# Patient Record
Sex: FEMALE | Race: BLACK OR AFRICAN AMERICAN | NOT HISPANIC OR LATINO | Employment: UNEMPLOYED | URBAN - METROPOLITAN AREA
[De-identification: names, ages, dates, MRNs, and addresses within clinical notes are randomized per-mention and may not be internally consistent; named-entity substitution may affect disease eponyms.]

---

## 2017-04-19 ENCOUNTER — TELEPHONE (OUTPATIENT)
Dept: NEUROLOGY | Facility: CLINIC | Age: 57
End: 2017-04-19

## 2017-04-19 NOTE — TELEPHONE ENCOUNTER
I called in refill script to on representative pharmacist because the patient called them desperate, about to run out

## 2017-06-01 ENCOUNTER — TELEPHONE (OUTPATIENT)
Dept: NEUROLOGY | Facility: CLINIC | Age: 57
End: 2017-06-01

## 2017-06-01 NOTE — TELEPHONE ENCOUNTER
----- Message from Naina Monzon sent at 5/31/2017  4:54 PM CDT -----  Contact: Patient [phone]  Patient calling to let office know that [facility] will not be paying for her clobazam (ONFI) 20 mg Tab, anymore and she wanted to know what she should do to continue to receive her medication. Please call.

## 2017-06-01 NOTE — TELEPHONE ENCOUNTER
I called Miss Francisco to get details. She is emailing me the letter she received from [employer]. I am completing the onfi PA assistance request form in hopes of receiving help from the company.

## 2017-06-13 ENCOUNTER — TELEPHONE (OUTPATIENT)
Dept: NEUROSURGERY | Facility: CLINIC | Age: 57
End: 2017-06-13

## 2017-06-14 NOTE — TELEPHONE ENCOUNTER
----- Message from Dominic Menezes sent at 6/13/2017 12:28 PM CDT -----  Contact: PT  Would like to schedule her post op appt, the next available was 7-18-17. She would like to come in sooner.    She would like to schedule between 8am-10am, due to distance.     Call: [phone]

## 2017-06-14 NOTE — TELEPHONE ENCOUNTER
Spoke with patient. Patient has been advised that she will not need to see Dr. Gonzalez until her Vagal Nerve Stimulator needs the battery changed. Patient has been informed that she needs to contact her PCP if she is having nose bleeding. Lesvia the VNS rep has been contacted and she will call patient about her VNS concerns.

## 2017-06-21 ENCOUNTER — TELEPHONE (OUTPATIENT)
Dept: NEUROLOGY | Facility: CLINIC | Age: 57
End: 2017-06-21

## 2017-06-21 NOTE — TELEPHONE ENCOUNTER
----- Message from Alejandra Gooden sent at 6/21/2017  8:37 AM CDT -----  Contact: self @ [phone]  Pt says onfi is not paying for her medication.  Pt would like to speak with someone.  pls call.

## 2017-06-26 ENCOUNTER — TELEPHONE (OUTPATIENT)
Dept: NEUROLOGY | Facility: CLINIC | Age: 57
End: 2017-06-26

## 2017-06-26 NOTE — TELEPHONE ENCOUNTER
Patient has bilateral temporal pain and weakness. took mucinex, didn't seem to help. she should follow up w/ her PCP or ENT per Dr. Hobbs. she understands and agrees the plan

## 2017-06-27 ENCOUNTER — TELEPHONE (OUTPATIENT)
Dept: NEUROLOGY | Facility: CLINIC | Age: 57
End: 2017-06-27

## 2017-06-27 NOTE — TELEPHONE ENCOUNTER
Shilo Olvera spoke to Pt       - Message from Tomy Santana sent at 6/26/2017 12:00 PM CDT -----  Contact: self  Pt stated that she is feeling weak after taking a mucinex she is requesting a call back at [phone].. She stated she had pain on both sides of her head which is why she took a mucinex ..

## 2017-07-20 ENCOUNTER — TELEPHONE (OUTPATIENT)
Dept: NEUROLOGY | Facility: CLINIC | Age: 57
End: 2017-07-20

## 2017-07-20 NOTE — TELEPHONE ENCOUNTER
Spoke to pt and advised much needed f/u to discuss medications and seizure activity. Pt requested I call Reji, son, to schedule pt. Cannot leave message on lenahs phone.

## 2017-07-20 NOTE — TELEPHONE ENCOUNTER
----- Message from Filiberto Mendes sent at 7/20/2017  2:38 PM CDT -----  Contact: patient @ [phone]  Pt said she had 2 seizures this morning and would like to speak with someone. Please call.

## 2017-07-21 ENCOUNTER — TELEPHONE (OUTPATIENT)
Dept: NEUROLOGY | Facility: CLINIC | Age: 57
End: 2017-07-21

## 2017-07-21 NOTE — TELEPHONE ENCOUNTER
Spoke to Pt she states understanding of her appt date and time      Message from Alejandra Gooden sent at 7/21/2017 11:09 AM CDT -----  Contact: self @ [phone]  Pt says she is trying to come in on Tuesday 7-25-17 not 8-1-17.  Pls call.

## 2017-07-21 NOTE — TELEPHONE ENCOUNTER
Spoke to Pt she states she will give us a call back for her appt die to her  work scheduled         - Message from Donya Gardiner sent at 7/21/2017 10:04 AM CDT -----  Contact: pt [phone]  Pt is calling to schedule an appt.pls call

## 2017-07-25 ENCOUNTER — OFFICE VISIT (OUTPATIENT)
Dept: NEUROLOGY | Facility: CLINIC | Age: 57
End: 2017-07-25
Payer: MEDICARE

## 2017-07-25 ENCOUNTER — TELEPHONE (OUTPATIENT)
Dept: NEUROLOGY | Facility: CLINIC | Age: 57
End: 2017-07-25

## 2017-07-25 VITALS
SYSTOLIC BLOOD PRESSURE: 118 MMHG | BODY MASS INDEX: 36.84 KG/M2 | HEART RATE: 90 BPM | DIASTOLIC BLOOD PRESSURE: 78 MMHG | HEIGHT: 65 IN | WEIGHT: 221.13 LBS

## 2017-07-25 DIAGNOSIS — G40.109 TEMPORAL LOBE EPILEPSY: ICD-10-CM

## 2017-07-25 DIAGNOSIS — Z79.899 ENCOUNTER FOR MONITORING ANTICONVULSANT THERAPY: Primary | ICD-10-CM

## 2017-07-25 DIAGNOSIS — Z51.81 ENCOUNTER FOR MONITORING ANTICONVULSANT THERAPY: Primary | ICD-10-CM

## 2017-07-25 PROCEDURE — 99999 PR PBB SHADOW E&M-EST. PATIENT-LVL III: CPT | Mod: PBBFAC,,, | Performed by: PSYCHIATRY & NEUROLOGY

## 2017-07-25 PROCEDURE — 99499 UNLISTED E&M SERVICE: CPT | Mod: S$PBB,,, | Performed by: PSYCHIATRY & NEUROLOGY

## 2017-07-25 PROCEDURE — 99213 OFFICE O/P EST LOW 20 MIN: CPT | Mod: PBBFAC | Performed by: PSYCHIATRY & NEUROLOGY

## 2017-07-25 PROCEDURE — 99213 OFFICE O/P EST LOW 20 MIN: CPT | Mod: S$PBB,,, | Performed by: PSYCHIATRY & NEUROLOGY

## 2017-07-25 NOTE — PROGRESS NOTES
History of Present Illness:  Ran out of Onfi in May   Last sz - past Thursday, prior to that she had 3 in 2016     Prior note:   Patient reports that she has had 4 seizures since last visit. She had 3 seizures on Wednesday and was evaluated by MANJULA. She reports that she has been taking benadryl for a rash. She is currently on Lamictal 125 mg daily and Zonsiamide 100 mg AM and 300 mg PM. She reports compliance with her medications. She has an appointment with Dr. Gonzalez on Tuesday for VNS.      Last Clinic Visit (5/19/16):  Patient reports that she has had 2 seizures since her last visit. She is currently taking Zonisamide 100 mg AM and 300 mg PM and Lamictal 75 mg daily. She reports that she has been complaint with her medications by using a pill box. She denies missing any medications when she had her seizures. She reports that she is still feeling fatigued but it is improving. She has an appointment with Dr. Gonzalez for VNS consult on 6/7/16. She also reports that someone from [facility] has been in contact with her about the possibility of her being able to get her medications cheaper.      Last Clinic Visit (4/28/16):  The patient is a 56 y.o. female who is here today to follow-up from EMU admission. Patient was discharged form the EMU on 4/16/16 on Zonisamide 200 mg BID and Onfi 20 mg BID. Patient was not able to afford Onfi and she ran out of medication on Saturday. She reports 2 seizures since discharge one on 4/23/16, she is not sure of when the other occurred. Zonisamide was increased on Monday to 300 mg BID since patient ran out of Onfi until she could be seen in clinic today to discuss other medication options. She reports since the increase that she is fatigued, has decreased appetite, and has trouble thinking. She reports that she is interested in VNS.      Epilepsy History  The patient is a 55 y.o. yo RHWF   The patient was initially referred for consultation by Dr. Palencia.      The patient was  accompanied by her mother who provided some of the history.   Patient reports that she began having spells about 6 years ago. The first spell she describes as staring for a few seconds. She reports that sometimes she is aware and sometimes she has loss of awareness, she reports that she has not had a staring episode in a few years. She describes her second type of spell as her left side feeling like it will give way, followed by running around and spitting. Her mother reports that this can last up to 10 minutes. She reported that these spells began 3 years ago and are currently happening about once a week. She reports that she had 2 of these spells on Monday. Denies tongue biting and incontinence during these spells. Triggers include sleep deprivation, cleaning odors, and certain foods. She also reports that when she uses her CPAP machine that she tends to have seizures. She is currently taking Zonisiamide 100 mg AM and 300 mg PM and Depakote 500 mg BID.      Seizure Seminology  Seizure Type 1  Classification:   Aura -   Ictus  - Nonconv - staring +/- loss of awarness  - Conv -  - Duration - few seconds   Post-ictal  - Symptoms  - Duration  Age of onset - 46 y/o  Current Seizure Frequency - Has not had one in a few years  Last Seizure     Seizure Type 2  Classification:   Aura - left side feels like it is going to give way  Ictus  - Nonconv - runs around, spitting; not aware pf what is going on  - Conv -  - Duration - 10 minutes  Post-ictal  - Symptoms - takes a nap  - Duration  Age of onset - 51 y/o  Current Seizure Frequency - 1 a week  Last Seizure - 6/2/16     Seizure Triggers  Sleep Deprivation - Yes  Other medications - None  Psych/stress - None  Photic stimulation - None  Hyperventilation - None  Medical Problems - None  Menses - No  Sensory Stimulation (light, sound, etc) - None  Missed dose of meds - None  Odors +     AED Treatments  Present regimen  Zonisamide 300 mg BID     7/15/16  PREVIOUS SETTINGS    Output Current  (MA)  0   Signal Freq  (Hz)  30    Pulse width  (u sec)  500    Signal on time  (sec)  30    Signal off time  (min)  5    Mag Current  (MA)  0    Mag on time  (sec)  30    Pulse width  (u sec)  500               NEW SETTINGS   Output Current  (MA)  0.25     Signal Freq  (Hz)  30    Pulse width  (u sec)  500    Signal on time  (sec)  30    Signal off time  (min)  5.0    Mag Current  (MA)  0.5    Mag on time  (sec)  60    Pulse width  (u sec)  500               Auto-stim Output Current  (MA)  0.375     Signal Freq  (Hz)  30    Pulse width  (u sec)  500    Signal on time  (sec)  30            Tachycardia detection    ON   Sensitivity    3   Threshold    40%              Model# [id number]   Serial# [id number]  Implanted 6/30/16  Impedence - Ok    Ohms - 3690  IFI - NO      Cough at 0.5 mA    Prior treatments  lamotrigine (Lamictal, LTG) - mild rash  levetiracetam (Keppra, LEV)  topiramate (Topamax, TPM)  Depakote 500 gm BID  Vimpat, Onfi, and Fycompa were too expensive      Not tried  acetazolamide (Diamox, AZM)  amantadine  carbamazepine (Tegretol, CBZ)  ethosuximide (Zarontin, ESM)  eslicarbazine (Aptiom, ESL)  felbamate (felbatol, FBM)  gabapentin (Neurontin, GPN)  methsuximide (Celontin, MSM)  methyphenytoin (Mesantion, MHT)  oxcarbazepine (Trileptal OXC)  phenobarbital (Pb)  phenytoin (Dilantin, PHT)  pregabalin (Lyrica, PGB)  primidone (Mysoline, PRM)  retigabine (Potiga, RTG)  rufinamide (Banzel, RUF)  tiagabine (Gabatril, TGB)  viagabatrin, (Sabril, VGB)  vagal nerve stimulator (VNS)  Benzodiazepines  diazepam - rectal (Diastatl)  diazepam - oral (Valium, DZ)  clonazepam (Klonopin, CZP)  clorazepate (Tranxene, CLZ)  Ativan  Brain Stimulation  Vagal Nerve Stimulation-n/a  DBS- n/a  Compliance method  Memory - yes  Mom or Spouse - Yes  Pill Box - no  Chris calendar - no  Turn over medication bottle - no  Phone alarm - no     Seizure Evaluation  EEG Routine - reports that she has had in the past and were  normal (reports not available to me)  EEG Ambulatory -   EEG\Video Monitoring -   4/7/16 - CLINICAL DESCRIPTION: At the beginning of seizure 1, the patient was lying in bed with her eyes closed and was in light sleep. With the onset of the electrographic seizure, she opened her eyes and looked slightly to the left. She then quickly got out of bed and walked out of the site of the camera towards the open door into the corridor. At the onset, she did fumble and tried to push the event button. The nursing and physician staff did guide her back into the room and she remained standing, but not responding appropriately to them till the end of the electrographic seizure. Clinical manifestations at the beginning of the second seizure were the same. She quickly got out of bed and tried to exit the room. The camera was changed and followed her and she was guided back into the room by the nursing staff. She was noted to have some orofacial automatisms. Seizure 3 also occurred while in bed asleep. She was noted to move a little in the bed, but did not get out of bed and ambulate as she did on the first 2 seizures. There were no automatisms or other motor behavior noted. The fourth seizure also occurred out of light sleep. Fifteen seconds into the seizure, she appeared to arouse and then turned over in bed, but no other behavioral manifestations were evident.     FINAL IMPRESSION:  Classification: Complex partial seizures.  Localization: Lateral frontal, anterior temporal.  Lateralization: Two seizures evolved from the right and they were longer with some overt clinical manifestations and 2 evolved from the left.     CLINICAL CORRELATION: The patient is a 55-year-old female who has been having what was thought to possibly be seizures for the last 6 years. They started with blank staring spells, but then the last 3 years, she has had episodes of falling and specifically her left side would appear to give way. She would thenrun  around and spit. The episodes were reported to last up to 10 minutes.  They were occurring about once a week. By history, they were triggered by use of her CPAP machine, swimming, cleaning fluids and certain foods. During the monitoring, she was exposed to the cleaning fluids that she had thought had caused the seizures in the past, but there were no seizures triggered.     MRI/MRA - 4/22/15 - slight asymetry of right temporal horn, per outside record in media tab (image not available to me)  CT/CTA Scan -   PET Scan -   Neuropsychological evaluation -   DEXA Scan     Potential Epilepsy Risk Factors:   Pregnancy/Labor/Delivery - full term uncomplicated pregnancy labor and vaginal delivery  Febrile seizures - none  Head injury - none  CNS infection - none   Stroke - none  Family Hx of Sz - + father     Review of Systems:  Constitutional: no fever or chills  Eyes: no visual changes  ENT: no nasal congestion or sore throat  Respiratory: no cough or shortness of breath  Cardiovascular: no chest pain or palpitations  Gastrointestinal: no nausea or vomiting, no abdominal pain or change in bowel habits  Genitourinary: no hematuria or dysuria  Integument/Breast: positive for rash, negative for dryness and pruritus  Musculoskeletal: no arthralgias or myalgias  Neurological: positive for headaches  Behavioral/Psych: no auditory or visual hallucinations     ALLERGIES: Ace inhibitors and Latex, natural rubber         OBJECTIVE:      Physical Exam:  General: well developed, well nourished  Eyes: conjunctivae/corneas clear. PERRL..  HENT: Head:normocephalic, atraumatic. Ears:right ear normal, left ear normal. Nose: no discharge. Throat: no throat erythema.  Skin: Skin color, texture, turgor normal. No rashes or lesions   Higher Cortical Function:   Patient is a well developed, pleasant, well groomed individual appearing their stated age  Oriented - intact to person, place and time and followed two step instruction correctly.    Memory - Intact  Fund of knowledge was appropriate.   R-L Orientation - Intact    Cranial Nerves II - XII: grossly normal   Motor: Power, bulk and tone were normal in all extremities.  Sensory: Light touch senses were normal in all extremities.   Coordination: Rapid alternating movements and rapid finger tapping - normal.   Gait: normal     ASSESSMENT/PLAN:      IMPRESSION:  1. Complex Partial Seizures - Right and left onset, localized to Lateral frontal, anterior temporal  2.  ABELARDO - uses CPAP on occasion at home  3.  HTN  - managed by PCP  4.  Hyperlipidemia - managed by PCP  5.  CAD - managed by PCP  Headaches       DISPOSITION:    1. Continue Zonisamide 300 mg BID   2. AED levels today  3, Will increase VNS settings during next visit     F/up in 2 months

## 2017-07-25 NOTE — TELEPHONE ENCOUNTER
----- Message from Filiberto Mendes sent at 7/25/2017 11:10 AM CDT -----  Contact: Self [phone]  Patient is requesting a return call regarding the appt today she coming to get a brace check but she do not have one piece of the brace, pls call ASAP before she make the long distance drive

## 2017-07-31 ENCOUNTER — TELEPHONE (OUTPATIENT)
Dept: NEUROLOGY | Facility: CLINIC | Age: 57
End: 2017-07-31

## 2017-07-31 NOTE — TELEPHONE ENCOUNTER
----- Message from Alejandra Gooden sent at 7/31/2017  9:02 AM CDT -----  Contact: self @ [phone]  Pt was advised to call and inform dr palomino if she had a seizure.  Pt says she had a seizure yesterday morning.  Pls call to discuss.

## 2017-07-31 NOTE — TELEPHONE ENCOUNTER
Called and spoke to Patient.She states she had a seizure yesterday morning.She was staring first and woke up-she was trying to get ready to go to [place].She was across the bed-daughter witnessed it but she is not available to provide details.No incontinence noted.She did not bite tongue.Last seizure stated was 2 weeks ago.She is taking Zionism 300 mg in am and 300 mg in PM.She states she feels tired today.

## 2017-08-01 DIAGNOSIS — R56.9 SEIZURES: Primary | ICD-10-CM

## 2017-08-01 NOTE — TELEPHONE ENCOUNTER
attempted to call pt back, no voice mail set up. i called 2nd #and left message to call us back. 3rd# also has no voice mail. Dr. Hobbs has ordered a zonisamide level to be drawn

## 2017-08-03 ENCOUNTER — TELEPHONE (OUTPATIENT)
Dept: NEUROLOGY | Facility: CLINIC | Age: 57
End: 2017-08-03

## 2017-08-03 NOTE — TELEPHONE ENCOUNTER
Returned Patient's call.She relates she feels agitated and can't sleep at all which she feels is related to the Zonisiamide.She relates she can't sleep at all.She is short of breath and will see Cardiology next week for a monitor placement.

## 2017-08-03 NOTE — TELEPHONE ENCOUNTER
Called and left message for Patient.Informed her to reduce her Zonisamide to 200 mg twice daily-and to notify clinic if she has any questions or concerns.

## 2017-08-28 RX ORDER — ZONISAMIDE 100 MG/1
300 CAPSULE ORAL 2 TIMES DAILY
Qty: 180 CAPSULE | Refills: 12 | Status: CANCELLED | OUTPATIENT
Start: 2017-08-28

## 2017-08-28 NOTE — TELEPHONE ENCOUNTER
----- Message from Naina Monzon sent at 8/28/2017 12:10 PM CDT -----  Contact: Patient [phone]  Patient is calling to request a refill on her zonisamide (ZONEGRAN) 100 MG Cap.    [facility] - [address] [address]  Phone: [phone] Fax: [phone]

## 2018-10-01 ENCOUNTER — CLINICAL SUPPORT (OUTPATIENT)
Dept: AUDIOLOGY | Facility: CLINIC | Age: 58
End: 2018-10-01
Payer: MEDICARE

## 2018-10-01 ENCOUNTER — OFFICE VISIT (OUTPATIENT)
Dept: OTOLARYNGOLOGY | Facility: CLINIC | Age: 58
End: 2018-10-01
Payer: MEDICARE

## 2018-10-01 VITALS
TEMPERATURE: 97 F | SYSTOLIC BLOOD PRESSURE: 145 MMHG | WEIGHT: 232.81 LBS | BODY MASS INDEX: 38.74 KG/M2 | HEART RATE: 64 BPM | DIASTOLIC BLOOD PRESSURE: 86 MMHG

## 2018-10-01 DIAGNOSIS — H92.03 ACUTE OTALGIA, BILATERAL: Primary | ICD-10-CM

## 2018-10-01 DIAGNOSIS — H92.03 OTALGIA OF BOTH EARS: Primary | ICD-10-CM

## 2018-10-01 DIAGNOSIS — M26.623 BILATERAL TEMPOROMANDIBULAR JOINT PAIN: ICD-10-CM

## 2018-10-01 PROCEDURE — 99999 PR PBB SHADOW E&M-EST. PATIENT-LVL III: CPT | Mod: PBBFAC,,, | Performed by: ORTHOPAEDIC SURGERY

## 2018-10-01 PROCEDURE — 99203 OFFICE O/P NEW LOW 30 MIN: CPT | Mod: S$PBB,,, | Performed by: ORTHOPAEDIC SURGERY

## 2018-10-01 PROCEDURE — 99999 PR PBB SHADOW E&M-EST. PATIENT-LVL I: CPT | Mod: PBBFAC,,, | Performed by: AUDIOLOGIST-HEARING AID FITTER

## 2018-10-01 PROCEDURE — 92557 COMPREHENSIVE HEARING TEST: CPT | Mod: PBBFAC | Performed by: AUDIOLOGIST-HEARING AID FITTER

## 2018-10-01 PROCEDURE — 99213 OFFICE O/P EST LOW 20 MIN: CPT | Mod: PBBFAC,27 | Performed by: ORTHOPAEDIC SURGERY

## 2018-10-01 PROCEDURE — 99211 OFF/OP EST MAY X REQ PHY/QHP: CPT | Mod: PBBFAC,25 | Performed by: AUDIOLOGIST-HEARING AID FITTER

## 2018-10-01 PROCEDURE — 92567 TYMPANOMETRY: CPT | Mod: PBBFAC | Performed by: AUDIOLOGIST-HEARING AID FITTER

## 2018-10-01 NOTE — PROGRESS NOTES
Subjective:       Patient ID: Evelyne Francisco is a 58 y.o. female.    Chief Complaint: Otalgia    Patient is a very pleasant 58 year old female her to see me today for the first time for evaluation of pain in her both ears, left worse than right.  She says that it has been an issue since May.  She has not seen any ear drainage, or had any issue with hearing loss or tinnitus.  She has been using ear drops with no improvement in her symptoms.  She says that her pain is worse at night, but is also present during the day as well.  She has pain over her right jaw at times with yawning or laughing.  She does not think that she grinds her teeth at night.  She has not seen her dentist in many years, more than 10 years.  She has a history of seizures, sees Neurology.      Review of Systems   Constitutional: Negative for chills, fatigue, fever and unexpected weight change.   HENT: Negative for congestion, dental problem, ear discharge, ear pain, facial swelling, hearing loss, nosebleeds, postnasal drip, rhinorrhea, sinus pressure, sneezing, sore throat, tinnitus, trouble swallowing and voice change.    Eyes: Negative for redness, itching and visual disturbance.   Respiratory: Positive for shortness of breath. Negative for cough, choking and wheezing.    Cardiovascular: Negative for chest pain and palpitations.   Gastrointestinal: Negative for abdominal pain.        No reflux.   Musculoskeletal: Negative for gait problem.   Skin: Negative for rash.   Neurological: Negative for dizziness, light-headedness and headaches.       Objective:      Physical Exam   Constitutional: She is oriented to person, place, and time. She appears well-developed and well-nourished. No distress.   HENT:   Head: Normocephalic and atraumatic.   Right Ear: Tympanic membrane, external ear and ear canal normal.   Left Ear: Tympanic membrane, external ear and ear canal normal.   Nose: Nose normal. No mucosal edema, rhinorrhea, nasal deformity or septal  deviation. No epistaxis. Right sinus exhibits no maxillary sinus tenderness and no frontal sinus tenderness. Left sinus exhibits no maxillary sinus tenderness and no frontal sinus tenderness.   Mouth/Throat: Uvula is midline, oropharynx is clear and moist and mucous membranes are normal. Mucous membranes are not pale and not dry. No dental caries. No oropharyngeal exudate or posterior oropharyngeal erythema.   Tender to palpation anterior to tragus, worse with jaw movement, popping of right TM joint with movement   Eyes: Conjunctivae, EOM and lids are normal. Pupils are equal, round, and reactive to light. Right eye exhibits no chemosis. Left eye exhibits no chemosis. Right conjunctiva is not injected. Left conjunctiva is not injected. No scleral icterus. Right eye exhibits normal extraocular motion and no nystagmus. Left eye exhibits normal extraocular motion and no nystagmus.   Neck: Trachea normal and phonation normal. No tracheal tenderness present. No tracheal deviation present. No thyroid mass and no thyromegaly present.   Cardiovascular: Intact distal pulses.   Pulmonary/Chest: Effort normal. No stridor. No respiratory distress.   Abdominal: She exhibits no distension.   Lymphadenopathy:        Head (right side): No submental, no submandibular, no preauricular, no posterior auricular and no occipital adenopathy present.        Head (left side): No submental, no submandibular, no preauricular, no posterior auricular and no occipital adenopathy present.     She has no cervical adenopathy.   Neurological: She is alert and oriented to person, place, and time. No cranial nerve deficit.   Skin: Skin is warm and dry. No rash noted. No erythema.   Psychiatric: She has a normal mood and affect. Her behavior is normal.           AUDIOLOGY:  Results reveal normal hearing from 250-8000 Hz in each ear.   Speech Reception Thresholds were  10 dBHL for the right ear and 10 dBHL for the left ear.   Word recognition scores  were excellent for the right ear and excellent for the left ear.   Tympanograms were Type As, shallow for the right ear and Type As, shallow for the left ear.    Assessment:       1. Acute otalgia, bilateral    2. Bilateral temporomandibular joint pain        Plan:       1.  Acute otalgia:  Reassured patient that her ear exam today is normal, and does not show any cause for her ear pain.  We had a long discussion regarding the underlying pathology of temporomandibular joint dysfunction (TMD) as the cause of ear pain.  We further discussed conservative measures to treat TMD including avoiding gum and other foods that require lots of chewing, warm compresses, and scheduled antinflammatories.  If the pain persists, the patient will then schedule an appointment with a dentist for further evaluation.  She has not seen a dentist in over 10 years, and I think that is the main trigger for her ear pain.  2.  Bilateral TMJ:  As above.

## 2018-10-01 NOTE — PROGRESS NOTES
Referring Provider: Dr. Tyree Francisco was seen 10/01/2018 for an audiological evaluation.  Patient complains of ear pain since May 2018. She reports taking medication from her primary care physician but symptoms were not alleviated. She denies hearing loss, tinnitus, vertigo, and aural fullness. She does report sinus trouble regularly.     Results reveal normal hearing from 250-8000 Hz in each ear.   Speech Reception Thresholds were  10 dBHL for the right ear and 10 dBHL for the left ear.   Word recognition scores were excellent for the right ear and excellent for the left ear.   Tympanograms were Type As, shallow for the right ear and Type As, shallow for the left ear.    Patient was counseled on the above findings.    Recommendations:  1. ENT  2. Audiograms as needed.  3. Hearing protection in noise.

## 2018-10-01 NOTE — LETTER
October 1, 2018        Edouard Ovalle III, MD  [address]  [facility] [address]             [facility]  [address] [place] [id number]  Phone: [phone]  Fax: [phone]   Patient: Evelyne Francisco   MR Number: [id number]   YOB: 1960   Date of Visit: 10/1/2018       Dear Dr. Ovalle:    Thank you for referring Evelyne Francisco to me for evaluation. Attached you will find relevant portions of my assessment and plan of care.    If you have questions, please do not hesitate to call me. I look forward to following Evelyne Francisco along with you.    Sincerely,      Emily Clements MD          CC  No Recipients    Enclosure

## 2018-10-09 ENCOUNTER — TELEPHONE (OUTPATIENT)
Dept: OTOLARYNGOLOGY | Facility: CLINIC | Age: 58
End: 2018-10-09

## 2018-10-09 NOTE — TELEPHONE ENCOUNTER
I called the patient back and conveyed to her that neither us nor the  staff found her id or insurance card.  Patient thanked me for calling her back.

## 2018-10-09 NOTE — TELEPHONE ENCOUNTER
----- Message from Esteban Cyr sent at 10/9/2018 11:30 AM CDT -----  Contact: [phone]   Pt would like to know if ID card and insurance card was left.  Please call back at [phone].  x-

## 2018-11-29 ENCOUNTER — OFFICE VISIT (OUTPATIENT)
Dept: DERMATOLOGY | Facility: CLINIC | Age: 58
End: 2018-11-29
Payer: MEDICARE

## 2018-11-29 DIAGNOSIS — L30.9 DERMATITIS: Primary | ICD-10-CM

## 2018-11-29 PROCEDURE — 99213 OFFICE O/P EST LOW 20 MIN: CPT | Mod: S$PBB,,, | Performed by: PHYSICIAN ASSISTANT

## 2018-11-29 PROCEDURE — 99999 PR PBB SHADOW E&M-EST. PATIENT-LVL II: CPT | Mod: PBBFAC,,, | Performed by: PHYSICIAN ASSISTANT

## 2018-11-29 PROCEDURE — 99212 OFFICE O/P EST SF 10 MIN: CPT | Mod: PBBFAC,PO | Performed by: PHYSICIAN ASSISTANT

## 2018-11-29 RX ORDER — TRIAMCINOLONE ACETONIDE 1 MG/G
CREAM TOPICAL 2 TIMES DAILY
Qty: 45 G | Refills: 1 | Status: SHIPPED | OUTPATIENT
Start: 2018-11-29 | End: 2021-04-20

## 2018-11-29 NOTE — PROGRESS NOTES
"  Subjective:       Patient ID:  Evelyne Francisco is a 58 y.o. female who presents for   Chief Complaint   Patient presents with    Eczema     c/o eczema all over body x years thats really itchy tx aveeno eczema relief      History of Present Illness: The patient presents with chief complaint of "eczema". Describes clusters of red bumps that are itchy, and spread with scratching, then leave dark discoloration. Flares monthly. Biopsy in 2016 by Dr. Anderson for similar rash.   Location: generalized body (worse on bilateral knees, elbows, and lower shins)  Duration: years   Signs/Symptoms: itching, bumps, redness, discoloration; Aggravating factors: hot showers, soap with fragrance  Prior treatments: aveeno eczema relief, cortisone injection- helped temporarily, OTC HC,     Denies Hx of eczema as child.    Biopsy Result 6/7/16-  FINAL PATHOLOGIC DIAGNOSIS  1. 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  MICROSCOPIC DESCRIPTION: Sections show focal epidermal spongiosis and a superficial and deep perivascular  and interstitial infiltrate with numerous eosinophils.  COMMENT: These changes are consistent with an arthropod assault.          Review of Systems   Constitutional: Negative for fever and chills.   Gastrointestinal: Negative for nausea and vomiting.   Skin: Positive for itching, rash and dry skin. Negative for daily sunscreen use, activity-related sunscreen use and recent sunburn.   Hematologic/Lymphatic: Does not bruise/bleed easily.        Objective:    Physical Exam   Constitutional: She appears well-developed and well-nourished. No distress.   Neurological: She is alert and oriented to person, place, and time. She is not disoriented.   Psychiatric: She has a normal mood and affect.   Skin:   Areas Examined (abnormalities noted in diagram):   Head / Face Inspection Performed  Neck Inspection Performed  Chest / " Axilla Inspection Performed  Abdomen Inspection Performed  Genitals / Buttocks / Groin Inspection Performed  Back Inspection Performed  RUE Inspected  LUE Inspection Performed  RLE Inspected  LLE Inspection Performed  Nails and Digits Inspection Performed              Diagram Legend     Erythematous scaling macule/papule c/w actinic keratosis       Vascular papule c/w angioma      Pigmented verrucoid papule/plaque c/w seborrheic keratosis      Yellow umbilicated papule c/w sebaceous hyperplasia      Irregularly shaped tan macule c/w lentigo     1-2 mm smooth white papules consistent with Milia      Movable subcutaneous cyst with punctum c/w epidermal inclusion cyst      Subcutaneous movable cyst c/w pilar cyst      Firm pink to brown papule c/w dermatofibroma      Pedunculated fleshy papule(s) c/w skin tag(s)      Evenly pigmented macule c/w junctional nevus     Mildly variegated pigmented, slightly irregular-bordered macule c/w mildly atypical nevus      Flesh colored to evenly pigmented papule c/w intradermal nevus       Pink pearly papule/plaque c/w basal cell carcinoma      Erythematous hyperkeratotic cursted plaque c/w SCC      Surgical scar with no sign of skin cancer recurrence      Open and closed comedones      Inflammatory papules and pustules      Verrucoid papule consistent consistent with wart     Erythematous eczematous patches and plaques     Dystrophic onycholytic nail with subungual debris c/w onychomycosis     Umbilicated papule    Erythematous-base heme-crusted tan verrucoid plaque consistent with inflamed seborrheic keratosis     Erythematous Silvery Scaling Plaque c/w Psoriasis     See annotation      Assessment / Plan:        Dermatitis  Ddx: insect bite reaction vs. other  -     triamcinolone acetonide 0.1% (KENALOG) 0.1 % cream; Apply topically 2 (two) times daily.  Dispense: 45 g; Refill: 1  Will start above med bid prn itching. Discussed gentle skin care. Discussed previous biopsy findings  and the potential for sensitivity to insect bites. Encouraged patient to try to identify any potential triggers for rash or exposures.  Consider repeat biopsy in the future.          Follow-up in about 4 weeks (around 12/27/2018) for dermatitis.

## 2018-11-29 NOTE — PROGRESS NOTES
Subjective:       Patient ID:  Evelyne Francisco is a 58 y.o. female who presents for   Chief Complaint   Patient presents with    Eczema     c/o eczema all over body x years thats really itchy tx aveeno eczema relief      History of Present Illness: The patient presents with chief complaint of eczema .  Location: all over body   Duration: years   Signs/Symptoms: itch   Prior treatments: aveeno eczema relief           Review of Systems     Objective:    Physical Exam       Diagram Legend     Erythematous scaling macule/papule c/w actinic keratosis       Vascular papule c/w angioma      Pigmented verrucoid papule/plaque c/w seborrheic keratosis      Yellow umbilicated papule c/w sebaceous hyperplasia      Irregularly shaped tan macule c/w lentigo     1-2 mm smooth white papules consistent with Milia      Movable subcutaneous cyst with punctum c/w epidermal inclusion cyst      Subcutaneous movable cyst c/w pilar cyst      Firm pink to brown papule c/w dermatofibroma      Pedunculated fleshy papule(s) c/w skin tag(s)      Evenly pigmented macule c/w junctional nevus     Mildly variegated pigmented, slightly irregular-bordered macule c/w mildly atypical nevus      Flesh colored to evenly pigmented papule c/w intradermal nevus       Pink pearly papule/plaque c/w basal cell carcinoma      Erythematous hyperkeratotic cursted plaque c/w SCC      Surgical scar with no sign of skin cancer recurrence      Open and closed comedones      Inflammatory papules and pustules      Verrucoid papule consistent consistent with wart     Erythematous eczematous patches and plaques     Dystrophic onycholytic nail with subungual debris c/w onychomycosis     Umbilicated papule    Erythematous-base heme-crusted tan verrucoid plaque consistent with inflamed seborrheic keratosis     Erythematous Silvery Scaling Plaque c/w Psoriasis     See annotation      Assessment / Plan:        There are no diagnoses linked to this encounter.         No  Follow-up on file.

## 2018-11-29 NOTE — PATIENT INSTRUCTIONS
Use a mild gentle soap such as Dove for Sensitive Skin or Cetaphil Gentle Cleanser.  Recommend fragrance free and hypoallergenic skin care products.  Shower or bathe once daily. Limit duration to 5 minutes with lukewarm water.  Apply moisturizer immediately after showering.  Some good moisturizers to try are Cetaphil, CeraVe, or Aveeno.

## 2020-09-23 ENCOUNTER — PATIENT MESSAGE (OUTPATIENT)
Dept: NEUROLOGY | Facility: CLINIC | Age: 60
End: 2020-09-23

## 2020-09-23 ENCOUNTER — OFFICE VISIT (OUTPATIENT)
Dept: NEUROLOGY | Facility: CLINIC | Age: 60
End: 2020-09-23
Payer: MEDICARE

## 2020-09-23 VITALS
SYSTOLIC BLOOD PRESSURE: 138 MMHG | RESPIRATION RATE: 16 BRPM | HEART RATE: 90 BPM | WEIGHT: 238.56 LBS | HEIGHT: 65 IN | DIASTOLIC BLOOD PRESSURE: 40 MMHG | BODY MASS INDEX: 39.75 KG/M2

## 2020-09-23 DIAGNOSIS — I25.10 CORONARY ARTERY DISEASE INVOLVING NATIVE CORONARY ARTERY WITHOUT ANGINA PECTORIS, UNSPECIFIED WHETHER NATIVE OR TRANSPLANTED HEART: Chronic | ICD-10-CM

## 2020-09-23 DIAGNOSIS — G40.919 REFRACTORY EPILEPSY: ICD-10-CM

## 2020-09-23 DIAGNOSIS — G40.919 REFRACTORY EPILEPSY: Primary | ICD-10-CM

## 2020-09-23 DIAGNOSIS — R56.9 CONVULSIONS, UNSPECIFIED CONVULSION TYPE: ICD-10-CM

## 2020-09-23 DIAGNOSIS — G40.219 PARTIAL SYMPTOMATIC EPILEPSY WITH COMPLEX PARTIAL SEIZURES, INTRACTABLE, WITHOUT STATUS EPILEPTICUS: ICD-10-CM

## 2020-09-23 DIAGNOSIS — Z79.899 ENCOUNTER FOR MONITORING ANTICONVULSANT THERAPY: ICD-10-CM

## 2020-09-23 DIAGNOSIS — E78.5 HYPERLIPIDEMIA, UNSPECIFIED HYPERLIPIDEMIA TYPE: Chronic | ICD-10-CM

## 2020-09-23 DIAGNOSIS — Z51.81 ENCOUNTER FOR MONITORING ANTICONVULSANT THERAPY: ICD-10-CM

## 2020-09-23 DIAGNOSIS — R79.89 ABNORMAL TSH: ICD-10-CM

## 2020-09-23 DIAGNOSIS — G40.019 PARTIAL IDIOPATHIC EPILEPSY WITH SEIZURES OF LOCALIZED ONSET, INTRACTABLE, WITHOUT STATUS EPILEPTICUS: ICD-10-CM

## 2020-09-23 DIAGNOSIS — R21 RASH: ICD-10-CM

## 2020-09-23 DIAGNOSIS — I10 ESSENTIAL HYPERTENSION: Chronic | ICD-10-CM

## 2020-09-23 PROCEDURE — 99214 OFFICE O/P EST MOD 30 MIN: CPT | Mod: PBBFAC | Performed by: PSYCHIATRY & NEUROLOGY

## 2020-09-23 PROCEDURE — 99205 OFFICE O/P NEW HI 60 MIN: CPT | Mod: S$PBB,,, | Performed by: PSYCHIATRY & NEUROLOGY

## 2020-09-23 PROCEDURE — 99205 PR OFFICE/OUTPT VISIT, NEW, LEVL V, 60-74 MIN: ICD-10-PCS | Mod: S$PBB,,, | Performed by: PSYCHIATRY & NEUROLOGY

## 2020-09-23 PROCEDURE — 99999 PR PBB SHADOW E&M-EST. PATIENT-LVL IV: ICD-10-PCS | Mod: PBBFAC,,, | Performed by: PSYCHIATRY & NEUROLOGY

## 2020-09-23 PROCEDURE — 99999 PR PBB SHADOW E&M-EST. PATIENT-LVL IV: CPT | Mod: PBBFAC,,, | Performed by: PSYCHIATRY & NEUROLOGY

## 2020-09-23 RX ORDER — LEVOTHYROXINE SODIUM 100 UG/1
100 TABLET ORAL
COMMUNITY
End: 2021-01-15 | Stop reason: SDUPTHER

## 2020-09-23 RX ORDER — NITROFURANTOIN (MACROCRYSTALS) 100 MG/1
CAPSULE ORAL
COMMUNITY
Start: 2020-09-03 | End: 2020-10-11 | Stop reason: ALTCHOICE

## 2020-09-23 RX ORDER — ZONISAMIDE 100 MG/1
300 CAPSULE ORAL 2 TIMES DAILY
Qty: 180 CAPSULE | Refills: 11 | Status: SHIPPED | OUTPATIENT
Start: 2020-09-23 | End: 2021-10-06

## 2020-09-23 RX ORDER — CLOBAZAM 20 MG/1
20 TABLET ORAL 2 TIMES DAILY
Qty: 180 TABLET | Refills: 5 | Status: SHIPPED | OUTPATIENT
Start: 2020-09-23 | End: 2020-10-12

## 2020-09-23 RX ORDER — CLOPIDOGREL BISULFATE 75 MG/1
75 TABLET ORAL DAILY
COMMUNITY
End: 2022-05-02 | Stop reason: SDUPTHER

## 2020-09-23 RX ORDER — POTASSIUM CHLORIDE 750 MG/1
10 CAPSULE, EXTENDED RELEASE ORAL ONCE
COMMUNITY

## 2020-09-23 RX ORDER — NAPROXEN SODIUM 220 MG/1
81 TABLET, FILM COATED ORAL DAILY
COMMUNITY
End: 2021-02-23

## 2020-09-23 RX ORDER — LEVOCETIRIZINE DIHYDROCHLORIDE 5 MG/1
5 TABLET, FILM COATED ORAL NIGHTLY
COMMUNITY
End: 2020-12-07 | Stop reason: SDUPTHER

## 2020-09-23 RX ORDER — MIRABEGRON 25 MG/1
25 TABLET, FILM COATED, EXTENDED RELEASE ORAL DAILY
COMMUNITY
End: 2020-12-07

## 2020-09-23 RX ORDER — METOPROLOL SUCCINATE 50 MG/1
50 TABLET, EXTENDED RELEASE ORAL DAILY
COMMUNITY
End: 2020-12-14 | Stop reason: SDUPTHER

## 2020-09-23 RX ORDER — CLOBAZAM 20 MG/1
20 TABLET ORAL 2 TIMES DAILY
Qty: 180 TABLET | Refills: 5 | Status: SHIPPED | OUTPATIENT
Start: 2020-09-23 | End: 2020-09-23 | Stop reason: SDUPTHER

## 2020-09-23 RX ORDER — PHENYTOIN SODIUM 100 MG/1
CAPSULE, EXTENDED RELEASE ORAL 2 TIMES DAILY
COMMUNITY
End: 2020-09-23 | Stop reason: SDUPTHER

## 2020-09-23 RX ORDER — PHENYTOIN SODIUM 100 MG/1
100 CAPSULE, EXTENDED RELEASE ORAL 2 TIMES DAILY
Qty: 180 CAPSULE | Refills: 11 | Status: SHIPPED | OUTPATIENT
Start: 2020-09-23 | End: 2020-10-12 | Stop reason: SDUPTHER

## 2020-09-23 RX ORDER — ESTRADIOL 2 MG/1
2 TABLET ORAL DAILY
COMMUNITY
End: 2021-08-17 | Stop reason: SDUPTHER

## 2020-09-23 NOTE — PATIENT INSTRUCTIONS
Living Well with Epilepsy  People with epilepsy can lead healthy, productive lives. Life with epilepsy can be challenging, but there are things you can do to make it easier. For example, you can pay attention to your emotions. If you feel down, upset, or scared, talk to your healthcare provider. And be open with the people in your life. Talking about epilepsy can help them understand. It can also help you feel better.  Coping with emotions  You may be scared to go out in public for fear of having a seizure. Or you may just get frustrated with having epilepsy. Such feelings are normal. But they can lead to anxiety and depression. Treatment is available for these conditions, so talk to your healthcare provider. Discuss what can help you, such as the following:  · Support groups give you the chance to talk with other people who have epilepsy.  · Counseling helps you learn to cope with your emotions and health problems.  · Medicine can help if you have a mood disorder.     Recognizing signs of depression  Depression is an illness that affects your thoughts and feelings. It can be caused by trouble coping with epilepsy, and sometimes it may be caused by the medicines used to treat it. Depression can be serious. If you have any of the following, call your healthcare provider:  · Feeling down most of the time  · Feeling hopeless or helpless  · Losing pleasure in things you used to enjoy  · Sleeping less or more than usual  · Having a big change in appetite or weight  · Having trouble focusing, remembering, or making decisions  · Isolating yourself from friends or family    Coping at home  Epilepsy affects those around you, too. Talk with your loved ones and learn their concerns. For instance, your children may be afraid for your safety. Reassure them that you can live a long, healthy life with epilepsy. Your partner may wonder if a normal sex life is possible. Let him or her know that epilepsy doesnt have to affect  intimacy. If loved ones have questions, you can always arrange a talk with your healthcare provider.  Epilepsy and your job  Epilepsy doesnt have to keep you from working. In fact, people with epilepsy hold many kinds of jobs. But there are some issues you should consider, such as:  · What kind of work can I do? This depends on several things, like how well controlled your seizures are. Also consider whether the job involves tasks that may not be safe for you. These include driving or operating heavy machinery.  · Should I tell my boss or coworkers about my epilepsy? This is your personal choice. But you may be safer if people at your workplace are prepared to respond to a seizure. If you are concerned about losing your job, know your rights. The Americans with Disabilities Act provides work-related protections for people with epilepsy.  Date Last Reviewed: 9/10/2015  © 6808-3769 [employer]. [address] [place] [address], [place] [address]. All rights reserved. This information is not intended as a substitute for professional medical care. Always follow your healthcare professional's instructions.

## 2020-09-23 NOTE — PROGRESS NOTES
Subjective:       Patient ID: Evelyne Francisco is a 60 y.o. female.    Chief Complaint: Seizures          HPI       The patient is here for seizure evaluation and a 4th opinion.    Saw Dr. Monge, Dr. Hobbs and Dr. Smith in the past.     The patient started having seizures 11 years ago (2009) at the age of 49 .The patient describes aura of strange feeling in her head that she cannot describe. The patient is amnestic to the seizure after that. The seizures are mostly described as Complex Partial (staring, unresponsiveness, sometimes accompanied by spitting) and some of the them Grand Mal (GTC). The patient is currently not driving and has 2-5 seizure a month. No history of meningitis or encephalitis No toxic exposure or lead poisoning. Her father had posttraumatic family history of epilepsy and her nephew might be having seizures.  No history of strokes or aneurysmal bleeding. No history of TBI. Routine EEGs have been normal. EMU in 2016 captured 2 seizures with B/L TL localization. Brain MRI was reported as unremarkable. Failed VPA, LTG, OXC, LEV and VNS. She is currently on  mg BID and  mg BID. LCM,  ESL and BRV were too expensive. She says that CLB helped control the seizures but the insurance would not pay for it.         Review of Systems   Constitutional: Negative for appetite change and fatigue.   HENT: Negative for hearing loss and tinnitus.    Eyes: Negative for photophobia and visual disturbance.   Respiratory: Negative for apnea and shortness of breath.    Cardiovascular: Negative for chest pain and palpitations.   Gastrointestinal: Negative for nausea and vomiting.   Endocrine: Negative for cold intolerance and heat intolerance.   Genitourinary: Negative for difficulty urinating and urgency.   Musculoskeletal: Negative for arthralgias, back pain, gait problem, joint swelling, myalgias, neck pain and neck stiffness.   Skin: Negative for color change and rash.   Allergic/Immunologic:  Negative for environmental allergies and immunocompromised state.   Neurological: Positive for seizures. Negative for dizziness, tremors, syncope, facial asymmetry, speech difficulty, weakness, light-headedness, numbness and headaches.   Hematological: Negative for adenopathy. Does not bruise/bleed easily.   Psychiatric/Behavioral: Negative for agitation, behavioral problems, confusion, decreased concentration, dysphoric mood, hallucinations, self-injury, sleep disturbance and suicidal ideas. The patient is not hyperactive.              Current Outpatient Medications:     aspirin 81 MG Chew, Take 81 mg by mouth once daily., Disp: , Rfl:     atorvastatin (LIPITOR) 80 MG tablet, Take 80 mg by mouth nightly. , Disp: , Rfl:     clopidogreL (PLAVIX) 75 mg tablet, Take 75 mg by mouth once daily., Disp: , Rfl:     estradioL (ESTRACE) 2 MG tablet, Take 2 mg by mouth once daily., Disp: , Rfl:     isosorbide mononitrate (IMDUR) 60 MG 24 hr tablet, Take 60 mg by mouth every morning. , Disp: , Rfl:     Lactobacillus rhamnosus GG (CULTURELLE) 10 billion cell capsule, Take 1 capsule by mouth once daily., Disp: , Rfl:     levocetirizine (XYZAL) 5 MG tablet, Take 5 mg by mouth every evening., Disp: , Rfl:     levothyroxine (SYNTHROID) 100 MCG tablet, Take 100 mcg by mouth before breakfast., Disp: , Rfl:     metoprolol succinate (TOPROL-XL) 50 MG 24 hr tablet, Take 50 mg by mouth once daily., Disp: , Rfl:     mirabegron (MYRBETRIQ) 25 mg Tb24 ER tablet, Take 25 mg by mouth once daily., Disp: , Rfl:     nitrofurantoin (MACRODANTIN) 100 MG capsule, TAKE ONE CAPSULE TWICE DAILY FOR 10 DAYS, THEN ONE (1) CAPSULE AT BEDTIME, Disp: , Rfl:     pantoprazole (PROTONIX) 40 MG tablet, Take 40 mg by mouth every morning. , Disp: , Rfl:     phenytoin (DILANTIN) 100 MG ER capsule, Take 1 capsule (100 mg total) by mouth 2 (two) times daily., Disp: 180 capsule, Rfl: 11    potassium chloride (MICRO-K) 10 MEQ CpSR, Take 10 mEq by mouth  once., Disp: , Rfl:     ranolazine (RANEXA) 500 MG Tb12, Take 1,000 mg by mouth 2 (two) times daily. 1000 mg every morning; 1000 mg every evening, Disp: , Rfl:     zonisamide (ZONEGRAN) 100 MG Cap, Take 3 capsules (300 mg total) by mouth 2 (two) times daily., Disp: 180 capsule, Rfl: 11    cloBAZam (ONFI) 20 mg Tab, Take 1 tablet (20 mg total) by mouth 2 (two) times daily., Disp: 180 tablet, Rfl: 5    triamcinolone acetonide 0.1% (KENALOG) 0.1 % cream, Apply topically 2 (two) times daily., Disp: 45 g, Rfl: 1  Past Medical History:   Diagnosis Date    Blood clotting tendency     Coronary artery disease     Fever blister     HLD (hyperlipidemia)     Hypertension     Seizures     Temporal lobe epilepsy 2016     Past Surgical History:   Procedure Laterality Date    ABDOMINAL SURGERY      BACK SURGERY      CARDIAC SURGERY      FRACTURE SURGERY      HYSTERECTOMY      TONSILLECTOMY      VASCULAR SURGERY       Social History     Socioeconomic History    Marital status:      Spouse name: Not on file    Number of children: Not on file    Years of education: Not on file    Highest education level: Not on file   Occupational History    Not on file   Social Needs    Financial resource strain: Not on file    Food insecurity     Worry: Not on file     Inability: Not on file    Transportation needs     Medical: Not on file     Non-medical: Not on file   Tobacco Use    Smoking status: Former Smoker     Quit date: 2014     Years since quittin.2    Smokeless tobacco: Never Used   Substance and Sexual Activity    Alcohol use: No    Drug use: No    Sexual activity: Yes     Partners: Male   Lifestyle    Physical activity     Days per week: Not on file     Minutes per session: Not on file    Stress: Not on file   Relationships    Social connections     Talks on phone: Not on file     Gets together: Not on file     Attends [employer] service: Not on file     Active member of club or  organization: Not on file     Attends meetings of clubs or organizations: Not on file     Relationship status: Not on file   Other Topics Concern    Are you pregnant or think you may be? Not Asked    Breast-feeding Not Asked   Social History Narrative    Not on file             Past/Current Medical/Surgical History, Past/Current Social History, Past/Current Family History and Past/Current Medications were reviewed in detail.        Objective:           VITAL SIGNS WERE REVIEWED      GENERAL APPEARANCE:     The patient looks comfortable.    Body habitus is obese.     No signs of respiratory distress.    Normal breathing pattern.    No dysmorphic features    Normal eye contact.     GENERAL MEDICAL EXAM:    HEENT:  Head is atraumatic normocephalic.     No tender temporal arteries. Fundoscopic (Ophthalmoscopic) exam showed no disc edema.      Neck and Axillae: No JVD. No visible lesions.    No carotid bruits. No thyromegaly. No lymphadenopathy.    Cardiopulmonary: No cyanosis. No tachypnea. Normal respiratory effort.VNS in place.    Clear breath sounds. S1, S2 with regular rhythm . No murmurs.     Gastrointestinal/Urogenital:  No jaundice. No stomas or lesions. No visible hernias. No catheters.     Abdomen is soft non-tender. No masses or organomegaly.    Skin, Hair and Nails: No pathognonomic skin rash. No neurofibromatosis. No visible lesions.No stigmata of autoimmune disease. No clubbing.    Skin is warm and moist. No palpable masses.    Limbs: No varicose veins. No visible swelling.    No palpable edema. Pulses are symmetric. Pedal pulses are palpable.      Muskoskeletal: No visible deformities.No visible lesions.    No spine tenderness. No signs of longstanding neuropathy. No dislocations or fractures.            Neurologic Exam     Mental Status   Oriented to person, place, and time.   Registration: recalls 3 of 3 objects. Recall at 5 minutes: recalls 3 of 3 objects. Follows 3 step commands.   Attention:  normal. Concentration: normal.   Speech: speech is normal   Level of consciousness: alert  Knowledge: good and consistent with education. Able to perform simple calculations.   Able to name object. Able to read. Able to repeat. Able to write. Normal comprehension.     Cranial Nerves   Cranial nerves II through XII intact.     CN II   Visual fields full to confrontation.   Visual acuity: normal with correction  Right visual field deficit: none  Left visual field deficit: none     CN III, IV, VI   Pupils are equal, round, and reactive to light.  Extraocular motions are normal.   Right pupil: Size: 2 mm. Shape: regular. Reactivity: brisk. Consensual response: intact. Accommodation: intact.   Left pupil: Size: 2 mm. Shape: regular. Reactivity: brisk. Consensual response: intact. Accommodation: intact.   CN III: no CN III palsy  CN VI: no CN VI palsy  Nystagmus: none   Diplopia: none  Ophthalmoparesis: none  Upgaze: normal  Downgaze: normal  Conjugate gaze: present  Vestibulo-ocular reflex: present    CN V   Facial sensation intact.   Right facial sensation deficit: none  Left facial sensation deficit: none    CN VII   Facial expression full, symmetric.   Right facial weakness: none  Left facial weakness: none    CN VIII   CN VIII normal.   Hearing: intact  Right Rinne: AC > BC  Left Rinne: AC > BC  Mendes: does not lateralize     CN IX, X   CN IX normal.   CN X normal.   Palate: symmetric    CN XI   CN XI normal.   Right sternocleidomastoid strength: normal  Left sternocleidomastoid strength: normal  Right trapezius strength: normal  Left trapezius strength: normal    CN XII   CN XII normal.   Tongue: not atrophic  Fasciculations: absent  Tongue deviation: none    Motor Exam   Muscle bulk: normal  Overall muscle tone: normal  Right arm tone: normal  Left arm tone: normal  Right arm pronator drift: absent  Left arm pronator drift: absent  Right leg tone: normal  Left leg tone: normal    Strength   Strength 5/5 throughout.    Right neck flexion: 5/5  Left neck flexion: 5/5  Right neck extension: 5/5  Left neck extension: 5/5  Right deltoid: 5/5  Left deltoid: 5/5  Right biceps: 5/5  Left biceps: 5/5  Right triceps: 5/5  Left triceps: 5/5  Right wrist flexion: 5/5  Left wrist flexion: 5/5  Right wrist extension: 5/5  Left wrist extension: 5/5  Right interossei: 5/5  Left interossei: 5/5  Right iliopsoas: 5/5  Left iliopsoas: 5/5  Right quadriceps: 5/5  Left quadriceps: 5/5  Right hamstrin/5  Left hamstrin/5  Right glutei: 5/5  Left glutei: 5/5  Right anterior tibial: 5/5  Left anterior tibial: 5/5  Right posterior tibial: 5/5  Left posterior tibial: 5/5  Right peroneal: 5/5  Left peroneal: 5/5  Right gastroc: 5/5  Left gastroc: 55    Sensory Exam   Light touch normal.   Right arm light touch: normal  Left arm light touch: normal  Right leg light touch: normal  Left leg light touch: normal  Vibration normal.   Right arm vibration: normal  Left arm vibration: normal  Right leg vibration: normal  Left leg vibration: normal  Proprioception normal.   Right arm proprioception: normal  Left arm proprioception: normal  Right leg proprioception: normal  Left leg proprioception: normal  Pinprick normal.   Right arm pinprick: normal  Left arm pinprick: normal  Right leg pinprick: normal  Left leg pinprick: normal  Graphesthesia: normal  Stereognosis: normal    Gait, Coordination, and Reflexes     Gait  Gait: normal    Coordination   Romberg: negative  Finger to nose coordination: normal  Heel to shin coordination: normal  Tandem walking coordination: normal    Tremor   Resting tremor: absent  Intention tremor: absent  Action tremor: absent    Reflexes   Right brachioradialis: 2+  Left brachioradialis: 2+  Right biceps: 2+  Left biceps: 2+  Right triceps: 2+  Left triceps: 2+  Right patellar: 2+  Left patellar: 2+  Right achilles: 2+  Left achilles: 2+  Right plantar: normal  Left plantar: normal  Right Marie: absent  Left Marie:  absent  Right ankle clonus: absent  Left ankle clonus: absent  Right pendular knee jerk: absent  Left pendular knee jerk: absent      Lab Results   Component Value Date    WBC 4.58 04/07/2016    HGB 11.3 (L) 04/07/2016    HCT 34.7 (L) 04/07/2016    MCV 97 04/07/2016     (L) 04/07/2016     Sodium   Date Value Ref Range Status   04/07/2016 140 136 - 145 mmol/L Final     Potassium   Date Value Ref Range Status   04/07/2016 4.5 3.5 - 5.1 mmol/L Final     Chloride   Date Value Ref Range Status   04/07/2016 113 (H) 95 - 110 mmol/L Final     CO2   Date Value Ref Range Status   04/07/2016 21 (L) 23 - 29 mmol/L Final     Glucose   Date Value Ref Range Status   04/07/2016 82 70 - 110 mg/dL Final     BUN, Bld   Date Value Ref Range Status   04/07/2016 15 6 - 20 mg/dL Final     Creatinine   Date Value Ref Range Status   04/07/2016 0.9 0.5 - 1.4 mg/dL Final     Calcium   Date Value Ref Range Status   04/07/2016 8.7 8.7 - 10.5 mg/dL Final     Total Protein   Date Value Ref Range Status   04/07/2016 6.0 6.0 - 8.4 g/dL Final     Albumin   Date Value Ref Range Status   04/07/2016 3.1 (L) 3.5 - 5.2 g/dL Final     Total Bilirubin   Date Value Ref Range Status   04/07/2016 0.3 0.1 - 1.0 mg/dL Final     Comment:     For infants and newborns, interpretation of results should be based  on gestational age, weight and in agreement with clinical  observations.  Premature Infant recommended reference ranges:  Up to 24 hours.............<8.0 mg/dL  Up to 48 hours............<12.0 mg/dL  3-5 days..................<15.0 mg/dL  6-29 days.................<15.0 mg/dL       Alkaline Phosphatase   Date Value Ref Range Status   04/07/2016 72 55 - 135 U/L Final     AST   Date Value Ref Range Status   04/07/2016 12 10 - 40 U/L Final     ALT   Date Value Ref Range Status   04/07/2016 9 (L) 10 - 44 U/L Final     Anion Gap   Date Value Ref Range Status   04/07/2016 6 (L) 8 - 16 mmol/L Final     eGFR if    Date Value Ref Range Status    04/07/2016 >60.0 >60 mL/min/1.73 m^2 Final     eGFR if non    Date Value Ref Range Status   04/07/2016 >60.0 >60 mL/min/1.73 m^2 Final     Comment:     Calculation used to obtain the estimated glomerular filtration  rate (eGFR) is the CKD-EPI equation. Since race is unknown   in our information system, the eGFR values for   -American and Non--American patients are given   for each creatinine result.       No results found for: [id number]  Lab Results   Component Value Date    TSH 0.287 (L) 04/07/2016    FREET4 0.99 04/07/2016 2016-2020    Brain MRI Unremarkable    EMG TL Seizures  B/L         Reviewed the neuroimaging independently       Assessment:       1. Refractory epilepsy    2. Coronary artery disease involving native coronary artery without angina pectoris, unspecified whether native or transplanted heart    3. Essential hypertension    4. Hyperlipidemia, unspecified hyperlipidemia type    5. Abnormal TSH    6. Partial symptomatic epilepsy with complex partial seizures, intractable, without status epilepticus    7. Rash    8. Encounter for monitoring anticonvulsant therapy    9. Convulsions, unspecified convulsion type    10. Partial idiopathic epilepsy with seizures of localized onset, intractable, without status epilepticus          EPILEPSY CLASSIFICATION      SEMIOLOGY: DIALEPTIC (FOCAL WITH HILARY) WITH ICTAL SPITTING -->GTC     EPILEPTOGENIC ZONE (S):  TL     ETIOLOGY: UNKNOWN     PRIOR AEDS: VPA, LTG, OXC, LEV, CLB (EXPENSIVE)    CURRENT AEDS: ZNS, PHT    LAST SEIZURE DATE:       COMPREHENSIVE LIST OF AEDs:     Acetazolamide (AZM-Diamox)   Benzos: clonazepam (CZP Klonopin), lorazepam (LZP-Ativan), diazepam (DZP-Valium), clorazepate (CLZ- Tranxene)  Brivaracetam (BRV-Briviact)  Cannabidiol (CBD- Epidiolex)  Carbamazepine (CBZ-Tegretol)  Cenobamate (CNB-Xcopri)  Clobazam (CLB-Onfi)  Eslicarbazepine (ESL-Aptiom)  Ethosuximide (ESX-Zarontin)  Felbamate  (FBM-Felbatol)  Gabapentin (GBP-Neurontin)  Lacosamide (LCM-Vimpat)  Lamotrigine (LTG-Lamitcal)  Levetiracetam (LEV- Keppra)  Oxcarbazepine (OXC-Trileptal)  Perampanel (PML-Fycompa)  Phenobarbital (PB)  Phenytoin (PHT-Dilantin)  Pregabalin (PGB-Lyrica)  Primidone (PRM)   Retigabine (RTG- Potiga) Discontinued in 2017  Rufinamide (RFN-Benzil)  Stiripentol (STP-Diacomit)  Tiagabine (TGB-Gabitril)  Topiramate (TPM-Topamax)  Valproate (VPA-Depakote)  Vigabatrin (VGB-Sabril)  Zonisamide (ZNS-Zonegran)    Plan:       INTRACTABLE-MEDICALLY REFRACTORY NON-LESIONAL TLE           EEG to evaluate for epileptiform discharges.     The patient was encouraged to maintain full traditional seizure precautions which include but not limited to avoid driving, avoid high altitudes, avoid being close to fire or fire source, avoid being close to a body of water or swimming alone, avoid operating heavy machinery and avoid using sharp objects if possible. The patient was encouraged to shower (without accumulation of water) instead of taking a bath if unsupervised. The patient was made aware that these precautions are especially important during concurrent illness. Adequate sleep and avoidance of alcohol as important measures to assure good seizure control were discussed with the patient. The patient was also advised not to care for children without company. The patient was advised to pad the side rails with pillows and blankets if applicable.I strongly recommended lowering the bed to the floor level to decrease the risk of falls.     Continue  mg BID. Check Trough level.    Continue  mg BID. Check Trough level.    Add CLB 20 mg BID.    Explained to the patient that failing 2 AEDs typically means that chance of achieving seizure-freedom using AEDs alone is <10% and we should explore other treatments like epilepsy surgery. Verbalized understanding.        Continue AEDs INDEFINITELY, FOR GOOD AND NEVER SKIP A DOSE. The patient  verbalized full understanding. Stressed the extreme medical, personal safety, public safety and legal importance of compliance and seizure control. Will give as many refills as possible with or without face-to-face evaluation to make sure the patient and any patient with epilepsy will never run out of medications. Running out of seizure medications should never happen under our care. I explained to the patient that he should not, under any circumstances, adjust or stop taking his seizure medication without discussing with me. The patient verbalized full understanding.     AVOID any substance that could lower seizure threshold including but not limited to:      ALCOHOL AND WITHDRAWAL      TRAMADOL.     DEMEROL      ALL STIMULANTS-ALL ADHD MEDICATIONS.      CLOZAPINE.      BUPROPION.     CIPROFLOXACIN          MEDICAL/SURGICAL COMORBIDITIES     All relevant medical comorbidities noted and managed by primary care physician and medical care team.          MISCELLANEOUS MEDICAL PROBLEMS       HEALTHY LIFESTYLE AND PREVENTATIVE CARE    Encouraged the patient to adhere to the age-appropriate health maintenance guidelines including screening tests and vaccinations.     Discussed the overall importance of healthy lifestyle, optimal weight, exercise, healthy diet, good sleep hygiene and avoiding drugs including smoking, alcohol and recreational drugs. The patient verbalized full understanding.       Advised the patient to follow COVID-19 prevention measures.       I spent  65 minutes face to face with the patient    More than 35  minutes of the time spent in counseling and coordination of care including discussions etiology of diagnosis (EPILEPSY), pathogenesis of diagnosis, prognosis of diagnosis,, diagnostic results, impression and recommendations, diagnostic studies, management, risks and benefits of treatment, instructions of disease self-management, treatment instructions, follow up requirements, patient and family  counseling/involvement in care compliance with treatment regimen. All of the patient's questions were answered during this discussion.          RTC in 8 weeks       Amandreia Estevez MD, FAAN    Attending Neurologist/Epileptologist         Diplomate, American Board of Psychiatry and Neurology    Diplomate, American Board of Clinical Neurophysiology     Fellow, American Academy of Neurology

## 2020-09-24 ENCOUNTER — LAB VISIT (OUTPATIENT)
Dept: LAB | Facility: HOSPITAL | Age: 60
End: 2020-09-24
Attending: PSYCHIATRY & NEUROLOGY
Payer: MEDICARE

## 2020-09-24 DIAGNOSIS — G40.919 REFRACTORY EPILEPSY: ICD-10-CM

## 2020-09-24 PROCEDURE — 36415 COLL VENOUS BLD VENIPUNCTURE: CPT | Mod: PO

## 2020-09-27 LAB
PHENYTOIN FREE SERPL-MCNC: 1.1 MCG/ML (ref 1–2)
PHENYTOIN, TOTAL: 12.9 MCG/ML (ref 10–20)

## 2020-09-28 ENCOUNTER — PATIENT MESSAGE (OUTPATIENT)
Dept: NEUROLOGY | Facility: CLINIC | Age: 60
End: 2020-09-28

## 2020-09-29 ENCOUNTER — PATIENT MESSAGE (OUTPATIENT)
Dept: NEUROLOGY | Facility: CLINIC | Age: 60
End: 2020-09-29

## 2020-09-29 DIAGNOSIS — G40.109 TEMPORAL LOBE EPILEPSY: Primary | ICD-10-CM

## 2020-09-29 LAB — ZONISAMIDE SERPL-MCNC: 13 MCG/ML (ref 10–40)

## 2020-09-30 ENCOUNTER — PATIENT MESSAGE (OUTPATIENT)
Dept: NEUROLOGY | Facility: CLINIC | Age: 60
End: 2020-09-30

## 2020-10-02 ENCOUNTER — PATIENT MESSAGE (OUTPATIENT)
Dept: NEUROLOGY | Facility: CLINIC | Age: 60
End: 2020-10-02

## 2020-10-11 ENCOUNTER — HOSPITAL ENCOUNTER (EMERGENCY)
Facility: HOSPITAL | Age: 60
Discharge: HOME | End: 2020-10-11
Attending: EMERGENCY MEDICINE
Payer: MEDICARE

## 2020-10-11 VITALS
HEART RATE: 97 BPM | RESPIRATION RATE: 26 BRPM | DIASTOLIC BLOOD PRESSURE: 64 MMHG | TEMPERATURE: 99 F | OXYGEN SATURATION: 100 % | SYSTOLIC BLOOD PRESSURE: 121 MMHG

## 2020-10-11 DIAGNOSIS — R41.82 ALTERED MENTAL STATUS: ICD-10-CM

## 2020-10-11 DIAGNOSIS — T50.905A MEDICATION ADVERSE EFFECT, INITIAL ENCOUNTER: Primary | ICD-10-CM

## 2020-10-11 DIAGNOSIS — R53.1 GENERALIZED WEAKNESS: ICD-10-CM

## 2020-10-11 LAB
ALBUMIN SERPL BCP-MCNC: 3.9 G/DL (ref 3.5–5.2)
ALP SERPL-CCNC: 174 U/L (ref 55–135)
ALT SERPL W/O P-5'-P-CCNC: 22 U/L (ref 10–44)
AMPHET+METHAMPHET UR QL: NEGATIVE
ANION GAP SERPL CALC-SCNC: 10 MMOL/L (ref 8–16)
AST SERPL-CCNC: 20 U/L (ref 10–40)
BARBITURATES UR QL SCN>200 NG/ML: NEGATIVE
BASOPHILS # BLD AUTO: 0.02 K/UL (ref 0–0.2)
BASOPHILS NFR BLD: 0.2 % (ref 0–1.9)
BENZODIAZ UR QL SCN>200 NG/ML: NORMAL
BILIRUB SERPL-MCNC: 0.4 MG/DL (ref 0.1–1)
BILIRUB UR QL STRIP: NEGATIVE
BUN SERPL-MCNC: 11 MG/DL (ref 6–20)
BZE UR QL SCN: NEGATIVE
CALCIUM SERPL-MCNC: 8.7 MG/DL (ref 8.7–10.5)
CANNABINOIDS UR QL SCN: NEGATIVE
CHLORIDE SERPL-SCNC: 109 MMOL/L (ref 95–110)
CLARITY UR: CLEAR
CO2 SERPL-SCNC: 21 MMOL/L (ref 23–29)
COLOR UR: YELLOW
CREAT SERPL-MCNC: 1 MG/DL (ref 0.5–1.4)
CREAT UR-MCNC: 106.8 MG/DL (ref 15–325)
DIFFERENTIAL METHOD: ABNORMAL
EOSINOPHIL # BLD AUTO: 0 K/UL (ref 0–0.5)
EOSINOPHIL NFR BLD: 0.2 % (ref 0–8)
ERYTHROCYTE [DISTWIDTH] IN BLOOD BY AUTOMATED COUNT: 15.9 % (ref 11.5–14.5)
EST. GFR  (AFRICAN AMERICAN): >60 ML/MIN/1.73 M^2
EST. GFR  (NON AFRICAN AMERICAN): >60 ML/MIN/1.73 M^2
ETHANOL SERPL-MCNC: <10 MG/DL
GLUCOSE SERPL-MCNC: 121 MG/DL (ref 70–110)
GLUCOSE UR QL STRIP: NEGATIVE
HCT VFR BLD AUTO: 37.6 % (ref 37–48.5)
HCV AB SERPL QL IA: NEGATIVE
HGB BLD-MCNC: 11.6 G/DL (ref 12–16)
HGB UR QL STRIP: NEGATIVE
HIV 1+2 AB+HIV1 P24 AG SERPL QL IA: NEGATIVE
IMM GRANULOCYTES # BLD AUTO: 0.04 K/UL (ref 0–0.04)
IMM GRANULOCYTES NFR BLD AUTO: 0.4 % (ref 0–0.5)
KETONES UR QL STRIP: NEGATIVE
LACTATE SERPL-SCNC: 2 MMOL/L (ref 0.5–2.2)
LEUKOCYTE ESTERASE UR QL STRIP: NEGATIVE
LYMPHOCYTES # BLD AUTO: 0.9 K/UL (ref 1–4.8)
LYMPHOCYTES NFR BLD: 7.6 % (ref 18–48)
MCH RBC QN AUTO: 28 PG (ref 27–31)
MCHC RBC AUTO-ENTMCNC: 30.9 G/DL (ref 32–36)
MCV RBC AUTO: 91 FL (ref 82–98)
METHADONE UR QL SCN>300 NG/ML: NEGATIVE
MONOCYTES # BLD AUTO: 0.7 K/UL (ref 0.3–1)
MONOCYTES NFR BLD: 6.6 % (ref 4–15)
NEUTROPHILS # BLD AUTO: 9.5 K/UL (ref 1.8–7.7)
NEUTROPHILS NFR BLD: 85 % (ref 38–73)
NITRITE UR QL STRIP: NEGATIVE
NRBC BLD-RTO: 0 /100 WBC
OPIATES UR QL SCN: NEGATIVE
PCP UR QL SCN>25 NG/ML: NEGATIVE
PH UR STRIP: 7 [PH] (ref 5–8)
PHENYTOIN SERPL-MCNC: 15 UG/ML (ref 10–20)
PLATELET # BLD AUTO: 235 K/UL (ref 150–350)
PMV BLD AUTO: 9.7 FL (ref 9.2–12.9)
POCT GLUCOSE: 109 MG/DL (ref 70–110)
POTASSIUM SERPL-SCNC: 4 MMOL/L (ref 3.5–5.1)
PROT SERPL-MCNC: 7.5 G/DL (ref 6–8.4)
PROT UR QL STRIP: NEGATIVE
RBC # BLD AUTO: 4.14 M/UL (ref 4–5.4)
SARS-COV-2 RDRP RESP QL NAA+PROBE: NEGATIVE
SODIUM SERPL-SCNC: 140 MMOL/L (ref 136–145)
SP GR UR STRIP: 1.01 (ref 1–1.03)
T4 FREE SERPL-MCNC: 0.99 NG/DL (ref 0.71–1.51)
TOXICOLOGY INFORMATION: NORMAL
TROPONIN I SERPL DL<=0.01 NG/ML-MCNC: <0.006 NG/ML (ref 0–0.03)
TSH SERPL DL<=0.005 MIU/L-ACNC: 0.15 UIU/ML (ref 0.4–4)
URN SPEC COLLECT METH UR: NORMAL
UROBILINOGEN UR STRIP-ACNC: NEGATIVE EU/DL
WBC # BLD AUTO: 11.18 K/UL (ref 3.9–12.7)

## 2020-10-11 PROCEDURE — 80203 DRUG SCREEN QUANT ZONISAMIDE: CPT

## 2020-10-11 PROCEDURE — 84443 ASSAY THYROID STIM HORMONE: CPT

## 2020-10-11 PROCEDURE — 83605 ASSAY OF LACTIC ACID: CPT

## 2020-10-11 PROCEDURE — 80307 DRUG TEST PRSMV CHEM ANLYZR: CPT

## 2020-10-11 PROCEDURE — 80185 ASSAY OF PHENYTOIN TOTAL: CPT

## 2020-10-11 PROCEDURE — 84484 ASSAY OF TROPONIN QUANT: CPT

## 2020-10-11 PROCEDURE — 99285 EMERGENCY DEPT VISIT HI MDM: CPT | Mod: 25

## 2020-10-11 PROCEDURE — 87040 BLOOD CULTURE FOR BACTERIA: CPT

## 2020-10-11 PROCEDURE — 93010 ELECTROCARDIOGRAM REPORT: CPT | Mod: ,,, | Performed by: INTERNAL MEDICINE

## 2020-10-11 PROCEDURE — 80053 COMPREHEN METABOLIC PANEL: CPT

## 2020-10-11 PROCEDURE — 86703 HIV-1/HIV-2 1 RESULT ANTBDY: CPT

## 2020-10-11 PROCEDURE — U0002 COVID-19 LAB TEST NON-CDC: HCPCS

## 2020-10-11 PROCEDURE — 93010 EKG 12-LEAD: ICD-10-PCS | Mod: ,,, | Performed by: INTERNAL MEDICINE

## 2020-10-11 PROCEDURE — 82962 GLUCOSE BLOOD TEST: CPT

## 2020-10-11 PROCEDURE — 81003 URINALYSIS AUTO W/O SCOPE: CPT

## 2020-10-11 PROCEDURE — 96360 HYDRATION IV INFUSION INIT: CPT

## 2020-10-11 PROCEDURE — 86803 HEPATITIS C AB TEST: CPT

## 2020-10-11 PROCEDURE — 80320 DRUG SCREEN QUANTALCOHOLS: CPT

## 2020-10-11 PROCEDURE — 36415 COLL VENOUS BLD VENIPUNCTURE: CPT

## 2020-10-11 PROCEDURE — 25000003 PHARM REV CODE 250: Performed by: EMERGENCY MEDICINE

## 2020-10-11 PROCEDURE — 85025 COMPLETE CBC W/AUTO DIFF WBC: CPT

## 2020-10-11 PROCEDURE — 84439 ASSAY OF FREE THYROXINE: CPT

## 2020-10-11 PROCEDURE — 93005 ELECTROCARDIOGRAM TRACING: CPT

## 2020-10-11 RX ORDER — TOLTERODINE 4 MG/1
CAPSULE, EXTENDED RELEASE ORAL
COMMUNITY
Start: 2020-09-30 | End: 2020-12-07

## 2020-10-11 RX ORDER — RANOLAZINE 1000 MG/1
1000 TABLET, EXTENDED RELEASE ORAL 2 TIMES DAILY
COMMUNITY
Start: 2020-09-29

## 2020-10-11 RX ADMIN — SODIUM CHLORIDE 1000 ML: 0.9 INJECTION, SOLUTION INTRAVENOUS at 02:10

## 2020-10-11 NOTE — DISCHARGE INSTRUCTIONS
Take next Onfi dose tomorrow night, 10/12/2020.  Start taking 20 mg once daily (instead of twice).

## 2020-10-11 NOTE — ED PROVIDER NOTES
"SCRIBE #1 NOTE: I, Dulce Nicholson, am scribing for, and in the presence of, Chema Pérez MD. I have scribed the entire note.       History     Chief Complaint   Patient presents with    Medication Reaction     reports increased seizure medication 2 weeks ago; reports extreme drowsiness, labile emotions, slurred speech, loss of balance worsening over past few days     Review of patient's allergies indicates:   Allergen Reactions    Latex, natural rubber Hives, Shortness Of Breath and Swelling     Throat swelling  Throat swelling  Other reaction(s): Hives  Throat swelling    Ace inhibitors Other (See Comments)     Coughing  Other reaction(s): Unknown         History of Present Illness     HPI    10/11/2020, 1:45 PM  History obtained from the patient      History of Present Illness: Evelyne Francisco is a 60 y.o. female patient with a PMHx of seizures, HTN, HLD, and CAD who presents to the Emergency Department for evaluation of medication reaction. Pt reports she was started on Onfi 2mg x2 weeks ago. Since med change, she has been experiencing fatigue, confusion and generalized weakness. She states that today sxs worsened and she suddenly starting "crying and shaking." Symptoms are constant and moderate in severity. No mitigating or exacerbating factors reported. Associated sxs include bilat hand numbness. Patient denies any fever, chills, n/v/d, abd pain, CP, SOB, and all other sxs at this time. Pt also reports intermittent dysuria since September of 2019. Lastly, pt c/o R knee pain for "a while." Sxs are constant and moderate in severity. Exacerbated by ambulation. No mitigating factors reported. No further complaints or concerns at this time.       Arrival mode: Personal vehicle     PCP: Edouard Ovalle Iii, MD        Past Medical History:  Past Medical History:   Diagnosis Date    Blood clotting tendency     Coronary artery disease     Fever blister     HLD (hyperlipidemia)     Hypertension     Seizures  "    Temporal lobe epilepsy 2016       Past Surgical History:  Past Surgical History:   Procedure Laterality Date    ABDOMINAL SURGERY      BACK SURGERY      CARDIAC SURGERY      FRACTURE SURGERY      HYSTERECTOMY      TONSILLECTOMY      VASCULAR SURGERY           Family History:  Family History   Problem Relation Age of Onset    Hypertension Mother     Hyperlipidemia Mother     Stroke Father     Seizures Father     Hypertension Sister     Cancer Brother     Marfan syndrome Daughter        Social History:  Social History     Tobacco Use    Smoking status: Former Smoker     Quit date: 2014     Years since quittin.2    Smokeless tobacco: Never Used   Substance and Sexual Activity    Alcohol use: No    Drug use: No    Sexual activity: Yes     Partners: Male        Review of Systems     Review of Systems   Constitutional: Positive for fatigue. Negative for chills and fever.   HENT: Negative for sore throat.    Respiratory: Negative for shortness of breath.    Cardiovascular: Negative for chest pain.   Gastrointestinal: Negative for abdominal pain, diarrhea, nausea and vomiting.   Genitourinary: Positive for dysuria.   Musculoskeletal: Positive for arthralgias (R knee). Negative for back pain.   Skin: Negative for rash.   Allergic/Immunologic:        (+) medication reaction   Neurological: Positive for weakness (generalized) and numbness (bilat hand).   Hematological: Does not bruise/bleed easily.   Psychiatric/Behavioral: Positive for confusion.   All other systems reviewed and are negative.     Physical Exam     Initial Vitals [10/11/20 1324]   BP Pulse Resp Temp SpO2   (!) 146/79 (!) 114 20 99.4 °F (37.4 °C) 95 %      MAP       --          Physical Exam  Nursing Notes and Vital Signs Reviewed.  Constitutional: Patient is in moderate distress. Well-developed and well-nourished.  Head: Atraumatic. Normocephalic.  Eyes: PERRL. EOM intact. Conjunctivae are not pale. No scleral  icterus.  ENT: Mucous membranes are moist. Oropharynx is clear and symmetric.    Neck: Supple. Full ROM. No lymphadenopathy.  Cardiovascular: Tachycardic. Regular rhythm. No murmurs, rubs, or gallops. Distal pulses are 2+ and symmetric.  Pulmonary/Chest: No respiratory distress. Clear to auscultation bilaterally. No wheezing or rales.  Abdominal: Soft and non-distended.  There is no tenderness.  No rebound, guarding, or rigidity. Good bowel sounds. Bilat knees non tender.   Genitourinary: No CVA tenderness  Musculoskeletal: Moves all extremities. No obvious deformities. No edema. No calf tenderness.  Skin: Warm and dry.  Neurological:  Alert, awake, and appropriate.  Normal speech. Long latency of speech and reaction.   Psychiatric: Good eye contact. Tearful.     ED Course   Procedures  ED Vital Signs:  Vitals:    10/11/20 1324 10/11/20 1341 10/11/20 1436 10/11/20 1445   BP: (!) 146/79 (!) 171/91 (!) 154/74    Pulse: (!) 114 (!) 118 109 107   Resp: 20      Temp: 99.4 °F (37.4 °C)      TempSrc: Oral      SpO2: 95% 100% 100%     10/11/20 1500 10/11/20 1530 10/11/20 1600 10/11/20 1630   BP: 131/71 135/71 136/67 130/65   Pulse: 104 102 100 99   Resp: 20 19 (!) 21 (!) 26   Temp:       TempSrc:       SpO2: 99% 99% 99% 99%    10/11/20 1730 10/11/20 1815   BP: 124/66 121/64   Pulse: 97 97   Resp: (!) 25 (!) 26   Temp:  99 °F (37.2 °C)   TempSrc:  Oral   SpO2: 100% 100%       Abnormal Lab Results:  Labs Reviewed   CBC W/ AUTO DIFFERENTIAL - Abnormal; Notable for the following components:       Result Value    Hemoglobin 11.6 (*)     Mean Corpuscular Hemoglobin Conc 30.9 (*)     RDW 15.9 (*)     Gran # (ANC) 9.5 (*)     Lymph # 0.9 (*)     Gran% 85.0 (*)     Lymph% 7.6 (*)     All other components within normal limits   COMPREHENSIVE METABOLIC PANEL - Abnormal; Notable for the following components:    CO2 21 (*)     Glucose 121 (*)     Alkaline Phosphatase 174 (*)     All other components within normal limits   TSH -  Abnormal; Notable for the following components:    TSH 0.152 (*)     All other components within normal limits   CULTURE, BLOOD   CULTURE, BLOOD   HIV 1 / 2 ANTIBODY   HEPATITIS C ANTIBODY   DRUG SCREEN PANEL, URINE EMERGENCY    Narrative:     Specimen Source->Urine   ALCOHOL,MEDICAL (ETHANOL)   URINALYSIS, REFLEX TO URINE CULTURE    Narrative:     Specimen Source->Urine   LACTIC ACID, PLASMA   SARS-COV-2 RNA AMPLIFICATION, QUAL   TROPONIN I   TROPONIN I   PHENYTOIN LEVEL, TOTAL AND FREE   ZONISAMIDE LEVEL   T4, FREE   PHENYTOIN LEVEL, TOTAL   ZONISAMIDE LEVEL   POCT GLUCOSE        All Lab Results:  Results for orders placed or performed during the hospital encounter of 10/11/20   Blood culture #1 **CANNOT BE ORDERED STAT**    Specimen: Peripheral, Antecubital, Right; Blood   Result Value Ref Range    Blood Culture, Routine No Growth to date     Blood Culture, Routine No Growth to date    Blood culture #2 **CANNOT BE ORDERED STAT**    Specimen: Peripheral, Antecubital, Left; Blood   Result Value Ref Range    Blood Culture, Routine No Growth to date     Blood Culture, Routine No Growth to date    HIV 1/2 Ag/Ab (4th Gen)   Result Value Ref Range    HIV 1/2 Ag/Ab Negative Negative   Hepatitis C antibody   Result Value Ref Range    Hepatitis C Ab Negative Negative   CBC auto differential   Result Value Ref Range    WBC 11.18 3.90 - 12.70 K/uL    RBC 4.14 4.00 - 5.40 M/uL    Hemoglobin 11.6 (L) 12.0 - 16.0 g/dL    Hematocrit 37.6 37.0 - 48.5 %    Mean Corpuscular Volume 91 82 - 98 fL    Mean Corpuscular Hemoglobin 28.0 27.0 - 31.0 pg    Mean Corpuscular Hemoglobin Conc 30.9 (L) 32.0 - 36.0 g/dL    RDW 15.9 (H) 11.5 - 14.5 %    Platelets 235 150 - 350 K/uL    MPV 9.7 9.2 - 12.9 fL    Immature Granulocytes 0.4 0.0 - 0.5 %    Gran # (ANC) 9.5 (H) 1.8 - 7.7 K/uL    Immature Grans (Abs) 0.04 0.00 - 0.04 K/uL    Lymph # 0.9 (L) 1.0 - 4.8 K/uL    Mono # 0.7 0.3 - 1.0 K/uL    Eos # 0.0 0.0 - 0.5 K/uL    Baso # 0.02 0.00 - 0.20  K/uL    nRBC 0 0 /100 WBC    Gran% 85.0 (H) 38.0 - 73.0 %    Lymph% 7.6 (L) 18.0 - 48.0 %    Mono% 6.6 4.0 - 15.0 %    Eosinophil% 0.2 0.0 - 8.0 %    Basophil% 0.2 0.0 - 1.9 %    Differential Method Automated    Comprehensive metabolic panel   Result Value Ref Range    Sodium 140 136 - 145 mmol/L    Potassium 4.0 3.5 - 5.1 mmol/L    Chloride 109 95 - 110 mmol/L    CO2 21 (L) 23 - 29 mmol/L    Glucose 121 (H) 70 - 110 mg/dL    BUN, Bld 11 6 - 20 mg/dL    Creatinine 1.0 0.5 - 1.4 mg/dL    Calcium 8.7 8.7 - 10.5 mg/dL    Total Protein 7.5 6.0 - 8.4 g/dL    Albumin 3.9 3.5 - 5.2 g/dL    Total Bilirubin 0.4 0.1 - 1.0 mg/dL    Alkaline Phosphatase 174 (H) 55 - 135 U/L    AST 20 10 - 40 U/L    ALT 22 10 - 44 U/L    Anion Gap 10 8 - 16 mmol/L    eGFR if African American >60 >60 mL/min/1.73 m^2    eGFR if non African American >60 >60 mL/min/1.73 m^2   Drug screen panel, emergency   Result Value Ref Range    Benzodiazepines Presumptive Positive     Methadone metabolites Negative     Cocaine (Metab.) Negative     Opiate Scrn, Ur Negative     Barbiturate Screen, Ur Negative     Amphetamine Screen, Ur Negative     THC Negative     Phencyclidine Negative     Creatinine, Random Ur 106.8 15.0 - 325.0 mg/dL    Toxicology Information SEE COMMENT    Ethanol   Result Value Ref Range    Alcohol, Medical, Serum <10 <10 mg/dL   Urinalysis, Reflex to Urine Culture Urine, Clean Catch    Specimen: Urine   Result Value Ref Range    Specimen UA Urine, Clean Catch     Color, UA Yellow Yellow, Straw, Angelica    Appearance, UA Clear Clear    pH, UA 7.0 5.0 - 8.0    Specific Gravity, UA 1.015 1.005 - 1.030    Protein, UA Negative Negative    Glucose, UA Negative Negative    Ketones, UA Negative Negative    Bilirubin (UA) Negative Negative    Occult Blood UA Negative Negative    Nitrite, UA Negative Negative    Urobilinogen, UA Negative <2.0 EU/dL    Leukocytes, UA Negative Negative   Lactic acid, plasma   Result Value Ref Range    Lactate (Lactic  Acid) 2.0 0.5 - 2.2 mmol/L   TSH   Result Value Ref Range    TSH 0.152 (L) 0.400 - 4.000 uIU/mL   COVID-19 Rapid Screening   Result Value Ref Range    SARS-CoV-2 RNA, Amplification, Qual Negative Negative   Troponin I   Result Value Ref Range    Troponin I <0.006 0.000 - 0.026 ng/mL   T4, Free   Result Value Ref Range    Free T4 0.99 0.71 - 1.51 ng/dL   Phenytoin Level, Total   Result Value Ref Range    Phenytoin Lvl 15.0 10.0 - 20.0 ug/mL   POCT glucose   Result Value Ref Range    POCT Glucose 109 70 - 110 mg/dL     Imaging Results:  Imaging Results          CT Head Without Contrast (Final result)  Result time 10/11/20 14:18:26    Final result by Yoseph English MD (10/11/20 14:18:26)                 Impression:      Negative head CT.    All CT scans at this facility are performed  using dose modulation techniques as appropriate to performed exam including the following:  automated exposure control; adjustment of mA and/or kV according to the patients size (this includes techniques or standardized protocols for targeted exams where dose is matched to indication/reason for exam: i.e. extremities or head);  iterative reconstruction technique.      Electronically signed by: Yoseph English MD  Date:    10/11/2020  Time:    14:18             Narrative:    EXAMINATION:  CT HEAD WITHOUT CONTRAST    CLINICAL HISTORY:  Altered mental status;    TECHNIQUE:  Routine noncontrast head CT.    COMPARISON:  None    FINDINGS:  There is no acute intracranial hemorrhage or abnormal extra-axial fluid collection.  There is no abnormal increased or decreased density within the brain parenchyma.  Gray-white differentiation preserved.  Normal ventricles.  There is no intracranial mass or mass effect.  The calvarium is intact.  Visualized paranasal sinuses and mastoids are well aerated.                                 The EKG was ordered, reviewed, and independently interpreted by the ED provider.  Interpretation time: 1353  Rate: 110  BPM  Rhythm: sinus tachycardia  Interpretation: Possible L atrial enlargement. Low voltage QRS. Nonspecific ST and T wave abnormality. No STEMI.           The Emergency Provider reviewed the vital signs and test results, which are outlined above.     ED Discussion     2:01 PM: Discussed pt's case with Derek Yap MD (Neurology) who states that patient is on a heavy does of Onfi and he recommends decreasing dose from 20 BID to 20 once a day. He also recommends checking patient's seizure med levels and having her f/u with Dr. Estevez in clinic tomorrow. He states that patient's symptoms sound consistent with Onfi side effects.     5:40 PM: Reassessed pt at this time.  Pt states her condition has improved at this time. Discussed with pt all pertinent ED information and results. Discussed pt dx and plan of tx. Gave pt all f/u and return to the ED instructions. All questions and concerns were addressed at this time. Pt expresses understanding of information and instructions, and is comfortable with plan to discharge. Pt is stable for discharge.    I discussed with patient and/or family/caretaker that evaluation in the ED does not suggest any emergent or life threatening medical conditions requiring immediate intervention beyond what was provided in the ED, and I believe patient is safe for discharge.  Regardless, an unremarkable evaluation in the ED does not preclude the development or presence of a serious of life threatening condition. As such, patient was instructed to return immediately for any worsening or change in current symptoms.         Medical Decision Making:   Clinical Tests:   Lab Tests: Reviewed and Ordered  Radiological Study: Reviewed and Ordered  Medical Tests: Reviewed and Ordered     Additional MDM:     NIH Stroke Scale:   Interval = baseline (upon arrival/admit)  Level of consciousness = 0 - alert  LOC questions = 0 - answers both correctly  LOC commands = 0 - performs both correctly  Best gaze =  0 - normal  Visual = 0 - no visual loss  Facial palsy = 0 - normal  Motor left arm =  0 - no drift  Motor right arm =  0 - no drift  Motor left leg = 0 - no drift  Motor right leg =  0 - no drift  Limb ataxia = 0 - absent  Sensory = 0 - normal  Best language = 0 - no aphasia  Dysarthria = 0 - normal articulation  Extinction and inattention = 0 - no neglect  NIH Stroke Scale Total = 0       ED Medication(s):  Medications   sodium chloride 0.9% bolus 1,000 mL (0 mLs Intravenous Stopped 10/11/20 1533)       Discharge Medication List as of 10/11/2020  5:55 PM          Follow-up Information     Payton Estevez MD In 1 day.    Specialty: Neurology  Contact information:  [id number] [place] [address] [phone]             [facility]    Specialty: Emergency Medicine  Why: As needed, If symptoms worsen  Contact information:  [address]  [facility] [address] [phone]                     Scribe Attestation:   Scribe #1: I performed the above scribed service and the documentation accurately describes the services I performed. I attest to the accuracy of the note.     Attending:   Physician Attestation Statement for Scribe #1: I, Chema Pérez MD, personally performed the services described in this documentation, as scribed by Dulce Nicholson, in my presence, and it is both accurate and complete.           Clinical Impression       ICD-10-CM ICD-9-CM   1. Medication adverse effect, initial encounter  T50.905A E947.9   2. Altered mental status  R41.82 780.97   3. Generalized weakness  R53.1 780.79       Disposition:   Disposition: Discharged  Condition: Stable         Chema Pérez MD  10/13/20 1202

## 2020-10-12 ENCOUNTER — OFFICE VISIT (OUTPATIENT)
Dept: NEUROLOGY | Facility: CLINIC | Age: 60
End: 2020-10-12
Payer: MEDICARE

## 2020-10-12 ENCOUNTER — LAB VISIT (OUTPATIENT)
Dept: LAB | Facility: HOSPITAL | Age: 60
End: 2020-10-12
Attending: NURSE PRACTITIONER
Payer: MEDICARE

## 2020-10-12 VITALS
HEART RATE: 72 BPM | DIASTOLIC BLOOD PRESSURE: 72 MMHG | BODY MASS INDEX: 39.34 KG/M2 | SYSTOLIC BLOOD PRESSURE: 118 MMHG | WEIGHT: 236.13 LBS | RESPIRATION RATE: 16 BRPM | HEIGHT: 65 IN

## 2020-10-12 DIAGNOSIS — G40.019 PARTIAL IDIOPATHIC EPILEPSY WITH SEIZURES OF LOCALIZED ONSET, INTRACTABLE, WITHOUT STATUS EPILEPTICUS: ICD-10-CM

## 2020-10-12 DIAGNOSIS — T88.7XXA MEDICATION SIDE EFFECTS: ICD-10-CM

## 2020-10-12 DIAGNOSIS — G40.919 REFRACTORY EPILEPSY: ICD-10-CM

## 2020-10-12 DIAGNOSIS — I25.10 CORONARY ARTERY DISEASE INVOLVING NATIVE CORONARY ARTERY WITHOUT ANGINA PECTORIS, UNSPECIFIED WHETHER NATIVE OR TRANSPLANTED HEART: Chronic | ICD-10-CM

## 2020-10-12 DIAGNOSIS — I10 ESSENTIAL HYPERTENSION: Chronic | ICD-10-CM

## 2020-10-12 DIAGNOSIS — G40.919 REFRACTORY EPILEPSY: Primary | ICD-10-CM

## 2020-10-12 DIAGNOSIS — E78.5 HYPERLIPIDEMIA, UNSPECIFIED HYPERLIPIDEMIA TYPE: Chronic | ICD-10-CM

## 2020-10-12 PROCEDURE — 99999 PR PBB SHADOW E&M-EST. PATIENT-LVL V: CPT | Mod: PBBFAC,,, | Performed by: NURSE PRACTITIONER

## 2020-10-12 PROCEDURE — 99214 OFFICE O/P EST MOD 30 MIN: CPT | Mod: S$PBB,,, | Performed by: NURSE PRACTITIONER

## 2020-10-12 PROCEDURE — 99214 PR OFFICE/OUTPT VISIT, EST, LEVL IV, 30-39 MIN: ICD-10-PCS | Mod: S$PBB,,, | Performed by: NURSE PRACTITIONER

## 2020-10-12 PROCEDURE — 99215 OFFICE O/P EST HI 40 MIN: CPT | Mod: PBBFAC | Performed by: NURSE PRACTITIONER

## 2020-10-12 PROCEDURE — 36415 COLL VENOUS BLD VENIPUNCTURE: CPT

## 2020-10-12 PROCEDURE — 80339 ANTIEPILEPTICS NOS 1-3: CPT

## 2020-10-12 PROCEDURE — 99999 PR PBB SHADOW E&M-EST. PATIENT-LVL V: ICD-10-PCS | Mod: PBBFAC,,, | Performed by: NURSE PRACTITIONER

## 2020-10-12 RX ORDER — PHENYTOIN SODIUM 100 MG/1
100 CAPSULE, EXTENDED RELEASE ORAL 2 TIMES DAILY
Qty: 180 CAPSULE | Refills: 11 | Status: SHIPPED | OUTPATIENT
Start: 2020-10-12 | End: 2021-05-06 | Stop reason: SDUPTHER

## 2020-10-12 RX ORDER — CLOBAZAM 10 MG/1
10 TABLET ORAL DAILY
Qty: 30 EACH | Refills: 5 | Status: SHIPPED | OUTPATIENT
Start: 2020-10-12 | End: 2020-10-19 | Stop reason: DRUGHIGH

## 2020-10-12 RX ORDER — CLOBAZAM 20 MG/1
20 TABLET ORAL NIGHTLY
Qty: 30 TABLET | Refills: 5 | Status: SHIPPED | OUTPATIENT
Start: 2020-10-12 | End: 2020-10-19 | Stop reason: SDUPTHER

## 2020-10-12 NOTE — PATIENT INSTRUCTIONS
Continue  mg BID.    Continue  mg BID.   Plan to taper medication dose Patient instructed to take CLB 10 mg in the morning and 20 mg at bedtime     Clobazam oral tablets  What is this medicine?  CLOBAZAM (KLOH [place]) is a benzodiazepine. It is used to treat seizures due to Lennox-Gastaut syndrome in combination with other anti-seizure medicines.  How should I use this medicine?  Take this medicine by mouth with a glass of water. Follow the directions on the prescription label. You can take it with or without food. If it upsets your stomach, take it with food. The tablets may be swallowed whole, split in half along the score line, or crushed and mixed in applesauce. Take your medicine at regular intervals. Do not take it more often than directed. Do not stop taking except on your doctor's advice.  A special MedGuide will be given to you by the pharmacist with each prescription and refill. Be sure to read this information carefully each time.  Talk to your pediatrician regarding the use of this medicine in children. While this drug may be prescribed for children as young as 2 years of age for selected conditions, precautions do apply.  Patients over 65 years old may have a stronger reaction and need a smaller dose.  What side effects may I notice from receiving this medicine?  Side effects that you should report to your doctor or health care professional as soon as possible:  · allergic reactions like skin rash, itching or hives, swelling of the face, lips, or tongue  · breathing problems  · confusion  · loss of balance or coordination  · signs and symptoms of low blood pressure like dizziness; feeling faint or lightheaded, falls; unusually weak or tired  · suicidal thoughts or other mood changes  Side effects that usually do not require medical attention (report to your doctor or health care professional if they continue or are bothersome):  · constipation  · irritable  · tiredness  · vomiting  What  may interact with this medicine?  Do not take this medication with any of the following medicines:  · narcotic medicines for cough  · sodium oxybate  This medicine may interact with the following medications:  · alcohol  · antihistamines for allergy, cough, and cold  · certain medicines for anxiety or sleep  · certain medicines for depression, like amitriptyline, fluoxetine, sertraline  · certain medicines for fungal infections like ketoconazole and itraconazole  · certain medicines for seizures like phenobarbital, primidone  · dextromethorphan  · female hormones, like estrogens or progestins and birth control pills, patches, rings, or injections  · general anesthetics like halothane, isoflurane, methoxyflurane, propofol  · local anesthetics like lidocaine, pramoxine, tetracaine  · medicines that relax muscles for surgery  · narcotic medicines for pain  · omeprazole  · phenothiazines like chlorpromazine, mesoridazine, prochlorperazine, thioridazine  · ticlopidine  What if I miss a dose?  If you miss a dose, take it as soon as you can. If it is almost time for your next dose, take only that dose. Do not take double or extra doses.  Where should I keep my medicine?  Keep out of the reach of children. This medicine can be abused. Keep your medicine in a safe place to protect it from theft. Do not share this medicine with anyone. Selling or giving away this medicine is dangerous and against the law.  This medicine may cause accidental overdose and death if it taken by other adults, children, or pets. Mix any unused medicine with a substance like cat litter or coffee grounds. Then throw the medicine away in a sealed container like a sealed bag or a coffee can with a lid. Do not use the medicine after the expiration date.  Store at room temperature between 20 and 25 degrees C (68 and 77 degrees F).  What should I tell my health care provider before I take this medicine?  They need to know if you have any of these  conditions:  · drug abuse or addiction  · kidney disease  · liver disease  · lung or breathing disease  · mental illness  · suicidal thoughts, plans, or attempt; a previous suicide attempt by you or a family member  · an unusual or allergic reaction to clobazam, other benzodiazepines, foods, dyes, or preservatives  · pregnant or trying to get pregnant  · breast-feeding  What should I watch for while using this medicine?  Tell your doctor or healthcare professional if your symptoms do not start to get better or if they get worse.  Do not stop taking except on your doctor's advice. You may develop a severe reaction. Your doctor will tell you how much medicine to take.  Wear a medical ID bracelet or chain, and carry a card that describes your disease and details of your medicine and dosage times.  You may get drowsy or dizzy. Do not drive, use machinery, or do anything that needs mental alertness until you know how this medicine affects you. Do not stand or sit up quickly, especially if you are an older patient. This reduces the risk of dizzy or fainting spells. Alcohol may interfere with the effect of this medicine. Avoid alcoholic drinks.  If you are taking another medicine that also causes drowsiness, you may have more side effects. Give your health care provider a list of all medicines you use. Your doctor will tell you how much medicine to take. Do not take more medicine than directed. Call emergency for help if you have problems breathing or unusual sleepiness.  Birth control pills may not work properly while you are taking this medicine. Talk to your doctor about using an extra method of birth control.  NOTE:This sheet is a summary. It may not cover all possible information. If you have questions about this medicine, talk to your doctor, pharmacist, or health care provider. Copyright© 2017 [employer]      6

## 2020-10-12 NOTE — PROGRESS NOTES
Subjective:       Patient ID: Evelyne Francisco is a 60 y.o. female.    Chief Complaint: Medication Reaction (onfi), Altered Mental Status, and Hospital Follow Up          HPI       The patient is here for seizure evaluation and a 4th opinion.    Saw Dr. Monge, Dr. Hobbs and Dr. Smith in the past.     The patient started having seizures 11 years ago (2009) at the age of 49 .The patient describes aura of strange feeling in her head that she cannot describe. The patient is amnestic to the seizure after that. The seizures are mostly described as Complex Partial (staring, unresponsiveness, sometimes accompanied by spitting) and some of the them Grand Mal (GTC). The patient is currently not driving and has 2-5 seizure a month. No history of meningitis or encephalitis No toxic exposure or lead poisoning. Her father had posttraumatic family history of epilepsy and her nephew might be having seizures.  No history of strokes or aneurysmal bleeding. No history of TBI. Routine EEGs have been normal. EMU in 2016 captured 2 seizures with B/L TL localization. Brain MRI was reported as unremarkable. Failed VPA, LTG, OXC, LEV and VNS. She is currently on  mg BID and  mg BID. LCM,  ESL and BRV were too expensive. She says that CLB helped control the seizures but the insurance would not pay for it.       Interval Note: 10-12-20: Patient is current patient of Dr. Herb chauhan to me. Patient accompanied by daughter for hospital follow up adverse effects to Onfi. The patient daughter reports that the patient  began experiencing extereme drowsiness shortly after starting ONFI 2 weeks ago. She reported the drowsiness started after the onfi was started. The patient reportedly had increased drowsiness and slurred speech. The daughter reported  Yesterday the patient  began having crying outburts, lack of coordination and feeling weak so she brought her to the ED to be evaluated. The patient was started on CLB 20 mg BID two  weeks ago. The patient reports that she took CLB in the past and had success of managing seizures but she remembered that the dose had to be adjusted because the  higher dose made her have simliar side effects. She reports her Neurologist decreased the dose and she was much better and it controlled her seizures. The patient reports that since starting the medication she has not had any seizure. The patient denies fever, recent illness, no lightheadedness, no nausea and no vomiting, no increased stress and occasional mild headache. No complaint of headache at this time.     Last Zonimide level 9/24 13 WNL, PHT 15 WNL     Review of Systems   Constitutional: Negative for activity change, appetite change, chills and fatigue.   HENT: Negative for hearing loss and tinnitus.    Eyes: Negative for photophobia and visual disturbance.   Respiratory: Negative for apnea and shortness of breath.    Cardiovascular: Negative for chest pain and palpitations.   Gastrointestinal: Negative for nausea and vomiting.   Endocrine: Negative for cold intolerance and heat intolerance.   Genitourinary: Negative for difficulty urinating and urgency.   Musculoskeletal: Negative for arthralgias, back pain, gait problem, joint swelling, myalgias, neck pain and neck stiffness.   Skin: Negative for color change and rash.   Allergic/Immunologic: Negative for environmental allergies and immunocompromised state.   Neurological: Positive for seizures, weakness and headaches. Negative for dizziness, tremors, syncope, facial asymmetry, speech difficulty, light-headedness and numbness.        No recent seizure within the last 2 weeks    Hematological: Negative for adenopathy. Does not bruise/bleed easily.   Psychiatric/Behavioral: Negative for agitation, behavioral problems, confusion, decreased concentration, dysphoric mood, hallucinations, self-injury, sleep disturbance and suicidal ideas. The patient is not hyperactive.         Crying and emotional               Current Outpatient Medications:     aspirin 81 MG Chew, Take 81 mg by mouth once daily., Disp: , Rfl:     atorvastatin (LIPITOR) 80 MG tablet, Take 80 mg by mouth nightly. , Disp: , Rfl:     clopidogreL (PLAVIX) 75 mg tablet, Take 75 mg by mouth once daily., Disp: , Rfl:     estradioL (ESTRACE) 2 MG tablet, Take 2 mg by mouth once daily., Disp: , Rfl:     isosorbide mononitrate (IMDUR) 60 MG 24 hr tablet, Take 60 mg by mouth every morning. , Disp: , Rfl:     Lactobacillus rhamnosus GG (CULTURELLE) 10 billion cell capsule, Take 1 capsule by mouth once daily., Disp: , Rfl:     levocetirizine (XYZAL) 5 MG tablet, Take 5 mg by mouth every evening., Disp: , Rfl:     levothyroxine (SYNTHROID) 100 MCG tablet, Take 100 mcg by mouth before breakfast., Disp: , Rfl:     metoprolol succinate (TOPROL-XL) 50 MG 24 hr tablet, Take 50 mg by mouth once daily., Disp: , Rfl:     mirabegron (MYRBETRIQ) 25 mg Tb24 ER tablet, Take 25 mg by mouth once daily., Disp: , Rfl:     pantoprazole (PROTONIX) 40 MG tablet, Take 40 mg by mouth every morning. , Disp: , Rfl:     potassium chloride (MICRO-K) 10 MEQ CpSR, Take 10 mEq by mouth once., Disp: , Rfl:     ranolazine (RANEXA) 1,000 mg Tb12, Take 1,000 mg by mouth 2 (two) times daily., Disp: , Rfl:     tolterodine (DETROL LA) 4 MG 24 hr capsule, , Disp: , Rfl:     triamcinolone acetonide 0.1% (KENALOG) 0.1 % cream, Apply topically 2 (two) times daily., Disp: 45 g, Rfl: 1    zonisamide (ZONEGRAN) 100 MG Cap, Take 3 capsules (300 mg total) by mouth 2 (two) times daily., Disp: 180 capsule, Rfl: 11    cloBAZam (ONFI) 10 mg Tab, Take 1 tablet (10 mg total) by mouth once daily., Disp: 30 each, Rfl: 5    cloBAZam (ONFI) 20 mg Tab, Take 1 tablet (20 mg total) by mouth every evening., Disp: 30 tablet, Rfl: 5    phenytoin (DILANTIN) 100 MG ER capsule, Take 1 capsule (100 mg total) by mouth 2 (two) times daily., Disp: 180 capsule, Rfl: 11  No current facility-administered  medications for this visit.   Past Medical History:   Diagnosis Date    Blood clotting tendency     Coronary artery disease     Fever blister     HLD (hyperlipidemia)     Hypertension     Seizures     Temporal lobe epilepsy 2016     Past Surgical History:   Procedure Laterality Date    ABDOMINAL SURGERY      BACK SURGERY      CARDIAC SURGERY      FRACTURE SURGERY      HYSTERECTOMY      TONSILLECTOMY      VASCULAR SURGERY       Social History     Socioeconomic History    Marital status:      Spouse name: Not on file    Number of children: Not on file    Years of education: Not on file    Highest education level: Not on file   Occupational History    Not on file   Social Needs    Financial resource strain: Not on file    Food insecurity     Worry: Not on file     Inability: Not on file    Transportation needs     Medical: Not on file     Non-medical: Not on file   Tobacco Use    Smoking status: Former Smoker     Quit date: 2014     Years since quittin.2    Smokeless tobacco: Never Used   Substance and Sexual Activity    Alcohol use: No    Drug use: No    Sexual activity: Yes     Partners: Male   Lifestyle    Physical activity     Days per week: Not on file     Minutes per session: Not on file    Stress: Not on file   Relationships    Social connections     Talks on phone: Not on file     Gets together: Not on file     Attends Rastafari service: Not on file     Active member of club or organization: Not on file     Attends meetings of clubs or organizations: Not on file     Relationship status: Not on file   Other Topics Concern    Are you pregnant or think you may be? Not Asked    Breast-feeding Not Asked   Social History Narrative    Not on file             Past/Current Medical/Surgical History, Past/Current Social History, Past/Current Family History and Past/Current Medications were reviewed in detail.        Objective:           VITAL SIGNS WERE  REVIEWED      GENERAL APPEARANCE:     The patient looks calm, tearful, comfortable.    Body habitus is obese.     No signs of respiratory distress.    Normal breathing pattern.    No dysmorphic features    Normal eye contact.     GENERAL MEDICAL EXAM:    HEENT:  Head is atraumatic normocephalic.     No tender temporal arteries. Fundoscopic (Ophthalmoscopic) exam showed no disc edema.      Neck and Axillae: No JVD. No visible lesions.    No carotid bruits. No thyromegaly. No lymphadenopathy.    Cardiopulmonary: No cyanosis. No tachypnea. Normal respiratory effort.VNS in place.    Clear breath sounds. S1, S2 with regular rhythm . No murmurs.     Gastrointestinal/Urogenital:  No jaundice. No stomas or lesions. No visible hernias. No catheters.     Abdomen is soft non-tender. No masses or organomegaly.    Skin, Hair and Nails: No pathognonomic skin rash. No neurofibromatosis. No visible lesions.No stigmata of autoimmune disease. No clubbing.    Skin is warm and moist. No palpable masses.    Limbs: No varicose veins. No visible swelling.    No palpable edema. Pulses are symmetric. Pedal pulses are palpable.      Muskoskeletal: No visible deformities.No visible lesions.    No spine tenderness. No signs of longstanding neuropathy. No dislocations or fractures.            Neurologic Exam     Mental Status   Oriented to person, place, and time.   Registration: recalls 3 of 3 objects. Recall at 5 minutes: recalls 3 of 3 objects. Follows 3 step commands.   Attention: normal. Concentration: normal.   Speech: speech is normal   Level of consciousness: alert  Knowledge: good and consistent with education. Able to perform simple calculations.   Able to name object. Able to read. Able to repeat. Able to write. Normal comprehension.     Cranial Nerves   Cranial nerves II through XII intact.     CN II   Visual fields full to confrontation.   Visual acuity: normal with correction  Right visual field deficit: none  Left visual field  deficit: none     CN III, IV, VI   Pupils are equal, round, and reactive to light.  Extraocular motions are normal.   Right pupil: Size: 2 mm. Shape: regular. Reactivity: brisk. Consensual response: intact. Accommodation: intact.   Left pupil: Size: 2 mm. Shape: regular. Reactivity: brisk. Consensual response: intact. Accommodation: intact.   CN III: no CN III palsy  CN VI: no CN VI palsy  Nystagmus: none   Diplopia: none  Ophthalmoparesis: none  Upgaze: normal  Downgaze: normal  Conjugate gaze: present  Vestibulo-ocular reflex: present    CN V   Facial sensation intact.   Right facial sensation deficit: none  Left facial sensation deficit: none    CN VII   Facial expression full, symmetric.   Right facial weakness: none  Left facial weakness: none    CN VIII   CN VIII normal.   Hearing: intact  Right Rinne: AC > BC  Left Rinne: AC > BC  Mendes: does not lateralize     CN IX, X   CN IX normal.   CN X normal.   Palate: symmetric    CN XI   CN XI normal.   Right sternocleidomastoid strength: normal  Left sternocleidomastoid strength: normal  Right trapezius strength: normal  Left trapezius strength: normal    CN XII   CN XII normal.   Tongue: not atrophic  Fasciculations: absent  Tongue deviation: none    Motor Exam   Muscle bulk: normal  Overall muscle tone: normal  Right arm tone: normal  Left arm tone: normal  Right arm pronator drift: absent  Left arm pronator drift: absent  Right leg tone: normal  Left leg tone: normal    Strength   Strength 5/5 throughout.   Right neck flexion: 5/5  Left neck flexion: 5/5  Right neck extension: 5/5  Left neck extension: 5/5  Right deltoid: 5/5  Left deltoid: 5/5  Right biceps: 5/5  Left biceps: 5/5  Right triceps: 5/5  Left triceps: 5/5  Right wrist flexion: 5/5  Left wrist flexion: 5/5  Right wrist extension: 5/5  Left wrist extension: 5/5  Right interossei: 5/5  Left interossei: 5/5  Right iliopsoas: 5/5  Left iliopsoas: 5/5  Right quadriceps: 5/5  Left quadriceps: 5/5  Right  hamstrin/5  Left hamstrin/5  Right glutei: 5/5  Left glutei: 5/5  Right anterior tibial: 5/5  Left anterior tibial: 5/5  Right posterior tibial: 5/5  Left posterior tibial: 5/5  Right peroneal: 5/5  Left peroneal: 5/5  Right gastroc: 5/5  Left gastroc: 5/5    Sensory Exam   Light touch normal.   Right arm light touch: normal  Left arm light touch: normal  Right leg light touch: normal  Left leg light touch: normal  Vibration normal.   Right arm vibration: normal  Left arm vibration: normal  Right leg vibration: normal  Left leg vibration: normal  Proprioception normal.   Right arm proprioception: normal  Left arm proprioception: normal  Right leg proprioception: normal  Left leg proprioception: normal  Pinprick normal.   Right arm pinprick: normal  Left arm pinprick: normal  Right leg pinprick: normal  Left leg pinprick: normal  Graphesthesia: normal  Stereognosis: normal    Gait, Coordination, and Reflexes     Gait  Gait: normal    Coordination   Romberg: negative  Finger to nose coordination: normal  Heel to shin coordination: normal  Tandem walking coordination: normal    Tremor   Resting tremor: absent  Intention tremor: absent  Action tremor: absent    Reflexes   Right brachioradialis: 2+  Left brachioradialis: 2+  Right biceps: 2+  Left biceps: 2+  Right triceps: 2+  Left triceps: 2+  Right patellar: 2+  Left patellar: 2+  Right achilles: 2+  Left achilles: 2+  Right plantar: normal  Left plantar: normal  Right Marie: absent  Left Marie: absent  Right ankle clonus: absent  Left ankle clonus: absent  Right pendular knee jerk: absent  Left pendular knee jerk: absent      Lab Results   Component Value Date    WBC 11.18 10/11/2020    HGB 11.6 (L) 10/11/2020    HCT 37.6 10/11/2020    MCV 91 10/11/2020     10/11/2020     Sodium   Date Value Ref Range Status   10/11/2020 140 136 - 145 mmol/L Final     Potassium   Date Value Ref Range Status   10/11/2020 4.0 3.5 - 5.1 mmol/L Final     Chloride    Date Value Ref Range Status   10/11/2020 109 95 - 110 mmol/L Final     CO2   Date Value Ref Range Status   10/11/2020 21 (L) 23 - 29 mmol/L Final     Glucose   Date Value Ref Range Status   10/11/2020 121 (H) 70 - 110 mg/dL Final     BUN, Bld   Date Value Ref Range Status   10/11/2020 11 6 - 20 mg/dL Final     Creatinine   Date Value Ref Range Status   10/11/2020 1.0 0.5 - 1.4 mg/dL Final     Calcium   Date Value Ref Range Status   10/11/2020 8.7 8.7 - 10.5 mg/dL Final     Total Protein   Date Value Ref Range Status   10/11/2020 7.5 6.0 - 8.4 g/dL Final     Albumin   Date Value Ref Range Status   10/11/2020 3.9 3.5 - 5.2 g/dL Final     Total Bilirubin   Date Value Ref Range Status   10/11/2020 0.4 0.1 - 1.0 mg/dL Final     Comment:     For infants and newborns, interpretation of results should be based  on gestational age, weight and in agreement with clinical  observations.  Premature Infant recommended reference ranges:  Up to 24 hours.............<8.0 mg/dL  Up to 48 hours............<12.0 mg/dL  3-5 days..................<15.0 mg/dL  6-29 days.................<15.0 mg/dL       Alkaline Phosphatase   Date Value Ref Range Status   10/11/2020 174 (H) 55 - 135 U/L Final     AST   Date Value Ref Range Status   10/11/2020 20 10 - 40 U/L Final     ALT   Date Value Ref Range Status   10/11/2020 22 10 - 44 U/L Final     Anion Gap   Date Value Ref Range Status   10/11/2020 10 8 - 16 mmol/L Final     eGFR if    Date Value Ref Range Status   10/11/2020 >60 >60 mL/min/1.73 m^2 Final     eGFR if non    Date Value Ref Range Status   10/11/2020 >60 >60 mL/min/1.73 m^2 Final     Comment:     Calculation used to obtain the estimated glomerular filtration  rate (eGFR) is the CKD-EPI equation.        No results found for: [id number]  Lab Results   Component Value Date    TSH 0.152 (L) 10/11/2020    FREET4 0.99 10/11/2020      Ref Range & Units 2wk ago  (9/24/20)   Zonisamide 10 - 40 mcg/mL 13      Lab Results   Component Value Date    PHENYTOIN 15.0 10/11/2020       [id number]    Brain MRI Unremarkable    EMG TL Seizures  B/L         Reviewed the neuroimaging independently       Assessment:       1. Refractory epilepsy    2. Medication side effects    3. Partial idiopathic epilepsy with seizures of localized onset, intractable, without status epilepticus    4. Coronary artery disease involving native coronary artery without angina pectoris, unspecified whether native or transplanted heart    5. Essential hypertension    6. Hyperlipidemia, unspecified hyperlipidemia type          EPILEPSY CLASSIFICATION      SEMIOLOGY: DIALEPTIC (FOCAL WITH HILARY) WITH ICTAL SPITTING -->GTC     EPILEPTOGENIC ZONE (S):  TL     ETIOLOGY: UNKNOWN     PRIOR AEDS: VPA, LTG, OXC, LEV, CLB (EXPENSIVE)    CURRENT AEDS: ZNS, PHT    LAST SEIZURE DATE:       COMPREHENSIVE LIST OF AEDs:     Acetazolamide (AZM-Diamox)   Benzos: clonazepam (CZP Klonopin), lorazepam (LZP-Ativan), diazepam (DZP-Valium), clorazepate (CLZ- Tranxene)  Brivaracetam (BRV-Briviact)  Cannabidiol (CBD- Epidiolex)  Carbamazepine (CBZ-Tegretol)  Cenobamate (CNB-Xcopri)  Clobazam (CLB-Onfi)  Eslicarbazepine (ESL-Aptiom)  Ethosuximide (ESX-Zarontin)  Felbamate (FBM-Felbatol)  Gabapentin (GBP-Neurontin)  Lacosamide (LCM-Vimpat)  Lamotrigine (LTG-Lamitcal)  Levetiracetam (LEV- Keppra)  Oxcarbazepine (OXC-Trileptal)  Perampanel (PML-Fycompa)  Phenobarbital (PB)  Phenytoin (PHT-Dilantin)  Pregabalin (PGB-Lyrica)  Primidone (PRM)   Retigabine (RTG- Potiga) Discontinued in 2017  Rufinamide (RFN-Benzil)  Stiripentol (STP-Diacomit)  Tiagabine (TGB-Gabitril)  Topiramate (TPM-Topamax)  Valproate (VPA-Depakote)  Vigabatrin (VGB-Sabril)  Zonisamide (ZNS-Zonegran)    Plan:       INTRACTABLE-MEDICALLY REFRACTORY NON-LESIONAL TLE           EEG to evaluate for epileptiform discharges.     The patient was encouraged to maintain full traditional seizure precautions which include  but not limited to avoid driving, avoid high altitudes, avoid being close to fire or fire source, avoid being close to a body of water or swimming alone, avoid operating heavy machinery and avoid using sharp objects if possible. The patient was encouraged to shower (without accumulation of water) instead of taking a bath if unsupervised. The patient was made aware that these precautions are especially important during concurrent illness. Adequate sleep and avoidance of alcohol as important measures to assure good seizure control were discussed with the patient. The patient was also advised not to care for children without company. The patient was advised to pad the side rails with pillows and blankets if applicable.I strongly recommended lowering the bed to the floor level to decrease the risk of falls.     Continue  mg BID. Trough level pending    Continue  mg BID. Trough level pending     CLB Medication side Effects     Plan to taper medication dose Patient instructed to take CLB 10 mg in the morning and 20 mg at bedtime  Check CLB Trough Level   If patient s/s persist will consider decreasing dose.    Explained to the patient that failing 2 AEDs typically means that chance of achieving seizure-freedom using AEDs alone is <10% and we should explore other treatments like epilepsy surgery. Verbalized understanding.  Patient has agreed to follow up with [facility] Department of Neurology referral sent for Epilepsy surgery.       Continue AEDs INDEFINITELY, FOR GOOD AND NEVER SKIP A DOSE. The patient verbalized full understanding. Stressed the extreme medical, personal safety, public safety and legal importance of compliance and seizure control. Will give as many refills as possible with or without face-to-face evaluation to make sure the patient and any patient with epilepsy will never run out of medications. Running out of seizure medications should never happen under our care. I explained to the patient  that he should not, under any circumstances, adjust or stop taking his seizure medication without discussing with me. The patient verbalized full understanding.     AVOID any substance that could lower seizure threshold including but not limited to:      ALCOHOL AND WITHDRAWAL      TRAMADOL.     DEMEROL      ALL STIMULANTS-ALL ADHD MEDICATIONS.      CLOZAPINE.      BUPROPION.     CIPROFLOXACIN          MEDICAL/SURGICAL COMORBIDITIES     All relevant medical comorbidities noted and managed by primary care physician and medical care team.          MISCELLANEOUS MEDICAL PROBLEMS       HEALTHY LIFESTYLE AND PREVENTATIVE CARE    Encouraged the patient to adhere to the age-appropriate health maintenance guidelines including screening tests and vaccinations.     Discussed the overall importance of healthy lifestyle, optimal weight, exercise, healthy diet, good sleep hygiene and avoiding drugs including smoking, alcohol and recreational drugs. The patient verbalized full understanding.       Advised the patient to follow COVID-19 prevention measures.       I spent 30 minutes face to face with the patient    More than 35  minutes of the time spent in counseling and coordination of care including discussions etiology of diagnosis (EPILEPSY, CLB Medication side effects ), pathogenesis of diagnosis, prognosis of diagnosis,, diagnostic results, impression and recommendations, diagnostic studies, management, risks and benefits of treatment, instructions of disease self-management, treatment instructions, follow up requirements, patient and family counseling/involvement in care compliance with treatment regimen. All of the patient's questions were answered during this discussion.          RTC in 6 weeks     Serenity Adan MSN,  NP  Collaborating Provider: Payton Estevez MD, FAAN, Neurologist/Epileptologist

## 2020-10-13 ENCOUNTER — TELEPHONE (OUTPATIENT)
Dept: NEUROLOGY | Facility: CLINIC | Age: 60
End: 2020-10-13

## 2020-10-13 NOTE — TELEPHONE ENCOUNTER
----- Message from Muriel Morgan sent at 10/13/2020  9:22 AM CDT -----  Regarding: Nurse Naina call back about referral  Type:  Needs Medical Advice    Who Called: Lata   Symptoms (please be specific): n/a  How long has patient had these symptoms:  n/a  Pharmacy name and phone #:  n/a  Would the patient rather a call back or a response via [facility]? Call back if necessary   Best Call Back Number: [phone]  Additional Information: in regards to fax that has been sent over 4 times it has been received 4 time,  has accepted case. The pt will be called. Thanks

## 2020-10-14 LAB — ZONISAMIDE SERPL-MCNC: 37 MCG/ML (ref 10–40)

## 2020-10-15 LAB
CLOBAZAM SERPL-MCNC: 176 NG/ML (ref 30–300)
NORCLOBAZAM SERPL-MCNC: 3450 NG/ML (ref 300–3000)

## 2020-10-16 LAB
BACTERIA BLD CULT: NORMAL
BACTERIA BLD CULT: NORMAL

## 2020-10-17 ENCOUNTER — PATIENT MESSAGE (OUTPATIENT)
Dept: NEUROLOGY | Facility: CLINIC | Age: 60
End: 2020-10-17

## 2020-10-19 ENCOUNTER — PATIENT MESSAGE (OUTPATIENT)
Dept: NEUROLOGY | Facility: CLINIC | Age: 60
End: 2020-10-19

## 2020-10-19 DIAGNOSIS — G40.919 REFRACTORY EPILEPSY: ICD-10-CM

## 2020-10-19 DIAGNOSIS — G40.109 TEMPORAL LOBE EPILEPSY: Primary | ICD-10-CM

## 2020-10-19 RX ORDER — CLOBAZAM 2.5 MG/ML
5 SUSPENSION ORAL 2 TIMES DAILY
Qty: 3 BOTTLE | Refills: 5 | Status: SHIPPED | OUTPATIENT
Start: 2020-10-19 | End: 2020-12-07 | Stop reason: SDUPTHER

## 2020-10-19 RX ORDER — CLOBAZAM 10 MG/1
10 TABLET ORAL 2 TIMES DAILY
Qty: 60 EACH | Refills: 5 | OUTPATIENT
Start: 2020-10-19 | End: 2021-04-17

## 2020-11-09 ENCOUNTER — OFFICE VISIT (OUTPATIENT)
Dept: NEUROLOGY | Facility: CLINIC | Age: 60
End: 2020-11-09
Payer: MEDICARE

## 2020-11-09 VITALS
WEIGHT: 234.38 LBS | HEART RATE: 72 BPM | DIASTOLIC BLOOD PRESSURE: 72 MMHG | HEIGHT: 65 IN | BODY MASS INDEX: 39.05 KG/M2 | SYSTOLIC BLOOD PRESSURE: 130 MMHG | RESPIRATION RATE: 16 BRPM

## 2020-11-09 DIAGNOSIS — R79.89 ABNORMAL TSH: ICD-10-CM

## 2020-11-09 DIAGNOSIS — I25.10 CORONARY ARTERY DISEASE INVOLVING NATIVE CORONARY ARTERY WITHOUT ANGINA PECTORIS, UNSPECIFIED WHETHER NATIVE OR TRANSPLANTED HEART: Chronic | ICD-10-CM

## 2020-11-09 DIAGNOSIS — E78.5 HYPERLIPIDEMIA, UNSPECIFIED HYPERLIPIDEMIA TYPE: Chronic | ICD-10-CM

## 2020-11-09 DIAGNOSIS — R21 RASH: ICD-10-CM

## 2020-11-09 DIAGNOSIS — R56.9 CONVULSIONS, UNSPECIFIED CONVULSION TYPE: ICD-10-CM

## 2020-11-09 DIAGNOSIS — Z51.81 ENCOUNTER FOR MONITORING ANTICONVULSANT THERAPY: ICD-10-CM

## 2020-11-09 DIAGNOSIS — I10 ESSENTIAL HYPERTENSION: Chronic | ICD-10-CM

## 2020-11-09 DIAGNOSIS — G40.019 PARTIAL IDIOPATHIC EPILEPSY WITH SEIZURES OF LOCALIZED ONSET, INTRACTABLE, WITHOUT STATUS EPILEPTICUS: ICD-10-CM

## 2020-11-09 DIAGNOSIS — Z79.899 ENCOUNTER FOR MONITORING ANTICONVULSANT THERAPY: ICD-10-CM

## 2020-11-09 DIAGNOSIS — G40.109 TEMPORAL LOBE EPILEPSY: Primary | ICD-10-CM

## 2020-11-09 DIAGNOSIS — I25.10 CORONARY ARTERY DISEASE INVOLVING NATIVE CORONARY ARTERY OF NATIVE HEART WITHOUT ANGINA PECTORIS: ICD-10-CM

## 2020-11-09 DIAGNOSIS — G40.219 PARTIAL SYMPTOMATIC EPILEPSY WITH COMPLEX PARTIAL SEIZURES, INTRACTABLE, WITHOUT STATUS EPILEPTICUS: ICD-10-CM

## 2020-11-09 PROCEDURE — 99214 PR OFFICE/OUTPT VISIT, EST, LEVL IV, 30-39 MIN: ICD-10-PCS | Mod: S$PBB,,, | Performed by: PSYCHIATRY & NEUROLOGY

## 2020-11-09 PROCEDURE — 99214 OFFICE O/P EST MOD 30 MIN: CPT | Mod: S$PBB,,, | Performed by: PSYCHIATRY & NEUROLOGY

## 2020-11-09 PROCEDURE — 99999 PR PBB SHADOW E&M-EST. PATIENT-LVL V: ICD-10-PCS | Mod: PBBFAC,,, | Performed by: PSYCHIATRY & NEUROLOGY

## 2020-11-09 PROCEDURE — 99215 OFFICE O/P EST HI 40 MIN: CPT | Mod: PBBFAC | Performed by: PSYCHIATRY & NEUROLOGY

## 2020-11-09 PROCEDURE — 99999 PR PBB SHADOW E&M-EST. PATIENT-LVL V: CPT | Mod: PBBFAC,,, | Performed by: PSYCHIATRY & NEUROLOGY

## 2020-11-09 NOTE — PROGRESS NOTES
Subjective:       Patient ID: Evelyne Francisco is a 60 y.o. female.    Chief Complaint: refractory epilepsy          HPI       BACKGROUND HISTORY     The patient is here for seizure evaluation and a 4th opinion.    Saw Dr. Monge, Dr. Hobbs and Dr. Smith in the past.     The patient started having seizures 11 years ago (2009) at the age of 49 .The patient describes aura of strange feeling in her head that she cannot describe. The patient is amnestic to the seizure after that. The seizures are mostly described as Complex Partial (staring, unresponsiveness, sometimes accompanied by spitting) and some of the them Grand Mal (GTC). The patient is currently not driving and has 2-5 seizure a month. No history of meningitis or encephalitis No toxic exposure or lead poisoning. Her father had posttraumatic family history of epilepsy and her nephew might be having seizures.  No history of strokes or aneurysmal bleeding. No history of TBI. Routine EEGs have been normal. EMU in 2016 captured 2 seizures with B/L TL localization. Brain MRI was reported as unremarkable. Failed VPA, LTG, OXC, LEV and VNS. She is currently on  mg BID and  mg BID. LCM,  ESL and BRV were too expensive. She says that CLB helped control the seizures but the insurance would not pay for it. Continued  mg BID. Checked Trough level.Continued  mg BID. Checked Trough level.Added CLB 20 mg BID.Explained to the patient that failing 2 AEDs typically means that chance of achieving seizure-freedom using AEDs alone is <10% and we should explore other treatments like epilepsy surgery. Verbalized understanding.        INTERVAL HISTORY         On 09->10- PHT 12.9>15 (10-20), fPHT 1.1 (1-2) and  ZNS 13>37 (10-40).  On  mg BID and  mg BID. Could not tolerate CLB 20 mg BID. Takes 5 mg BID. Last seizure was 11-. Was approved to be seen at [facility].  EEG is pending.          Review of Systems    Constitutional: Positive for fatigue. Negative for appetite change.   HENT: Negative for hearing loss and tinnitus.    Eyes: Negative for photophobia and visual disturbance.   Respiratory: Negative for apnea and shortness of breath.    Cardiovascular: Negative for chest pain and palpitations.   Gastrointestinal: Negative for nausea and vomiting.   Endocrine: Negative for cold intolerance and heat intolerance.   Genitourinary: Negative for difficulty urinating and urgency.   Musculoskeletal: Negative for arthralgias, back pain, gait problem, joint swelling, myalgias, neck pain and neck stiffness.   Skin: Negative for color change and rash.   Allergic/Immunologic: Negative for environmental allergies and immunocompromised state.   Neurological: Positive for seizures. Negative for dizziness, tremors, syncope, facial asymmetry, speech difficulty, weakness, light-headedness, numbness and headaches.   Hematological: Negative for adenopathy. Does not bruise/bleed easily.   Psychiatric/Behavioral: Negative for agitation, behavioral problems, confusion, decreased concentration, dysphoric mood, hallucinations, self-injury, sleep disturbance and suicidal ideas. The patient is not hyperactive.              Current Outpatient Medications:     aspirin 81 MG Chew, Take 81 mg by mouth once daily., Disp: , Rfl:     atorvastatin (LIPITOR) 80 MG tablet, Take 80 mg by mouth nightly. , Disp: , Rfl:     cloBAZam (ONFI) 2.5 mg/mL Susp, Take 2 mLs (5 mg total) by mouth 2 (two) times daily., Disp: 3 Bottle, Rfl: 5    clopidogreL (PLAVIX) 75 mg tablet, Take 75 mg by mouth once daily., Disp: , Rfl:     estradioL (ESTRACE) 2 MG tablet, Take 2 mg by mouth once daily., Disp: , Rfl:     isosorbide mononitrate (IMDUR) 60 MG 24 hr tablet, Take 60 mg by mouth every morning. , Disp: , Rfl:     Lactobacillus rhamnosus GG (CULTURELLE) 10 billion cell capsule, Take 1 capsule by mouth once daily., Disp: , Rfl:     levocetirizine (XYZAL) 5 MG  tablet, Take 5 mg by mouth every evening., Disp: , Rfl:     levothyroxine (SYNTHROID) 100 MCG tablet, Take 100 mcg by mouth before breakfast., Disp: , Rfl:     metoprolol succinate (TOPROL-XL) 50 MG 24 hr tablet, Take 50 mg by mouth once daily., Disp: , Rfl:     mirabegron (MYRBETRIQ) 25 mg Tb24 ER tablet, Take 25 mg by mouth once daily., Disp: , Rfl:     pantoprazole (PROTONIX) 40 MG tablet, Take 40 mg by mouth every morning. , Disp: , Rfl:     phenytoin (DILANTIN) 100 MG ER capsule, Take 1 capsule (100 mg total) by mouth 2 (two) times daily., Disp: 180 capsule, Rfl: 11    potassium chloride (MICRO-K) 10 MEQ CpSR, Take 10 mEq by mouth once., Disp: , Rfl:     ranolazine (RANEXA) 1,000 mg Tb12, Take 1,000 mg by mouth 2 (two) times daily., Disp: , Rfl:     tolterodine (DETROL LA) 4 MG 24 hr capsule, , Disp: , Rfl:     triamcinolone acetonide 0.1% (KENALOG) 0.1 % cream, Apply topically 2 (two) times daily., Disp: 45 g, Rfl: 1    zonisamide (ZONEGRAN) 100 MG Cap, Take 3 capsules (300 mg total) by mouth 2 (two) times daily., Disp: 180 capsule, Rfl: 11  Past Medical History:   Diagnosis Date    Blood clotting tendency     Coronary artery disease     Fever blister     HLD (hyperlipidemia)     Hypertension     Seizures     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     Past Surgical History:   Procedure Laterality Date    ABDOMINAL SURGERY      BACK SURGERY      CARDIAC SURGERY      FRACTURE SURGERY      HYSTERECTOMY      TONSILLECTOMY      VASCULAR SURGERY       Social History     Socioeconomic History    Marital status:      Spouse name: Not on file    Number of children: Not on file    Years of education: Not on file    Highest education level: Not on file   Occupational History    Not on file   Social Needs    Financial resource strain: Not on file    Food insecurity     Worry: Not on file     Inability: Not on file    Transportation needs     Medical: Not on file     Non-medical: Not on  file   Tobacco Use    Smoking status: Former Smoker     Quit date: 2014     Years since quittin.3    Smokeless tobacco: Never Used   Substance and Sexual Activity    Alcohol use: No    Drug use: No    Sexual activity: Yes     Partners: Male   Lifestyle    Physical activity     Days per week: Not on file     Minutes per session: Not on file    Stress: Not on file   Relationships    Social connections     Talks on phone: Not on file     Gets together: Not on file     Attends [employer] service: Not on file     Active member of club or organization: Not on file     Attends meetings of clubs or organizations: Not on file     Relationship status: Not on file   Other Topics Concern    Are you pregnant or think you may be? Not Asked    Breast-feeding Not Asked   Social History Narrative    Not on file             Past/Current Medical/Surgical History, Past/Current Social History, Past/Current Family History and Past/Current Medications were reviewed in detail.        Objective:           VITAL SIGNS WERE REVIEWED      GENERAL APPEARANCE:     The patient looks comfortable.    Body habitus is obese.     No signs of respiratory distress.    Normal breathing pattern.    No dysmorphic features    Normal eye contact.     GENERAL MEDICAL EXAM:    HEENT:  Head is atraumatic normocephalic.     Neck and Axillae: No JVD. No visible lesions    Cardiopulmonary: No cyanosis. No tachypnea. Normal respiratory effort.VNS in place.    Gastrointestinal/Urogenital:  No jaundice. No stomas or lesions. No visible hernias. No catheters.     Skin, Hair and Nails: No pathognonomic skin rash. No neurofibromatosis. No visible lesions.No stigmata of autoimmune disease. No clubbing.    Limbs: No varicose veins. No visible swelling.    Muskoskeletal: No visible deformities.No visible lesions.               Neurologic Exam     Mental Status   Oriented to person, place, and time.   Registration: recalls 3 of 3 objects. Recall at 5  minutes: recalls 3 of 3 objects. Follows 3 step commands.   Attention: normal. Concentration: normal.   Speech: speech is normal   Level of consciousness: alert  Knowledge: good and consistent with education. Able to perform simple calculations.   Able to name object. Able to read. Able to repeat. Able to write. Normal comprehension.     Cranial Nerves   Cranial nerves II through XII intact.     CN II   Visual fields full to confrontation.   Visual acuity: normal with correction  Right visual field deficit: none  Left visual field deficit: none     CN III, IV, VI   Pupils are equal, round, and reactive to light.  Extraocular motions are normal.   Right pupil: Size: 2 mm. Shape: regular. Reactivity: brisk. Consensual response: intact. Accommodation: intact.   Left pupil: Size: 2 mm. Shape: regular. Reactivity: brisk. Consensual response: intact. Accommodation: intact.   CN III: no CN III palsy  CN VI: no CN VI palsy  Nystagmus: none   Diplopia: none  Ophthalmoparesis: none  Upgaze: normal  Downgaze: normal  Conjugate gaze: present  Vestibulo-ocular reflex: present    CN V   Facial sensation intact.   Right facial sensation deficit: none  Left facial sensation deficit: none    CN VII   Facial expression full, symmetric.   Right facial weakness: none  Left facial weakness: none    CN VIII   CN VIII normal.   Hearing: intact  Right Rinne: AC > BC  Left Rinne: AC > BC  Mendes: does not lateralize     CN IX, X   CN IX normal.   CN X normal.   Palate: symmetric    CN XI   CN XI normal.   Right sternocleidomastoid strength: normal  Left sternocleidomastoid strength: normal  Right trapezius strength: normal  Left trapezius strength: normal    CN XII   CN XII normal.   Tongue: not atrophic  Fasciculations: absent  Tongue deviation: none    Motor Exam   Muscle bulk: normal  Overall muscle tone: normal  Right arm tone: normal  Left arm tone: normal  Right arm pronator drift: absent  Left arm pronator drift: absent  Right leg  tone: normal  Left leg tone: normal    Strength   Strength 5/5 throughout.   Right neck flexion: 5/5  Left neck flexion: 5/5  Right neck extension: 5/5  Left neck extension: 5/5  Right deltoid: 5/5  Left deltoid: 5/5  Right biceps: 5/5  Left biceps: 5/5  Right triceps: 5/5  Left triceps: 5/5  Right wrist flexion: 5/5  Left wrist flexion: 5/5  Right wrist extension: 5/5  Left wrist extension: 5/5  Right interossei: 5/5  Left interossei: 5/5  Right iliopsoas: 5/5  Left iliopsoas: 5/5  Right quadriceps: 5/5  Left quadriceps: 5/5  Right hamstrin/5  Left hamstrin/5  Right glutei: 5/5  Left glutei: 5/5  Right anterior tibial: 5/5  Left anterior tibial: 5/5  Right posterior tibial: 5/5  Left posterior tibial: 5/5  Right peroneal: 5/5  Left peroneal: 5/5  Right gastroc: 5/5  Left gastroc: 5/5    Sensory Exam   Light touch normal.   Right arm light touch: normal  Left arm light touch: normal  Right leg light touch: normal  Left leg light touch: normal  Vibration normal.   Right arm vibration: normal  Left arm vibration: normal  Right leg vibration: normal  Left leg vibration: normal  Proprioception normal.   Right arm proprioception: normal  Left arm proprioception: normal  Right leg proprioception: normal  Left leg proprioception: normal  Pinprick normal.   Right arm pinprick: normal  Left arm pinprick: normal  Right leg pinprick: normal  Left leg pinprick: normal  Graphesthesia: normal  Stereognosis: normal    Gait, Coordination, and Reflexes     Gait  Gait: normal    Coordination   Romberg: negative  Finger to nose coordination: normal  Heel to shin coordination: normal  Tandem walking coordination: normal    Tremor   Resting tremor: absent  Intention tremor: absent  Action tremor: absent    Reflexes   Right brachioradialis: 2+  Left brachioradialis: 2+  Right biceps: 2+  Left biceps: 2+  Right triceps: 2+  Left triceps: 2+  Right patellar: 2+  Left patellar: 2+  Right achilles: 2+  Left achilles: 2+  Right  plantar: normal  Left plantar: normal  Right Marie: absent  Left Marie: absent  Right ankle clonus: absent  Left ankle clonus: absent  Right pendular knee jerk: absent  Left pendular knee jerk: absent      Lab Results   Component Value Date    WBC 11.18 10/11/2020    HGB 11.6 (L) 10/11/2020    HCT 37.6 10/11/2020    MCV 91 10/11/2020     10/11/2020     Sodium   Date Value Ref Range Status   10/11/2020 140 136 - 145 mmol/L Final     Potassium   Date Value Ref Range Status   10/11/2020 4.0 3.5 - 5.1 mmol/L Final     Chloride   Date Value Ref Range Status   10/11/2020 109 95 - 110 mmol/L Final     CO2   Date Value Ref Range Status   10/11/2020 21 (L) 23 - 29 mmol/L Final     Glucose   Date Value Ref Range Status   10/11/2020 121 (H) 70 - 110 mg/dL Final     BUN   Date Value Ref Range Status   10/11/2020 11 6 - 20 mg/dL Final     Creatinine   Date Value Ref Range Status   10/11/2020 1.0 0.5 - 1.4 mg/dL Final     Calcium   Date Value Ref Range Status   10/11/2020 8.7 8.7 - 10.5 mg/dL Final     Total Protein   Date Value Ref Range Status   10/11/2020 7.5 6.0 - 8.4 g/dL Final     Albumin   Date Value Ref Range Status   10/11/2020 3.9 3.5 - 5.2 g/dL Final     Total Bilirubin   Date Value Ref Range Status   10/11/2020 0.4 0.1 - 1.0 mg/dL Final     Comment:     For infants and newborns, interpretation of results should be based  on gestational age, weight and in agreement with clinical  observations.  Premature Infant recommended reference ranges:  Up to 24 hours.............<8.0 mg/dL  Up to 48 hours............<12.0 mg/dL  3-5 days..................<15.0 mg/dL  6-29 days.................<15.0 mg/dL       Alkaline Phosphatase   Date Value Ref Range Status   10/11/2020 174 (H) 55 - 135 U/L Final     AST   Date Value Ref Range Status   10/11/2020 20 10 - 40 U/L Final     ALT   Date Value Ref Range Status   10/11/2020 22 10 - 44 U/L Final     Anion Gap   Date Value Ref Range Status   10/11/2020 10 8 - 16 mmol/L Final      eGFR if    Date Value Ref Range Status   10/11/2020 >60 >60 mL/min/1.73 m^2 Final     eGFR if non    Date Value Ref Range Status   10/11/2020 >60 >60 mL/min/1.73 m^2 Final     Comment:     Calculation used to obtain the estimated glomerular filtration  rate (eGFR) is the CKD-EPI equation.        No results found for: [id number]  Lab Results   Component Value Date    TSH 0.152 (L) 10/11/2020    FREET4 0.99 10/11/2020     [id number]    Brain MRI Unremarkable    EEG TL Seizures  B/L       09->10-     PHT 12.9>15 (10-20), fPHT 1.1 (1-2) and  ZNS 13>37 (10-40)          Reviewed the neuroimaging independently       Assessment:       1. Temporal lobe epilepsy    2. Coronary artery disease involving native coronary artery without angina pectoris, unspecified whether native or transplanted heart    3. Essential hypertension    4. Hyperlipidemia, unspecified hyperlipidemia type    5. Abnormal TSH    6. Coronary artery disease involving native coronary artery of native heart without angina pectoris    7. Convulsions, unspecified convulsion type    8. Encounter for monitoring anticonvulsant therapy    9. Rash    10. Partial symptomatic epilepsy with complex partial seizures, intractable, without status epilepticus    11. Partial idiopathic epilepsy with seizures of localized onset, intractable, without status epilepticus          EPILEPSY CLASSIFICATION      SEMIOLOGY: DIALEPTIC (FOCAL WITH HILARY) WITH ICTAL SPITTING -->GTC     EPILEPTOGENIC ZONE (S):  TL     ETIOLOGY: UNKNOWN     PRIOR AEDS: VPA, LTG, OXC, LEV    CURRENT AEDS: ZNS, PHT, CLB    LAST SEIZURE DATE:       COMPREHENSIVE LIST OF AEDs:     Acetazolamide (AZM-Diamox)   Benzos: clonazepam (CZP Klonopin), lorazepam (LZP-Ativan), diazepam (DZP-Valium), clorazepate (CLZ- Tranxene)  Brivaracetam (BRV-Briviact)  Cannabidiol (CBD- Epidiolex)  Carbamazepine (CBZ-Tegretol)  Cenobamate (CNB-Xcopri)  Clobazam  (CLB-Onfi)  Eslicarbazepine (ESL-Aptiom)  Ethosuximide (ESX-Zarontin)  Felbamate (FBM-Felbatol)  Gabapentin (GBP-Neurontin)  Lacosamide (LCM-Vimpat)  Lamotrigine (LTG-Lamitcal)  Levetiracetam (LEV- Keppra)  Oxcarbazepine (OXC-Trileptal)  Perampanel (PML-Fycompa)  Phenobarbital (PB)  Phenytoin (PHT-Dilantin)  Pregabalin (PGB-Lyrica)  Primidone (PRM)   Retigabine (RTG- Potiga) Discontinued in 2017  Rufinamide (RFN-Benzil)  Stiripentol (STP-Diacomit)  Tiagabine (TGB-Gabitril)  Topiramate (TPM-Topamax)  Valproate (VPA-Depakote)  Vigabatrin (VGB-Sabril)  Zonisamide (ZNS-Zonegran)    Plan:       INTRACTABLE-MEDICALLY REFRACTORY NON-LESIONAL TLE           EEG to evaluate for epileptiform discharges.     The patient was encouraged to maintain full traditional seizure precautions which include but not limited to avoid driving, avoid high altitudes, avoid being close to fire or fire source, avoid being close to a body of water or swimming alone, avoid operating heavy machinery and avoid using sharp objects if possible. The patient was encouraged to shower (without accumulation of water) instead of taking a bath if unsupervised. The patient was made aware that these precautions are especially important during concurrent illness. Adequate sleep and avoidance of alcohol as important measures to assure good seizure control were discussed with the patient. The patient was also advised not to care for children without company. The patient was advised to pad the side rails with pillows and blankets if applicable.I strongly recommended lowering the bed to the floor level to decrease the risk of falls.     Continue  mg BID.     Continue  mg BID.     Change CLB to 7.5 mg BID ( 3 ml BID). Every 4-8 weeks will all 1 ml BID to a maximum of 8 ml BID.     Explained to the patient that failing 2 AEDs typically means that chance of achieving seizure-freedom using AEDs alone is <10% and we should explore other treatments like  epilepsy surgery. Verbalized understanding.  Waiting [facility] evaluation.       Continue AEDs INDEFINITELY, FOR GOOD AND NEVER SKIP A DOSE. The patient verbalized full understanding. Stressed the extreme medical, personal safety, public safety and legal importance of compliance and seizure control. Will give as many refills as possible with or without face-to-face evaluation to make sure the patient and any patient with epilepsy will never run out of medications. Running out of seizure medications should never happen under our care. I explained to the patient that he should not, under any circumstances, adjust or stop taking his seizure medication without discussing with me. The patient verbalized full understanding.     AVOID any substance that could lower seizure threshold including but not limited to:      ALCOHOL AND WITHDRAWAL      TRAMADOL.     DEMEROL      ALL STIMULANTS-ALL ADHD MEDICATIONS.      CLOZAPINE.      BUPROPION.     CIPROFLOXACIN          MEDICAL/SURGICAL COMORBIDITIES     All relevant medical comorbidities noted and managed by primary care physician and medical care team.          MISCELLANEOUS MEDICAL PROBLEMS       HEALTHY LIFESTYLE AND PREVENTATIVE CARE    Encouraged the patient to adhere to the age-appropriate health maintenance guidelines including screening tests and vaccinations.     Discussed the overall importance of healthy lifestyle, optimal weight, exercise, healthy diet, good sleep hygiene and avoiding drugs including smoking, alcohol and recreational drugs. The patient verbalized full understanding.       Advised the patient to follow COVID-19 prevention measures.       I spent  35 minutes face to face with the patient    More than 20  minutes of the time spent in counseling and coordination of care including discussions etiology of diagnosis (EPILEPSY), pathogenesis of diagnosis, prognosis of diagnosis,, diagnostic results, impression and recommendations, diagnostic studies,  management, risks and benefits of treatment, instructions of disease self-management, treatment instructions, follow up requirements, patient and family counseling/involvement in care compliance with treatment regimen. All of the patient's questions were answered during this discussion.          RTC in 4 weeks       Payton Estevez MD, FAAN    Attending Neurologist/Epileptologist         Diplomate, American Board of Psychiatry and Neurology    Diplomate, American Board of Clinical Neurophysiology     Fellow, American Academy of Neurology

## 2020-11-09 NOTE — PATIENT INSTRUCTIONS
Self-Care for Epilepsy  You can do many things to help control your seizures. First, follow your treatment plan. If your healthcare provider has prescribed medicines, be sure to take them as directed. Also, take the following steps.    Track and avoid triggers  Triggers are things that seem to provoke seizures. Keep track of your triggers and try to avoid them. Here are 2 common triggers and ways to cope with them:  · Too little sleep. Be sure to get enough sleep. If you have trouble sleeping, talk to your healthcare provider.  · Alcohol and drugs. Avoid alcohol. Never take any illegal drugs. If you do have substance abuse issues, seek the help of your healthcare provider for the safest way to become free of alcohol and drugs.   Keep a healthy lifestyle  A healthy lifestyle can help you feel good and cope better with epilepsy.  · Exercise often. Frequent exercise can help keep you healthy. Try to exercise for 30 minutes most days of the week. Yoga is a good choice.  · Eat well and regularly. Good nutrition can give you energy and make you feel better. Eat lots of fruits, vegetables, and whole grains. Avoid skipping meals, because seizures are more likely if you have low blood sugar.  · Control stress. Keeping stress levels low can help you cope better with epilepsy. To manage stress, try an exercise program.  · Manage illness. Get proper treatment when youre sick. Check with your healthcare provider and pharmacist about the risk of seizures with medicines you take for illnesses. If your healthcare provider has prescribed antiepileptic medicines, be sure to take them even when youre ill.  Date Last Reviewed: 9/8/2015  © 6117-2784 [email or web address]. [address] [place] [address], [place] [address]. All rights reserved. This information is not intended as a substitute for professional medical care. Always follow your healthcare professional's instructions.

## 2020-11-11 ENCOUNTER — TELEPHONE (OUTPATIENT)
Dept: NEUROLOGY | Facility: CLINIC | Age: 60
End: 2020-11-11

## 2020-11-11 NOTE — TELEPHONE ENCOUNTER
Pt wanted to know if [facility] would accept her medicare , advised her that we sent info over and they didn't say anything about it . She has a video  Visit 11/18/2020

## 2020-11-11 NOTE — TELEPHONE ENCOUNTER
----- Message from Vahid Youssef sent at 11/11/2020  3:33 PM CST -----  .Type:  Needs Medical Advice    Who Called:YOLI PUGA [[id number]]  Symptoms (please be specific):  How long has patient had these symptoms:   Pharmacy name and phone #:   Would the patient rather a call back or a response via [facility]?    Call   Best Call Back Number:   [phone]  Additional Information: Pt is requesting a call back from the nurse in regards to the pt referral and her up coming appt with the gissel Chen  please

## 2020-11-20 ENCOUNTER — TELEPHONE (OUTPATIENT)
Dept: NEUROLOGY | Facility: CLINIC | Age: 60
End: 2020-11-20

## 2020-11-20 NOTE — TELEPHONE ENCOUNTER
----- Message from Yeny Messer sent at 11/20/2020  3:29 PM CST -----  Regarding: request call back  Pt  request callback , doctor pt was referredt to  request information for pt to get a  [phone]

## 2020-12-02 ENCOUNTER — HOSPITAL ENCOUNTER (OUTPATIENT)
Dept: PULMONOLOGY | Facility: HOSPITAL | Age: 60
Discharge: HOME | End: 2020-12-02
Attending: PSYCHIATRY & NEUROLOGY
Payer: MEDICARE

## 2020-12-02 DIAGNOSIS — G40.919 REFRACTORY EPILEPSY: ICD-10-CM

## 2020-12-02 PROCEDURE — 95816 EEG AWAKE AND DROWSY: CPT

## 2020-12-02 PROCEDURE — 95819 EEG AWAKE AND ASLEEP: CPT | Mod: 26,,, | Performed by: PSYCHIATRY & NEUROLOGY

## 2020-12-02 PROCEDURE — 95819 PR EEG,W/AWAKE & ASLEEP RECORD: ICD-10-PCS | Mod: 26,,, | Performed by: PSYCHIATRY & NEUROLOGY

## 2020-12-03 ENCOUNTER — TELEPHONE (OUTPATIENT)
Dept: NEUROLOGY | Facility: CLINIC | Age: 60
End: 2020-12-03

## 2020-12-03 NOTE — PROCEDURES
DATE EEG PERFORMED:  12-      DATE EEG INTERPRETED:  12-.                DURATION OF EEG: ROUTINE.         LEVEL OF CONSCIOUSENESS    Awake and Sleep.         EEG BACKGROUND    The posterior dominant basic rhythm reaches 8-9 Hz, symmetric, reactive, well-modulated and well-sustained.         EEG CLASSIFICATION    Normal        IMPRESSION      The EEG is normal in the awake and sleep states.       There are no epileptiform discharges or lateralizing signs. No typical events were recorded. There is no electrographic evidence of seizure.There is no electrographic evidence of status epilepticus.         PLEASE NOTE THAT A NON-EPILEPTIFORM EEG DOES NOT RULE OUT EPILEPSY.        JACQUE EWING MD, FAAN    Diplomate, American Board of Psychiatry and Neurology    Diplomate, American Board of Clinical Neurophysiology

## 2020-12-07 ENCOUNTER — OFFICE VISIT (OUTPATIENT)
Dept: INTERNAL MEDICINE | Facility: CLINIC | Age: 60
End: 2020-12-07
Payer: MEDICARE

## 2020-12-07 ENCOUNTER — OFFICE VISIT (OUTPATIENT)
Dept: NEUROLOGY | Facility: CLINIC | Age: 60
End: 2020-12-07
Payer: MEDICARE

## 2020-12-07 ENCOUNTER — TELEPHONE (OUTPATIENT)
Dept: ORTHOPEDICS | Facility: CLINIC | Age: 60
End: 2020-12-07

## 2020-12-07 VITALS
SYSTOLIC BLOOD PRESSURE: 130 MMHG | HEART RATE: 72 BPM | BODY MASS INDEX: 39.49 KG/M2 | HEIGHT: 65 IN | DIASTOLIC BLOOD PRESSURE: 80 MMHG | WEIGHT: 237 LBS | RESPIRATION RATE: 16 BRPM

## 2020-12-07 VITALS
WEIGHT: 237.19 LBS | OXYGEN SATURATION: 98 % | TEMPERATURE: 98 F | DIASTOLIC BLOOD PRESSURE: 80 MMHG | HEIGHT: 65 IN | BODY MASS INDEX: 39.52 KG/M2 | SYSTOLIC BLOOD PRESSURE: 130 MMHG | HEART RATE: 98 BPM

## 2020-12-07 DIAGNOSIS — I10 ESSENTIAL HYPERTENSION: ICD-10-CM

## 2020-12-07 DIAGNOSIS — R21 RASH: ICD-10-CM

## 2020-12-07 DIAGNOSIS — E03.9 HYPOTHYROIDISM, UNSPECIFIED TYPE: ICD-10-CM

## 2020-12-07 DIAGNOSIS — Z51.81 ENCOUNTER FOR MONITORING ANTICONVULSANT THERAPY: ICD-10-CM

## 2020-12-07 DIAGNOSIS — I25.10 CORONARY ARTERY DISEASE INVOLVING NATIVE CORONARY ARTERY WITHOUT ANGINA PECTORIS, UNSPECIFIED WHETHER NATIVE OR TRANSPLANTED HEART: Chronic | ICD-10-CM

## 2020-12-07 DIAGNOSIS — E07.9 DISORDER OF THYROID, UNSPECIFIED: ICD-10-CM

## 2020-12-07 DIAGNOSIS — G40.019 PARTIAL IDIOPATHIC EPILEPSY WITH SEIZURES OF LOCALIZED ONSET, INTRACTABLE, WITHOUT STATUS EPILEPTICUS: ICD-10-CM

## 2020-12-07 DIAGNOSIS — G40.919 REFRACTORY EPILEPSY: ICD-10-CM

## 2020-12-07 DIAGNOSIS — M25.561 CHRONIC PAIN OF RIGHT KNEE: ICD-10-CM

## 2020-12-07 DIAGNOSIS — E78.5 HYPERLIPIDEMIA, UNSPECIFIED HYPERLIPIDEMIA TYPE: Chronic | ICD-10-CM

## 2020-12-07 DIAGNOSIS — I25.10 CORONARY ARTERY DISEASE INVOLVING NATIVE CORONARY ARTERY OF NATIVE HEART WITHOUT ANGINA PECTORIS: ICD-10-CM

## 2020-12-07 DIAGNOSIS — Z79.899 ENCOUNTER FOR MONITORING ANTICONVULSANT THERAPY: ICD-10-CM

## 2020-12-07 DIAGNOSIS — G89.29 CHRONIC PAIN OF RIGHT KNEE: ICD-10-CM

## 2020-12-07 DIAGNOSIS — R79.89 ABNORMAL TSH: ICD-10-CM

## 2020-12-07 DIAGNOSIS — Z95.5 HX OF HEART ARTERY STENT: ICD-10-CM

## 2020-12-07 DIAGNOSIS — I25.10 CORONARY ARTERY DISEASE INVOLVING NATIVE CORONARY ARTERY WITHOUT ANGINA PECTORIS, UNSPECIFIED WHETHER NATIVE OR TRANSPLANTED HEART: ICD-10-CM

## 2020-12-07 DIAGNOSIS — E78.5 HYPERLIPIDEMIA, UNSPECIFIED HYPERLIPIDEMIA TYPE: ICD-10-CM

## 2020-12-07 DIAGNOSIS — G40.219 PARTIAL SYMPTOMATIC EPILEPSY WITH COMPLEX PARTIAL SEIZURES, INTRACTABLE, WITHOUT STATUS EPILEPTICUS: ICD-10-CM

## 2020-12-07 DIAGNOSIS — G40.109 TEMPORAL LOBE EPILEPSY: Primary | ICD-10-CM

## 2020-12-07 DIAGNOSIS — R56.9 CONVULSIONS, UNSPECIFIED CONVULSION TYPE: ICD-10-CM

## 2020-12-07 DIAGNOSIS — Z76.89 ESTABLISHING CARE WITH NEW DOCTOR, ENCOUNTER FOR: Primary | ICD-10-CM

## 2020-12-07 PROCEDURE — 99214 OFFICE O/P EST MOD 30 MIN: CPT | Mod: S$PBB,,, | Performed by: NURSE PRACTITIONER

## 2020-12-07 PROCEDURE — 99999 PR PBB SHADOW E&M-EST. PATIENT-LVL V: ICD-10-PCS | Mod: PBBFAC,,, | Performed by: FAMILY MEDICINE

## 2020-12-07 PROCEDURE — 99215 OFFICE O/P EST HI 40 MIN: CPT | Mod: PBBFAC | Performed by: NURSE PRACTITIONER

## 2020-12-07 PROCEDURE — 99999 PR PBB SHADOW E&M-EST. PATIENT-LVL V: CPT | Mod: PBBFAC,,, | Performed by: FAMILY MEDICINE

## 2020-12-07 PROCEDURE — 99204 OFFICE O/P NEW MOD 45 MIN: CPT | Mod: S$PBB,,, | Performed by: FAMILY MEDICINE

## 2020-12-07 PROCEDURE — 99999 PR PBB SHADOW E&M-EST. PATIENT-LVL V: CPT | Mod: PBBFAC,,, | Performed by: NURSE PRACTITIONER

## 2020-12-07 PROCEDURE — 99215 OFFICE O/P EST HI 40 MIN: CPT | Mod: PBBFAC,27 | Performed by: FAMILY MEDICINE

## 2020-12-07 PROCEDURE — 99999 PR PBB SHADOW E&M-EST. PATIENT-LVL V: ICD-10-PCS | Mod: PBBFAC,,, | Performed by: NURSE PRACTITIONER

## 2020-12-07 PROCEDURE — 99214 PR OFFICE/OUTPT VISIT, EST, LEVL IV, 30-39 MIN: ICD-10-PCS | Mod: S$PBB,,, | Performed by: NURSE PRACTITIONER

## 2020-12-07 PROCEDURE — 99204 PR OFFICE/OUTPT VISIT, NEW, LEVL IV, 45-59 MIN: ICD-10-PCS | Mod: S$PBB,,, | Performed by: FAMILY MEDICINE

## 2020-12-07 RX ORDER — CLOBAZAM 2.5 MG/ML
7.5 SUSPENSION ORAL 2 TIMES DAILY
Qty: 180 ML | Refills: 5 | Status: SHIPPED | OUTPATIENT
Start: 2020-12-07 | End: 2021-01-08 | Stop reason: SDUPTHER

## 2020-12-07 RX ORDER — LEVOCETIRIZINE DIHYDROCHLORIDE 5 MG/1
5 TABLET, FILM COATED ORAL NIGHTLY
Qty: 30 TABLET | Refills: 5 | Status: SHIPPED | OUTPATIENT
Start: 2020-12-07 | End: 2021-01-08 | Stop reason: SDUPTHER

## 2020-12-07 RX ORDER — CLOBAZAM 2.5 MG/ML
5 SUSPENSION ORAL 2 TIMES DAILY
Qty: 3 BOTTLE | Refills: 5 | Status: SHIPPED | OUTPATIENT
Start: 2020-12-07 | End: 2020-12-07 | Stop reason: DRUGHIGH

## 2020-12-07 NOTE — PATIENT INSTRUCTIONS
Epilepsy: How Seizures Affect the Body  The brain is the control center of your body. It manages everything from movement and balance to emotions and memory. When a seizure happens, some or all brain functions are temporarily affected.        Normal EEG       Partial seizure EEG       Generalized seizure EEG      The brain working normally  The brain uses electrical signals to send messages throughout your body. Signals sent from different parts of your brain control different body functions. For instance, 1 part of your brain controls balance. Another part controls speech. A healthcare provider can record brain signals using a test called an electroencephalogram (EEG).  The brain during a seizure  During a seizure, excessive electrical signals in your brain disrupt its normal activity. The way this affects body functions depends on 2 main factors. First is the location of the seizure in your brain. For instance, a seizure in a part of your brain that controls movement might cause your arm or leg to jerk. The second factor is the type of seizure. For instance, a seizure that affects more of your brain may affect more of your body.  Types of seizures  The 2 main types of seizures are partial and generalized. A partial seizure can become generalized.  Partial seizures (do not involve the whole brain)  Also called focal seizures, these seizures start in 1 part of your brain. There are 2 types:  · Simple partial seizures. These may start with an aura, or warning. Auras are an unusual sense or feeling that indicates that a seizure will soon happen. Auras can involve strange tastes or smells, stomach upset, or a feeling of fear or déjà vu. Simple partial seizures may also involve jerking movements or hallucinations. You are awake and aware that you are having a simple seizure.  · Complex partial seizures. These may also start with an aura. You may become motionless and have a vacant stare. Or you may perform  automatisms. These are repeated movements, such as smacking lips or gesturing. A complex seizure means there is a change or loss of consciousness. If you have a complex seizure, you may be awake but unaware of the seizure.  Generalized seizures (involves the whole brain)  These seizures affect your entire brain at once. They usually start happening at a young age. The most common types of generalized seizures are:  · Absence seizures (petit mal seizures). These seizures involve a brief lapse of awareness. Signs can include staring, eye blinking, and lip smacking.  · Tonic-clonic seizures (grand mal seizures). These may be the best known type of seizure. You lose consciousness and may fall to the ground. Your body stiffens and then convulses, with your arms and legs jerking rhythmically.  · Myoclonic seizures. These seizures involve brief jerking movements. They usually affect both sides of your body.  · Atonic seizures (drop attacks). During these seizures, you lose all muscle control and may fall or slump over.  Date Last Reviewed: 9/8/2015 © 2000-2017 [employer]. [address] [place] [address]. All rights reserved. This information is not intended as a substitute for professional medical care. Always follow your healthcare professional's instructions.        Diagnosing Epilepsy    Your primary healthcare provider may be the first healthcare provider to evaluate you for epilepsy. He or she may then refer you to a specialist for further evaluation. This specialist may be a neurologist (a healthcare provider who treats the brain), or an epileptologist, a neurologist who specializes in seizure disorders. Your evaluation will include a medical history, physical and neurologic exams, and tests.  Medical history  This is the most important part of your evaluation. The healthcare provider will ask you to describe your seizures. The healthcare provider may also want to talk to family or friends  who have observed your seizures. In addition, your healthcare provider will ask about your risk factors. These are things that make you more likely to have epilepsy, and include:  · Premature birth (being born before your due date)  · Oxygen deprivation during birth   · A family history of epilepsy  · Past nervous system infection, like meningitis  · A previous head or brain injury  · Past stroke or brain tumor  · A history of febrile seizures (childhood seizures caused by high fever)  · Use of illegal drugs or alcohol  · Certain genetic disorders  · Alcohol abuse or withdrawal  · Alzheimer disease  · Gluten intolerance or celiac disease  · Hydrocepalus or an abnormal buildup of fluid around the brain  · Withdrawal of antiepileptic medicines, even when they are used for other condition, like gabapentin for pain   Physical and neurologic exams  The physical exam checks your overall health. Your pulse, blood pressure, and temperature are taken. The neurologic exam checks certain functions of your brain. These include reflexes, balance, muscle strength, and coordination. Mental skills, like language and memory, and nerve function of the body are also checked.  Tests for epilepsy  After the exams are done, the healthcare provider may order some tests. EEG and MRI are the most common tests used to support a diagnosis of epilepsy.  Electroencephalogram (EEG)  An EEG records electrical activity in the brain. It can show abnormal signals that may indicate seizure activity. In some cases, it can point to the area of the brain where seizures might start.  Imaging tests  Imaging tests may be used to create detailed pictures of the brain. These tests include MRI (magnetic resonance imaging) and CT (computed tomography).  Blood tests and other tests  You may have a sample of blood taken and tested. Other tests may also be done. These tests can help rule out certain health problems or provide more information.  Date Last  Reviewed: 9/8/2015  © 8604-3921 [employer]. [address] [place] [address], [place] [address]. All rights reserved. This information is not intended as a substitute for professional medical care. Always follow your healthcare professional's instructions.        Epilepsy: How Seizures Affect the Body  The brain is the control center of your body. It manages everything from movement and balance to emotions and memory. When a seizure happens, some or all brain functions are temporarily affected.        Normal EEG       Partial seizure EEG       Generalized seizure EEG      The brain working normally  The brain uses electrical signals to send messages throughout your body. Signals sent from different parts of your brain control different body functions. For instance, 1 part of your brain controls balance. Another part controls speech. A healthcare provider can record brain signals using a test called an electroencephalogram (EEG).  The brain during a seizure  During a seizure, excessive electrical signals in your brain disrupt its normal activity. The way this affects body functions depends on 2 main factors. First is the location of the seizure in your brain. For instance, a seizure in a part of your brain that controls movement might cause your arm or leg to jerk. The second factor is the type of seizure. For instance, a seizure that affects more of your brain may affect more of your body.  Types of seizures  The 2 main types of seizures are partial and generalized. A partial seizure can become generalized.  Partial seizures (do not involve the whole brain)  Also called focal seizures, these seizures start in 1 part of your brain. There are 2 types:  · Simple partial seizures. These may start with an aura, or warning. Auras are an unusual sense or feeling that indicates that a seizure will soon happen. Auras can involve strange tastes or smells, stomach upset, or a feeling of fear or déjà vu. Simple partial  seizures may also involve jerking movements or hallucinations. You are awake and aware that you are having a simple seizure.  · Complex partial seizures. These may also start with an aura. You may become motionless and have a vacant stare. Or you may perform automatisms. These are repeated movements, such as smacking lips or gesturing. A complex seizure means there is a change or loss of consciousness. If you have a complex seizure, you may be awake but unaware of the seizure.  Generalized seizures (involves the whole brain)  These seizures affect your entire brain at once. They usually start happening at a young age. The most common types of generalized seizures are:  · Absence seizures (petit mal seizures). These seizures involve a brief lapse of awareness. Signs can include staring, eye blinking, and lip smacking.  · Tonic-clonic seizures (grand mal seizures). These may be the best known type of seizure. You lose consciousness and may fall to the ground. Your body stiffens and then convulses, with your arms and legs jerking rhythmically.  · Myoclonic seizures. These seizures involve brief jerking movements. They usually affect both sides of your body.  · Atonic seizures (drop attacks). During these seizures, you lose all muscle control and may fall or slump over.  Date Last Reviewed: 9/8/2015 © 2000-2017 [employer]. [address] [place] [address], [place] [address]. All rights reserved. This information is not intended as a substitute for professional medical care. Always follow your healthcare professional's instructions.      Follow with [facility] evaluation.   Epilepsy: How Seizures Affect the Body  The brain is the control center of your body. It manages everything from movement and balance to emotions and memory. When a seizure happens, some or all brain functions are temporarily affected.        Normal EEG       Partial seizure EEG       Generalized seizure EEG      The brain working  normally  The brain uses electrical signals to send messages throughout your body. Signals sent from different parts of your brain control different body functions. For instance, 1 part of your brain controls balance. Another part controls speech. A healthcare provider can record brain signals using a test called an electroencephalogram (EEG).  The brain during a seizure  During a seizure, excessive electrical signals in your brain disrupt its normal activity. The way this affects body functions depends on 2 main factors. First is the location of the seizure in your brain. For instance, a seizure in a part of your brain that controls movement might cause your arm or leg to jerk. The second factor is the type of seizure. For instance, a seizure that affects more of your brain may affect more of your body.  Types of seizures  The 2 main types of seizures are partial and generalized. A partial seizure can become generalized.  Partial seizures (do not involve the whole brain)  Also called focal seizures, these seizures start in 1 part of your brain. There are 2 types:  · Simple partial seizures. These may start with an aura, or warning. Auras are an unusual sense or feeling that indicates that a seizure will soon happen. Auras can involve strange tastes or smells, stomach upset, or a feeling of fear or déjà vu. Simple partial seizures may also involve jerking movements or hallucinations. You are awake and aware that you are having a simple seizure.  · Complex partial seizures. These may also start with an aura. You may become motionless and have a vacant stare. Or you may perform automatisms. These are repeated movements, such as smacking lips or gesturing. A complex seizure means there is a change or loss of consciousness. If you have a complex seizure, you may be awake but unaware of the seizure.  Generalized seizures (involves the whole brain)  These seizures affect your entire brain at once. They usually start  happening at a young age. The most common types of generalized seizures are:  · Absence seizures (petit mal seizures). These seizures involve a brief lapse of awareness. Signs can include staring, eye blinking, and lip smacking.  · Tonic-clonic seizures (grand mal seizures). These may be the best known type of seizure. You lose consciousness and may fall to the ground. Your body stiffens and then convulses, with your arms and legs jerking rhythmically.  · Myoclonic seizures. These seizures involve brief jerking movements. They usually affect both sides of your body.  · Atonic seizures (drop attacks). During these seizures, you lose all muscle control and may fall or slump over.  Date Last Reviewed: 9/8/2015 © 2000-2017 "[employer]. [address] [place] [address], [place] [address]. All rights reserved. This information is not intended as a substitute for professional medical care. Always follow your healthcare professional's instructions.      Continue to follow up with  [facility] evaluation     Living Well with Epilepsy  People with epilepsy can lead healthy, productive lives. Life with epilepsy can be challenging, but there are things you can do to make it easier. For example, you can pay attention to your emotions. If you feel down, upset, or scared, talk to your healthcare provider. And be open with the people in your life. Talking about epilepsy can help them understand. It can also help you feel better.  Coping with emotions  You may be scared to go out in public for fear of having a seizure. Or you may just get frustrated with having epilepsy. Such feelings are normal. But they can lead to anxiety and depression. Treatment is available for these conditions, so talk to your healthcare provider. Discuss what can help you, such as the following:  · Support groups give you the chance to talk with other people who have epilepsy.  · Counseling helps you learn to cope with your emotions and health  problems.  · Medicine can help if you have a mood disorder.     Recognizing signs of depression  Depression is an illness that affects your thoughts and feelings. It can be caused by trouble coping with epilepsy, and sometimes it may be caused by the medicines used to treat it. Depression can be serious. If you have any of the following, call your healthcare provider:  · Feeling down most of the time  · Feeling hopeless or helpless  · Losing pleasure in things you used to enjoy  · Sleeping less or more than usual  · Having a big change in appetite or weight  · Having trouble focusing, remembering, or making decisions  · Isolating yourself from friends or family    Coping at home  Epilepsy affects those around you, too. Talk with your loved ones and learn their concerns. For instance, your children may be afraid for your safety. Reassure them that you can live a long, healthy life with epilepsy. Your partner may wonder if a normal sex life is possible. Let him or her know that epilepsy doesnt have to affect intimacy. If loved ones have questions, you can always arrange a talk with your healthcare provider.  Epilepsy and your job  Epilepsy doesnt have to keep you from working. In fact, people with epilepsy hold many kinds of jobs. But there are some issues you should consider, such as:  · What kind of work can I do? This depends on several things, like how well controlled your seizures are. Also consider whether the job involves tasks that may not be safe for you. These include driving or operating heavy machinery.  · Should I tell my boss or coworkers about my epilepsy? This is your personal choice. But you may be safer if people at your workplace are prepared to respond to a seizure. If you are concerned about losing your job, know your rights. The Americans with Disabilities Act provides work-related protections for people with epilepsy.  Date Last Reviewed: 9/10/2015  © 4443-6928 [employer] [address]  [place] [address]. All rights reserved. This information is not intended as a substitute for professional medical care. Always follow your healthcare professional's instructions.        Diagnosing Epilepsy    Your primary healthcare provider may be the first healthcare provider to evaluate you for epilepsy. He or she may then refer you to a specialist for further evaluation. This specialist may be a neurologist (a healthcare provider who treats the brain), or an epileptologist, a neurologist who specializes in seizure disorders. Your evaluation will include a medical history, physical and neurologic exams, and tests.  Medical history  This is the most important part of your evaluation. The healthcare provider will ask you to describe your seizures. The healthcare provider may also want to talk to family or friends who have observed your seizures. In addition, your healthcare provider will ask about your risk factors. These are things that make you more likely to have epilepsy, and include:  · Premature birth (being born before your due date)  · Oxygen deprivation during birth   · A family history of epilepsy  · Past nervous system infection, like meningitis  · A previous head or brain injury  · Past stroke or brain tumor  · A history of febrile seizures (childhood seizures caused by high fever)  · Use of illegal drugs or alcohol  · Certain genetic disorders  · Alcohol abuse or withdrawal  · Alzheimer disease  · Gluten intolerance or celiac disease  · Hydrocepalus or an abnormal buildup of fluid around the brain  · Withdrawal of antiepileptic medicines, even when they are used for other condition, like gabapentin for pain   Physical and neurologic exams  The physical exam checks your overall health. Your pulse, blood pressure, and temperature are taken. The neurologic exam checks certain functions of your brain. These include reflexes, balance, muscle strength, and coordination. Mental skills,  like language and memory, and nerve function of the body are also checked.  Tests for epilepsy  After the exams are done, the healthcare provider may order some tests. EEG and MRI are the most common tests used to support a diagnosis of epilepsy.  Electroencephalogram (EEG)  An EEG records electrical activity in the brain. It can show abnormal signals that may indicate seizure activity. In some cases, it can point to the area of the brain where seizures might start.  Imaging tests  Imaging tests may be used to create detailed pictures of the brain. These tests include MRI (magnetic resonance imaging) and CT (computed tomography).  Blood tests and other tests  You may have a sample of blood taken and tested. Other tests may also be done. These tests can help rule out certain health problems or provide more information.  Date Last Reviewed: 9/8/2015  © 0967-7620 [employer]. [address] [place] [address]. All rights reserved. This information is not intended as a substitute for professional medical care. Always follow your healthcare professional's instructions.        Epilepsy: How Seizures Affect the Body  The brain is the control center of your body. It manages everything from movement and balance to emotions and memory. When a seizure happens, some or all brain functions are temporarily affected.        Normal EEG       Partial seizure EEG       Generalized seizure EEG      The brain working normally  The brain uses electrical signals to send messages throughout your body. Signals sent from different parts of your brain control different body functions. For instance, 1 part of your brain controls balance. Another part controls speech. A healthcare provider can record brain signals using a test called an electroencephalogram (EEG).  The brain during a seizure  During a seizure, excessive electrical signals in your brain disrupt its normal activity. The way this affects body functions  depends on 2 main factors. First is the location of the seizure in your brain. For instance, a seizure in a part of your brain that controls movement might cause your arm or leg to jerk. The second factor is the type of seizure. For instance, a seizure that affects more of your brain may affect more of your body.  Types of seizures  The 2 main types of seizures are partial and generalized. A partial seizure can become generalized.  Partial seizures (do not involve the whole brain)  Also called focal seizures, these seizures start in 1 part of your brain. There are 2 types:  · Simple partial seizures. These may start with an aura, or warning. Auras are an unusual sense or feeling that indicates that a seizure will soon happen. Auras can involve strange tastes or smells, stomach upset, or a feeling of fear or déjà vu. Simple partial seizures may also involve jerking movements or hallucinations. You are awake and aware that you are having a simple seizure.  · Complex partial seizures. These may also start with an aura. You may become motionless and have a vacant stare. Or you may perform automatisms. These are repeated movements, such as smacking lips or gesturing. A complex seizure means there is a change or loss of consciousness. If you have a complex seizure, you may be awake but unaware of the seizure.  Generalized seizures (involves the whole brain)  These seizures affect your entire brain at once. They usually start happening at a young age. The most common types of generalized seizures are:  · Absence seizures (petit mal seizures). These seizures involve a brief lapse of awareness. Signs can include staring, eye blinking, and lip smacking.  · Tonic-clonic seizures (grand mal seizures). These may be the best known type of seizure. You lose consciousness and may fall to the ground. Your body stiffens and then convulses, with your arms and legs jerking rhythmically.  · Myoclonic seizures. These seizures involve  brief jerking movements. They usually affect both sides of your body.  · Atonic seizures (drop attacks). During these seizures, you lose all muscle control and may fall or slump over.  Date Last Reviewed: 9/8/2015  © 0436-6223 [employer]. [address] [place] [address], [place] [address]. All rights reserved. This information is not intended as a substitute for professional medical care. Always follow your healthcare professional's instructions.      Continue to Follow up with [facility] for evaluation.

## 2020-12-07 NOTE — PROGRESS NOTES
Subjective:       Patient ID: Evelyne Francisco is a 60 y.o. female.    Chief Complaint: Establish Care    New patient establish care.  Medical history includes coronary artery disease with stent, hypertension, hyperlipidemia, hypothyroidism, temporal lobe epilepsy, hyperactive bladder, chronic allergic rhinitis.  She is also followed by Cardiology neurology Urology Gynecology and Gastroenterology.  She was placed on Protonix for several weeks after having had an EGD done about 1 month ago.  She is scheduled to see Gastroenterology for colon screening.  She is scheduled to see gynecology for routine exam in a refill on estradiol.  She denies chest pain palpitations or edema. She has occasional shortness of breath and was told by Cardiology that it was stress related.    Review of Systems   Constitutional: Negative for appetite change, chills, diaphoresis, fatigue, fever and unexpected weight change.   HENT: Positive for congestion and postnasal drip. Negative for sneezing and sore throat.    Eyes: Positive for itching. Negative for pain, discharge and visual disturbance.   Respiratory: Positive for shortness of breath. Negative for cough, chest tightness and wheezing.    Cardiovascular: Negative for chest pain, palpitations and leg swelling.        Denies lightheadedness   Gastrointestinal: Negative for abdominal distention, abdominal pain, constipation, diarrhea and nausea.   Genitourinary: Negative for dysuria, hematuria and urgency.        Hyperactive bladder no longer on medication   Musculoskeletal: Positive for arthralgias, neck pain and neck stiffness.        Chronic pain and stiffness in her med of the right neck.  She reports evaluation in the past and was told she had arthritis  chronic right knee pain.  She was told she has arthritis.  Would like orthopedic evaluation   Neurological: Positive for seizures, syncope and headaches. Negative for dizziness and light-headedness.       Objective:      Physical  Exam  Constitutional:       General: She is not in acute distress.     Appearance: She is not ill-appearing or diaphoretic.   HENT:      Right Ear: Tympanic membrane and ear canal normal.      Left Ear: Tympanic membrane and ear canal normal.      Nose: Nose normal.   Eyes:      General:         Right eye: No discharge.         Left eye: No discharge.      Conjunctiva/sclera: Conjunctivae normal.      Pupils: Pupils are equal, round, and reactive to light.   Neck:      Musculoskeletal: No muscular tenderness.      Comments: 2+ carotids normal thyroid  Cardiovascular:      Rate and Rhythm: Normal rate and regular rhythm.      Heart sounds: No murmur. No gallop.    Pulmonary:      Effort: Pulmonary effort is normal.      Breath sounds: Normal breath sounds. No wheezing, rhonchi or rales.   Abdominal:      General: Abdomen is flat. There is no distension.      Palpations: There is no mass.      Tenderness: There is no abdominal tenderness.   Musculoskeletal:         General: No swelling or deformity.   Lymphadenopathy:      Cervical: No cervical adenopathy.   Skin:     General: Skin is warm and dry.      Coloration: Skin is not pale.      Findings: No erythema.   Neurological:      Mental Status: She is alert.   Psychiatric:         Mood and Affect: Mood normal.         Behavior: Behavior normal.         Thought Content: Thought content normal.         Judgment: Judgment normal.         Lab Visit on 10/12/2020   Component Date Value Ref Range Status    Clobazam 10/12/2020 176.0  30 - 300 ng/mL Final    Desmethylclobazam 10/12/2020 3450.0* 300 - 3000 ng/mL Final     Assessment:       1. Coronary artery disease involving native coronary artery without angina pectoris, unspecified whether native or transplanted heart    2. Essential hypertension    3. Hyperlipidemia, unspecified hyperlipidemia type    4. Abnormal TSH    5. Hypothyroidism, unspecified type    6. Disorder of thyroid, unspecified     7. Hypothyroidism,  unspecified     8. Chronic pain of right knee        Plan:     Medication reviewed will ordered orthopedic referral.  Follow-up in 6 weeks.  Refills eyes all for chronic allergic rhinitis and itchy eyelids    Coronary artery disease involving native coronary artery without angina pectoris, unspecified whether native or transplanted heart  -     CBC Auto Differential; Future; Expected date: 12/07/2020  -     Urinalysis; Future; Expected date: 12/07/2020  -     Comprehensive Metabolic Panel; Future; Expected date: 12/07/2020  -     Lipid Panel; Future; Expected date: 12/07/2020    Essential hypertension    Hyperlipidemia, unspecified hyperlipidemia type    Abnormal TSH  -     TSH; Future; Expected date: 12/07/2020  -     T4, Free; Future; Expected date: 12/07/2020    Hypothyroidism, unspecified type    Disorder of thyroid, unspecified   -     TSH; Future; Expected date: 12/07/2020    Hypothyroidism, unspecified   -     T4, Free; Future; Expected date: 12/07/2020    Chronic pain of right knee  -     Ambulatory referral/consult to Orthopedics; Future; Expected date: 12/14/2020    Other orders  -     levocetirizine (XYZAL) 5 MG tablet; Take 1 tablet (5 mg total) by mouth every evening.  Dispense: 30 tablet; Refill: 5

## 2020-12-07 NOTE — PROGRESS NOTES
Subjective:       Patient ID: Evelyne Francisco is a 60 y.o. female.    Chief Complaint: Temporal lobe epilepsy          HPI       BACKGROUND HISTORY     The patient is here for seizure evaluation and a 4th opinion.    Saw Dr. Monge, Dr. Hobbs and Dr. Smith in the past.     The patient started having seizures 11 years ago (2009) at the age of 49 .The patient describes aura of strange feeling in her head that she cannot describe. The patient is amnestic to the seizure after that. The seizures are mostly described as Complex Partial (staring, unresponsiveness, sometimes accompanied by spitting) and some of the them Grand Mal (GTC). The patient is currently not driving and has 2-5 seizure a month. No history of meningitis or encephalitis No toxic exposure or lead poisoning. Her father had posttraumatic family history of epilepsy and her nephew might be having seizures.  No history of strokes or aneurysmal bleeding. No history of TBI. Routine EEGs have been normal. EMU in 2016 captured 2 seizures with B/L TL localization. Brain MRI was reported as unremarkable. Failed VPA, LTG, OXC, LEV and VNS. She is currently on  mg BID and  mg BID. LCM,  ESL and BRV were too expensive. She says that CLB helped control the seizures but the insurance would not pay for it. Continued  mg BID. Checked Trough level.Continued  mg BID. Checked Trough level.Added CLB 20 mg BID.Explained to the patient that failing 2 AEDs typically means that chance of achieving seizure-freedom using AEDs alone is <10% and we should explore other treatments like epilepsy surgery. Verbalized understanding.    On 09->10- PHT 12.9>15 (10-20), fPHT 1.1 (1-2) and  ZNS 13>37 (10-40).  On  mg BID and  mg BID. Could not tolerate CLB 20 mg BID. Takes 5 mg BID. Last seizure was 11-. Was approved to be seen at [facility].  EEG is pending.            INTERVAL HISTORY   Patient presents for follow up for  Temporal epilepsy management. Tolerating  CLB 7.5 mg (3 ML)  BID. No side effects reported.  Last seizure was 11-. Patient continues to follow up with [facility].  12-02-20 EEG  the awake and sleep states. There are no epileptiform discharges or lateralizing signs. No typical events were recorded. There is no electrographic evidence of seizure.There is no electrographic evidence of status epilepticus. Patient reports that she has had 2 seizures last month none in this month thus far.      Review of Systems   Constitutional: Positive for fatigue. Negative for appetite change.   HENT: Negative for hearing loss and tinnitus.    Eyes: Negative for photophobia and visual disturbance.   Respiratory: Negative for apnea and shortness of breath.    Cardiovascular: Negative for chest pain and palpitations.   Gastrointestinal: Negative for nausea and vomiting.   Endocrine: Negative for cold intolerance and heat intolerance.   Genitourinary: Negative for difficulty urinating and urgency.   Musculoskeletal: Negative for arthralgias, back pain, gait problem, joint swelling, myalgias, neck pain and neck stiffness.   Skin: Negative for color change and rash.   Allergic/Immunologic: Negative for environmental allergies and immunocompromised state.   Neurological: Positive for seizures. Negative for dizziness, tremors, syncope, facial asymmetry, speech difficulty, weakness, light-headedness, numbness and headaches.   Hematological: Negative for adenopathy. Does not bruise/bleed easily.   Psychiatric/Behavioral: Negative for agitation, behavioral problems, confusion, decreased concentration, dysphoric mood, hallucinations, self-injury, sleep disturbance and suicidal ideas. The patient is not hyperactive.              Current Outpatient Medications:     aspirin 81 MG Chew, Take 81 mg by mouth once daily., Disp: , Rfl:     atorvastatin (LIPITOR) 80 MG tablet, Take 80 mg by mouth nightly. , Disp: , Rfl:     clopidogreL (PLAVIX) 75  mg tablet, Take 75 mg by mouth once daily., Disp: , Rfl:     estradioL (ESTRACE) 2 MG tablet, Take 2 mg by mouth once daily., Disp: , Rfl:     isosorbide mononitrate (IMDUR) 60 MG 24 hr tablet, Take 60 mg by mouth every morning. , Disp: , Rfl:     Lactobacillus rhamnosus GG (CULTURELLE) 10 billion cell capsule, Take 1 capsule by mouth once daily., Disp: , Rfl:     levocetirizine (XYZAL) 5 MG tablet, Take 5 mg by mouth every evening., Disp: , Rfl:     levothyroxine (SYNTHROID) 100 MCG tablet, Take 100 mcg by mouth before breakfast., Disp: , Rfl:     metoprolol succinate (TOPROL-XL) 50 MG 24 hr tablet, Take 50 mg by mouth once daily., Disp: , Rfl:     mirabegron (MYRBETRIQ) 25 mg Tb24 ER tablet, Take 25 mg by mouth once daily., Disp: , Rfl:     pantoprazole (PROTONIX) 40 MG tablet, Take 40 mg by mouth every morning. , Disp: , Rfl:     phenytoin (DILANTIN) 100 MG ER capsule, Take 1 capsule (100 mg total) by mouth 2 (two) times daily., Disp: 180 capsule, Rfl: 11    potassium chloride (MICRO-K) 10 MEQ CpSR, Take 10 mEq by mouth once., Disp: , Rfl:     ranolazine (RANEXA) 1,000 mg Tb12, Take 1,000 mg by mouth 2 (two) times daily., Disp: , Rfl:     tolterodine (DETROL LA) 4 MG 24 hr capsule, , Disp: , Rfl:     triamcinolone acetonide 0.1% (KENALOG) 0.1 % cream, Apply topically 2 (two) times daily., Disp: 45 g, Rfl: 1    zonisamide (ZONEGRAN) 100 MG Cap, Take 3 capsules (300 mg total) by mouth 2 (two) times daily., Disp: 180 capsule, Rfl: 11    cloBAZam (ONFI) 2.5 mg/mL Susp, Take 3 mLs (7.5 mg total) by mouth 2 (two) times daily., Disp: 180 mL, Rfl: 5  Past Medical History:   Diagnosis Date    Blood clotting tendency     Coronary artery disease     Fever blister     HLD (hyperlipidemia)     Hypertension     Seizures     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     Past Surgical History:   Procedure Laterality Date    ABDOMINAL SURGERY      BACK SURGERY      CARDIAC SURGERY      FRACTURE SURGERY       HYSTERECTOMY      TONSILLECTOMY      VASCULAR SURGERY       Social History     Socioeconomic History    Marital status:      Spouse name: Not on file    Number of children: Not on file    Years of education: Not on file    Highest education level: Not on file   Occupational History    Not on file   Social Needs    Financial resource strain: Not on file    Food insecurity     Worry: Not on file     Inability: Not on file    Transportation needs     Medical: Not on file     Non-medical: Not on file   Tobacco Use    Smoking status: Former Smoker     Quit date: 2014     Years since quittin.4    Smokeless tobacco: Never Used   Substance and Sexual Activity    Alcohol use: No    Drug use: No    Sexual activity: Yes     Partners: Male   Lifestyle    Physical activity     Days per week: Not on file     Minutes per session: Not on file    Stress: Not on file   Relationships    Social connections     Talks on phone: Not on file     Gets together: Not on file     Attends [employer] service: Not on file     Active member of club or organization: Not on file     Attends meetings of clubs or organizations: Not on file     Relationship status: Not on file   Other Topics Concern    Are you pregnant or think you may be? Not Asked    Breast-feeding Not Asked   Social History Narrative    Not on file             Past/Current Medical/Surgical History, Past/Current Social History, Past/Current Family History and Past/Current Medications were reviewed in detail.        Objective:           VITAL SIGNS WERE REVIEWED      GENERAL APPEARANCE:     The patient looks comfortable.    Body habitus is obese.     No signs of respiratory distress.    Normal breathing pattern.    No dysmorphic features    Normal eye contact.     GENERAL MEDICAL EXAM:    HEENT:  Head is atraumatic normocephalic.     Neck and Axillae: No JVD. No visible lesions    Cardiopulmonary: No cyanosis. No tachypnea. Normal respiratory  effort.VNS in place.    Gastrointestinal/Urogenital:  No jaundice. No stomas or lesions. No visible hernias. No catheters.     Skin, Hair and Nails: No pathognonomic skin rash. No neurofibromatosis. No visible lesions.No stigmata of autoimmune disease. No clubbing.    Limbs: No varicose veins. No visible swelling.    Muskoskeletal: No visible deformities.No visible lesions.               Neurologic Exam     Mental Status   Oriented to person, place, and time.   Registration: recalls 3 of 3 objects. Recall at 5 minutes: recalls 3 of 3 objects. Follows 3 step commands.   Attention: normal. Concentration: normal.   Speech: speech is normal   Level of consciousness: alert  Knowledge: good and consistent with education. Able to perform simple calculations.   Able to name object. Able to read. Able to repeat. Able to write. Normal comprehension.     Cranial Nerves   Cranial nerves II through XII intact.     CN II   Visual fields full to confrontation.   Visual acuity: normal with correction  Right visual field deficit: none  Left visual field deficit: none     CN III, IV, VI   Pupils are equal, round, and reactive to light.  Extraocular motions are normal.   Right pupil: Size: 2 mm. Shape: regular. Reactivity: brisk. Consensual response: intact. Accommodation: intact.   Left pupil: Size: 2 mm. Shape: regular. Reactivity: brisk. Consensual response: intact. Accommodation: intact.   CN III: no CN III palsy  CN VI: no CN VI palsy  Nystagmus: none   Diplopia: none  Ophthalmoparesis: none  Upgaze: normal  Downgaze: normal  Conjugate gaze: present  Vestibulo-ocular reflex: present    CN V   Facial sensation intact.   Right facial sensation deficit: none  Left facial sensation deficit: none    CN VII   Facial expression full, symmetric.   Right facial weakness: none  Left facial weakness: none    CN VIII   CN VIII normal.   Hearing: intact  Right Rinne: AC > BC  Left Rinne: AC > BC  Mendes: does not lateralize     CN IX, X   CN  IX normal.   CN X normal.   Palate: symmetric    CN XI   CN XI normal.   Right sternocleidomastoid strength: normal  Left sternocleidomastoid strength: normal  Right trapezius strength: normal  Left trapezius strength: normal    CN XII   CN XII normal.   Tongue: not atrophic  Fasciculations: absent  Tongue deviation: none    Motor Exam   Muscle bulk: normal  Overall muscle tone: normal  Right arm tone: normal  Left arm tone: normal  Right arm pronator drift: absent  Left arm pronator drift: absent  Right leg tone: normal  Left leg tone: normal    Strength   Strength 5/5 throughout.   Right neck flexion: 5/5  Left neck flexion: 5/5  Right neck extension: 5/5  Left neck extension: 5/5  Right deltoid: 5/5  Left deltoid: 5/5  Right biceps: 5/5  Left biceps: 5/5  Right triceps: 5/5  Left triceps: 5/5  Right wrist flexion: 5/5  Left wrist flexion: 5/5  Right wrist extension: 5/5  Left wrist extension: 5/5  Right interossei: 5/5  Left interossei: 5/5  Right iliopsoas: 5/5  Left iliopsoas: 5/5  Right quadriceps: 5/5  Left quadriceps: 5/5  Right hamstrin/5  Left hamstrin/5  Right glutei: 5/5  Left glutei: 5/5  Right anterior tibial: 5/5  Left anterior tibial: 5/5  Right posterior tibial: 5/5  Left posterior tibial: 5/5  Right peroneal: 5/5  Left peroneal: 5/5  Right gastroc: 5/5  Left gastroc: 5/5    Sensory Exam   Light touch normal.   Right arm light touch: normal  Left arm light touch: normal  Right leg light touch: normal  Left leg light touch: normal  Vibration normal.   Right arm vibration: normal  Left arm vibration: normal  Right leg vibration: normal  Left leg vibration: normal  Proprioception normal.   Right arm proprioception: normal  Left arm proprioception: normal  Right leg proprioception: normal  Left leg proprioception: normal  Pinprick normal.   Right arm pinprick: normal  Left arm pinprick: normal  Right leg pinprick: normal  Left leg pinprick: normal  Graphesthesia: normal  Stereognosis:  normal    Gait, Coordination, and Reflexes     Gait  Gait: normal    Coordination   Romberg: negative  Finger to nose coordination: normal  Heel to shin coordination: normal  Tandem walking coordination: normal    Tremor   Resting tremor: absent  Intention tremor: absent  Action tremor: absent    Reflexes   Right brachioradialis: 2+  Left brachioradialis: 2+  Right biceps: 2+  Left biceps: 2+  Right triceps: 2+  Left triceps: 2+  Right patellar: 2+  Left patellar: 2+  Right achilles: 2+  Left achilles: 2+  Right plantar: normal  Left plantar: normal  Right Marie: absent  Left Marie: absent  Right ankle clonus: absent  Left ankle clonus: absent  Right pendular knee jerk: absent  Left pendular knee jerk: absent      Lab Results   Component Value Date    WBC 11.18 10/11/2020    HGB 11.6 (L) 10/11/2020    HCT 37.6 10/11/2020    MCV 91 10/11/2020     10/11/2020     Sodium   Date Value Ref Range Status   10/11/2020 140 136 - 145 mmol/L Final     Potassium   Date Value Ref Range Status   10/11/2020 4.0 3.5 - 5.1 mmol/L Final     Chloride   Date Value Ref Range Status   10/11/2020 109 95 - 110 mmol/L Final     CO2   Date Value Ref Range Status   10/11/2020 21 (L) 23 - 29 mmol/L Final     Glucose   Date Value Ref Range Status   10/11/2020 121 (H) 70 - 110 mg/dL Final     BUN   Date Value Ref Range Status   10/11/2020 11 6 - 20 mg/dL Final     Creatinine   Date Value Ref Range Status   10/11/2020 1.0 0.5 - 1.4 mg/dL Final     Calcium   Date Value Ref Range Status   10/11/2020 8.7 8.7 - 10.5 mg/dL Final     Total Protein   Date Value Ref Range Status   10/11/2020 7.5 6.0 - 8.4 g/dL Final     Albumin   Date Value Ref Range Status   10/11/2020 3.9 3.5 - 5.2 g/dL Final     Total Bilirubin   Date Value Ref Range Status   10/11/2020 0.4 0.1 - 1.0 mg/dL Final     Comment:     For infants and newborns, interpretation of results should be based  on gestational age, weight and in agreement with  clinical  observations.  Premature Infant recommended reference ranges:  Up to 24 hours.............<8.0 mg/dL  Up to 48 hours............<12.0 mg/dL  3-5 days..................<15.0 mg/dL  6-29 days.................<15.0 mg/dL       Alkaline Phosphatase   Date Value Ref Range Status   10/11/2020 174 (H) 55 - 135 U/L Final     AST   Date Value Ref Range Status   10/11/2020 20 10 - 40 U/L Final     ALT   Date Value Ref Range Status   10/11/2020 22 10 - 44 U/L Final     Anion Gap   Date Value Ref Range Status   10/11/2020 10 8 - 16 mmol/L Final     eGFR if    Date Value Ref Range Status   10/11/2020 >60 >60 mL/min/1.73 m^2 Final     eGFR if non    Date Value Ref Range Status   10/11/2020 >60 >60 mL/min/1.73 m^2 Final     Comment:     Calculation used to obtain the estimated glomerular filtration  rate (eGFR) is the CKD-EPI equation.        No results found for: [id number]  Lab Results   Component Value Date    TSH 0.152 (L) 10/11/2020    FREET4 0.99 10/11/2020     [id number]    Brain MRI Unremarkable    EEG TL Seizures  B/L       09->10-     PHT 12.9>15 (10-20), fPHT 1.1 (1-2) and  ZNS 13>37 (10-40)    12-    EEG NL exam       Reviewed the neuroimaging independently       Assessment:       1. Temporal lobe epilepsy          EPILEPSY CLASSIFICATION      SEMIOLOGY: DIALEPTIC (FOCAL WITH HILARY) WITH ICTAL SPITTING -->GTC     EPILEPTOGENIC ZONE (S):  TL     ETIOLOGY: UNKNOWN     PRIOR AEDS: VPA, LTG, OXC, LEV    CURRENT AEDS: ZNS, PHT, CLB    LAST SEIZURE DATE:       COMPREHENSIVE LIST OF AEDs:     Acetazolamide (AZM-Diamox)   Benzos: clonazepam (CZP Klonopin), lorazepam (LZP-Ativan), diazepam (DZP-Valium), clorazepate (CLZ- Tranxene)  Brivaracetam (BRV-Briviact)  Cannabidiol (CBD- Epidiolex)  Carbamazepine (CBZ-Tegretol)  Cenobamate (CNB-Xcopri)  Clobazam (CLB-Onfi)  Eslicarbazepine (ESL-Aptiom)  Ethosuximide (ESX-Zarontin)  Felbamate (FBM-Felbatol)  Gabapentin  (GBP-Neurontin)  Lacosamide (LCM-Vimpat)  Lamotrigine (LTG-Lamitcal)  Levetiracetam (LEV- Keppra)  Oxcarbazepine (OXC-Trileptal)  Perampanel (PML-Fycompa)  Phenobarbital (PB)  Phenytoin (PHT-Dilantin)  Pregabalin (PGB-Lyrica)  Primidone (PRM)   Retigabine (RTG- Potiga) Discontinued in 2017  Rufinamide (RFN-Benzil)  Stiripentol (STP-Diacomit)  Tiagabine (TGB-Gabitril)  Topiramate (TPM-Topamax)  Valproate (VPA-Depakote)  Vigabatrin (VGB-Sabril)  Zonisamide (ZNS-Zonegran)    Plan:       INTRACTABLE-MEDICALLY REFRACTORY NON-LESIONAL TLE           12-02-20 EEG NL      The patient was encouraged to maintain full traditional seizure precautions which include but not limited to avoid driving, avoid high altitudes, avoid being close to fire or fire source, avoid being close to a body of water or swimming alone, avoid operating heavy machinery and avoid using sharp objects if possible. The patient was encouraged to shower (without accumulation of water) instead of taking a bath if unsupervised. The patient was made aware that these precautions are especially important during concurrent illness. Adequate sleep and avoidance of alcohol as important measures to assure good seizure control were discussed with the patient. The patient was also advised not to care for children without company. The patient was advised to pad the side rails with pillows and blankets if applicable.I strongly recommended lowering the bed to the floor level to decrease the risk of falls.     Continue  mg BID.     Continue  mg BID.     Change CLB to 7.5 mg BID ( 3 ml BID). Every 4-8 weeks will all 1 ml BID to a maximum of 8 ml BID. Refills given     Explained to the patient that failing 2 AEDs typically means that chance of achieving seizure-freedom using AEDs alone is <10% and we should explore other treatments like epilepsy surgery. Verbalized understanding.     Continue to Follow up with [facility] evaluation.       Continue  AEDs INDEFINITELY, FOR GOOD AND NEVER SKIP A DOSE. The patient verbalized full understanding. Stressed the extreme medical, personal safety, public safety and legal importance of compliance and seizure control. Will give as many refills as possible with or without face-to-face evaluation to make sure the patient and any patient with epilepsy will never run out of medications. Running out of seizure medications should never happen under our care. I explained to the patient that he should not, under any circumstances, adjust or stop taking his seizure medication without discussing with me. The patient verbalized full understanding.     AVOID any substance that could lower seizure threshold including but not limited to:      ALCOHOL AND WITHDRAWAL      TRAMADOL.     DEMEROL      ALL STIMULANTS-ALL ADHD MEDICATIONS.      CLOZAPINE.      BUPROPION.     CIPROFLOXACIN          MEDICAL/SURGICAL COMORBIDITIES     All relevant medical comorbidities noted and managed by primary care physician and medical care team.          MISCELLANEOUS MEDICAL PROBLEMS       HEALTHY LIFESTYLE AND PREVENTATIVE CARE    Encouraged the patient to adhere to the age-appropriate health maintenance guidelines including screening tests and vaccinations.     Discussed the overall importance of healthy lifestyle, optimal weight, exercise, healthy diet, good sleep hygiene and avoiding drugs including smoking, alcohol and recreational drugs. The patient verbalized full understanding.       Advised the patient to follow COVID-19 prevention measures.       I spent  35 minutes face to face with the patient    More than 20  minutes of the time spent in counseling and coordination of care including discussions etiology of diagnosis (EPILEPSY), pathogenesis of diagnosis, prognosis of diagnosis,, diagnostic results, impression and recommendations, diagnostic studies, management, risks and benefits of treatment, instructions of disease self-management, treatment  instructions, follow up requirements, patient and family counseling/involvement in care compliance with treatment regimen. All of the patient's questions were answered during this discussion.          RTC in 3 month          Stacey Adna MSN NP      Collaborating Provider: Payton Estevez MD, FAAN Neurologist/Epileptologist

## 2020-12-14 ENCOUNTER — TELEPHONE (OUTPATIENT)
Dept: INTERNAL MEDICINE | Facility: CLINIC | Age: 60
End: 2020-12-14

## 2020-12-14 RX ORDER — PANTOPRAZOLE SODIUM 40 MG/1
40 TABLET, DELAYED RELEASE ORAL DAILY
Qty: 30 TABLET | Refills: 11 | Status: SHIPPED | OUTPATIENT
Start: 2020-12-14 | End: 2021-03-24 | Stop reason: SDUPTHER

## 2020-12-14 RX ORDER — METOPROLOL SUCCINATE 50 MG/1
50 TABLET, EXTENDED RELEASE ORAL DAILY
Qty: 90 TABLET | Refills: 1 | Status: SHIPPED | OUTPATIENT
Start: 2020-12-14 | End: 2021-06-15 | Stop reason: SDUPTHER

## 2020-12-14 NOTE — TELEPHONE ENCOUNTER
----- Message from Nathalia Kate sent at 12/14/2020 11:51 AM CST -----  Contact: [phone]  Would like to consult with the nurse, patient would like to know which pharmacy  her Rx medication was sent to, patient would like a call back as soon as possible, please call back thanks sj

## 2020-12-14 NOTE — TELEPHONE ENCOUNTER
----- Message from Mariia Kang sent at 12/14/2020  7:43 AM CST -----  Contact: Evelyne  Pt called in regarding speaking to the nurse about her medications that she needed refilled. She stated they are protonix 40mg and metatropohol 50mg. Pt would like [facility] to [facility] in [place]. Please give her a call back at [phone].    Thanks,  Pb

## 2020-12-15 ENCOUNTER — OFFICE VISIT (OUTPATIENT)
Dept: ORTHOPEDICS | Facility: CLINIC | Age: 60
End: 2020-12-15
Payer: MEDICARE

## 2020-12-15 ENCOUNTER — HOSPITAL ENCOUNTER (OUTPATIENT)
Dept: RADIOLOGY | Facility: HOSPITAL | Age: 60
Discharge: HOME | End: 2020-12-15
Attending: PHYSICIAN ASSISTANT
Payer: MEDICARE

## 2020-12-15 VITALS
WEIGHT: 237 LBS | HEART RATE: 86 BPM | DIASTOLIC BLOOD PRESSURE: 90 MMHG | SYSTOLIC BLOOD PRESSURE: 147 MMHG | HEIGHT: 65 IN | BODY MASS INDEX: 39.49 KG/M2

## 2020-12-15 DIAGNOSIS — M25.561 CHRONIC PAIN OF RIGHT KNEE: ICD-10-CM

## 2020-12-15 DIAGNOSIS — G89.29 CHRONIC PAIN OF RIGHT KNEE: ICD-10-CM

## 2020-12-15 DIAGNOSIS — M25.561 PAIN IN BOTH KNEES, UNSPECIFIED CHRONICITY: ICD-10-CM

## 2020-12-15 DIAGNOSIS — M25.561 PAIN IN BOTH KNEES, UNSPECIFIED CHRONICITY: Primary | ICD-10-CM

## 2020-12-15 DIAGNOSIS — M23.91 INTERNAL DERANGEMENT OF MULTIPLE SITES OF RIGHT KNEE: Primary | ICD-10-CM

## 2020-12-15 DIAGNOSIS — M25.562 PAIN IN BOTH KNEES, UNSPECIFIED CHRONICITY: Primary | ICD-10-CM

## 2020-12-15 DIAGNOSIS — M25.562 PAIN IN BOTH KNEES, UNSPECIFIED CHRONICITY: ICD-10-CM

## 2020-12-15 PROCEDURE — 73564 X-RAY EXAM KNEE 4 OR MORE: CPT | Mod: TC,50

## 2020-12-15 PROCEDURE — 73564 XR KNEE ORTHO BILAT WITH FLEXION: ICD-10-PCS | Mod: 26,50,, | Performed by: RADIOLOGY

## 2020-12-15 PROCEDURE — 20610 LARGE JOINT ASPIRATION/INJECTION: R KNEE: ICD-10-PCS | Mod: S$PBB,RT,ICN, | Performed by: PHYSICIAN ASSISTANT

## 2020-12-15 PROCEDURE — 73564 X-RAY EXAM KNEE 4 OR MORE: CPT | Mod: 26,50,, | Performed by: RADIOLOGY

## 2020-12-15 PROCEDURE — 99214 PR OFFICE/OUTPT VISIT, EST, LEVL IV, 30-39 MIN: ICD-10-PCS | Mod: 25,S$PBB,ICN, | Performed by: PHYSICIAN ASSISTANT

## 2020-12-15 PROCEDURE — 20610 DRAIN/INJ JOINT/BURSA W/O US: CPT | Mod: S$PBB,RT,ICN, | Performed by: PHYSICIAN ASSISTANT

## 2020-12-15 PROCEDURE — 99215 OFFICE O/P EST HI 40 MIN: CPT | Mod: PBBFAC,25 | Performed by: PHYSICIAN ASSISTANT

## 2020-12-15 PROCEDURE — 20610 DRAIN/INJ JOINT/BURSA W/O US: CPT | Mod: PBBFAC | Performed by: PHYSICIAN ASSISTANT

## 2020-12-15 PROCEDURE — 99999 PR PBB SHADOW E&M-EST. PATIENT-LVL V: ICD-10-PCS | Mod: PBBFAC,,, | Performed by: PHYSICIAN ASSISTANT

## 2020-12-15 PROCEDURE — 99999 PR PBB SHADOW E&M-EST. PATIENT-LVL V: CPT | Mod: PBBFAC,,, | Performed by: PHYSICIAN ASSISTANT

## 2020-12-15 PROCEDURE — 99214 OFFICE O/P EST MOD 30 MIN: CPT | Mod: 25,S$PBB,ICN, | Performed by: PHYSICIAN ASSISTANT

## 2020-12-15 RX ORDER — DICLOFENAC SODIUM 10 MG/G
2 GEL TOPICAL 3 TIMES DAILY PRN
Qty: 2 TUBE | Refills: 6 | Status: SHIPPED | OUTPATIENT
Start: 2020-12-15 | End: 2021-04-20

## 2020-12-15 RX ORDER — METHYLPREDNISOLONE ACETATE 80 MG/ML
80 INJECTION, SUSPENSION INTRA-ARTICULAR; INTRALESIONAL; INTRAMUSCULAR; SOFT TISSUE
Status: DISCONTINUED | OUTPATIENT
Start: 2020-12-15 | End: 2020-12-15 | Stop reason: HOSPADM

## 2020-12-15 RX ADMIN — METHYLPREDNISOLONE ACETATE 80 MG: 80 INJECTION, SUSPENSION INTRALESIONAL; INTRAMUSCULAR; INTRASYNOVIAL; SOFT TISSUE at 02:12

## 2020-12-15 NOTE — PROCEDURES
Large Joint Aspiration/Injection: R knee    Date/Time: 12/15/2020 2:00 PM  Performed by: Deana Irizarry PA-C  Authorized by: Deana Irizarry PA-C     Consent Done?:  Yes (Verbal)  Indications:  Pain  Site marked: the procedure site was marked    Timeout: prior to procedure the correct patient, procedure, and site was verified    Prep: patient was prepped and draped in usual sterile fashion      Local anesthesia used?: Yes    Local anesthetic:  Lidocaine 1% without epinephrine  Anesthetic total (ml):  2      Details:  Needle Size:  22 G  Ultrasonic Guidance for needle placement?: No    Approach:  Anterolateral  Location:  Knee  Site:  R knee  Medications:  80 mg methylPREDNISolone acetate 80 mg/mL  Patient tolerance:  Patient tolerated the procedure well with no immediate complications     After verbal consent was obtained for right knee injection, patient ID, site, and side were verified.  The  right  knee was sterilly prepped in the standard fashion.  A 22-gauge needle was introduced into right knee joint from an sylvia-lateral site without complication. The right knee was then injected with 20 mg lidocaine plain and 80 mg depomedrol.  A sterile bandaid was applied.  The patient was informed to apply an ice pack approximately 10min once arriving home and not to do anything strenuous for 24hours.  Elevation of glucose in diabetic patients was discussed.  She was instructed to call if there were any problems. The patient was discharged in stable condition.

## 2020-12-15 NOTE — PROGRESS NOTES
Subjective:      Patient ID: Evelyne Francisco is a 60 y.o. female.    Chief Complaint: Pain of the Right Knee      HPI: Evelyne Francisco  is a 60 y.o. female who c/o Pain of the Right Knee   for duration of over a month.  She denies any inciting injury.  Pain level is 5/10.  She hurts medially.  Quality is aching and sharp.  Alleviating factors include nothing.  Aggravating factors include weight-bearing.  She denies sharp pain with pivoting and twisting as well as deep squatting.    Past Medical History:   Diagnosis Date    Blood clotting tendency     Coronary artery disease     Fever blister     HLD (hyperlipidemia)     Hypertension     Hypothyroidism 2020    Seizures     Temporal lobe epilepsy 2016     Past Surgical History:   Procedure Laterality Date    ABDOMINAL SURGERY      BACK SURGERY      CARDIAC SURGERY      FRACTURE SURGERY      HYSTERECTOMY      TONSILLECTOMY      VASCULAR SURGERY       Family History   Problem Relation Age of Onset    Hypertension Mother     Hyperlipidemia Mother     Stroke Father     Seizures Father     Hypertension Sister     Cancer Brother     Marfan syndrome Daughter      Social History     Socioeconomic History    Marital status:      Spouse name: Not on file    Number of children: Not on file    Years of education: Not on file    Highest education level: Not on file   Occupational History    Not on file   Social Needs    Financial resource strain: Not on file    Food insecurity     Worry: Not on file     Inability: Not on file    Transportation needs     Medical: Not on file     Non-medical: Not on file   Tobacco Use    Smoking status: Former Smoker     Quit date: 2014     Years since quittin.4    Smokeless tobacco: Never Used   Substance and Sexual Activity    Alcohol use: No    Drug use: No    Sexual activity: Yes     Partners: Male   Lifestyle    Physical activity     Days per week: Not on file     Minutes per  session: Not on file    Stress: Not on file   Relationships    Social connections     Talks on phone: Not on file     Gets together: Not on file     Attends Jainism service: Not on file     Active member of club or organization: Not on file     Attends meetings of clubs or organizations: Not on file     Relationship status: Not on file   Other Topics Concern    Are you pregnant or think you may be? Not Asked    Breast-feeding Not Asked   Social History Narrative    Not on file     Medication List with Changes/Refills   New Medications    DICLOFENAC SODIUM (VOLTAREN) 1 % GEL    Apply 2 g topically 3 (three) times daily as needed.   Current Medications    ASPIRIN 81 MG CHEW    Take 81 mg by mouth once daily.    ATORVASTATIN (LIPITOR) 80 MG TABLET    Take 80 mg by mouth nightly.     CLOBAZAM (ONFI) 2.5 MG/ML SUSP    Take 3 mLs (7.5 mg total) by mouth 2 (two) times daily.    CLOPIDOGREL (PLAVIX) 75 MG TABLET    Take 75 mg by mouth once daily.    ESTRADIOL (ESTRACE) 2 MG TABLET    Take 2 mg by mouth once daily.    ISOSORBIDE MONONITRATE (IMDUR) 60 MG 24 HR TABLET    Take 60 mg by mouth every morning.     LACTOBACILLUS RHAMNOSUS GG (CULTURELLE) 10 BILLION CELL CAPSULE    Take 1 capsule by mouth once daily.    LEVOCETIRIZINE (XYZAL) 5 MG TABLET    Take 1 tablet (5 mg total) by mouth every evening.    LEVOTHYROXINE (SYNTHROID) 100 MCG TABLET    Take 100 mcg by mouth before breakfast.    METOPROLOL SUCCINATE (TOPROL-XL) 50 MG 24 HR TABLET    Take 1 tablet (50 mg total) by mouth once daily.    PANTOPRAZOLE (PROTONIX) 40 MG TABLET    Take 1 tablet (40 mg total) by mouth once daily.    PHENYTOIN (DILANTIN) 100 MG ER CAPSULE    Take 1 capsule (100 mg total) by mouth 2 (two) times daily.    POTASSIUM CHLORIDE (MICRO-K) 10 MEQ CPSR    Take 10 mEq by mouth once.    RANOLAZINE (RANEXA) 1,000 MG TB12    Take 1,000 mg by mouth 2 (two) times daily.    TRIAMCINOLONE ACETONIDE 0.1% (KENALOG) 0.1 % CREAM    Apply topically 2 (two)  times daily.    ZONISAMIDE (ZONEGRAN) 100 MG CAP    Take 3 capsules (300 mg total) by mouth 2 (two) times daily.     Review of patient's allergies indicates:   Allergen Reactions    Latex, natural rubber Hives, Shortness Of Breath and Swelling     Throat swelling  Throat swelling  Other reaction(s): Hives  Throat swelling    Ace inhibitors Other (See Comments)     Coughing  Other reaction(s): Unknown       Review of Systems   Constitution: Negative for fever.   Cardiovascular: Negative for chest pain.   Respiratory: Negative for cough and shortness of breath.    Skin: Negative for rash.   Musculoskeletal: Positive for joint pain. Negative for joint swelling and stiffness.   Gastrointestinal: Negative for heartburn.   Neurological: Negative for headaches and numbness.         Objective:        General    Nursing note and vitals reviewed.  Constitutional: She is oriented to person, place, and time. She appears well-developed and well-nourished.   HENT:   Head: Normocephalic and atraumatic.   Eyes: EOM are normal.   Cardiovascular: Normal rate and regular rhythm.    Pulmonary/Chest: Effort normal.   Neurological: She is alert and oriented to person, place, and time.   Psychiatric: She has a normal mood and affect. Her behavior is normal.           Right Knee Exam     Inspection   Erythema: absent  Swelling: absent  Effusion: absent  Deformity: absent  Bruising: absent    Tenderness   The patient is tender to palpation of the medial joint line.    Range of Motion   Extension: normal   Flexion: normal     Tests   Meniscus   Katharina:  Medial - negative Lateral - negative  Ligament Examination Lachman: normal (-1 to 2mm) PCL-Posterior Drawer: normal (0 to 2mm)     MCL - Valgus: normal (0 to 2mm)  LCL - Varus: normal  Patella   Patellar apprehension: negative  Passive Patellar Tilt: neutral  Patellar Grind: negative    Other   Meniscal Cyst: absent  Popliteal (Baker's) Cyst: absent  Sensation: normal    Left Knee Exam      Inspection   Erythema: absent  Swelling: absent  Effusion: absent  Deformity: absent  Bruising: absent    Tenderness   The patient is experiencing no tenderness.     Range of Motion   Extension: normal   Flexion: normal     Tests   Meniscus   Katharina:  Medial - negative Lateral - negative  Stability Lachman: normal (-1 to 2mm) PCL-Posterior Drawer: normal (0 to 2mm)  MCL - Valgus: normal (0 to 2mm)  LCL - Varus: normal (0 to 2mm)  Patella   Patellar apprehension: negative  Passive Patellar Tilt: neutral  Patellar Grind: negative    Other   Meniscal Cyst: absent  Popliteal (Baker's) Cyst: absent  Sensation: normal    Right Hip Exam     Tests   Bernarda: negative  Left Hip Exam     Tests   Bernarda: negative          Muscle Strength   Right Lower Extremity   Quadriceps:  5/5   Hamstrin/5   Left Lower Extremity   Quadriceps:  5/5   Hamstrin/5     Vascular Exam       Edema  Right Lower Leg: absent  Left Lower Leg: absent              Xray images and report were reviewed today.  I agree with the radiologist's interpretation.    X-ray Knee Ortho Bilateral with Flexion  Narrative: EXAMINATION:  XR KNEE ORTHO BILAT WITH FLEXION    CLINICAL HISTORY:  Pain in right knee    TECHNIQUE:  AP standing of both knees, PA flexion standing views of both knees, and Merchant views of both knees were performed.  Lateral views of both knees were also performed.    COMPARISON:  None    FINDINGS:  No fracture or dislocation.  No joint effusion.  No significant joint space narrowing.  Minimal marginal spurring.  Soft tissues are normal.  Impression: As above    Electronically signed by: Rad Kuo MD  Date:    12/15/2020  Time:    14:13        Assessment:       Encounter Diagnoses   Name Primary?    Internal derangement of multiple sites of right knee Yes    Chronic pain of right knee           Plan:       Evelyne was seen today for pain.    Diagnoses and all orders for this visit:    Internal derangement of multiple sites of  right knee  -     diclofenac sodium (VOLTAREN) 1 % Gel; Apply 2 g topically 3 (three) times daily as needed.    Chronic pain of right knee  -     Ambulatory referral/consult to Orthopedics  -     diclofenac sodium (VOLTAREN) 1 % Gel; Apply 2 g topically 3 (three) times daily as needed.        Evelyne Francisco is a new pt who comes in today for the above problems.  She has tenderness over the medial joint line.  However, I would recommend optimizing conservative treatment.  We have discussed risks and benefits of corticosteroid injection.  She wishes to proceed.  I put her on a home exercise program as well as recommended Voltaren gel topically 2 g 3 times daily.  See her back in the office in 1 month.  If she is having sharp medial-sided pain with tenderness over the medial joint line still, she may benefit from further diagnostic imaging.  She verbalizes understanding and agrees.    Follow up in about 1 month (around 1/15/2021).          The patient understands, chooses and consents to this plan and accepts all   the risks which include but are not limited to the risks mentioned above.     Disclaimer: This note was prepared using a voice recognition system and is likely to have sound alike errors within the text.

## 2020-12-15 NOTE — LETTER
December 15, 2020      Sergio Malcolm MD  [id number] [place] [id number]           [facility]  [address] [place] [id number]  Phone: [phone]  Fax: [phone]          Patient: Evelyne Francisco   MR Number: [id number]   YOB: 1960   Date of Visit: 12/15/2020       Dear Dr. Sergio Malcolm:    Thank you for referring Evelyne Francisco to me for evaluation. Attached you will find relevant portions of my assessment and plan of care.    If you have questions, please do not hesitate to call me. I look forward to following Evelyne Francisco along with you.    Sincerely,    Deana Irizarry PA-C    Enclosure  CC:  No Recipients    If you would like to receive this communication electronically, please contact [email or web address] or [phone] to request more information on [facility] [employer] access.    For providers and/or their staff who would like to refer a patient to [facility], please contact us through our one-stop-shop provider referral line, [facility], at [phone].    If you feel you have received this communication in error or would no longer like to receive these types of communications, please e-mail [email or web address]

## 2020-12-31 ENCOUNTER — TELEPHONE (OUTPATIENT)
Dept: NEUROLOGY | Facility: CLINIC | Age: 60
End: 2020-12-31

## 2020-12-31 ENCOUNTER — TELEPHONE (OUTPATIENT)
Dept: INTERNAL MEDICINE | Facility: CLINIC | Age: 60
End: 2020-12-31

## 2020-12-31 NOTE — TELEPHONE ENCOUNTER
Pt states that she had a seizure this morning. 5 this month. Wanted to let Dr. Estevez know what was going. Pt states to call her if needed. Thanks.

## 2020-12-31 NOTE — TELEPHONE ENCOUNTER
----- Message from Aura Acevedo sent at 12/31/2020 12:15 PM CST -----  Regarding: seizure  Contact: pt  Caller is requesting a call back regarding her having 5 seizure this month  alone with the one this morning 12-31-20.  Please call back at [phone].  Thanks.

## 2020-12-31 NOTE — TELEPHONE ENCOUNTER
----- Message from Muriel Morgan sent at 12/31/2020  1:53 PM CST -----  Regarding: missed call  Type:  Needs Medical Advice    Who Called: pt  Symptoms (please be specific): staff  How long has patient had these symptoms:  n/a  Pharmacy name and phone #:  n/a  Would the patient rather a call back or a response via [facility]? Call back   Best Call Back Number: [phone]  Additional Information: Please call back.Thanks

## 2021-01-08 DIAGNOSIS — G40.109 TEMPORAL LOBE EPILEPSY: ICD-10-CM

## 2021-01-08 RX ORDER — CLOBAZAM 2.5 MG/ML
7.5 SUSPENSION ORAL 2 TIMES DAILY
Qty: 180 ML | Refills: 5 | Status: SHIPPED | OUTPATIENT
Start: 2021-01-08 | End: 2021-01-28 | Stop reason: DRUGHIGH

## 2021-01-09 RX ORDER — LEVOCETIRIZINE DIHYDROCHLORIDE 5 MG/1
5 TABLET, FILM COATED ORAL NIGHTLY
Qty: 30 TABLET | Refills: 5 | Status: SHIPPED | OUTPATIENT
Start: 2021-01-09 | End: 2021-07-23

## 2021-01-13 ENCOUNTER — TELEPHONE (OUTPATIENT)
Dept: NEUROLOGY | Facility: CLINIC | Age: 61
End: 2021-01-13

## 2021-01-13 ENCOUNTER — TELEPHONE (OUTPATIENT)
Dept: ORTHOPEDICS | Facility: CLINIC | Age: 61
End: 2021-01-13

## 2021-01-13 ENCOUNTER — TELEPHONE (OUTPATIENT)
Dept: PEDIATRICS | Facility: CLINIC | Age: 61
End: 2021-01-13

## 2021-01-13 DIAGNOSIS — Z01.818 PRE-PROCEDURAL EXAMINATION: Primary | ICD-10-CM

## 2021-01-13 DIAGNOSIS — Z01.818 PRE-OP TESTING: ICD-10-CM

## 2021-01-14 ENCOUNTER — LAB VISIT (OUTPATIENT)
Dept: OTOLARYNGOLOGY | Facility: CLINIC | Age: 61
End: 2021-01-14
Payer: MEDICARE

## 2021-01-14 DIAGNOSIS — Z01.818 PRE-PROCEDURAL EXAMINATION: ICD-10-CM

## 2021-01-14 DIAGNOSIS — Z01.818 PRE-OP TESTING: ICD-10-CM

## 2021-01-14 LAB — SARS-COV-2 RNA RESP QL NAA+PROBE: NOT DETECTED

## 2021-01-14 PROCEDURE — U0003 INFECTIOUS AGENT DETECTION BY NUCLEIC ACID (DNA OR RNA); SEVERE ACUTE RESPIRATORY SYNDROME CORONAVIRUS 2 (SARS-COV-2) (CORONAVIRUS DISEASE [COVID-19]), AMPLIFIED PROBE TECHNIQUE, MAKING USE OF HIGH THROUGHPUT TECHNOLOGIES AS DESCRIBED BY CMS-2020-01-R: HCPCS

## 2021-01-15 ENCOUNTER — TELEPHONE (OUTPATIENT)
Dept: NEUROLOGY | Facility: CLINIC | Age: 61
End: 2021-01-15

## 2021-01-17 RX ORDER — LEVOTHYROXINE SODIUM 100 UG/1
100 TABLET ORAL
Qty: 90 TABLET | Refills: 0 | Status: SHIPPED | OUTPATIENT
Start: 2021-01-17 | End: 2021-10-14 | Stop reason: SDUPTHER

## 2021-01-25 ENCOUNTER — TELEPHONE (OUTPATIENT)
Dept: INTERNAL MEDICINE | Facility: CLINIC | Age: 61
End: 2021-01-25

## 2021-01-28 ENCOUNTER — OFFICE VISIT (OUTPATIENT)
Dept: INTERNAL MEDICINE | Facility: CLINIC | Age: 61
End: 2021-01-28
Payer: MEDICARE

## 2021-01-28 VITALS
OXYGEN SATURATION: 98 % | DIASTOLIC BLOOD PRESSURE: 80 MMHG | HEIGHT: 65 IN | TEMPERATURE: 97 F | BODY MASS INDEX: 39.05 KG/M2 | WEIGHT: 234.38 LBS | SYSTOLIC BLOOD PRESSURE: 110 MMHG | HEART RATE: 89 BPM

## 2021-01-28 DIAGNOSIS — R39.89 ABNORMAL URINE COLOR: ICD-10-CM

## 2021-01-28 DIAGNOSIS — D64.9 ANEMIA, UNSPECIFIED TYPE: Primary | ICD-10-CM

## 2021-01-28 DIAGNOSIS — H53.8 BLURRED VISION, LEFT EYE: ICD-10-CM

## 2021-01-28 DIAGNOSIS — I10 ESSENTIAL HYPERTENSION: ICD-10-CM

## 2021-01-28 DIAGNOSIS — K62.5 RECTAL BLEED: ICD-10-CM

## 2021-01-28 DIAGNOSIS — I25.10 CORONARY ARTERY DISEASE INVOLVING NATIVE CORONARY ARTERY WITHOUT ANGINA PECTORIS, UNSPECIFIED WHETHER NATIVE OR TRANSPLANTED HEART: ICD-10-CM

## 2021-01-28 DIAGNOSIS — E03.9 HYPOTHYROIDISM, UNSPECIFIED TYPE: ICD-10-CM

## 2021-01-28 PROCEDURE — 99999 PR PBB SHADOW E&M-EST. PATIENT-LVL V: CPT | Mod: PBBFAC,,, | Performed by: FAMILY MEDICINE

## 2021-01-28 PROCEDURE — 99215 OFFICE O/P EST HI 40 MIN: CPT | Mod: PBBFAC | Performed by: FAMILY MEDICINE

## 2021-01-28 PROCEDURE — 99999 PR PBB SHADOW E&M-EST. PATIENT-LVL V: ICD-10-PCS | Mod: PBBFAC,,, | Performed by: FAMILY MEDICINE

## 2021-01-28 PROCEDURE — 99214 OFFICE O/P EST MOD 30 MIN: CPT | Mod: S$PBB,,, | Performed by: FAMILY MEDICINE

## 2021-01-28 PROCEDURE — 99214 PR OFFICE/OUTPT VISIT, EST, LEVL IV, 30-39 MIN: ICD-10-PCS | Mod: S$PBB,,, | Performed by: FAMILY MEDICINE

## 2021-02-01 ENCOUNTER — OFFICE VISIT (OUTPATIENT)
Dept: ORTHOPEDICS | Facility: CLINIC | Age: 61
End: 2021-02-01
Payer: MEDICARE

## 2021-02-01 ENCOUNTER — PATIENT OUTREACH (OUTPATIENT)
Dept: ADMINISTRATIVE | Facility: OTHER | Age: 61
End: 2021-02-01

## 2021-02-01 VITALS
WEIGHT: 234 LBS | HEART RATE: 79 BPM | SYSTOLIC BLOOD PRESSURE: 129 MMHG | HEIGHT: 65 IN | DIASTOLIC BLOOD PRESSURE: 77 MMHG | BODY MASS INDEX: 38.99 KG/M2

## 2021-02-01 DIAGNOSIS — M76.51 PATELLAR TENDINITIS OF RIGHT KNEE: ICD-10-CM

## 2021-02-01 DIAGNOSIS — M70.51 PES ANSERINUS BURSITIS OF RIGHT KNEE: ICD-10-CM

## 2021-02-01 DIAGNOSIS — M25.561 CHRONIC PAIN OF RIGHT KNEE: Primary | ICD-10-CM

## 2021-02-01 DIAGNOSIS — G89.29 CHRONIC PAIN OF RIGHT KNEE: Primary | ICD-10-CM

## 2021-02-01 PROCEDURE — 99215 OFFICE O/P EST HI 40 MIN: CPT | Mod: PBBFAC | Performed by: PHYSICIAN ASSISTANT

## 2021-02-01 PROCEDURE — 99999 PR PBB SHADOW E&M-EST. PATIENT-LVL V: ICD-10-PCS | Mod: PBBFAC,,, | Performed by: PHYSICIAN ASSISTANT

## 2021-02-01 PROCEDURE — 99213 PR OFFICE/OUTPT VISIT, EST, LEVL III, 20-29 MIN: ICD-10-PCS | Mod: S$PBB,,, | Performed by: PHYSICIAN ASSISTANT

## 2021-02-01 PROCEDURE — 99213 OFFICE O/P EST LOW 20 MIN: CPT | Mod: S$PBB,,, | Performed by: PHYSICIAN ASSISTANT

## 2021-02-01 PROCEDURE — 99999 PR PBB SHADOW E&M-EST. PATIENT-LVL V: CPT | Mod: PBBFAC,,, | Performed by: PHYSICIAN ASSISTANT

## 2021-02-18 ENCOUNTER — TELEPHONE (OUTPATIENT)
Dept: PREADMISSION TESTING | Facility: HOSPITAL | Age: 61
End: 2021-02-18

## 2021-02-23 ENCOUNTER — OFFICE VISIT (OUTPATIENT)
Dept: INTERNAL MEDICINE | Facility: CLINIC | Age: 61
End: 2021-02-23
Payer: MEDICARE

## 2021-02-23 ENCOUNTER — HOSPITAL ENCOUNTER (OUTPATIENT)
Facility: HOSPITAL | Age: 61
Discharge: HOME | End: 2021-02-23
Attending: INTERNAL MEDICINE | Admitting: INTERNAL MEDICINE
Payer: MEDICARE

## 2021-02-23 VITALS
HEART RATE: 89 BPM | HEIGHT: 65 IN | SYSTOLIC BLOOD PRESSURE: 114 MMHG | OXYGEN SATURATION: 97 % | TEMPERATURE: 97 F | BODY MASS INDEX: 39.56 KG/M2 | DIASTOLIC BLOOD PRESSURE: 78 MMHG | WEIGHT: 237.44 LBS

## 2021-02-23 VITALS
TEMPERATURE: 98 F | HEART RATE: 81 BPM | BODY MASS INDEX: 39.3 KG/M2 | DIASTOLIC BLOOD PRESSURE: 81 MMHG | SYSTOLIC BLOOD PRESSURE: 132 MMHG | RESPIRATION RATE: 18 BRPM | OXYGEN SATURATION: 99 % | HEIGHT: 65 IN | WEIGHT: 235.88 LBS

## 2021-02-23 DIAGNOSIS — I25.10 CORONARY ARTERY DISEASE INVOLVING NATIVE CORONARY ARTERY WITHOUT ANGINA PECTORIS, UNSPECIFIED WHETHER NATIVE OR TRANSPLANTED HEART: ICD-10-CM

## 2021-02-23 DIAGNOSIS — E07.9 DISORDER OF THYROID, UNSPECIFIED: ICD-10-CM

## 2021-02-23 DIAGNOSIS — G40.109 TEMPORAL LOBE EPILEPSY: ICD-10-CM

## 2021-02-23 DIAGNOSIS — T78.3XXA ANGIOEDEMA, INITIAL ENCOUNTER: Primary | ICD-10-CM

## 2021-02-23 DIAGNOSIS — I10 ESSENTIAL HYPERTENSION: ICD-10-CM

## 2021-02-23 DIAGNOSIS — D64.9 ANEMIA, UNSPECIFIED TYPE: ICD-10-CM

## 2021-02-23 LAB — SARS-COV-2 RDRP RESP QL NAA+PROBE: NEGATIVE

## 2021-02-23 PROCEDURE — 91037 PR GERD TST W/ NASAL IMPEDENCE ELECTROD: ICD-10-PCS | Mod: 26,,, | Performed by: INTERNAL MEDICINE

## 2021-02-23 PROCEDURE — 99215 OFFICE O/P EST HI 40 MIN: CPT | Mod: PBBFAC | Performed by: FAMILY MEDICINE

## 2021-02-23 PROCEDURE — 91037 ESOPH IMPED FUNCTION TEST: CPT | Mod: 26,,, | Performed by: INTERNAL MEDICINE

## 2021-02-23 PROCEDURE — 99999 PR PBB SHADOW E&M-EST. PATIENT-LVL V: CPT | Mod: PBBFAC,,, | Performed by: FAMILY MEDICINE

## 2021-02-23 PROCEDURE — 99214 PR OFFICE/OUTPT VISIT, EST, LEVL IV, 30-39 MIN: ICD-10-PCS | Mod: S$PBB,,, | Performed by: FAMILY MEDICINE

## 2021-02-23 PROCEDURE — 91037 ESOPH IMPED FUNCTION TEST: CPT

## 2021-02-23 PROCEDURE — 91010 PR ESOPHAGEAL MOTILITY STUDY, MA2METRY: ICD-10-PCS | Mod: 26,,, | Performed by: INTERNAL MEDICINE

## 2021-02-23 PROCEDURE — 99999 PR PBB SHADOW E&M-EST. PATIENT-LVL V: ICD-10-PCS | Mod: PBBFAC,,, | Performed by: FAMILY MEDICINE

## 2021-02-23 PROCEDURE — 91010 ESOPHAGUS MOTILITY STUDY: CPT | Mod: 26,,, | Performed by: INTERNAL MEDICINE

## 2021-02-23 PROCEDURE — 99214 OFFICE O/P EST MOD 30 MIN: CPT | Mod: S$PBB,,, | Performed by: FAMILY MEDICINE

## 2021-02-23 PROCEDURE — U0002 COVID-19 LAB TEST NON-CDC: HCPCS

## 2021-02-23 PROCEDURE — 91010 ESOPHAGUS MOTILITY STUDY: CPT

## 2021-02-23 PROCEDURE — 25000003 PHARM REV CODE 250: Performed by: INTERNAL MEDICINE

## 2021-02-23 RX ORDER — LIDOCAINE HYDROCHLORIDE 20 MG/ML
2 SOLUTION OROPHARYNGEAL ONCE
Status: COMPLETED | OUTPATIENT
Start: 2021-02-23 | End: 2021-02-23

## 2021-02-23 RX ADMIN — LIDOCAINE HYDROCHLORIDE 2 ML: 20 SOLUTION ORAL; TOPICAL at 09:02

## 2021-02-24 ENCOUNTER — TELEPHONE (OUTPATIENT)
Dept: INTERNAL MEDICINE | Facility: CLINIC | Age: 61
End: 2021-02-24

## 2021-02-24 DIAGNOSIS — R56.9 SEIZURE: Primary | ICD-10-CM

## 2021-02-26 ENCOUNTER — TELEPHONE (OUTPATIENT)
Dept: INTERNAL MEDICINE | Facility: CLINIC | Age: 61
End: 2021-02-26

## 2021-03-09 ENCOUNTER — LAB VISIT (OUTPATIENT)
Dept: LAB | Facility: HOSPITAL | Age: 61
End: 2021-03-09
Attending: FAMILY MEDICINE
Payer: MEDICARE

## 2021-03-09 ENCOUNTER — TELEPHONE (OUTPATIENT)
Dept: NEUROLOGY | Facility: CLINIC | Age: 61
End: 2021-03-09

## 2021-03-09 ENCOUNTER — PATIENT MESSAGE (OUTPATIENT)
Dept: INTERNAL MEDICINE | Facility: CLINIC | Age: 61
End: 2021-03-09

## 2021-03-09 ENCOUNTER — OFFICE VISIT (OUTPATIENT)
Dept: INTERNAL MEDICINE | Facility: CLINIC | Age: 61
End: 2021-03-09
Payer: MEDICARE

## 2021-03-09 VITALS
DIASTOLIC BLOOD PRESSURE: 76 MMHG | WEIGHT: 239.88 LBS | BODY MASS INDEX: 39.97 KG/M2 | OXYGEN SATURATION: 100 % | HEART RATE: 81 BPM | TEMPERATURE: 97 F | SYSTOLIC BLOOD PRESSURE: 122 MMHG | HEIGHT: 65 IN

## 2021-03-09 DIAGNOSIS — R56.9 SEIZURE: ICD-10-CM

## 2021-03-09 DIAGNOSIS — G40.109 TEMPORAL LOBE EPILEPSY: ICD-10-CM

## 2021-03-09 DIAGNOSIS — E03.9 HYPOTHYROIDISM, UNSPECIFIED TYPE: ICD-10-CM

## 2021-03-09 DIAGNOSIS — D64.9 ANEMIA, UNSPECIFIED TYPE: Primary | ICD-10-CM

## 2021-03-09 DIAGNOSIS — E07.9 DISORDER OF THYROID, UNSPECIFIED: ICD-10-CM

## 2021-03-09 DIAGNOSIS — T78.3XXA ANGIOEDEMA, INITIAL ENCOUNTER: ICD-10-CM

## 2021-03-09 DIAGNOSIS — I10 ESSENTIAL HYPERTENSION: ICD-10-CM

## 2021-03-09 DIAGNOSIS — D64.9 ANEMIA, UNSPECIFIED TYPE: ICD-10-CM

## 2021-03-09 LAB
BASOPHILS # BLD AUTO: 0.03 K/UL (ref 0–0.2)
BASOPHILS NFR BLD: 0.7 % (ref 0–1.9)
DIFFERENTIAL METHOD: ABNORMAL
EOSINOPHIL # BLD AUTO: 0.1 K/UL (ref 0–0.5)
EOSINOPHIL NFR BLD: 2.8 % (ref 0–8)
ERYTHROCYTE [DISTWIDTH] IN BLOOD BY AUTOMATED COUNT: 15.9 % (ref 11.5–14.5)
HCT VFR BLD AUTO: 36.7 % (ref 37–48.5)
HGB BLD-MCNC: 11.1 G/DL (ref 12–16)
IMM GRANULOCYTES # BLD AUTO: 0 K/UL (ref 0–0.04)
IMM GRANULOCYTES NFR BLD AUTO: 0 % (ref 0–0.5)
LYMPHOCYTES # BLD AUTO: 1.9 K/UL (ref 1–4.8)
LYMPHOCYTES NFR BLD: 44.5 % (ref 18–48)
MCH RBC QN AUTO: 28.2 PG (ref 27–31)
MCHC RBC AUTO-ENTMCNC: 30.2 G/DL (ref 32–36)
MCV RBC AUTO: 93 FL (ref 82–98)
MONOCYTES # BLD AUTO: 0.3 K/UL (ref 0.3–1)
MONOCYTES NFR BLD: 7.5 % (ref 4–15)
NEUTROPHILS # BLD AUTO: 1.9 K/UL (ref 1.8–7.7)
NEUTROPHILS NFR BLD: 44.5 % (ref 38–73)
NRBC BLD-RTO: 0 /100 WBC
PLATELET # BLD AUTO: 260 K/UL (ref 150–350)
PMV BLD AUTO: 10.2 FL (ref 9.2–12.9)
RBC # BLD AUTO: 3.93 M/UL (ref 4–5.4)
WBC # BLD AUTO: 4.25 K/UL (ref 3.9–12.7)

## 2021-03-09 PROCEDURE — 99999 PR PBB SHADOW E&M-EST. PATIENT-LVL IV: ICD-10-PCS | Mod: PBBFAC,,, | Performed by: FAMILY MEDICINE

## 2021-03-09 PROCEDURE — 84443 ASSAY THYROID STIM HORMONE: CPT | Performed by: FAMILY MEDICINE

## 2021-03-09 PROCEDURE — 85025 COMPLETE CBC W/AUTO DIFF WBC: CPT | Performed by: FAMILY MEDICINE

## 2021-03-09 PROCEDURE — 99999 PR PBB SHADOW E&M-EST. PATIENT-LVL IV: CPT | Mod: PBBFAC,,, | Performed by: FAMILY MEDICINE

## 2021-03-09 PROCEDURE — 99214 OFFICE O/P EST MOD 30 MIN: CPT | Mod: PBBFAC | Performed by: FAMILY MEDICINE

## 2021-03-09 PROCEDURE — 84439 ASSAY OF FREE THYROXINE: CPT | Performed by: FAMILY MEDICINE

## 2021-03-09 PROCEDURE — 99214 OFFICE O/P EST MOD 30 MIN: CPT | Mod: S$PBB,,, | Performed by: FAMILY MEDICINE

## 2021-03-09 PROCEDURE — 36415 COLL VENOUS BLD VENIPUNCTURE: CPT | Performed by: FAMILY MEDICINE

## 2021-03-09 PROCEDURE — 99214 PR OFFICE/OUTPT VISIT, EST, LEVL IV, 30-39 MIN: ICD-10-PCS | Mod: S$PBB,,, | Performed by: FAMILY MEDICINE

## 2021-03-10 ENCOUNTER — PATIENT MESSAGE (OUTPATIENT)
Dept: INTERNAL MEDICINE | Facility: CLINIC | Age: 61
End: 2021-03-10

## 2021-03-10 LAB
T4 FREE SERPL-MCNC: 0.93 NG/DL (ref 0.71–1.51)
TSH SERPL DL<=0.005 MIU/L-ACNC: 0.26 UIU/ML (ref 0.4–4)

## 2021-03-11 ENCOUNTER — PATIENT MESSAGE (OUTPATIENT)
Dept: INTERNAL MEDICINE | Facility: CLINIC | Age: 61
End: 2021-03-11

## 2021-03-12 ENCOUNTER — PATIENT OUTREACH (OUTPATIENT)
Dept: ADMINISTRATIVE | Facility: OTHER | Age: 61
End: 2021-03-12

## 2021-03-12 DIAGNOSIS — Z12.31 ENCOUNTER FOR SCREENING MAMMOGRAM FOR BREAST CANCER: Primary | ICD-10-CM

## 2021-03-16 ENCOUNTER — PATIENT MESSAGE (OUTPATIENT)
Dept: ORTHOPEDICS | Facility: CLINIC | Age: 61
End: 2021-03-16

## 2021-03-22 ENCOUNTER — PATIENT MESSAGE (OUTPATIENT)
Dept: INTERNAL MEDICINE | Facility: CLINIC | Age: 61
End: 2021-03-22

## 2021-03-22 RX ORDER — PANTOPRAZOLE SODIUM 40 MG/1
40 TABLET, DELAYED RELEASE ORAL DAILY
Qty: 30 TABLET | Refills: 11 | Status: CANCELLED | OUTPATIENT
Start: 2021-03-22 | End: 2022-03-22

## 2021-03-24 RX ORDER — PANTOPRAZOLE SODIUM 40 MG/1
40 TABLET, DELAYED RELEASE ORAL DAILY
Qty: 30 TABLET | Refills: 3 | Status: SHIPPED | OUTPATIENT
Start: 2021-03-24 | End: 2021-05-21

## 2021-03-25 ENCOUNTER — TELEPHONE (OUTPATIENT)
Dept: PRIMARY CARE CLINIC | Facility: CLINIC | Age: 61
End: 2021-03-25

## 2021-04-19 ENCOUNTER — PATIENT OUTREACH (OUTPATIENT)
Dept: ADMINISTRATIVE | Facility: OTHER | Age: 61
End: 2021-04-19

## 2021-04-20 ENCOUNTER — OFFICE VISIT (OUTPATIENT)
Dept: NEUROLOGY | Facility: CLINIC | Age: 61
End: 2021-04-20
Payer: MEDICARE

## 2021-04-20 VITALS
BODY MASS INDEX: 39.49 KG/M2 | RESPIRATION RATE: 16 BRPM | HEIGHT: 65 IN | SYSTOLIC BLOOD PRESSURE: 122 MMHG | WEIGHT: 237 LBS | HEART RATE: 72 BPM | DIASTOLIC BLOOD PRESSURE: 80 MMHG

## 2021-04-20 DIAGNOSIS — I10 ESSENTIAL HYPERTENSION: Chronic | ICD-10-CM

## 2021-04-20 DIAGNOSIS — T78.3XXA ANGIOEDEMA, INITIAL ENCOUNTER: ICD-10-CM

## 2021-04-20 DIAGNOSIS — M25.561 CHRONIC PAIN OF RIGHT KNEE: ICD-10-CM

## 2021-04-20 DIAGNOSIS — K62.5 RECTAL BLEED: ICD-10-CM

## 2021-04-20 DIAGNOSIS — D64.9 ANEMIA, UNSPECIFIED TYPE: ICD-10-CM

## 2021-04-20 DIAGNOSIS — R21 RASH: ICD-10-CM

## 2021-04-20 DIAGNOSIS — R79.89 ABNORMAL TSH: ICD-10-CM

## 2021-04-20 DIAGNOSIS — G89.29 CHRONIC PAIN OF RIGHT KNEE: ICD-10-CM

## 2021-04-20 DIAGNOSIS — I25.10 CORONARY ARTERY DISEASE INVOLVING NATIVE CORONARY ARTERY WITHOUT ANGINA PECTORIS, UNSPECIFIED WHETHER NATIVE OR TRANSPLANTED HEART: Chronic | ICD-10-CM

## 2021-04-20 DIAGNOSIS — Z76.89 ESTABLISHING CARE WITH NEW DOCTOR, ENCOUNTER FOR: ICD-10-CM

## 2021-04-20 DIAGNOSIS — G40.919 REFRACTORY EPILEPSY: Primary | ICD-10-CM

## 2021-04-20 DIAGNOSIS — G40.109 TEMPORAL LOBE EPILEPSY: ICD-10-CM

## 2021-04-20 DIAGNOSIS — Z95.5 HX OF HEART ARTERY STENT: ICD-10-CM

## 2021-04-20 DIAGNOSIS — E03.9 HYPOTHYROIDISM, UNSPECIFIED TYPE: ICD-10-CM

## 2021-04-20 DIAGNOSIS — Z79.899 ENCOUNTER FOR MONITORING ANTICONVULSANT THERAPY: ICD-10-CM

## 2021-04-20 DIAGNOSIS — R56.9 SEIZURE: ICD-10-CM

## 2021-04-20 DIAGNOSIS — H53.8 BLURRED VISION, LEFT EYE: ICD-10-CM

## 2021-04-20 DIAGNOSIS — R39.89 ABNORMAL URINE COLOR: ICD-10-CM

## 2021-04-20 DIAGNOSIS — E78.5 HYPERLIPIDEMIA, UNSPECIFIED HYPERLIPIDEMIA TYPE: Chronic | ICD-10-CM

## 2021-04-20 DIAGNOSIS — G40.219 PARTIAL SYMPTOMATIC EPILEPSY WITH COMPLEX PARTIAL SEIZURES, INTRACTABLE, WITHOUT STATUS EPILEPTICUS: ICD-10-CM

## 2021-04-20 DIAGNOSIS — I25.10 CORONARY ARTERY DISEASE INVOLVING NATIVE CORONARY ARTERY OF NATIVE HEART WITHOUT ANGINA PECTORIS: ICD-10-CM

## 2021-04-20 DIAGNOSIS — G40.019 PARTIAL IDIOPATHIC EPILEPSY WITH SEIZURES OF LOCALIZED ONSET, INTRACTABLE, WITHOUT STATUS EPILEPTICUS: ICD-10-CM

## 2021-04-20 DIAGNOSIS — Z51.81 ENCOUNTER FOR MONITORING ANTICONVULSANT THERAPY: ICD-10-CM

## 2021-04-20 PROCEDURE — 99215 PR OFFICE/OUTPT VISIT, EST, LEVL V, 40-54 MIN: ICD-10-PCS | Mod: S$PBB,,, | Performed by: PSYCHIATRY & NEUROLOGY

## 2021-04-20 PROCEDURE — 99417 PR PROLONGED SVC, OUTPT, W/WO DIRECT PT CONTACT,  EA ADDTL 15 MIN: ICD-10-PCS | Mod: S$PBB,,, | Performed by: PSYCHIATRY & NEUROLOGY

## 2021-04-20 PROCEDURE — 99214 OFFICE O/P EST MOD 30 MIN: CPT | Mod: PBBFAC | Performed by: PSYCHIATRY & NEUROLOGY

## 2021-04-20 PROCEDURE — 99999 PR PBB SHADOW E&M-EST. PATIENT-LVL IV: CPT | Mod: PBBFAC,,, | Performed by: PSYCHIATRY & NEUROLOGY

## 2021-04-20 PROCEDURE — 99417 PROLNG OP E/M EACH 15 MIN: CPT | Mod: S$PBB,,, | Performed by: PSYCHIATRY & NEUROLOGY

## 2021-04-20 PROCEDURE — 99215 OFFICE O/P EST HI 40 MIN: CPT | Mod: S$PBB,,, | Performed by: PSYCHIATRY & NEUROLOGY

## 2021-04-20 PROCEDURE — 99999 PR PBB SHADOW E&M-EST. PATIENT-LVL IV: ICD-10-PCS | Mod: PBBFAC,,, | Performed by: PSYCHIATRY & NEUROLOGY

## 2021-04-20 RX ORDER — EZETIMIBE 10 MG/1
10 TABLET ORAL DAILY
COMMUNITY
Start: 2021-03-23 | End: 2022-06-08 | Stop reason: ALTCHOICE

## 2021-04-21 ENCOUNTER — PATIENT MESSAGE (OUTPATIENT)
Dept: NEUROLOGY | Facility: CLINIC | Age: 61
End: 2021-04-21

## 2021-04-21 ENCOUNTER — TELEPHONE (OUTPATIENT)
Dept: NEUROLOGY | Facility: CLINIC | Age: 61
End: 2021-04-21

## 2021-04-21 DIAGNOSIS — G40.109 TEMPORAL LOBE EPILEPSY: Primary | ICD-10-CM

## 2021-04-22 ENCOUNTER — PATIENT MESSAGE (OUTPATIENT)
Dept: NEUROLOGY | Facility: CLINIC | Age: 61
End: 2021-04-22

## 2021-05-03 ENCOUNTER — TELEPHONE (OUTPATIENT)
Dept: INTERNAL MEDICINE | Facility: CLINIC | Age: 61
End: 2021-05-03

## 2021-05-05 ENCOUNTER — LAB VISIT (OUTPATIENT)
Dept: LAB | Facility: HOSPITAL | Age: 61
End: 2021-05-05
Attending: PSYCHIATRY & NEUROLOGY
Payer: MEDICARE

## 2021-05-05 ENCOUNTER — TELEPHONE (OUTPATIENT)
Dept: NEUROLOGY | Facility: CLINIC | Age: 61
End: 2021-05-05

## 2021-05-05 ENCOUNTER — OFFICE VISIT (OUTPATIENT)
Dept: INTERNAL MEDICINE | Facility: CLINIC | Age: 61
End: 2021-05-05
Payer: MEDICARE

## 2021-05-05 VITALS — SYSTOLIC BLOOD PRESSURE: 120 MMHG | DIASTOLIC BLOOD PRESSURE: 83 MMHG

## 2021-05-05 DIAGNOSIS — I20.0 UNSTABLE ANGINA PECTORIS: ICD-10-CM

## 2021-05-05 DIAGNOSIS — G40.109 TEMPORAL LOBE EPILEPSY: ICD-10-CM

## 2021-05-05 DIAGNOSIS — I10 ESSENTIAL HYPERTENSION: ICD-10-CM

## 2021-05-05 DIAGNOSIS — I25.10 CORONARY ARTERY DISEASE INVOLVING NATIVE CORONARY ARTERY WITHOUT ANGINA PECTORIS, UNSPECIFIED WHETHER NATIVE OR TRANSPLANTED HEART: Primary | ICD-10-CM

## 2021-05-05 PROCEDURE — 99214 OFFICE O/P EST MOD 30 MIN: CPT | Mod: 95,,, | Performed by: FAMILY MEDICINE

## 2021-05-05 PROCEDURE — 36415 COLL VENOUS BLD VENIPUNCTURE: CPT | Mod: PO | Performed by: PSYCHIATRY & NEUROLOGY

## 2021-05-05 PROCEDURE — 80185 ASSAY OF PHENYTOIN TOTAL: CPT | Performed by: PSYCHIATRY & NEUROLOGY

## 2021-05-05 PROCEDURE — 99214 PR OFFICE/OUTPT VISIT, EST, LEVL IV, 30-39 MIN: ICD-10-PCS | Mod: 95,,, | Performed by: FAMILY MEDICINE

## 2021-05-06 ENCOUNTER — PATIENT MESSAGE (OUTPATIENT)
Dept: NEUROLOGY | Facility: CLINIC | Age: 61
End: 2021-05-06

## 2021-05-06 ENCOUNTER — TELEPHONE (OUTPATIENT)
Dept: NEUROLOGY | Facility: CLINIC | Age: 61
End: 2021-05-06

## 2021-05-06 DIAGNOSIS — G40.109 TEMPORAL LOBE EPILEPSY: Primary | ICD-10-CM

## 2021-05-06 LAB — PHENYTOIN SERPL-MCNC: 1.1 UG/ML (ref 10–20)

## 2021-05-06 RX ORDER — PHENYTOIN SODIUM 100 MG/1
200 CAPSULE, EXTENDED RELEASE ORAL 2 TIMES DAILY
Qty: 360 CAPSULE | Refills: 3 | Status: SHIPPED | OUTPATIENT
Start: 2021-05-06 | End: 2022-04-12

## 2021-05-17 ENCOUNTER — LAB VISIT (OUTPATIENT)
Dept: LAB | Facility: HOSPITAL | Age: 61
End: 2021-05-17
Attending: PSYCHIATRY & NEUROLOGY
Payer: MEDICARE

## 2021-05-17 DIAGNOSIS — G40.109 TEMPORAL LOBE EPILEPSY: ICD-10-CM

## 2021-05-17 PROCEDURE — 36415 COLL VENOUS BLD VENIPUNCTURE: CPT | Mod: PO | Performed by: PSYCHIATRY & NEUROLOGY

## 2021-05-19 LAB
PHENYTOIN FREE SERPL-MCNC: 1.1 MCG/ML (ref 1–2)
PHENYTOIN, TOTAL: 14 MCG/ML (ref 10–20)

## 2021-05-21 ENCOUNTER — OFFICE VISIT (OUTPATIENT)
Dept: CARDIOLOGY | Facility: CLINIC | Age: 61
End: 2021-05-21
Payer: MEDICARE

## 2021-05-21 VITALS
HEART RATE: 92 BPM | BODY MASS INDEX: 38.96 KG/M2 | OXYGEN SATURATION: 98 % | WEIGHT: 234.13 LBS | DIASTOLIC BLOOD PRESSURE: 78 MMHG | SYSTOLIC BLOOD PRESSURE: 118 MMHG

## 2021-05-21 DIAGNOSIS — Z95.5 HX OF HEART ARTERY STENT: ICD-10-CM

## 2021-05-21 DIAGNOSIS — I10 ESSENTIAL HYPERTENSION: Primary | ICD-10-CM

## 2021-05-21 DIAGNOSIS — E78.00 PURE HYPERCHOLESTEROLEMIA: Chronic | ICD-10-CM

## 2021-05-21 PROCEDURE — 99999 PR PBB SHADOW E&M-EST. PATIENT-LVL IV: ICD-10-PCS | Mod: PBBFAC,,, | Performed by: INTERNAL MEDICINE

## 2021-05-21 PROCEDURE — 99205 PR OFFICE/OUTPT VISIT, NEW, LEVL V, 60-74 MIN: ICD-10-PCS | Mod: S$PBB,,, | Performed by: INTERNAL MEDICINE

## 2021-05-21 PROCEDURE — 99205 OFFICE O/P NEW HI 60 MIN: CPT | Mod: S$PBB,,, | Performed by: INTERNAL MEDICINE

## 2021-05-21 PROCEDURE — 99214 OFFICE O/P EST MOD 30 MIN: CPT | Mod: PBBFAC | Performed by: INTERNAL MEDICINE

## 2021-05-21 PROCEDURE — 99999 PR PBB SHADOW E&M-EST. PATIENT-LVL IV: CPT | Mod: PBBFAC,,, | Performed by: INTERNAL MEDICINE

## 2021-05-21 RX ORDER — PANTOPRAZOLE SODIUM 40 MG/1
40 TABLET, DELAYED RELEASE ORAL 2 TIMES DAILY
Qty: 30 TABLET | Refills: 3
Start: 2021-05-21 | End: 2021-07-22

## 2021-05-28 ENCOUNTER — OFFICE VISIT (OUTPATIENT)
Dept: CARDIOLOGY | Facility: CLINIC | Age: 61
End: 2021-05-28
Payer: MEDICARE

## 2021-05-28 VITALS
BODY MASS INDEX: 38.86 KG/M2 | HEIGHT: 65 IN | DIASTOLIC BLOOD PRESSURE: 74 MMHG | HEART RATE: 93 BPM | OXYGEN SATURATION: 96 % | WEIGHT: 233.25 LBS | SYSTOLIC BLOOD PRESSURE: 112 MMHG

## 2021-05-28 DIAGNOSIS — I25.110 ATHEROSCLEROSIS OF NATIVE CORONARY ARTERY OF NATIVE HEART WITH UNSTABLE ANGINA PECTORIS: Primary | ICD-10-CM

## 2021-05-28 DIAGNOSIS — I25.10 CORONARY ARTERY DISEASE INVOLVING NATIVE CORONARY ARTERY OF NATIVE HEART WITHOUT ANGINA PECTORIS: ICD-10-CM

## 2021-05-28 DIAGNOSIS — E78.00 PURE HYPERCHOLESTEROLEMIA: ICD-10-CM

## 2021-05-28 DIAGNOSIS — I10 ESSENTIAL HYPERTENSION: ICD-10-CM

## 2021-05-28 DIAGNOSIS — R39.89 ABNORMAL URINE COLOR: ICD-10-CM

## 2021-05-28 DIAGNOSIS — G40.109 TEMPORAL LOBE EPILEPSY: ICD-10-CM

## 2021-05-28 DIAGNOSIS — E78.00 PURE HYPERCHOLESTEROLEMIA: Chronic | ICD-10-CM

## 2021-05-28 PROCEDURE — 99999 PR PBB SHADOW E&M-EST. PATIENT-LVL IV: CPT | Mod: PBBFAC,,, | Performed by: INTERNAL MEDICINE

## 2021-05-28 PROCEDURE — 99214 OFFICE O/P EST MOD 30 MIN: CPT | Mod: PBBFAC | Performed by: INTERNAL MEDICINE

## 2021-05-28 PROCEDURE — 99215 PR OFFICE/OUTPT VISIT, EST, LEVL V, 40-54 MIN: ICD-10-PCS | Mod: S$PBB,,, | Performed by: INTERNAL MEDICINE

## 2021-05-28 PROCEDURE — 99215 OFFICE O/P EST HI 40 MIN: CPT | Mod: S$PBB,,, | Performed by: INTERNAL MEDICINE

## 2021-05-28 PROCEDURE — 99999 PR PBB SHADOW E&M-EST. PATIENT-LVL IV: ICD-10-PCS | Mod: PBBFAC,,, | Performed by: INTERNAL MEDICINE

## 2021-05-31 ENCOUNTER — PATIENT MESSAGE (OUTPATIENT)
Dept: CARDIOLOGY | Facility: CLINIC | Age: 61
End: 2021-05-31

## 2021-05-31 ENCOUNTER — OFFICE VISIT (OUTPATIENT)
Dept: CARDIOLOGY | Facility: CLINIC | Age: 61
End: 2021-05-31
Payer: MEDICARE

## 2021-05-31 VITALS
WEIGHT: 235.69 LBS | DIASTOLIC BLOOD PRESSURE: 84 MMHG | OXYGEN SATURATION: 99 % | SYSTOLIC BLOOD PRESSURE: 130 MMHG | HEART RATE: 77 BPM | BODY MASS INDEX: 39.22 KG/M2

## 2021-05-31 DIAGNOSIS — I20.9 ANGINA PECTORIS, UNSPECIFIED: ICD-10-CM

## 2021-05-31 DIAGNOSIS — E78.00 PURE HYPERCHOLESTEROLEMIA: Chronic | ICD-10-CM

## 2021-05-31 DIAGNOSIS — Z95.5 HX OF HEART ARTERY STENT: ICD-10-CM

## 2021-05-31 DIAGNOSIS — I10 ESSENTIAL HYPERTENSION: ICD-10-CM

## 2021-05-31 DIAGNOSIS — R60.9 EDEMA, UNSPECIFIED TYPE: ICD-10-CM

## 2021-05-31 DIAGNOSIS — M79.604 PAIN OF RIGHT LOWER EXTREMITY: ICD-10-CM

## 2021-05-31 DIAGNOSIS — I25.10 CORONARY ARTERY DISEASE INVOLVING NATIVE CORONARY ARTERY OF NATIVE HEART WITHOUT ANGINA PECTORIS: Primary | ICD-10-CM

## 2021-05-31 PROCEDURE — 99999 PR PBB SHADOW E&M-EST. PATIENT-LVL III: ICD-10-PCS | Mod: PBBFAC,,, | Performed by: INTERNAL MEDICINE

## 2021-05-31 PROCEDURE — 99215 PR OFFICE/OUTPT VISIT, EST, LEVL V, 40-54 MIN: ICD-10-PCS | Mod: S$PBB,,, | Performed by: INTERNAL MEDICINE

## 2021-05-31 PROCEDURE — 99215 OFFICE O/P EST HI 40 MIN: CPT | Mod: S$PBB,,, | Performed by: INTERNAL MEDICINE

## 2021-05-31 PROCEDURE — 99213 OFFICE O/P EST LOW 20 MIN: CPT | Mod: PBBFAC | Performed by: INTERNAL MEDICINE

## 2021-05-31 PROCEDURE — 99999 PR PBB SHADOW E&M-EST. PATIENT-LVL III: CPT | Mod: PBBFAC,,, | Performed by: INTERNAL MEDICINE

## 2021-06-07 ENCOUNTER — OFFICE VISIT (OUTPATIENT)
Dept: CARDIOLOGY | Facility: CLINIC | Age: 61
End: 2021-06-07
Payer: MEDICARE

## 2021-06-07 VITALS
DIASTOLIC BLOOD PRESSURE: 84 MMHG | SYSTOLIC BLOOD PRESSURE: 128 MMHG | OXYGEN SATURATION: 97 % | BODY MASS INDEX: 39.18 KG/M2 | HEART RATE: 82 BPM | WEIGHT: 235.44 LBS

## 2021-06-07 DIAGNOSIS — Z95.5 HX OF HEART ARTERY STENT: ICD-10-CM

## 2021-06-07 DIAGNOSIS — I25.10 CORONARY ARTERY DISEASE INVOLVING NATIVE CORONARY ARTERY OF NATIVE HEART WITHOUT ANGINA PECTORIS: ICD-10-CM

## 2021-06-07 DIAGNOSIS — I10 ESSENTIAL HYPERTENSION: Primary | Chronic | ICD-10-CM

## 2021-06-07 DIAGNOSIS — R06.02 SOB (SHORTNESS OF BREATH): ICD-10-CM

## 2021-06-07 DIAGNOSIS — E78.00 PURE HYPERCHOLESTEROLEMIA: Chronic | ICD-10-CM

## 2021-06-07 PROCEDURE — 99213 OFFICE O/P EST LOW 20 MIN: CPT | Mod: PBBFAC | Performed by: INTERNAL MEDICINE

## 2021-06-07 PROCEDURE — 99999 PR PBB SHADOW E&M-EST. PATIENT-LVL III: ICD-10-PCS | Mod: PBBFAC,,, | Performed by: INTERNAL MEDICINE

## 2021-06-07 PROCEDURE — 99215 PR OFFICE/OUTPT VISIT, EST, LEVL V, 40-54 MIN: ICD-10-PCS | Mod: S$PBB,,, | Performed by: INTERNAL MEDICINE

## 2021-06-07 PROCEDURE — 99999 PR PBB SHADOW E&M-EST. PATIENT-LVL III: CPT | Mod: PBBFAC,,, | Performed by: INTERNAL MEDICINE

## 2021-06-07 PROCEDURE — 99215 OFFICE O/P EST HI 40 MIN: CPT | Mod: S$PBB,,, | Performed by: INTERNAL MEDICINE

## 2021-06-07 RX ORDER — FUROSEMIDE 40 MG/1
40 TABLET ORAL DAILY
Qty: 30 TABLET | Refills: 11 | Status: ON HOLD | OUTPATIENT
Start: 2021-06-07 | End: 2022-04-26 | Stop reason: SDUPTHER

## 2021-06-08 ENCOUNTER — PATIENT MESSAGE (OUTPATIENT)
Dept: CARDIOLOGY | Facility: CLINIC | Age: 61
End: 2021-06-08

## 2021-06-09 RX ORDER — ISOSORBIDE MONONITRATE 60 MG/1
60 TABLET, EXTENDED RELEASE ORAL EVERY 12 HOURS
Qty: 90 TABLET | Refills: 7 | Status: SHIPPED | OUTPATIENT
Start: 2021-06-09 | End: 2022-06-10

## 2021-06-10 ENCOUNTER — HOSPITAL ENCOUNTER (OUTPATIENT)
Dept: RADIOLOGY | Facility: HOSPITAL | Age: 61
Discharge: HOME | End: 2021-06-10
Attending: INTERNAL MEDICINE
Payer: MEDICARE

## 2021-06-10 ENCOUNTER — HOSPITAL ENCOUNTER (OUTPATIENT)
Dept: CARDIOLOGY | Facility: HOSPITAL | Age: 61
Discharge: HOME | End: 2021-06-10
Attending: INTERNAL MEDICINE
Payer: MEDICARE

## 2021-06-10 VITALS — BODY MASS INDEX: 39.15 KG/M2 | HEIGHT: 65 IN | WEIGHT: 235 LBS

## 2021-06-10 DIAGNOSIS — I20.9 ANGINA PECTORIS, UNSPECIFIED: ICD-10-CM

## 2021-06-10 DIAGNOSIS — R60.9 EDEMA, UNSPECIFIED TYPE: ICD-10-CM

## 2021-06-10 DIAGNOSIS — I25.10 CORONARY ARTERY DISEASE INVOLVING NATIVE CORONARY ARTERY OF NATIVE HEART WITHOUT ANGINA PECTORIS: ICD-10-CM

## 2021-06-10 LAB
CV STRESS BASE HR: 84 BPM
DIASTOLIC BLOOD PRESSURE: 77 MMHG
NUC REST EJECTION FRACTION: 79
NUC STRESS EJECTION FRACTION: 78 %
OHS CV CPX 85 PERCENT MAX PREDICTED HEART RATE MALE: 130
OHS CV CPX ESTIMATED METS: 1
OHS CV CPX MAX PREDICTED HEART RATE: 153
OHS CV CPX PATIENT IS FEMALE: 1
OHS CV CPX PATIENT IS MALE: 0
OHS CV CPX PEAK DIASTOLIC BLOOD PRESSURE: 74 MMHG
OHS CV CPX PEAK HEAR RATE: 103 BPM
OHS CV CPX PEAK RATE PRESSURE PRODUCT: NORMAL
OHS CV CPX PEAK SYSTOLIC BLOOD PRESSURE: 110 MMHG
OHS CV CPX PERCENT MAX PREDICTED HEART RATE ACHIEVED: 67
OHS CV CPX RATE PRESSURE PRODUCT PRESENTING: NORMAL
STRESS ECHO POST EXERCISE DUR SEC: 48 SECONDS
SYSTOLIC BLOOD PRESSURE: 123 MMHG

## 2021-06-10 PROCEDURE — 93971 EXTREMITY STUDY: CPT | Mod: 26,RT,, | Performed by: INTERNAL MEDICINE

## 2021-06-10 PROCEDURE — 63600175 PHARM REV CODE 636 W HCPCS: Performed by: INTERNAL MEDICINE

## 2021-06-10 PROCEDURE — 93971 EXTREMITY STUDY: CPT | Mod: RT

## 2021-06-10 PROCEDURE — 78452 HT MUSCLE IMAGE SPECT MULT: CPT | Mod: 26,,, | Performed by: INTERNAL MEDICINE

## 2021-06-10 PROCEDURE — 78452 HT MUSCLE IMAGE SPECT MULT: CPT

## 2021-06-10 PROCEDURE — 93971 CV US DOPPLER VENOUS LEG RIGHT (CUPID ONLY): ICD-10-PCS | Mod: 26,RT,, | Performed by: INTERNAL MEDICINE

## 2021-06-10 PROCEDURE — 93018 CV STRESS TEST I&R ONLY: CPT | Mod: ,,, | Performed by: INTERNAL MEDICINE

## 2021-06-10 PROCEDURE — 78452 STRESS TEST WITH MYOCARDIAL PERFUSION (CUPID ONLY): ICD-10-PCS | Mod: 26,,, | Performed by: INTERNAL MEDICINE

## 2021-06-10 PROCEDURE — 93016 STRESS TEST WITH MYOCARDIAL PERFUSION (CUPID ONLY): ICD-10-PCS | Mod: ,,, | Performed by: INTERNAL MEDICINE

## 2021-06-10 PROCEDURE — 93016 CV STRESS TEST SUPVJ ONLY: CPT | Mod: ,,, | Performed by: INTERNAL MEDICINE

## 2021-06-10 PROCEDURE — A9502 TC99M TETROFOSMIN: HCPCS

## 2021-06-10 PROCEDURE — 93018 STRESS TEST WITH MYOCARDIAL PERFUSION (CUPID ONLY): ICD-10-PCS | Mod: ,,, | Performed by: INTERNAL MEDICINE

## 2021-06-10 PROCEDURE — 93017 CV STRESS TEST TRACING ONLY: CPT

## 2021-06-10 RX ORDER — REGADENOSON 0.08 MG/ML
0.4 INJECTION, SOLUTION INTRAVENOUS ONCE
Status: COMPLETED | OUTPATIENT
Start: 2021-06-10 | End: 2021-06-10

## 2021-06-10 RX ADMIN — REGADENOSON 0.4 MG: 0.08 INJECTION, SOLUTION INTRAVENOUS at 10:06

## 2021-06-11 ENCOUNTER — TELEPHONE (OUTPATIENT)
Dept: CARDIOLOGY | Facility: CLINIC | Age: 61
End: 2021-06-11

## 2021-06-14 ENCOUNTER — OFFICE VISIT (OUTPATIENT)
Dept: CARDIOLOGY | Facility: CLINIC | Age: 61
End: 2021-06-14
Payer: MEDICARE

## 2021-06-14 VITALS
BODY MASS INDEX: 39.07 KG/M2 | OXYGEN SATURATION: 97 % | WEIGHT: 234.81 LBS | HEART RATE: 86 BPM | SYSTOLIC BLOOD PRESSURE: 130 MMHG | DIASTOLIC BLOOD PRESSURE: 82 MMHG

## 2021-06-14 DIAGNOSIS — I25.10 CORONARY ARTERY DISEASE INVOLVING NATIVE CORONARY ARTERY OF NATIVE HEART WITHOUT ANGINA PECTORIS: Primary | ICD-10-CM

## 2021-06-14 DIAGNOSIS — Z95.5 HX OF HEART ARTERY STENT: ICD-10-CM

## 2021-06-14 DIAGNOSIS — I10 ESSENTIAL HYPERTENSION: ICD-10-CM

## 2021-06-14 PROCEDURE — 99999 PR PBB SHADOW E&M-EST. PATIENT-LVL IV: ICD-10-PCS | Mod: PBBFAC,,, | Performed by: INTERNAL MEDICINE

## 2021-06-14 PROCEDURE — 99214 PR OFFICE/OUTPT VISIT, EST, LEVL IV, 30-39 MIN: ICD-10-PCS | Mod: S$PBB,,, | Performed by: INTERNAL MEDICINE

## 2021-06-14 PROCEDURE — 99214 OFFICE O/P EST MOD 30 MIN: CPT | Mod: PBBFAC | Performed by: INTERNAL MEDICINE

## 2021-06-14 PROCEDURE — 99214 OFFICE O/P EST MOD 30 MIN: CPT | Mod: S$PBB,,, | Performed by: INTERNAL MEDICINE

## 2021-06-14 PROCEDURE — 99999 PR PBB SHADOW E&M-EST. PATIENT-LVL IV: CPT | Mod: PBBFAC,,, | Performed by: INTERNAL MEDICINE

## 2021-06-15 ENCOUNTER — OFFICE VISIT (OUTPATIENT)
Dept: INTERNAL MEDICINE | Facility: CLINIC | Age: 61
End: 2021-06-15
Payer: MEDICARE

## 2021-06-15 VITALS — SYSTOLIC BLOOD PRESSURE: 130 MMHG | DIASTOLIC BLOOD PRESSURE: 82 MMHG

## 2021-06-15 DIAGNOSIS — Z95.5 HX OF HEART ARTERY STENT: ICD-10-CM

## 2021-06-15 DIAGNOSIS — L02.92 BOIL: ICD-10-CM

## 2021-06-15 DIAGNOSIS — I10 ESSENTIAL HYPERTENSION: ICD-10-CM

## 2021-06-15 DIAGNOSIS — I25.10 CORONARY ARTERY DISEASE INVOLVING NATIVE CORONARY ARTERY WITHOUT ANGINA PECTORIS, UNSPECIFIED WHETHER NATIVE OR TRANSPLANTED HEART: Primary | ICD-10-CM

## 2021-06-15 PROCEDURE — 99214 PR OFFICE/OUTPT VISIT, EST, LEVL IV, 30-39 MIN: ICD-10-PCS | Mod: 95,,, | Performed by: FAMILY MEDICINE

## 2021-06-15 PROCEDURE — 99214 OFFICE O/P EST MOD 30 MIN: CPT | Mod: 95,,, | Performed by: FAMILY MEDICINE

## 2021-06-15 RX ORDER — METOPROLOL SUCCINATE 50 MG/1
50 TABLET, EXTENDED RELEASE ORAL DAILY
Qty: 90 TABLET | Refills: 1 | Status: SHIPPED | OUTPATIENT
Start: 2021-06-15 | End: 2021-10-14 | Stop reason: SDUPTHER

## 2021-06-15 RX ORDER — SULFAMETHOXAZOLE AND TRIMETHOPRIM 800; 160 MG/1; MG/1
1 TABLET ORAL 2 TIMES DAILY
Qty: 20 TABLET | Refills: 0 | Status: SHIPPED | OUTPATIENT
Start: 2021-06-15 | End: 2021-06-29

## 2021-06-24 ENCOUNTER — PATIENT MESSAGE (OUTPATIENT)
Dept: INTERNAL MEDICINE | Facility: CLINIC | Age: 61
End: 2021-06-24

## 2021-06-29 ENCOUNTER — OFFICE VISIT (OUTPATIENT)
Dept: OBSTETRICS AND GYNECOLOGY | Facility: CLINIC | Age: 61
End: 2021-06-29
Payer: MEDICARE

## 2021-06-29 ENCOUNTER — TELEPHONE (OUTPATIENT)
Dept: CARDIOLOGY | Facility: CLINIC | Age: 61
End: 2021-06-29

## 2021-06-29 ENCOUNTER — PATIENT MESSAGE (OUTPATIENT)
Dept: OBSTETRICS AND GYNECOLOGY | Facility: CLINIC | Age: 61
End: 2021-06-29

## 2021-06-29 VITALS
BODY MASS INDEX: 39.45 KG/M2 | DIASTOLIC BLOOD PRESSURE: 80 MMHG | WEIGHT: 236.75 LBS | SYSTOLIC BLOOD PRESSURE: 126 MMHG | HEIGHT: 65 IN

## 2021-06-29 DIAGNOSIS — N39.0 FREQUENT UTI: Primary | ICD-10-CM

## 2021-06-29 LAB
BACTERIA #/AREA URNS AUTO: ABNORMAL /HPF
BILIRUB UR QL STRIP: NEGATIVE
CLARITY UR REFRACT.AUTO: ABNORMAL
COLOR UR AUTO: YELLOW
GLUCOSE UR QL STRIP: NEGATIVE
HGB UR QL STRIP: NEGATIVE
HYALINE CASTS UR QL AUTO: 0 /LPF
KETONES UR QL STRIP: NEGATIVE
LEUKOCYTE ESTERASE UR QL STRIP: ABNORMAL
MICROSCOPIC COMMENT: ABNORMAL
NITRITE UR QL STRIP: POSITIVE
PH UR STRIP: 7 [PH] (ref 5–8)
PROT UR QL STRIP: ABNORMAL
RBC #/AREA URNS AUTO: 13 /HPF (ref 0–4)
SP GR UR STRIP: 1.02 (ref 1–1.03)
SQUAMOUS #/AREA URNS AUTO: 8 /HPF
URN SPEC COLLECT METH UR: ABNORMAL
WBC #/AREA URNS AUTO: >100 /HPF (ref 0–5)

## 2021-06-29 PROCEDURE — 87186 SC STD MICRODIL/AGAR DIL: CPT | Performed by: OBSTETRICS & GYNECOLOGY

## 2021-06-29 PROCEDURE — 87086 URINE CULTURE/COLONY COUNT: CPT | Performed by: OBSTETRICS & GYNECOLOGY

## 2021-06-29 PROCEDURE — 99204 PR OFFICE/OUTPT VISIT, NEW, LEVL IV, 45-59 MIN: ICD-10-PCS | Mod: S$PBB,,, | Performed by: OBSTETRICS & GYNECOLOGY

## 2021-06-29 PROCEDURE — 99204 OFFICE O/P NEW MOD 45 MIN: CPT | Mod: S$PBB,,, | Performed by: OBSTETRICS & GYNECOLOGY

## 2021-06-29 PROCEDURE — 87088 URINE BACTERIA CULTURE: CPT | Performed by: OBSTETRICS & GYNECOLOGY

## 2021-06-29 PROCEDURE — 99214 OFFICE O/P EST MOD 30 MIN: CPT | Mod: PBBFAC | Performed by: OBSTETRICS & GYNECOLOGY

## 2021-06-29 PROCEDURE — 99999 PR PBB SHADOW E&M-EST. PATIENT-LVL IV: CPT | Mod: PBBFAC,,, | Performed by: OBSTETRICS & GYNECOLOGY

## 2021-06-29 PROCEDURE — 87077 CULTURE AEROBIC IDENTIFY: CPT | Performed by: OBSTETRICS & GYNECOLOGY

## 2021-06-29 PROCEDURE — 99999 PR PBB SHADOW E&M-EST. PATIENT-LVL IV: ICD-10-PCS | Mod: PBBFAC,,, | Performed by: OBSTETRICS & GYNECOLOGY

## 2021-06-29 PROCEDURE — 81001 URINALYSIS AUTO W/SCOPE: CPT | Performed by: OBSTETRICS & GYNECOLOGY

## 2021-06-29 RX ORDER — TOLTERODINE 4 MG/1
1 CAPSULE, EXTENDED RELEASE ORAL DAILY
COMMUNITY
End: 2021-06-29

## 2021-06-29 RX ORDER — MIRABEGRON 25 MG/1
1 TABLET, FILM COATED, EXTENDED RELEASE ORAL DAILY
COMMUNITY
End: 2021-06-29

## 2021-06-29 RX ORDER — AMOXICILLIN AND CLAVULANATE POTASSIUM 875; 125 MG/1; MG/1
1 TABLET, FILM COATED ORAL EVERY 12 HOURS
Qty: 10 TABLET | Refills: 0 | Status: SHIPPED | OUTPATIENT
Start: 2021-06-29 | End: 2021-07-04

## 2021-06-29 RX ORDER — EVOLOCUMAB 140 MG/ML
INJECTION, SOLUTION SUBCUTANEOUS
COMMUNITY
Start: 2021-06-23 | End: 2021-12-24 | Stop reason: SDUPTHER

## 2021-06-30 ENCOUNTER — PATIENT MESSAGE (OUTPATIENT)
Dept: OBSTETRICS AND GYNECOLOGY | Facility: CLINIC | Age: 61
End: 2021-06-30

## 2021-06-30 ENCOUNTER — TELEPHONE (OUTPATIENT)
Dept: RADIOLOGY | Facility: HOSPITAL | Age: 61
End: 2021-06-30

## 2021-07-01 ENCOUNTER — HOSPITAL ENCOUNTER (OUTPATIENT)
Dept: RADIOLOGY | Facility: HOSPITAL | Age: 61
Discharge: HOME | End: 2021-07-01
Attending: OBSTETRICS & GYNECOLOGY
Payer: MEDICARE

## 2021-07-01 DIAGNOSIS — N39.0 FREQUENT UTI: ICD-10-CM

## 2021-07-01 PROCEDURE — 76770 US EXAM ABDO BACK WALL COMP: CPT | Mod: 26,,, | Performed by: RADIOLOGY

## 2021-07-01 PROCEDURE — 76770 US EXAM ABDO BACK WALL COMP: CPT | Mod: TC

## 2021-07-01 PROCEDURE — 76770 US RETROPERITONEAL COMPLETE: ICD-10-PCS | Mod: 26,,, | Performed by: RADIOLOGY

## 2021-07-02 LAB — BACTERIA UR CULT: ABNORMAL

## 2021-07-07 ENCOUNTER — TELEPHONE (OUTPATIENT)
Dept: UROLOGY | Facility: CLINIC | Age: 61
End: 2021-07-07

## 2021-07-09 ENCOUNTER — HOSPITAL ENCOUNTER (OUTPATIENT)
Dept: RADIOLOGY | Facility: HOSPITAL | Age: 61
Discharge: HOME | End: 2021-07-09
Attending: FAMILY MEDICINE
Payer: MEDICARE

## 2021-07-09 VITALS — HEIGHT: 65 IN | BODY MASS INDEX: 39.45 KG/M2 | WEIGHT: 236.75 LBS

## 2021-07-09 DIAGNOSIS — Z12.31 ENCOUNTER FOR SCREENING MAMMOGRAM FOR BREAST CANCER: ICD-10-CM

## 2021-07-09 PROCEDURE — 77067 MAMMO DIGITAL SCREENING BILAT WITH TOMO: ICD-10-PCS | Mod: 26,,, | Performed by: RADIOLOGY

## 2021-07-09 PROCEDURE — 77067 SCR MAMMO BI INCL CAD: CPT | Mod: 26,,, | Performed by: RADIOLOGY

## 2021-07-09 PROCEDURE — 77063 BREAST TOMOSYNTHESIS BI: CPT | Mod: 26,,, | Performed by: RADIOLOGY

## 2021-07-09 PROCEDURE — 77063 MAMMO DIGITAL SCREENING BILAT WITH TOMO: ICD-10-PCS | Mod: 26,,, | Performed by: RADIOLOGY

## 2021-07-09 PROCEDURE — 77067 SCR MAMMO BI INCL CAD: CPT | Mod: TC

## 2021-07-12 ENCOUNTER — PATIENT OUTREACH (OUTPATIENT)
Dept: ADMINISTRATIVE | Facility: OTHER | Age: 61
End: 2021-07-12

## 2021-07-12 ENCOUNTER — OFFICE VISIT (OUTPATIENT)
Dept: UROLOGY | Facility: CLINIC | Age: 61
End: 2021-07-12
Payer: MEDICARE

## 2021-07-12 ENCOUNTER — HOSPITAL ENCOUNTER (OUTPATIENT)
Dept: RADIOLOGY | Facility: HOSPITAL | Age: 61
Discharge: HOME | End: 2021-07-12
Attending: STUDENT IN AN ORGANIZED HEALTH CARE EDUCATION/TRAINING PROGRAM
Payer: MEDICARE

## 2021-07-12 ENCOUNTER — TELEPHONE (OUTPATIENT)
Dept: ORTHOPEDICS | Facility: CLINIC | Age: 61
End: 2021-07-12

## 2021-07-12 ENCOUNTER — OFFICE VISIT (OUTPATIENT)
Dept: ORTHOPEDICS | Facility: CLINIC | Age: 61
End: 2021-07-12
Payer: MEDICARE

## 2021-07-12 VITALS — WEIGHT: 236 LBS | HEIGHT: 65 IN | BODY MASS INDEX: 39.32 KG/M2

## 2021-07-12 VITALS
HEIGHT: 65 IN | SYSTOLIC BLOOD PRESSURE: 127 MMHG | WEIGHT: 241.38 LBS | DIASTOLIC BLOOD PRESSURE: 88 MMHG | HEART RATE: 88 BPM | BODY MASS INDEX: 40.22 KG/M2

## 2021-07-12 DIAGNOSIS — N39.0 FREQUENT UTI: Primary | ICD-10-CM

## 2021-07-12 DIAGNOSIS — G89.29 CHRONIC PAIN OF RIGHT KNEE: ICD-10-CM

## 2021-07-12 DIAGNOSIS — M79.661 PAIN IN RIGHT SHIN: Primary | ICD-10-CM

## 2021-07-12 DIAGNOSIS — N39.3 STRESS INCONTINENCE: ICD-10-CM

## 2021-07-12 DIAGNOSIS — M76.51 PATELLAR TENDINITIS OF RIGHT KNEE: ICD-10-CM

## 2021-07-12 DIAGNOSIS — Z12.11 ENCOUNTER FOR FIT (FECAL IMMUNOCHEMICAL TEST) SCREENING: Primary | ICD-10-CM

## 2021-07-12 DIAGNOSIS — M79.661 PAIN IN RIGHT SHIN: ICD-10-CM

## 2021-07-12 DIAGNOSIS — N28.1 RENAL CYST: ICD-10-CM

## 2021-07-12 DIAGNOSIS — M70.51 PES ANSERINUS BURSITIS OF RIGHT KNEE: ICD-10-CM

## 2021-07-12 DIAGNOSIS — M25.561 CHRONIC PAIN OF RIGHT KNEE: ICD-10-CM

## 2021-07-12 PROCEDURE — 99999 PR PBB SHADOW E&M-EST. PATIENT-LVL IV: ICD-10-PCS | Mod: PBBFAC,,, | Performed by: STUDENT IN AN ORGANIZED HEALTH CARE EDUCATION/TRAINING PROGRAM

## 2021-07-12 PROCEDURE — 99215 OFFICE O/P EST HI 40 MIN: CPT | Mod: PBBFAC,27 | Performed by: UROLOGY

## 2021-07-12 PROCEDURE — 99214 OFFICE O/P EST MOD 30 MIN: CPT | Mod: PBBFAC | Performed by: STUDENT IN AN ORGANIZED HEALTH CARE EDUCATION/TRAINING PROGRAM

## 2021-07-12 PROCEDURE — 99999 PR PBB SHADOW E&M-EST. PATIENT-LVL V: CPT | Mod: PBBFAC,,, | Performed by: UROLOGY

## 2021-07-12 PROCEDURE — 99203 OFFICE O/P NEW LOW 30 MIN: CPT | Mod: S$PBB,,, | Performed by: UROLOGY

## 2021-07-12 PROCEDURE — 73590 X-RAY EXAM OF LOWER LEG: CPT | Mod: TC,RT

## 2021-07-12 PROCEDURE — 73590 XR TIBIA FIBULA 2 VIEW RIGHT: ICD-10-PCS | Mod: 26,RT,, | Performed by: RADIOLOGY

## 2021-07-12 PROCEDURE — 99999 PR PBB SHADOW E&M-EST. PATIENT-LVL V: ICD-10-PCS | Mod: PBBFAC,,, | Performed by: UROLOGY

## 2021-07-12 PROCEDURE — 99203 PR OFFICE/OUTPT VISIT, NEW, LEVL III, 30-44 MIN: ICD-10-PCS | Mod: S$PBB,,, | Performed by: UROLOGY

## 2021-07-12 PROCEDURE — 73590 X-RAY EXAM OF LOWER LEG: CPT | Mod: 26,RT,, | Performed by: RADIOLOGY

## 2021-07-12 PROCEDURE — 99214 PR OFFICE/OUTPT VISIT, EST, LEVL IV, 30-39 MIN: ICD-10-PCS | Mod: S$PBB,,, | Performed by: STUDENT IN AN ORGANIZED HEALTH CARE EDUCATION/TRAINING PROGRAM

## 2021-07-12 PROCEDURE — 99214 OFFICE O/P EST MOD 30 MIN: CPT | Mod: S$PBB,,, | Performed by: STUDENT IN AN ORGANIZED HEALTH CARE EDUCATION/TRAINING PROGRAM

## 2021-07-12 PROCEDURE — 99999 PR PBB SHADOW E&M-EST. PATIENT-LVL IV: CPT | Mod: PBBFAC,,, | Performed by: STUDENT IN AN ORGANIZED HEALTH CARE EDUCATION/TRAINING PROGRAM

## 2021-07-12 RX ORDER — CEFDINIR 300 MG/1
300 CAPSULE ORAL 2 TIMES DAILY
Qty: 20 CAPSULE | Refills: 0 | Status: SHIPPED | OUTPATIENT
Start: 2021-07-12 | End: 2021-07-22

## 2021-07-15 ENCOUNTER — OFFICE VISIT (OUTPATIENT)
Dept: INTERNAL MEDICINE | Facility: CLINIC | Age: 61
End: 2021-07-15
Payer: MEDICARE

## 2021-07-15 VITALS
HEIGHT: 65 IN | TEMPERATURE: 98 F | BODY MASS INDEX: 39.67 KG/M2 | SYSTOLIC BLOOD PRESSURE: 120 MMHG | HEART RATE: 84 BPM | WEIGHT: 238.13 LBS | OXYGEN SATURATION: 97 % | DIASTOLIC BLOOD PRESSURE: 80 MMHG

## 2021-07-15 DIAGNOSIS — M62.830 LUMBAR PARASPINAL MUSCLE SPASM: ICD-10-CM

## 2021-07-15 DIAGNOSIS — L02.92 BOIL: Primary | ICD-10-CM

## 2021-07-15 DIAGNOSIS — I10 ESSENTIAL HYPERTENSION: ICD-10-CM

## 2021-07-15 DIAGNOSIS — I25.10 CORONARY ARTERY DISEASE INVOLVING NATIVE CORONARY ARTERY WITHOUT ANGINA PECTORIS, UNSPECIFIED WHETHER NATIVE OR TRANSPLANTED HEART: ICD-10-CM

## 2021-07-15 PROCEDURE — 99214 PR OFFICE/OUTPT VISIT, EST, LEVL IV, 30-39 MIN: ICD-10-PCS | Mod: S$PBB,,, | Performed by: FAMILY MEDICINE

## 2021-07-15 PROCEDURE — 99214 OFFICE O/P EST MOD 30 MIN: CPT | Mod: PBBFAC | Performed by: FAMILY MEDICINE

## 2021-07-15 PROCEDURE — 99999 PR PBB SHADOW E&M-EST. PATIENT-LVL IV: CPT | Mod: PBBFAC,,, | Performed by: FAMILY MEDICINE

## 2021-07-15 PROCEDURE — 99214 OFFICE O/P EST MOD 30 MIN: CPT | Mod: S$PBB,,, | Performed by: FAMILY MEDICINE

## 2021-07-15 PROCEDURE — 99999 PR PBB SHADOW E&M-EST. PATIENT-LVL IV: ICD-10-PCS | Mod: PBBFAC,,, | Performed by: FAMILY MEDICINE

## 2021-07-15 RX ORDER — BACLOFEN 10 MG/1
10 TABLET ORAL NIGHTLY
Qty: 30 TABLET | Refills: 0 | Status: SHIPPED | OUTPATIENT
Start: 2021-07-15 | End: 2022-02-02

## 2021-07-19 ENCOUNTER — LAB VISIT (OUTPATIENT)
Dept: LAB | Facility: HOSPITAL | Age: 61
End: 2021-07-19
Attending: PSYCHIATRY & NEUROLOGY
Payer: MEDICARE

## 2021-07-19 DIAGNOSIS — Z51.81 ENCOUNTER FOR MONITORING ANTICONVULSANT THERAPY: ICD-10-CM

## 2021-07-19 DIAGNOSIS — G40.019 PARTIAL IDIOPATHIC EPILEPSY WITH SEIZURES OF LOCALIZED ONSET, INTRACTABLE, WITHOUT STATUS EPILEPTICUS: ICD-10-CM

## 2021-07-19 DIAGNOSIS — G40.919 REFRACTORY EPILEPSY: ICD-10-CM

## 2021-07-19 DIAGNOSIS — R39.89 ABNORMAL URINE COLOR: ICD-10-CM

## 2021-07-19 DIAGNOSIS — R21 RASH: ICD-10-CM

## 2021-07-19 DIAGNOSIS — Z76.89 ESTABLISHING CARE WITH NEW DOCTOR, ENCOUNTER FOR: ICD-10-CM

## 2021-07-19 DIAGNOSIS — G40.219 PARTIAL SYMPTOMATIC EPILEPSY WITH COMPLEX PARTIAL SEIZURES, INTRACTABLE, WITHOUT STATUS EPILEPTICUS: ICD-10-CM

## 2021-07-19 DIAGNOSIS — Z79.899 ENCOUNTER FOR MONITORING ANTICONVULSANT THERAPY: ICD-10-CM

## 2021-07-19 DIAGNOSIS — D64.9 ANEMIA, UNSPECIFIED TYPE: ICD-10-CM

## 2021-07-19 DIAGNOSIS — I25.10 CORONARY ARTERY DISEASE INVOLVING NATIVE CORONARY ARTERY OF NATIVE HEART WITHOUT ANGINA PECTORIS: ICD-10-CM

## 2021-07-19 DIAGNOSIS — R79.89 ABNORMAL TSH: ICD-10-CM

## 2021-07-19 DIAGNOSIS — T78.3XXA ANGIOEDEMA, INITIAL ENCOUNTER: ICD-10-CM

## 2021-07-19 DIAGNOSIS — E03.9 HYPOTHYROIDISM, UNSPECIFIED TYPE: ICD-10-CM

## 2021-07-19 DIAGNOSIS — I10 ESSENTIAL HYPERTENSION: Chronic | ICD-10-CM

## 2021-07-19 DIAGNOSIS — M25.561 CHRONIC PAIN OF RIGHT KNEE: ICD-10-CM

## 2021-07-19 DIAGNOSIS — G40.109 TEMPORAL LOBE EPILEPSY: ICD-10-CM

## 2021-07-19 DIAGNOSIS — H53.8 BLURRED VISION, LEFT EYE: ICD-10-CM

## 2021-07-19 DIAGNOSIS — E78.5 HYPERLIPIDEMIA, UNSPECIFIED HYPERLIPIDEMIA TYPE: Chronic | ICD-10-CM

## 2021-07-19 DIAGNOSIS — G89.29 CHRONIC PAIN OF RIGHT KNEE: ICD-10-CM

## 2021-07-19 DIAGNOSIS — I25.10 CORONARY ARTERY DISEASE INVOLVING NATIVE CORONARY ARTERY WITHOUT ANGINA PECTORIS, UNSPECIFIED WHETHER NATIVE OR TRANSPLANTED HEART: Chronic | ICD-10-CM

## 2021-07-19 DIAGNOSIS — R56.9 SEIZURE: ICD-10-CM

## 2021-07-19 DIAGNOSIS — Z95.5 HX OF HEART ARTERY STENT: ICD-10-CM

## 2021-07-19 DIAGNOSIS — K62.5 RECTAL BLEED: ICD-10-CM

## 2021-07-19 PROCEDURE — 36415 COLL VENOUS BLD VENIPUNCTURE: CPT | Mod: PO | Performed by: PSYCHIATRY & NEUROLOGY

## 2021-07-19 PROCEDURE — 80339 ANTIEPILEPTICS NOS 1-3: CPT | Performed by: PSYCHIATRY & NEUROLOGY

## 2021-07-21 LAB
PHENYTOIN FREE SERPL-MCNC: 0.8 MCG/ML (ref 1–2)
PHENYTOIN, TOTAL: 11.9 MCG/ML (ref 10–20)

## 2021-07-22 ENCOUNTER — PATIENT MESSAGE (OUTPATIENT)
Dept: NEUROLOGY | Facility: CLINIC | Age: 61
End: 2021-07-22

## 2021-07-22 LAB
CLOBAZAM SERPL-MCNC: 193 NG/ML (ref 30–300)
NORCLOBAZAM SERPL-MCNC: 1700 NG/ML (ref 300–3000)
ZONISAMIDE SERPL-MCNC: 37 MCG/ML (ref 10–40)

## 2021-07-22 RX ORDER — PANTOPRAZOLE SODIUM 40 MG/1
40 TABLET, DELAYED RELEASE ORAL 2 TIMES DAILY
Qty: 180 TABLET | Refills: 3 | Status: SHIPPED | OUTPATIENT
Start: 2021-07-22 | End: 2022-10-31

## 2021-07-23 RX ORDER — LEVOCETIRIZINE DIHYDROCHLORIDE 5 MG/1
5 TABLET, FILM COATED ORAL NIGHTLY
Qty: 90 TABLET | Refills: 3 | Status: SHIPPED | OUTPATIENT
Start: 2021-07-23 | End: 2022-09-29 | Stop reason: SDUPTHER

## 2021-07-27 RX ORDER — CLOBAZAM 2.5 MG/ML
SUSPENSION ORAL
Qty: 240 ML | Refills: 0 | Status: SHIPPED | OUTPATIENT
Start: 2021-07-27 | End: 2021-09-01

## 2021-08-02 ENCOUNTER — TELEPHONE (OUTPATIENT)
Dept: NEUROLOGY | Facility: CLINIC | Age: 61
End: 2021-08-02

## 2021-08-02 ENCOUNTER — OFFICE VISIT (OUTPATIENT)
Dept: NEUROLOGY | Facility: CLINIC | Age: 61
End: 2021-08-02
Payer: MEDICARE

## 2021-08-02 VITALS
SYSTOLIC BLOOD PRESSURE: 120 MMHG | WEIGHT: 238.13 LBS | HEART RATE: 93 BPM | OXYGEN SATURATION: 98 % | DIASTOLIC BLOOD PRESSURE: 82 MMHG | BODY MASS INDEX: 39.67 KG/M2 | RESPIRATION RATE: 16 BRPM | HEIGHT: 65 IN

## 2021-08-02 DIAGNOSIS — E03.9 HYPOTHYROIDISM, UNSPECIFIED TYPE: ICD-10-CM

## 2021-08-02 DIAGNOSIS — M25.561 CHRONIC PAIN OF RIGHT KNEE: ICD-10-CM

## 2021-08-02 DIAGNOSIS — T78.3XXA ANGIOEDEMA, INITIAL ENCOUNTER: ICD-10-CM

## 2021-08-02 DIAGNOSIS — Z01.818 PRE-PROCEDURAL EXAMINATION: ICD-10-CM

## 2021-08-02 DIAGNOSIS — D64.9 ANEMIA, UNSPECIFIED TYPE: ICD-10-CM

## 2021-08-02 DIAGNOSIS — L02.92 BOIL: ICD-10-CM

## 2021-08-02 DIAGNOSIS — R56.9 CONVULSIONS, UNSPECIFIED CONVULSION TYPE: ICD-10-CM

## 2021-08-02 DIAGNOSIS — K62.5 RECTAL BLEED: ICD-10-CM

## 2021-08-02 DIAGNOSIS — G40.109 TEMPORAL LOBE EPILEPSY: Primary | ICD-10-CM

## 2021-08-02 DIAGNOSIS — N28.1 RENAL CYST: ICD-10-CM

## 2021-08-02 DIAGNOSIS — Z95.5 HX OF HEART ARTERY STENT: ICD-10-CM

## 2021-08-02 DIAGNOSIS — Z76.89 ESTABLISHING CARE WITH NEW DOCTOR, ENCOUNTER FOR: ICD-10-CM

## 2021-08-02 DIAGNOSIS — G89.29 CHRONIC PAIN OF RIGHT KNEE: ICD-10-CM

## 2021-08-02 DIAGNOSIS — I10 ESSENTIAL HYPERTENSION: ICD-10-CM

## 2021-08-02 DIAGNOSIS — I20.0 UNSTABLE ANGINA PECTORIS: ICD-10-CM

## 2021-08-02 DIAGNOSIS — N39.0 FREQUENT UTI: ICD-10-CM

## 2021-08-02 DIAGNOSIS — M62.830 LUMBAR PARASPINAL MUSCLE SPASM: ICD-10-CM

## 2021-08-02 DIAGNOSIS — G40.919 REFRACTORY EPILEPSY: ICD-10-CM

## 2021-08-02 DIAGNOSIS — N39.3 STRESS INCONTINENCE: ICD-10-CM

## 2021-08-02 DIAGNOSIS — Z51.81 ENCOUNTER FOR MONITORING ANTICONVULSANT THERAPY: ICD-10-CM

## 2021-08-02 DIAGNOSIS — Z79.899 ENCOUNTER FOR MONITORING ANTICONVULSANT THERAPY: ICD-10-CM

## 2021-08-02 DIAGNOSIS — R21 RASH: ICD-10-CM

## 2021-08-02 DIAGNOSIS — E78.5 HYPERLIPIDEMIA, UNSPECIFIED HYPERLIPIDEMIA TYPE: ICD-10-CM

## 2021-08-02 DIAGNOSIS — M79.604 PAIN OF RIGHT LOWER EXTREMITY: ICD-10-CM

## 2021-08-02 DIAGNOSIS — R39.89 ABNORMAL URINE COLOR: ICD-10-CM

## 2021-08-02 DIAGNOSIS — H53.8 BLURRED VISION, LEFT EYE: ICD-10-CM

## 2021-08-02 DIAGNOSIS — I25.10 CORONARY ARTERY DISEASE INVOLVING NATIVE CORONARY ARTERY WITHOUT ANGINA PECTORIS, UNSPECIFIED WHETHER NATIVE OR TRANSPLANTED HEART: ICD-10-CM

## 2021-08-02 DIAGNOSIS — I25.10 CORONARY ARTERY DISEASE INVOLVING NATIVE CORONARY ARTERY OF NATIVE HEART WITHOUT ANGINA PECTORIS: ICD-10-CM

## 2021-08-02 DIAGNOSIS — R79.89 ABNORMAL TSH: ICD-10-CM

## 2021-08-02 DIAGNOSIS — G40.219 PARTIAL SYMPTOMATIC EPILEPSY WITH COMPLEX PARTIAL SEIZURES, INTRACTABLE, WITHOUT STATUS EPILEPTICUS: ICD-10-CM

## 2021-08-02 DIAGNOSIS — Z01.818 PRE-OP TESTING: ICD-10-CM

## 2021-08-02 DIAGNOSIS — T88.7XXA MEDICATION SIDE EFFECTS: ICD-10-CM

## 2021-08-02 DIAGNOSIS — G40.019 PARTIAL IDIOPATHIC EPILEPSY WITH SEIZURES OF LOCALIZED ONSET, INTRACTABLE, WITHOUT STATUS EPILEPTICUS: ICD-10-CM

## 2021-08-02 DIAGNOSIS — R56.9 SEIZURE: ICD-10-CM

## 2021-08-02 PROCEDURE — 99999 PR PBB SHADOW E&M-EST. PATIENT-LVL V: ICD-10-PCS | Mod: PBBFAC,,, | Performed by: NURSE PRACTITIONER

## 2021-08-02 PROCEDURE — 99215 PR OFFICE/OUTPT VISIT, EST, LEVL V, 40-54 MIN: ICD-10-PCS | Mod: S$PBB,,, | Performed by: NURSE PRACTITIONER

## 2021-08-02 PROCEDURE — 99999 PR PBB SHADOW E&M-EST. PATIENT-LVL V: CPT | Mod: PBBFAC,,, | Performed by: NURSE PRACTITIONER

## 2021-08-02 PROCEDURE — 99215 OFFICE O/P EST HI 40 MIN: CPT | Mod: PBBFAC | Performed by: NURSE PRACTITIONER

## 2021-08-02 PROCEDURE — 99215 OFFICE O/P EST HI 40 MIN: CPT | Mod: S$PBB,,, | Performed by: NURSE PRACTITIONER

## 2021-08-04 ENCOUNTER — PATIENT MESSAGE (OUTPATIENT)
Dept: CARDIOLOGY | Facility: HOSPITAL | Age: 61
End: 2021-08-04

## 2021-08-04 ENCOUNTER — OFFICE VISIT (OUTPATIENT)
Dept: CARDIOLOGY | Facility: CLINIC | Age: 61
End: 2021-08-04
Payer: MEDICARE

## 2021-08-04 VITALS
HEART RATE: 97 BPM | BODY MASS INDEX: 39.52 KG/M2 | HEIGHT: 65 IN | SYSTOLIC BLOOD PRESSURE: 129 MMHG | WEIGHT: 237.19 LBS | DIASTOLIC BLOOD PRESSURE: 83 MMHG

## 2021-08-04 DIAGNOSIS — I10 ESSENTIAL HYPERTENSION: Primary | ICD-10-CM

## 2021-08-04 DIAGNOSIS — E78.00 PURE HYPERCHOLESTEROLEMIA: Chronic | ICD-10-CM

## 2021-08-04 PROCEDURE — 99214 OFFICE O/P EST MOD 30 MIN: CPT | Mod: S$PBB,,, | Performed by: INTERNAL MEDICINE

## 2021-08-04 PROCEDURE — 99214 PR OFFICE/OUTPT VISIT, EST, LEVL IV, 30-39 MIN: ICD-10-PCS | Mod: S$PBB,,, | Performed by: INTERNAL MEDICINE

## 2021-08-04 PROCEDURE — 99214 OFFICE O/P EST MOD 30 MIN: CPT | Mod: PBBFAC | Performed by: INTERNAL MEDICINE

## 2021-08-04 PROCEDURE — 99999 PR PBB SHADOW E&M-EST. PATIENT-LVL IV: ICD-10-PCS | Mod: PBBFAC,,, | Performed by: INTERNAL MEDICINE

## 2021-08-04 PROCEDURE — 99999 PR PBB SHADOW E&M-EST. PATIENT-LVL IV: CPT | Mod: PBBFAC,,, | Performed by: INTERNAL MEDICINE

## 2021-08-04 RX ORDER — GABAPENTIN 100 MG/1
100 CAPSULE ORAL 3 TIMES DAILY
COMMUNITY
End: 2022-05-02

## 2021-08-05 ENCOUNTER — PATIENT MESSAGE (OUTPATIENT)
Dept: NEUROLOGY | Facility: CLINIC | Age: 61
End: 2021-08-05

## 2021-08-05 ENCOUNTER — PATIENT MESSAGE (OUTPATIENT)
Dept: CARDIOLOGY | Facility: CLINIC | Age: 61
End: 2021-08-05

## 2021-08-09 ENCOUNTER — PATIENT MESSAGE (OUTPATIENT)
Dept: UROLOGY | Facility: CLINIC | Age: 61
End: 2021-08-09

## 2021-08-09 ENCOUNTER — PATIENT MESSAGE (OUTPATIENT)
Dept: NEUROLOGY | Facility: CLINIC | Age: 61
End: 2021-08-09

## 2021-08-09 ENCOUNTER — TELEPHONE (OUTPATIENT)
Dept: UROLOGY | Facility: CLINIC | Age: 61
End: 2021-08-09

## 2021-08-09 DIAGNOSIS — M54.50 LOW BACK PAIN, UNSPECIFIED BACK PAIN LATERALITY, UNSPECIFIED CHRONICITY, UNSPECIFIED WHETHER SCIATICA PRESENT: Primary | ICD-10-CM

## 2021-08-09 DIAGNOSIS — N28.1 RENAL CYST: Primary | ICD-10-CM

## 2021-08-09 DIAGNOSIS — M25.552 LEFT HIP PAIN: ICD-10-CM

## 2021-08-10 ENCOUNTER — PATIENT MESSAGE (OUTPATIENT)
Dept: NEUROLOGY | Facility: CLINIC | Age: 61
End: 2021-08-10

## 2021-08-10 DIAGNOSIS — N28.1 RENAL CYST: Primary | ICD-10-CM

## 2021-08-12 ENCOUNTER — TELEPHONE (OUTPATIENT)
Dept: RADIOLOGY | Facility: HOSPITAL | Age: 61
End: 2021-08-12

## 2021-08-13 ENCOUNTER — HOSPITAL ENCOUNTER (OUTPATIENT)
Dept: RADIOLOGY | Facility: HOSPITAL | Age: 61
Discharge: HOME | End: 2021-08-13
Attending: UROLOGY
Payer: MEDICARE

## 2021-08-13 DIAGNOSIS — N28.1 RENAL CYST: ICD-10-CM

## 2021-08-13 PROCEDURE — 74170 CT ABD WO CNTRST FLWD CNTRST: CPT | Mod: 26,,, | Performed by: RADIOLOGY

## 2021-08-13 PROCEDURE — 25500020 PHARM REV CODE 255: Performed by: UROLOGY

## 2021-08-13 PROCEDURE — 74170 CT ABDOMEN W WO CONTRAST: ICD-10-PCS | Mod: 26,,, | Performed by: RADIOLOGY

## 2021-08-13 PROCEDURE — 74170 CT ABD WO CNTRST FLWD CNTRST: CPT | Mod: TC

## 2021-08-13 RX ADMIN — IOHEXOL 100 ML: 350 INJECTION, SOLUTION INTRAVENOUS at 01:08

## 2021-08-16 ENCOUNTER — PATIENT MESSAGE (OUTPATIENT)
Dept: OBSTETRICS AND GYNECOLOGY | Facility: CLINIC | Age: 61
End: 2021-08-16

## 2021-08-16 DIAGNOSIS — N95.1 MENOPAUSE SYNDROME: Primary | ICD-10-CM

## 2021-08-17 ENCOUNTER — TELEPHONE (OUTPATIENT)
Dept: NEUROLOGY | Facility: CLINIC | Age: 61
End: 2021-08-17

## 2021-08-17 ENCOUNTER — PATIENT MESSAGE (OUTPATIENT)
Dept: OBSTETRICS AND GYNECOLOGY | Facility: CLINIC | Age: 61
End: 2021-08-17

## 2021-08-17 RX ORDER — ESTRADIOL 2 MG/1
2 TABLET ORAL DAILY
Qty: 30 TABLET | Refills: 11 | Status: SHIPPED | OUTPATIENT
Start: 2021-08-17 | End: 2022-08-15 | Stop reason: SDUPTHER

## 2021-08-18 ENCOUNTER — OFFICE VISIT (OUTPATIENT)
Dept: UROLOGY | Facility: CLINIC | Age: 61
End: 2021-08-18
Payer: MEDICARE

## 2021-08-18 VITALS
HEART RATE: 87 BPM | BODY MASS INDEX: 39.44 KG/M2 | DIASTOLIC BLOOD PRESSURE: 80 MMHG | SYSTOLIC BLOOD PRESSURE: 129 MMHG | WEIGHT: 237 LBS

## 2021-08-18 DIAGNOSIS — N39.3 STRESS INCONTINENCE: ICD-10-CM

## 2021-08-18 DIAGNOSIS — N39.0 FREQUENT UTI: ICD-10-CM

## 2021-08-18 DIAGNOSIS — N28.1 RENAL CYST: Primary | ICD-10-CM

## 2021-08-18 LAB
BILIRUB SERPL-MCNC: NORMAL MG/DL
BLOOD URINE, POC: NORMAL
CLARITY, POC UA: CLEAR
COLOR, POC UA: YELLOW
GLUCOSE UR QL STRIP: NORMAL
KETONES UR QL STRIP: NORMAL
LEUKOCYTE ESTERASE URINE, POC: NORMAL
NITRITE, POC UA: NORMAL
PH, POC UA: 6
PROTEIN, POC: NORMAL
SPECIFIC GRAVITY, POC UA: 1.01
UROBILINOGEN, POC UA: NORMAL

## 2021-08-18 PROCEDURE — 99214 OFFICE O/P EST MOD 30 MIN: CPT | Mod: PBBFAC | Performed by: UROLOGY

## 2021-08-18 PROCEDURE — 99999 PR PBB SHADOW E&M-EST. PATIENT-LVL IV: CPT | Mod: PBBFAC,,, | Performed by: UROLOGY

## 2021-08-18 PROCEDURE — 99999 PR PBB SHADOW E&M-EST. PATIENT-LVL IV: ICD-10-PCS | Mod: PBBFAC,,, | Performed by: UROLOGY

## 2021-08-18 PROCEDURE — 81002 URINALYSIS NONAUTO W/O SCOPE: CPT | Mod: PBBFAC | Performed by: UROLOGY

## 2021-08-18 PROCEDURE — 99214 OFFICE O/P EST MOD 30 MIN: CPT | Mod: S$PBB,,, | Performed by: UROLOGY

## 2021-08-18 PROCEDURE — 99214 PR OFFICE/OUTPT VISIT, EST, LEVL IV, 30-39 MIN: ICD-10-PCS | Mod: S$PBB,,, | Performed by: UROLOGY

## 2021-09-01 ENCOUNTER — PATIENT MESSAGE (OUTPATIENT)
Dept: INTERNAL MEDICINE | Facility: CLINIC | Age: 61
End: 2021-09-01

## 2021-09-01 ENCOUNTER — PATIENT MESSAGE (OUTPATIENT)
Dept: OBSTETRICS AND GYNECOLOGY | Facility: CLINIC | Age: 61
End: 2021-09-01

## 2021-09-01 RX ORDER — CLOBAZAM 2.5 MG/ML
SUSPENSION ORAL
Qty: 60 ML | Refills: 0 | Status: SHIPPED | OUTPATIENT
Start: 2021-09-01 | End: 2021-09-22 | Stop reason: SDUPTHER

## 2021-09-02 ENCOUNTER — TELEPHONE (OUTPATIENT)
Dept: OBSTETRICS AND GYNECOLOGY | Facility: CLINIC | Age: 61
End: 2021-09-02

## 2021-09-03 ENCOUNTER — OFFICE VISIT (OUTPATIENT)
Dept: OBSTETRICS AND GYNECOLOGY | Facility: CLINIC | Age: 61
End: 2021-09-03
Payer: MEDICARE

## 2021-09-03 VITALS
BODY MASS INDEX: 39.3 KG/M2 | DIASTOLIC BLOOD PRESSURE: 70 MMHG | HEIGHT: 65 IN | WEIGHT: 235.88 LBS | SYSTOLIC BLOOD PRESSURE: 104 MMHG

## 2021-09-03 DIAGNOSIS — Z00.00 NORMAL BREAST EXAM: Primary | ICD-10-CM

## 2021-09-03 PROCEDURE — 99212 PR OFFICE/OUTPT VISIT, EST, LEVL II, 10-19 MIN: ICD-10-PCS | Mod: S$PBB,,, | Performed by: OBSTETRICS & GYNECOLOGY

## 2021-09-03 PROCEDURE — 99214 OFFICE O/P EST MOD 30 MIN: CPT | Mod: PBBFAC | Performed by: OBSTETRICS & GYNECOLOGY

## 2021-09-03 PROCEDURE — 99999 PR PBB SHADOW E&M-EST. PATIENT-LVL IV: ICD-10-PCS | Mod: PBBFAC,,, | Performed by: OBSTETRICS & GYNECOLOGY

## 2021-09-03 PROCEDURE — 99212 OFFICE O/P EST SF 10 MIN: CPT | Mod: S$PBB,,, | Performed by: OBSTETRICS & GYNECOLOGY

## 2021-09-03 PROCEDURE — 99999 PR PBB SHADOW E&M-EST. PATIENT-LVL IV: CPT | Mod: PBBFAC,,, | Performed by: OBSTETRICS & GYNECOLOGY

## 2021-09-22 ENCOUNTER — PATIENT MESSAGE (OUTPATIENT)
Dept: NEUROLOGY | Facility: CLINIC | Age: 61
End: 2021-09-22

## 2021-09-22 RX ORDER — CLOBAZAM 2.5 MG/ML
SUSPENSION ORAL
Qty: 360 ML | Refills: 5 | Status: SHIPPED | OUTPATIENT
Start: 2021-09-22 | End: 2022-02-02 | Stop reason: SDUPTHER

## 2021-09-22 RX ORDER — CLOBAZAM 2.5 MG/ML
SUSPENSION ORAL
Qty: 120 ML | Refills: 5 | Status: SHIPPED | OUTPATIENT
Start: 2021-09-22 | End: 2021-09-22 | Stop reason: SDUPTHER

## 2021-10-06 ENCOUNTER — PATIENT MESSAGE (OUTPATIENT)
Dept: NEUROLOGY | Facility: CLINIC | Age: 61
End: 2021-10-06

## 2021-10-06 RX ORDER — ZONISAMIDE 100 MG/1
300 CAPSULE ORAL 2 TIMES DAILY
Qty: 540 CAPSULE | Refills: 3 | Status: SHIPPED | OUTPATIENT
Start: 2021-10-06 | End: 2022-10-05 | Stop reason: SDUPTHER

## 2021-10-14 ENCOUNTER — HOSPITAL ENCOUNTER (OUTPATIENT)
Dept: RADIOLOGY | Facility: HOSPITAL | Age: 61
Discharge: HOME | End: 2021-10-14
Attending: FAMILY MEDICINE
Payer: MEDICARE

## 2021-10-14 ENCOUNTER — OFFICE VISIT (OUTPATIENT)
Dept: INTERNAL MEDICINE | Facility: CLINIC | Age: 61
End: 2021-10-14
Payer: MEDICARE

## 2021-10-14 VITALS
HEART RATE: 82 BPM | SYSTOLIC BLOOD PRESSURE: 118 MMHG | WEIGHT: 236.13 LBS | BODY MASS INDEX: 39.34 KG/M2 | TEMPERATURE: 97 F | HEIGHT: 65 IN | DIASTOLIC BLOOD PRESSURE: 80 MMHG | OXYGEN SATURATION: 97 %

## 2021-10-14 DIAGNOSIS — R05.9 COUGH: ICD-10-CM

## 2021-10-14 DIAGNOSIS — I10 PRIMARY HYPERTENSION: Primary | ICD-10-CM

## 2021-10-14 DIAGNOSIS — I25.10 CORONARY ARTERY DISEASE INVOLVING NATIVE CORONARY ARTERY WITHOUT ANGINA PECTORIS, UNSPECIFIED WHETHER NATIVE OR TRANSPLANTED HEART: ICD-10-CM

## 2021-10-14 DIAGNOSIS — D64.9 ANEMIA, UNSPECIFIED TYPE: ICD-10-CM

## 2021-10-14 DIAGNOSIS — E03.9 HYPOTHYROIDISM, UNSPECIFIED TYPE: ICD-10-CM

## 2021-10-14 PROCEDURE — 71046 XR CHEST PA AND LATERAL: ICD-10-PCS | Mod: 26,,, | Performed by: RADIOLOGY

## 2021-10-14 PROCEDURE — 71046 X-RAY EXAM CHEST 2 VIEWS: CPT | Mod: TC

## 2021-10-14 PROCEDURE — 90686 IIV4 VACC NO PRSV 0.5 ML IM: CPT | Mod: PBBFAC

## 2021-10-14 PROCEDURE — 99215 OFFICE O/P EST HI 40 MIN: CPT | Mod: PBBFAC | Performed by: FAMILY MEDICINE

## 2021-10-14 PROCEDURE — 71046 X-RAY EXAM CHEST 2 VIEWS: CPT | Mod: 26,,, | Performed by: RADIOLOGY

## 2021-10-14 PROCEDURE — 99214 PR OFFICE/OUTPT VISIT, EST, LEVL IV, 30-39 MIN: ICD-10-PCS | Mod: S$PBB,,, | Performed by: FAMILY MEDICINE

## 2021-10-14 PROCEDURE — 99999 PR PBB SHADOW E&M-EST. PATIENT-LVL V: ICD-10-PCS | Mod: PBBFAC,,, | Performed by: FAMILY MEDICINE

## 2021-10-14 PROCEDURE — 99999 PR PBB SHADOW E&M-EST. PATIENT-LVL V: CPT | Mod: PBBFAC,,, | Performed by: FAMILY MEDICINE

## 2021-10-14 PROCEDURE — 99214 OFFICE O/P EST MOD 30 MIN: CPT | Mod: S$PBB,,, | Performed by: FAMILY MEDICINE

## 2021-10-14 RX ORDER — LIDOCAINE 50 MG/G
PATCH TOPICAL
COMMUNITY
Start: 2021-08-10 | End: 2022-10-20

## 2021-10-14 RX ORDER — EVOLOCUMAB 140 MG/ML
140 INJECTION, SOLUTION SUBCUTANEOUS
Qty: 2 ML | Refills: 0
Start: 2021-10-14 | End: 2021-11-11

## 2021-10-14 RX ORDER — METOPROLOL SUCCINATE 50 MG/1
50 TABLET, EXTENDED RELEASE ORAL DAILY
Qty: 90 TABLET | Refills: 1 | Status: SHIPPED | OUTPATIENT
Start: 2021-10-14 | End: 2021-12-21 | Stop reason: SDUPTHER

## 2021-10-14 RX ORDER — ATORVASTATIN CALCIUM 40 MG/1
TABLET, FILM COATED ORAL
COMMUNITY
End: 2021-10-14

## 2021-10-14 RX ORDER — LEVOTHYROXINE SODIUM 100 UG/1
100 TABLET ORAL
Qty: 90 TABLET | Refills: 1 | Status: SHIPPED | OUTPATIENT
Start: 2021-10-14 | End: 2022-04-14 | Stop reason: SDUPTHER

## 2021-11-10 ENCOUNTER — OFFICE VISIT (OUTPATIENT)
Dept: UROLOGY | Facility: CLINIC | Age: 61
End: 2021-11-10
Payer: MEDICARE

## 2021-11-10 VITALS
HEIGHT: 65 IN | HEART RATE: 91 BPM | DIASTOLIC BLOOD PRESSURE: 84 MMHG | TEMPERATURE: 98 F | BODY MASS INDEX: 39.52 KG/M2 | WEIGHT: 237.19 LBS | SYSTOLIC BLOOD PRESSURE: 130 MMHG

## 2021-11-10 DIAGNOSIS — N39.46 MIXED INCONTINENCE: Primary | ICD-10-CM

## 2021-11-10 LAB
BILIRUB SERPL-MCNC: NORMAL MG/DL
BLOOD URINE, POC: NORMAL
CLARITY, POC UA: CLEAR
COLOR, POC UA: NORMAL
GLUCOSE UR QL STRIP: NORMAL
KETONES UR QL STRIP: NORMAL
LEUKOCYTE ESTERASE URINE, POC: NORMAL
NITRITE, POC UA: NORMAL
PH, POC UA: 5
POC RESIDUAL URINE VOLUME: 54 ML (ref 0–100)
PROTEIN, POC: NORMAL
SPECIFIC GRAVITY, POC UA: 1.01
UROBILINOGEN, POC UA: NORMAL

## 2021-11-10 PROCEDURE — 81002 URINALYSIS NONAUTO W/O SCOPE: CPT | Mod: PBBFAC | Performed by: NURSE PRACTITIONER

## 2021-11-10 PROCEDURE — 99999 PR PBB SHADOW E&M-EST. PATIENT-LVL IV: CPT | Mod: PBBFAC,,, | Performed by: NURSE PRACTITIONER

## 2021-11-10 PROCEDURE — 99214 PR OFFICE/OUTPT VISIT, EST, LEVL IV, 30-39 MIN: ICD-10-PCS | Mod: S$PBB,,, | Performed by: NURSE PRACTITIONER

## 2021-11-10 PROCEDURE — 51798 US URINE CAPACITY MEASURE: CPT | Mod: PBBFAC | Performed by: NURSE PRACTITIONER

## 2021-11-10 PROCEDURE — 99999 PR PBB SHADOW E&M-EST. PATIENT-LVL IV: ICD-10-PCS | Mod: PBBFAC,,, | Performed by: NURSE PRACTITIONER

## 2021-11-10 PROCEDURE — 99214 OFFICE O/P EST MOD 30 MIN: CPT | Mod: S$PBB,,, | Performed by: NURSE PRACTITIONER

## 2021-11-10 PROCEDURE — 99214 OFFICE O/P EST MOD 30 MIN: CPT | Mod: PBBFAC | Performed by: NURSE PRACTITIONER

## 2021-11-10 RX ORDER — SOLIFENACIN SUCCINATE 10 MG/1
10 TABLET, FILM COATED ORAL DAILY
Qty: 30 TABLET | Refills: 11 | Status: SHIPPED | OUTPATIENT
Start: 2021-11-10 | End: 2022-11-02

## 2021-12-03 ENCOUNTER — PATIENT MESSAGE (OUTPATIENT)
Dept: NEUROLOGY | Facility: CLINIC | Age: 61
End: 2021-12-03
Payer: MEDICARE

## 2021-12-20 ENCOUNTER — IMMUNIZATION (OUTPATIENT)
Dept: PRIMARY CARE CLINIC | Facility: CLINIC | Age: 61
End: 2021-12-20
Payer: MEDICARE

## 2021-12-20 DIAGNOSIS — Z23 NEED FOR VACCINATION: Primary | ICD-10-CM

## 2021-12-20 PROCEDURE — 0004A COVID-19, MRNA, LNP-S, PF, 30 MCG/0.3 ML DOSE VACCINE: CPT | Mod: CV19,PBBFAC | Performed by: FAMILY MEDICINE

## 2021-12-21 ENCOUNTER — PATIENT MESSAGE (OUTPATIENT)
Dept: INTERNAL MEDICINE | Facility: CLINIC | Age: 61
End: 2021-12-21
Payer: MEDICARE

## 2021-12-22 RX ORDER — METOPROLOL SUCCINATE 50 MG/1
50 TABLET, EXTENDED RELEASE ORAL DAILY
Qty: 90 TABLET | Refills: 1 | Status: SHIPPED | OUTPATIENT
Start: 2021-12-22 | End: 2022-04-14 | Stop reason: SDUPTHER

## 2021-12-23 ENCOUNTER — TELEPHONE (OUTPATIENT)
Dept: CARDIOLOGY | Facility: CLINIC | Age: 61
End: 2021-12-23
Payer: MEDICARE

## 2021-12-23 ENCOUNTER — PATIENT MESSAGE (OUTPATIENT)
Dept: CARDIOLOGY | Facility: CLINIC | Age: 61
End: 2021-12-23
Payer: MEDICARE

## 2022-01-28 ENCOUNTER — LAB VISIT (OUTPATIENT)
Dept: LAB | Facility: HOSPITAL | Age: 62
End: 2022-01-28
Attending: NURSE PRACTITIONER
Payer: MEDICARE

## 2022-01-28 DIAGNOSIS — G40.109 TEMPORAL LOBE EPILEPSY: ICD-10-CM

## 2022-01-28 PROCEDURE — 36415 COLL VENOUS BLD VENIPUNCTURE: CPT | Mod: PO | Performed by: NURSE PRACTITIONER

## 2022-01-28 PROCEDURE — 80185 ASSAY OF PHENYTOIN TOTAL: CPT | Performed by: NURSE PRACTITIONER

## 2022-01-28 PROCEDURE — 80339 ANTIEPILEPTICS NOS 1-3: CPT | Performed by: NURSE PRACTITIONER

## 2022-01-29 LAB — PHENYTOIN SERPL-MCNC: 11 UG/ML (ref 10–20)

## 2022-01-31 LAB
CLOBAZAM SERPL-MCNC: 193 NG/ML (ref 30–300)
NORCLOBAZAM SERPL-MCNC: 1390 NG/ML (ref 300–3000)

## 2022-02-01 ENCOUNTER — TELEPHONE (OUTPATIENT)
Dept: NEUROLOGY | Facility: CLINIC | Age: 62
End: 2022-02-01
Payer: MEDICARE

## 2022-02-01 ENCOUNTER — PATIENT OUTREACH (OUTPATIENT)
Dept: ADMINISTRATIVE | Facility: OTHER | Age: 62
End: 2022-02-01
Payer: MEDICARE

## 2022-02-01 LAB — ZONISAMIDE SERPL-MCNC: 39 MCG/ML (ref 10–40)

## 2022-02-01 NOTE — PROGRESS NOTES
Labs Reviewed:     01-    ZNS 39 NL ( 10 - 40 )      NL (30 - 300)     PHT 11.0 NL (10.0 - 20.0)     Normal AED levels  noted

## 2022-02-01 NOTE — PROGRESS NOTES
Health Maintenance Due   Topic Date Due    TETANUS VACCINE  Never done    Colorectal Cancer Screening  12/29/2015     Updates were requested from care everywhere.  Chart was reviewed for overdue Proactive [facility] Encounters (MARIANO) topics (CRS, Breast Cancer Screening, Eye exam)  Health Maintenance has been updated.  LINKS immunization registry triggered.  Immunizations were reconciled.

## 2022-02-01 NOTE — TELEPHONE ENCOUNTER
----- Message from Jada Corona sent at 2/1/2022 10:23 AM CST -----  Contact: pt- [phone]  Patient is returning a phone call.    Who left a message for the patient: Rickie Vanegas MA     Does patient know what this is regarding:  no    Would you like a call back, or a response through your [facility] portal?:   call  Comments:

## 2022-02-01 NOTE — TELEPHONE ENCOUNTER
----- Message from Serenity Adan NP sent at 2/1/2022  9:01 AM CST -----  Labs Reviewed:     01-    ZNS 39 NL ( 10 - 40 )      NL (30 - 300)     PHT 11.0 NL (10.0 - 20.0)     Normal AED levels  noted    Called patient as reminder of surgery for Monday 8/27/18. Npo after midnight.  Arrive at 5:30am. yuval

## 2022-02-02 ENCOUNTER — TELEPHONE (OUTPATIENT)
Dept: NEUROLOGY | Facility: CLINIC | Age: 62
End: 2022-02-02

## 2022-02-02 ENCOUNTER — OFFICE VISIT (OUTPATIENT)
Dept: NEUROLOGY | Facility: CLINIC | Age: 62
End: 2022-02-02
Payer: MEDICARE

## 2022-02-02 VITALS
HEART RATE: 96 BPM | OXYGEN SATURATION: 96 % | BODY MASS INDEX: 40.37 KG/M2 | WEIGHT: 242.31 LBS | DIASTOLIC BLOOD PRESSURE: 74 MMHG | HEIGHT: 65 IN | RESPIRATION RATE: 16 BRPM | SYSTOLIC BLOOD PRESSURE: 114 MMHG

## 2022-02-02 DIAGNOSIS — G40.019 PARTIAL IDIOPATHIC EPILEPSY WITH SEIZURES OF LOCALIZED ONSET, INTRACTABLE, WITHOUT STATUS EPILEPTICUS: ICD-10-CM

## 2022-02-02 DIAGNOSIS — G40.109 TEMPORAL LOBE EPILEPSY: ICD-10-CM

## 2022-02-02 DIAGNOSIS — G40.919 REFRACTORY EPILEPSY: Primary | ICD-10-CM

## 2022-02-02 PROCEDURE — 99215 OFFICE O/P EST HI 40 MIN: CPT | Mod: PBBFAC | Performed by: PSYCHIATRY & NEUROLOGY

## 2022-02-02 PROCEDURE — 99215 OFFICE O/P EST HI 40 MIN: CPT | Mod: S$PBB,,, | Performed by: PSYCHIATRY & NEUROLOGY

## 2022-02-02 PROCEDURE — 99999 PR PBB SHADOW E&M-EST. PATIENT-LVL V: ICD-10-PCS | Mod: PBBFAC,,, | Performed by: PSYCHIATRY & NEUROLOGY

## 2022-02-02 PROCEDURE — 99999 PR PBB SHADOW E&M-EST. PATIENT-LVL V: CPT | Mod: PBBFAC,,, | Performed by: PSYCHIATRY & NEUROLOGY

## 2022-02-02 PROCEDURE — 99215 PR OFFICE/OUTPT VISIT, EST, LEVL V, 40-54 MIN: ICD-10-PCS | Mod: S$PBB,,, | Performed by: PSYCHIATRY & NEUROLOGY

## 2022-02-02 RX ORDER — MIDAZOLAM 5 MG/.1ML
1 SPRAY NASAL ONCE AS NEEDED
Qty: 4 EACH | Refills: 5 | Status: SHIPPED | OUTPATIENT
Start: 2022-02-02 | End: 2022-02-02

## 2022-02-02 RX ORDER — CLOBAZAM 2.5 MG/ML
10 SUSPENSION ORAL 2 TIMES DAILY
Qty: 360 ML | Refills: 5 | Status: SHIPPED | OUTPATIENT
Start: 2022-02-02 | End: 2022-08-03 | Stop reason: SDUPTHER

## 2022-02-02 NOTE — PATIENT INSTRUCTIONS
Patient Education       Midazolam (MID stephanie jorge collier)   Brand Names: [place] Nayzilam   Warning   · This drug may cause very bad and sometimes deadly breathing problems. Call your doctor right away if you have slow, shallow, or trouble breathing.  · This drug is a benzodiazepine. The use of a benzodiazepine drug along with opioid drugs has led to very bad side effects. Side effects that have happened include slowed or trouble breathing and death. Opioid drugs include drugs like codeine, oxycodone, and morphine. Opioid drugs are used to treat pain and some are used to treat cough. Talk with the doctor.  · If you are taking this drug with an opioid drug, get medical help right away if you feel very sleepy or dizzy; if you have slow, shallow, or trouble breathing; or if you pass out. Caregivers or others need to get medical help right away if the patient does not respond, does not answer or react like normal, or will not wake up.  · Benzodiazepines can put you at risk for addiction, abuse, and misuse. Misuse or abuse of this drug can lead to overdose or death, especially when used along with certain other drugs, alcohol, or street drugs. Addiction can happen even if you take this drug as your doctor has told you. Get medical help right away if you have changes in mood or behavior, suicidal thoughts or actions, seizures, or trouble breathing.  · Benzodiazepines may cause dependence, especially if taken on a regular basis. This drug is not meant for regular daily use.  · The risk of dependence and withdrawal are raised the longer you take this drug and the higher the dose. Talk to your doctor before you lower the dose or stop this drug. You will need to follow your doctor's instructions. Lowering the dose or stopping this drug all of a sudden may cause withdrawal. This can be life-threatening. Get medical help right away if you have trouble controlling body movements, seizures, new or worse behavior or mood changes like  depression or thoughts of suicide, thoughts of harming someone, hallucinations (seeing or hearing things that are not there), losing contact with reality, moving around or talking a lot, or any other bad effects.  · Sometimes, withdrawal signs can last for several weeks to more than 12 months. Tell your doctor if you have anxiety; trouble with memory, learning, or focusing; trouble sleeping; burning, numbness, or tingling; weakness; shaking; muscle twitching; ringing in the ears; or any other bad effects.    What is this drug used for?   · It is used to calm you before a procedure.  · It is used to cause sleep during a procedure.  · It is used to treat seizures.    What do I need to tell my doctor BEFORE I take this drug?   · If you are allergic to this drug; any part of this drug; or any other drugs, foods, or substances. Tell your doctor about the allergy and what signs you had.  · If you have glaucoma.  · If you take any other drugs (prescription or OTC, natural products, vitamins). There are many drugs that interact with this drug, like certain drugs that are used for HIV, infections, or seizures.  This is not a list of all drugs or health problems that interact with this drug.  Tell your doctor and pharmacist about all of your drugs (prescription or OTC, natural products, vitamins) and health problems. You must check to make sure that it is safe for you to take this drug with all of your drugs and health problems. Do not start, stop, or change the dose of any drug without checking with your doctor.  What are some things I need to know or do while I take this drug?   All products:   · Tell all of your health care providers that you take this drug. This includes your doctors, nurses, pharmacists, and dentists.  · Avoid driving and doing other tasks that call for you to be alert until the effects of this drug wear off and you feel fully awake. You may also need to wait for 1 full day after your dose.  · Use care  moving around after getting this drug. You may need help with standing and walking until the effects of this drug have worn off.  · Talk with your doctor before you use alcohol, marijuana or other forms of cannabis, or prescription or OTC drugs that may slow your actions.  · Avoid grapefruit and grapefruit juice.  · If seizures are different or worse after starting this drug, talk with the doctor.  · Some products are not for use in people 65 or older. The risk of some side effects may be raised. If you have questions, talk with your doctor.  · If the patient is a child, use this drug with care. The risk of some side effects may be higher in children.  · This drug may cause harm to the unborn baby if you take it while you are pregnant. If you are pregnant or you get pregnant while taking this drug, call your doctor right away.  · Tell your doctor if you are breast-feeding. You will need to talk about any risks to your baby.  Liquid (syrup) and injection:   · Studies in young animals and children have shown that frequent or long-term use of anesthesia drugs or drugs used for sleep in children younger than 3 years of age may lead to long-term brain problems. This may also happen in unborn babies if the mother uses this drug during the third trimester of pregnancy. Talk with the doctor.  Injection:   · Some products have benzyl alcohol. Do not give a product that has benzyl alcohol in it to a  or infant. Talk with the doctor to see if this product has benzyl alcohol in it.  What are some side effects that I need to call my doctor about right away?   WARNING/CAUTION: Even though it may be rare, some people may have very bad and sometimes deadly side effects when taking a drug. Tell your doctor or get medical help right away if you have any of the following signs or symptoms that may be related to a very bad side effect:  · Signs of an allergic reaction, like rash; hives; itching; red, swollen, blistered, or  peeling skin with or without fever; wheezing; tightness in the chest or throat; trouble breathing, swallowing, or talking; unusual hoarseness; or swelling of the mouth, face, lips, tongue, or throat.  · Chest pain or pressure.  · Shakiness.  · Twitching.  · Very bad dizziness or passing out.  · Feeling agitated.  · Trouble controlling body movements.  · Like other drugs that may be used for seizures, this drug may rarely raise the risk of suicidal thoughts or actions. The risk may be higher in people who have had suicidal thoughts or actions in the past. Call the doctor right away about any new or worse signs like depression; feeling nervous, restless, or grouchy; panic attacks; or other changes in mood or behavior. Call the doctor right away if any suicidal thoughts or actions occur.  What are some other side effects of this drug?   All drugs may cause side effects. However, many people have no side effects or only have minor side effects. Call your doctor or get medical help if any of these side effects or any other side effects bother you or do not go away:  Nose spray:   · Feeling sleepy.  · Nose or throat irritation.  · Headache.  · Memory problems or loss.  Liquid (syrup) and injection:   · Feeling sleepy.  · Feeling tired or weak.  · Memory problems or loss.  Liquid (syrup):   · Upset stomach or throwing up.  Injection:   · Irritation where this drug is given.  These are not all of the side effects that may occur. If you have questions about side effects, call your doctor. Call your doctor for medical advice about side effects.  You may report side effects to your national health agency.  You may report side effects to the FDA at 1-835.647.3105. You may also report side effects at https://www.fda.gov/medwatch.  How is this drug best taken?   Use this drug as ordered by your doctor. Read all information given to you. Follow all instructions closely.  Nose spray:   · If you will be using this drug at home, your  doctor or nurse will teach you how to use it.  · Do not take this drug by mouth. Use in your nose only. Keep out of your mouth and eyes (may burn).  · Do not test or prime before using.  · If the seizure cluster has not stopped 10 minutes after you use this drug, a second dose may be used if your doctor has told you to. Do not use more than 2 doses for the same seizure.  · If using more than 1 dose, switch nostrils with each dose.  · If the seizure does not stop after using this drug, get medical help right away.  Liquid (syrup):   · It is given by mouth only.  Injection:   · It is given as a shot into a muscle or as an infusion into a vein over a period of time.  · This drug must not be given into the spine.  What do I do if I miss a dose?   Nose spray:   · This drug is used on an as needed basis. Do not use more often than told by the doctor.  Liquid (syrup) and injection:   · Call your doctor to find out what to do.  How do I store and/or throw out this drug?   Nose spray:   · Store at room temperature.  · Store this drug in the blister pack that it comes in. Do not open until ready to use. Do not use if the nasal spray is damaged.  Liquid (syrup) and injection:   · If you need to store this drug at home, talk with your doctor, nurse, or pharmacist about how to store it.  All products:   · Store this drug in a safe place where children cannot see or reach it, and where other people cannot get to it. A locked box or area may help keep this drug safe. Keep all drugs away from pets.  · Throw away unused or  drugs. Do not flush down a toilet or pour down a drain unless you are told to do so. Check with your pharmacist if you have questions about the best way to throw out drugs. There may be drug take-back programs in your area.  General drug facts   · If your symptoms or health problems do not get better or if they become worse, call your doctor.  · Do not share your drugs with others and do not take anyone  else's drugs.  · Some drugs may have another patient information leaflet. If you have any questions about this drug, please talk with your doctor, nurse, pharmacist, or other health care provider.  · Some drugs may have another patient information leaflet. Check with your pharmacist. If you have any questions about this drug, please talk with your doctor, nurse, pharmacist, or other health care provider.  · This drug comes with an extra patient fact sheet called a Medication Guide. Read it with care. Read it again each time this drug is refilled. If you have any questions about this drug, please talk with the doctor, pharmacist, or other health care provider.  · If you think there has been an overdose, call your poison control center or get medical care right away. Be ready to tell or show what was taken, how much, and when it happened.    Consumer Information Use and Disclaimer   This generalized information is a limited summary of diagnosis, treatment, and/or medication information. It is not meant to be comprehensive and should be used as a tool to help the user understand and/or assess potential diagnostic and treatment options. It does NOT include all information about conditions, treatments, medications, side effects, or risks that may apply to a specific patient. It is not intended to be medical advice or a substitute for the medical advice, diagnosis, or treatment of a health care provider based on the health care provider's examination and assessment of a patient's specific and unique circumstances. Patients must speak with a health care provider for complete information about their health, medical questions, and treatment options, including any risks or benefits regarding use of medications. This information does not endorse any treatments or medications as safe, effective, or approved for treating a specific patient. [employer] and its affiliates disclaim any warranty or liability relating to this  information or the use thereof. The use of this information is governed by the Terms of Use, available at https://www.[employer].com/en/solutions/lexicomp/about/niels.  Last Reviewed Date   2021-02-16  Copyright   © 2021 [employer] and its affiliates and/or licensors. All rights reserved.

## 2022-02-02 NOTE — TELEPHONE ENCOUNTER
Dr. Estevez placed a referral for pt Neuropsychological .Please review and contact pt to schedule appt if appropriate  . SCOTT

## 2022-02-02 NOTE — PROGRESS NOTES
Subjective:       Patient ID: Evelyne Francisco is a 61 y.o. female.    Chief Complaint: refractory epilepsy  and Temporal Lobe epilepsy           HPI       BACKGROUND HISTORY         The patient is here for seizure evaluation and a 4th opinion.    Saw Dr. Monge, Dr. Hobbs and Dr. Smith in the past.     The patient started having seizures 11 years ago (2009) at the age of 49 .The patient describes aura of strange feeling in her head that she cannot describe. The patient is amnestic to the seizure after that. The seizures are mostly described as Complex Partial (staring, unresponsiveness, sometimes accompanied by spitting) and some of the them Grand Mal (GTC). The patient is currently not driving and has 2-5 seizure a month. No history of meningitis or encephalitis No toxic exposure or lead poisoning. Her father had posttraumatic family history of epilepsy and her nephew might be having seizures.  No history of strokes or aneurysmal bleeding. No history of TBI. Routine EEGs have been normal. EMU in 2016 captured 2 seizures with B/L TL localization. Brain MRI was reported as unremarkable. Failed VPA, LTG, OXC, LEV and VNS. She was on  mg BID and  mg BID. LCM,  ESL and BRV were too expensive. She said that CLB helped control the seizures but the insurance would not pay for it. Added CLB 20 mg BID.Explained to the patient that failing 2 AEDs typically means that chance of achieving seizure-freedom using AEDs alone is <10% and we should explore other treatments like epilepsy surgery.Could not tolerate CLB 20 mg BID.  Was approved to be seen at [facility].  Titrated PHT to 200 mg BID since levels were very low.       INTERVAL HISTORY       Undergoing epilepsy surgery evaluation at [facility] by Dr. Nunez. EMU monitoring in  captured 3 stereotypical seizures originating form RT TL. The plan includes CNP, PET, WADA +/- DARCI. Trying to solve some billing issues with [facility] and [facility].     Last  seizure 01-.AED Regimen:  mg BID ,  mg BID and CLB 3 ml/ 4 ml (7.5 mg/10 mg) . On  mg BID for pain.           Review of Systems   Constitutional: Positive for fatigue. Negative for appetite change.   HENT: Negative for hearing loss and tinnitus.    Eyes: Negative for photophobia and visual disturbance.   Respiratory: Negative for apnea and shortness of breath.    Cardiovascular: Negative for chest pain and palpitations.   Gastrointestinal: Negative for nausea and vomiting.   Endocrine: Negative for cold intolerance and heat intolerance.   Genitourinary: Negative for difficulty urinating and urgency.   Musculoskeletal: Positive for arthralgias. Negative for back pain, gait problem, joint swelling, myalgias, neck pain and neck stiffness.   Skin: Negative for color change and rash.   Allergic/Immunologic: Negative for environmental allergies and immunocompromised state.   Neurological: Positive for seizures. Negative for dizziness, tremors, syncope, facial asymmetry, speech difficulty, weakness, light-headedness, numbness and headaches.   Hematological: Negative for adenopathy. Does not bruise/bleed easily.   Psychiatric/Behavioral: Negative for agitation, behavioral problems, confusion, decreased concentration, dysphoric mood, hallucinations, self-injury, sleep disturbance and suicidal ideas. The patient is not hyperactive.              Current Outpatient Medications:     baclofen (LIORESAL) 10 MG tablet, Take 1 tablet (10 mg total) by mouth every evening., Disp: 30 tablet, Rfl: 0    cloBAZam (ONFI) 2.5 mg/mL Susp, TAKE 3 ML BY MOUTH IN THE MORNING AND 4 ML AT NIGHT, Disp: 360 mL, Rfl: 5    clopidogreL (PLAVIX) 75 mg tablet, Take 75 mg by mouth once daily., Disp: , Rfl:     estradioL (ESTRACE) 2 MG tablet, Take 1 tablet (2 mg total) by mouth once daily., Disp: 30 tablet, Rfl: 11    ezetimibe (ZETIA) 10 mg tablet, Take 10 mg by mouth once daily., Disp: , Rfl:     furosemide (LASIX) 40  MG tablet, Take 1 tablet (40 mg total) by mouth once daily., Disp: 30 tablet, Rfl: 11    gabapentin (NEURONTIN) 100 MG capsule, Take 100 mg by mouth 3 (three) times daily., Disp: , Rfl:     isosorbide mononitrate (IMDUR) 60 MG 24 hr tablet, Take 1 tablet (60 mg total) by mouth every 12 (twelve) hours., Disp: 90 tablet, Rfl: 7    levocetirizine (XYZAL) 5 MG tablet, Take 1 tablet (5 mg total) by mouth every evening., Disp: 90 tablet, Rfl: 3    levothyroxine (SYNTHROID) 100 MCG tablet, Take 1 tablet (100 mcg total) by mouth before breakfast., Disp: 90 tablet, Rfl: 1    LIDOcaine (LIDODERM) 5 %, PUT 1 PATCH TO AFFECTED AREA FOR 12 HOURS AS NEEDED FOR PAIN; TAKE OFF FOR AT LEAST 12 HOURS BEFORE APPLYING ANOTHER PATCH, Disp: , Rfl:     metoprolol succinate (TOPROL-XL) 50 MG 24 hr tablet, Take 1 tablet (50 mg total) by mouth once daily., Disp: 90 tablet, Rfl: 1    pantoprazole (PROTONIX) 40 MG tablet, Take 1 tablet (40 mg total) by mouth 2 (two) times daily., Disp: 180 tablet, Rfl: 3    phenytoin (DILANTIN) 100 MG ER capsule, Take 2 capsules (200 mg total) by mouth 2 (two) times daily., Disp: 360 capsule, Rfl: 3    potassium chloride (MICRO-K) 10 MEQ CpSR, Take 10 mEq by mouth once., Disp: , Rfl:     ranolazine (RANEXA) 1,000 mg Tb12, Take 1,000 mg by mouth 2 (two) times daily., Disp: , Rfl:     REPATHA SURECLICK 140 mg/mL PnIj, SMARTSI Milligram(s) SUB-Q Every 2 Weeks, Disp: 2 each, Rfl: 11    solifenacin (VESICARE) 10 MG tablet, Take 1 tablet (10 mg total) by mouth once daily., Disp: 30 tablet, Rfl: 11    zonisamide (ZONEGRAN) 100 MG Cap, TAKE 3 CAPSULES BY MOUTH TWICE DAILY, Disp: 540 capsule, Rfl: 3    zonisamide (ZONEGRAN) 100 MG Cap, Take 3 capsules (300 mg total) by mouth 2 (two) times daily., Disp: 540 capsule, Rfl: 3  Past Medical History:   Diagnosis Date    Blood clotting tendency     Coronary artery disease     Fever blister     HLD (hyperlipidemia)     Hypertension     Hypothyroidism  2020    Renal cyst 2021    Seizures     Sleep apnea     Temporal lobe epilepsy 2016     Past Surgical History:   Procedure Laterality Date    ABDOMINAL SURGERY      BACK SURGERY      BREAST BIOPSY Left     benign    CARDIAC SURGERY      ESOPHAGEAL MANOMETRY WITH MEASUREMENT OF IMPEDANCE N/A 2021    Procedure: MANOMETRY, ESOPHAGUS, WITH IMPEDANCE MEASUREMENT;  Surgeon: Sumanth Curran RN;  Location: [facility];  Service: Endoscopy;  Laterality: N/A;    FRACTURE SURGERY      HYSTERECTOMY      OOPHORECTOMY      TONSILLECTOMY      VASCULAR SURGERY       Social History     Socioeconomic History    Marital status:    Tobacco Use    Smoking status: Former Smoker     Quit date: 2014     Years since quittin.6    Smokeless tobacco: Never Used   Substance and Sexual Activity    Alcohol use: No    Drug use: No    Sexual activity: Not Currently     Partners: Male             Past/Current Medical/Surgical History, Past/Current Social History, Past/Current Family History and Past/Current Medications were reviewed in detail.        Objective:           VITAL SIGNS WERE REVIEWED      GENERAL APPEARANCE:     The patient looks fatigued.     BMI 40.32     No signs of respiratory distress.    Normal breathing pattern.    No dysmorphic features    Normal eye contact.     GENERAL MEDICAL EXAM:    HEENT:  Head is atraumatic normocephalic.     Neck and Axillae: No JVD. No visible lesions    Cardiopulmonary: No cyanosis. No tachypnea. Normal respiratory effort.VNS in place.    Gastrointestinal/Urogenital:  No jaundice. No stomas or lesions. No visible hernias. No catheters.     Skin, Hair and Nails: No pathognonomic skin rash. No neurofibromatosis. No visible lesions.No stigmata of autoimmune disease. No clubbing.    Limbs: No varicose veins. No visible swelling.    Muskoskeletal: No visible deformities.No visible lesions.               Neurologic Exam     Mental Status   Oriented to  person, place, and time.   Follows 3 step commands.   Attention: normal. Concentration: normal.   Speech: speech is normal   Level of consciousness: alert  Knowledge: good.   Able to name object. Able to read. Able to repeat. Normal comprehension.     Cranial Nerves   Cranial nerves II through XII intact.     CN II   Visual fields full to confrontation.   Visual acuity: normal with correction  Right visual field deficit: none  Left visual field deficit: none     CN III, IV, VI   Pupils are equal, round, and reactive to light.  Extraocular motions are normal.   Right pupil: Size: 2 mm. Shape: regular. Reactivity: brisk. Consensual response: intact. Accommodation: intact.   Left pupil: Size: 2 mm. Shape: regular. Reactivity: brisk. Consensual response: intact. Accommodation: intact.   CN III: no CN III palsy  CN VI: no CN VI palsy  Nystagmus: none   Diplopia: none  Ophthalmoparesis: none  Upgaze: normal  Downgaze: normal  Conjugate gaze: present  Vestibulo-ocular reflex: present    CN V   Facial sensation intact.   Right facial sensation deficit: none  Left facial sensation deficit: none    CN VII   Facial expression full, symmetric.   Right facial weakness: none  Left facial weakness: none    CN VIII   CN VIII normal.   Hearing: intact  Right Rinne: AC > BC  Left Rinne: AC > BC  Mendes: does not lateralize     CN IX, X   CN IX normal.   CN X normal.   Palate: symmetric    CN XI   CN XI normal.   Right sternocleidomastoid strength: normal  Left sternocleidomastoid strength: normal  Right trapezius strength: normal  Left trapezius strength: normal    CN XII   CN XII normal.   Tongue: not atrophic  Fasciculations: absent  Tongue deviation: none    Motor Exam   Muscle bulk: normal  Overall muscle tone: normal  Right arm tone: normal  Left arm tone: normal  Right arm pronator drift: absent  Left arm pronator drift: absent  Right leg tone: normal  Left leg tone: normal    Strength   Strength 5/5 throughout.   Right neck  flexion: 5/5  Left neck flexion: 5/5  Right neck extension: 5/5  Left neck extension: 5/5  Right deltoid: 5/5  Left deltoid: 5/5  Right biceps: 5/5  Left biceps: 5/5  Right triceps: 5/5  Left triceps: 5/5  Right wrist flexion: 5/5  Left wrist flexion: 5/5  Right wrist extension: 5/5  Left wrist extension: 5/5  Right interossei: 5/5  Left interossei: 5/5  Right iliopsoas: 5/5  Left iliopsoas: 5/5  Right quadriceps: 5/5  Left quadriceps: 5/5  Right hamstrin/  Left hamstrin/5  Right glutei: 5  Left glutei: 5  Right anterior tibial: 5  Left anterior tibial: 5  Right posterior tibial: 5  Left posterior tibial: 5  Right peroneal:   Left peroneal: 5  Right gastroc: 5  Left gastroc:     Sensory Exam   Light touch normal.   Right arm light touch: normal  Left arm light touch: normal  Right leg light touch: normal  Left leg light touch: normal  Vibration normal.   Right arm vibration: normal  Left arm vibration: normal  Right leg vibration: normal  Left leg vibration: normal  Proprioception normal.   Right arm proprioception: normal  Left arm proprioception: normal  Right leg proprioception: normal  Left leg proprioception: normal  Pinprick normal.   Right arm pinprick: normal  Left arm pinprick: normal  Right leg pinprick: normal  Left leg pinprick: normal  Graphesthesia: normal  Stereognosis: normal    Gait, Coordination, and Reflexes     Gait  Gait: normal    Coordination   Romberg: negative  Finger to nose coordination: normal  Heel to shin coordination: normal  Tandem walking coordination: normal    Tremor   Resting tremor: absent  Intention tremor: absent  Action tremor: absent    Reflexes   Right brachioradialis: 2+  Left brachioradialis: 2+  Right biceps: 2+  Left biceps: 2+  Right triceps: 2+  Left triceps: 2+  Right patellar: 2+  Left patellar: 2+  Right achilles: 2+  Left achilles: 2+  Right plantar: normal  Left plantar: normal  Right Marie: absent  Left Marie: absent  Right  ankle clonus: absent  Left ankle clonus: absent  Right pendular knee jerk: absent  Left pendular knee jerk: absent      Lab Results   Component Value Date    WBC 4.69 10/14/2021    HGB 13.3 10/14/2021    HCT 41.5 10/14/2021     (H) 10/14/2021     10/14/2021     Sodium   Date Value Ref Range Status   06/10/2021 143 136 - 145 mmol/L Final     Potassium   Date Value Ref Range Status   06/10/2021 3.9 3.5 - 5.1 mmol/L Final     Chloride   Date Value Ref Range Status   06/10/2021 110 95 - 110 mmol/L Final     CO2   Date Value Ref Range Status   06/10/2021 21 (L) 23 - 29 mmol/L Final     Glucose   Date Value Ref Range Status   06/10/2021 84 70 - 110 mg/dL Final     BUN   Date Value Ref Range Status   06/10/2021 13 6 - 20 mg/dL Final     Creatinine   Date Value Ref Range Status   08/13/2021 0.9 0.5 - 1.4 mg/dL Final     Calcium   Date Value Ref Range Status   06/10/2021 9.3 8.7 - 10.5 mg/dL Final     Total Protein   Date Value Ref Range Status   12/07/2020 6.9 6.0 - 8.4 g/dL Final     Albumin   Date Value Ref Range Status   12/07/2020 3.5 3.5 - 5.2 g/dL Final     Total Bilirubin   Date Value Ref Range Status   12/07/2020 0.3 0.1 - 1.0 mg/dL Final     Comment:     For infants and newborns, interpretation of results should be based  on gestational age, weight and in agreement with clinical  observations.  Premature Infant recommended reference ranges:  Up to 24 hours.............<8.0 mg/dL  Up to 48 hours............<12.0 mg/dL  3-5 days..................<15.0 mg/dL  6-29 days.................<15.0 mg/dL       Alkaline Phosphatase   Date Value Ref Range Status   12/07/2020 150 (H) 55 - 135 U/L Final     AST   Date Value Ref Range Status   12/07/2020 14 10 - 40 U/L Final     ALT   Date Value Ref Range Status   12/07/2020 13 10 - 44 U/L Final     Anion Gap   Date Value Ref Range Status   06/10/2021 12 8 - 16 mmol/L Final     eGFR if    Date Value Ref Range Status   08/13/2021 >60 >60 mL/min/1.73  m^2 Final     eGFR if non    Date Value Ref Range Status   08/13/2021 >60 >60 mL/min/1.73 m^2 Final     Comment:     Calculation used to obtain the estimated glomerular filtration  rate (eGFR) is the CKD-EPI equation.        No results found for: [id number]  Lab Results   Component Value Date    TSH 0.361 (L) 10/14/2021    FREET4 0.79 10/14/2021     [id number]    Brain MRI Unremarkable    EEG TL Seizures  B/L       [facility]     3 RT TL Seizures       AED LEVELS     AED: PHT NORMAL LEVEL RANGE: 10-20   DATE LEVEL   09- 12.9   10- 15.0   01- 06.3   05- 01.1   05- (200 mg BID) 14.0   07- 11.9   01- 11.0               AED: ZNS NORMAL LEVEL RANGE: 10-40   DATE LEVEL   09- 13   07- 37   01- 39                   AED: CLB NORMAL LEVEL RANGE:    DATE LEVEL   10- 176   07- 193   01- 139                     Reviewed the neuroimaging independently       Assessment:       1. Temporal lobe epilepsy    2. Refractory epilepsy    3. Partial idiopathic epilepsy with seizures of localized onset, intractable, without status epilepticus          EPILEPSY CLASSIFICATION      SEMIOLOGY: DIALEPTIC (FOCAL WITH HILARY) WITH ICTAL SPITTING -->GTC     EPILEPTOGENIC ZONE (S):  RT TL     ETIOLOGY: UNKNOWN     PRIOR AEDS: VPA, LTG, OXC, LEV    CURRENT AEDS: ZNS, PHT, CLB, (GBP LBP)    LAST SEIZURE DATE: 01-      COMPREHENSIVE LIST OF AEDs:     Acetazolamide (AZM-Diamox)   Benzos: clonazepam (CZP Klonopin), lorazepam (LZP-Ativan), diazepam (DZP-Valium), clorazepate (CLZ- Tranxene)  Brivaracetam (BRV-Briviact)  Cannabidiol (CBD- Epidiolex)  Carbamazepine (CBZ-Tegretol)  Cenobamate (CNB-Xcopri)  Clobazam (CLB-Onfi)  Eslicarbazepine (ESL-Aptiom)  Ethosuximide (ESX-Zarontin)  Felbamate (FBM-Felbatol)  Gabapentin (GBP-Neurontin)  Lacosamide (LCM-Vimpat)  Lamotrigine (LTG-Lamitcal)  Levetiracetam (LEV- Keppra)  Oxcarbazepine  (OXC-Trileptal)  Perampanel (PML-Fycompa)  Phenobarbital (PB)  Phenytoin (PHT-Dilantin)  Pregabalin (PGB-Lyrica)  Primidone (PRM)   Retigabine (RTG- Potiga) Discontinued in 2017  Rufinamide (RFN-Benzil)  Stiripentol (STP-Diacomit)  Tiagabine (TGB-Gabitril)  Topiramate (TPM-Topamax)  Valproate (VPA-Depakote)  Vigabatrin (VGB-Sabril)  Zonisamide (ZNS-Zonegran)        Plan:             INTRACTABLE-MEDICALLY REFRACTORY NON-LESIONAL RT TLE UNDERGOING SURGICAL EVALUATION              The patient was encouraged to maintain full traditional seizure precautions which include but not limited to avoid driving, avoid high altitudes, avoid being close to fire or fire source, avoid being close to a body of water or swimming alone, avoid operating heavy machinery and avoid using sharp objects if possible. The patient was encouraged to shower (without accumulation of water) instead of taking a bath if unsupervised. The patient was made aware that these precautions are especially important during concurrent illness. Adequate sleep and avoidance of alcohol as important measures to assure good seizure control were discussed with the patient. The patient was also advised not to care for children without company. The patient was advised to pad the side rails with pillows and blankets if applicable.I strongly recommended lowering the bed to the floor level to decrease the risk of falls.     Continue  mg BID.      Continue  mg BID.      Change CLB from 7.5 mg (3 mL)/10 mg (4ml)  to 10 mg BID (4 mL BID).     Add Nayzilam (NS Midazolam) for prolonged and/or clusters of seizures.     Continue AEDs INDEFINITELY, FOR GOOD AND NEVER SKIP A DOSE. The patient verbalized full understanding. Stressed the extreme medical, personal safety, public safety and legal importance of compliance and seizure control. Will give as many refills as possible with or without face-to-face evaluation to make sure the patient and any patient with  epilepsy will never run out of medications. Running out of seizure medications should never happen under our care. I explained to the patient that he should not, under any circumstances, adjust or stop taking his seizure medication without discussing with me. The patient verbalized full understanding.     AVOID any substance that could lower seizure threshold including but not limited to:      ALCOHOL AND WITHDRAWAL      TRAMADOL.     DEMEROL      ALL STIMULANTS-ALL ADHD MEDICATIONS.      CLOZAPINE.      BUPROPION.     CIPROFLOXACIN          MEDICAL/SURGICAL COMORBIDITIES     All relevant medical comorbidities noted and managed by primary care physician and medical care team.          MISCELLANEOUS MEDICAL PROBLEMS       HEALTHY LIFESTYLE AND PREVENTATIVE CARE    Encouraged the patient to adhere to the age-appropriate health maintenance guidelines including screening tests and vaccinations.     Discussed the overall importance of healthy lifestyle, optimal weight, exercise, healthy diet, good sleep hygiene and avoiding drugs including smoking, alcohol and recreational drugs. The patient verbalized full understanding.       Advised the patient to follow COVID-19 prevention measures. S/P vaccination           RTC in 3 months           Payton Estevez MD, FAAN    Attending Neurologist/Epileptologist         Diplomate, American Board of Psychiatry and Neurology    Diplomate, American Board of Clinical Neurophysiology     Fellow, American Academy of Neurology               TOTAL E/M 52 M

## 2022-02-04 ENCOUNTER — OFFICE VISIT (OUTPATIENT)
Dept: CARDIOLOGY | Facility: CLINIC | Age: 62
End: 2022-02-04
Payer: MEDICARE

## 2022-02-04 VITALS
HEART RATE: 84 BPM | BODY MASS INDEX: 39.82 KG/M2 | SYSTOLIC BLOOD PRESSURE: 122 MMHG | WEIGHT: 239 LBS | HEIGHT: 65 IN | OXYGEN SATURATION: 96 % | DIASTOLIC BLOOD PRESSURE: 80 MMHG

## 2022-02-04 DIAGNOSIS — R06.02 SOB (SHORTNESS OF BREATH): ICD-10-CM

## 2022-02-04 DIAGNOSIS — Z95.5 HX OF HEART ARTERY STENT: ICD-10-CM

## 2022-02-04 DIAGNOSIS — I25.10 CORONARY ARTERY DISEASE INVOLVING NATIVE CORONARY ARTERY OF NATIVE HEART WITHOUT ANGINA PECTORIS: ICD-10-CM

## 2022-02-04 DIAGNOSIS — I10 ESSENTIAL HYPERTENSION: Primary | ICD-10-CM

## 2022-02-04 DIAGNOSIS — E78.00 PURE HYPERCHOLESTEROLEMIA: Chronic | ICD-10-CM

## 2022-02-04 PROCEDURE — 99214 PR OFFICE/OUTPT VISIT, EST, LEVL IV, 30-39 MIN: ICD-10-PCS | Mod: S$PBB,,, | Performed by: INTERNAL MEDICINE

## 2022-02-04 PROCEDURE — 99999 PR PBB SHADOW E&M-EST. PATIENT-LVL III: ICD-10-PCS | Mod: PBBFAC,,, | Performed by: INTERNAL MEDICINE

## 2022-02-04 PROCEDURE — 99214 OFFICE O/P EST MOD 30 MIN: CPT | Mod: S$PBB,,, | Performed by: INTERNAL MEDICINE

## 2022-02-04 PROCEDURE — 99213 OFFICE O/P EST LOW 20 MIN: CPT | Mod: PBBFAC | Performed by: INTERNAL MEDICINE

## 2022-02-04 PROCEDURE — 99999 PR PBB SHADOW E&M-EST. PATIENT-LVL III: CPT | Mod: PBBFAC,,, | Performed by: INTERNAL MEDICINE

## 2022-02-04 NOTE — PROGRESS NOTES
Subjective:   Patient ID:  Evelyne Francisco is a 61 y.o. female who presents for follow-up of Follow-up  Pt with SOB x 3 days. NMT nml 6-21  Pt denies CP.Pt with LE edema.    Hyperlipidemia  This is a chronic problem. The current episode started more than 1 year ago. Recent lipid tests were reviewed and are variable. Associated symptoms include shortness of breath. Pertinent negatives include no chest pain. Current antihyperlipidemic treatment includes ezetimibe. The current treatment provides moderate improvement of lipids. Compliance problems include medication side effects.    Coronary Artery Disease  Presents for follow-up visit. Symptoms include shortness of breath. Pertinent negatives include no chest pain, chest tightness, dizziness, leg swelling, muscle weakness, palpitations or weight gain. Risk factors include hyperlipidemia. The symptoms have been stable. Compliance with diet is variable. Compliance with exercise is variable. Compliance with medications is good.       Review of Systems   Constitutional: Negative. Negative for weight gain.   HENT: Negative.    Eyes: Negative.    Cardiovascular: Negative.  Negative for chest pain, leg swelling and palpitations.   Respiratory: Positive for shortness of breath. Negative for chest tightness.    Endocrine: Negative.    Hematologic/Lymphatic: Negative.    Skin: Negative.    Musculoskeletal: Negative for muscle weakness.   Gastrointestinal: Negative.    Genitourinary: Negative.    Neurological: Negative.  Negative for dizziness.   Psychiatric/Behavioral: Negative.    Allergic/Immunologic: Negative.      Family History   Problem Relation Age of Onset    Hypertension Mother     Hyperlipidemia Mother     Stroke Father     Seizures Father     Hypertension Sister     Cancer Brother     Marfan syndrome Daughter      Past Medical History:   Diagnosis Date    Blood clotting tendency     Coronary artery disease     Fever blister     HLD  (hyperlipidemia)     Hypertension     Hypothyroidism 2020    Renal cyst 2021    Seizures     Sleep apnea     Temporal lobe epilepsy 2016     Social History     Socioeconomic History    Marital status:    Tobacco Use    Smoking status: Former Smoker     Quit date: 2014     Years since quittin.6    Smokeless tobacco: Never Used   Substance and Sexual Activity    Alcohol use: No    Drug use: No    Sexual activity: Not Currently     Partners: Male     Current Outpatient Medications on File Prior to Visit   Medication Sig Dispense Refill    cloBAZam (ONFI) 2.5 mg/mL Susp Take 4 mLs (10 mg total) by mouth 2 (two) times daily. 360 mL 5    clopidogreL (PLAVIX) 75 mg tablet Take 75 mg by mouth once daily.      estradioL (ESTRACE) 2 MG tablet Take 1 tablet (2 mg total) by mouth once daily. 30 tablet 11    ezetimibe (ZETIA) 10 mg tablet Take 10 mg by mouth once daily.      furosemide (LASIX) 40 MG tablet Take 1 tablet (40 mg total) by mouth once daily. 30 tablet 11    gabapentin (NEURONTIN) 100 MG capsule Take 100 mg by mouth 3 (three) times daily.      isosorbide mononitrate (IMDUR) 60 MG 24 hr tablet Take 1 tablet (60 mg total) by mouth every 12 (twelve) hours. 90 tablet 7    levocetirizine (XYZAL) 5 MG tablet Take 1 tablet (5 mg total) by mouth every evening. 90 tablet 3    levothyroxine (SYNTHROID) 100 MCG tablet Take 1 tablet (100 mcg total) by mouth before breakfast. 90 tablet 1    LIDOcaine (LIDODERM) 5 % PUT 1 PATCH TO AFFECTED AREA FOR 12 HOURS AS NEEDED FOR PAIN; TAKE OFF FOR AT LEAST 12 HOURS BEFORE APPLYING ANOTHER PATCH      metoprolol succinate (TOPROL-XL) 50 MG 24 hr tablet Take 1 tablet (50 mg total) by mouth once daily. 90 tablet 1    pantoprazole (PROTONIX) 40 MG tablet Take 1 tablet (40 mg total) by mouth 2 (two) times daily. 180 tablet 3    phenytoin (DILANTIN) 100 MG ER capsule Take 2 capsules (200 mg total) by mouth 2 (two) times daily. 360 capsule 3     potassium chloride (MICRO-K) 10 MEQ CpSR Take 10 mEq by mouth once.      ranolazine (RANEXA) 1,000 mg Tb12 Take 1,000 mg by mouth 2 (two) times daily.      REPATHA SURECLICK 140 mg/mL PnIj SMARTSI Milligram(s) SUB-Q Every 2 Weeks 2 each 11    solifenacin (VESICARE) 10 MG tablet Take 1 tablet (10 mg total) by mouth once daily. 30 tablet 11    zonisamide (ZONEGRAN) 100 MG Cap Take 3 capsules (300 mg total) by mouth 2 (two) times daily. 540 capsule 3     No current facility-administered medications on file prior to visit.     Review of patient's allergies indicates:   Allergen Reactions    Latex, natural rubber Hives, Shortness Of Breath and Swelling     Throat swelling  Throat swelling  Other reaction(s): Hives  Throat swelling    Ace inhibitors Other (See Comments)     Coughing  Other reaction(s): Unknown    Diclo gel [diclofenac sodium] Other (See Comments)     Chest pain       Objective:     Physical Exam  Vitals and nursing note reviewed.   Constitutional:       Appearance: She is well-developed and well-nourished.   HENT:      Head: Normocephalic and atraumatic.   Eyes:      Extraocular Movements: EOM normal.      Conjunctiva/sclera: Conjunctivae normal.      Pupils: Pupils are equal, round, and reactive to light.   Cardiovascular:      Rate and Rhythm: Normal rate and regular rhythm.      Pulses: Intact distal pulses.      Heart sounds: Normal heart sounds.   Pulmonary:      Effort: Pulmonary effort is normal.      Breath sounds: Normal breath sounds.   Abdominal:      General: Bowel sounds are normal.      Palpations: Abdomen is soft.   Musculoskeletal:         General: Normal range of motion.      Cervical back: Normal range of motion and neck supple.   Skin:     General: Skin is warm and dry.   Neurological:      Mental Status: She is alert and oriented to person, place, and time.   Psychiatric:         Mood and Affect: Mood and affect normal.         Assessment:     1. Essential hypertension     2. Hx of heart artery stent    3. Coronary artery disease involving native coronary artery of native heart without angina pectoris    4. Pure hypercholesterolemia        Plan:     Essential hypertension    Hx of heart artery stent    Coronary artery disease involving native coronary artery of native heart without angina pectoris    Pure hypercholesterolemia        Continue ranexa, imdur- cad  Continue zetia-hlp  Continue metoprolol, lasix-htn   echo  Take lasix everyday

## 2022-02-10 ENCOUNTER — OFFICE VISIT (OUTPATIENT)
Dept: UROLOGY | Facility: CLINIC | Age: 62
End: 2022-02-10
Payer: MEDICARE

## 2022-02-10 VITALS
SYSTOLIC BLOOD PRESSURE: 133 MMHG | WEIGHT: 240.75 LBS | TEMPERATURE: 98 F | HEIGHT: 65 IN | BODY MASS INDEX: 40.11 KG/M2 | HEART RATE: 90 BPM | DIASTOLIC BLOOD PRESSURE: 84 MMHG

## 2022-02-10 DIAGNOSIS — N39.3 STRESS INCONTINENCE: ICD-10-CM

## 2022-02-10 DIAGNOSIS — N32.81 OAB (OVERACTIVE BLADDER): Primary | ICD-10-CM

## 2022-02-10 PROBLEM — Z76.89 ESTABLISHING CARE WITH NEW DOCTOR, ENCOUNTER FOR: Status: RESOLVED | Noted: 2020-12-07 | Resolved: 2022-02-10

## 2022-02-10 PROBLEM — R06.02 SOB (SHORTNESS OF BREATH): Status: RESOLVED | Noted: 2022-02-04 | Resolved: 2022-02-10

## 2022-02-10 LAB
BILIRUB SERPL-MCNC: NORMAL MG/DL
BLOOD URINE, POC: NORMAL
CLARITY, POC UA: NORMAL
COLOR, POC UA: YELLOW
GLUCOSE UR QL STRIP: NORMAL
KETONES UR QL STRIP: NORMAL
LEUKOCYTE ESTERASE URINE, POC: NORMAL
NITRITE, POC UA: NORMAL
PH, POC UA: 7
POC RESIDUAL URINE VOLUME: 146 ML (ref 0–100)
PROTEIN, POC: NORMAL
SPECIFIC GRAVITY, POC UA: 1.01
UROBILINOGEN, POC UA: NORMAL

## 2022-02-10 PROCEDURE — 99999 PR PBB SHADOW E&M-EST. PATIENT-LVL V: ICD-10-PCS | Mod: PBBFAC,,, | Performed by: NURSE PRACTITIONER

## 2022-02-10 PROCEDURE — 99215 OFFICE O/P EST HI 40 MIN: CPT | Mod: PBBFAC | Performed by: NURSE PRACTITIONER

## 2022-02-10 PROCEDURE — 81002 URINALYSIS NONAUTO W/O SCOPE: CPT | Mod: PBBFAC | Performed by: NURSE PRACTITIONER

## 2022-02-10 PROCEDURE — 99214 PR OFFICE/OUTPT VISIT, EST, LEVL IV, 30-39 MIN: ICD-10-PCS | Mod: S$PBB,,, | Performed by: NURSE PRACTITIONER

## 2022-02-10 PROCEDURE — 99999 PR PBB SHADOW E&M-EST. PATIENT-LVL V: CPT | Mod: PBBFAC,,, | Performed by: NURSE PRACTITIONER

## 2022-02-10 PROCEDURE — 51798 US URINE CAPACITY MEASURE: CPT | Mod: PBBFAC | Performed by: NURSE PRACTITIONER

## 2022-02-10 PROCEDURE — 99214 OFFICE O/P EST MOD 30 MIN: CPT | Mod: S$PBB,,, | Performed by: NURSE PRACTITIONER

## 2022-02-10 NOTE — PROGRESS NOTES
Chief Complaint:   Nocturia, daytime frequency and mixed incontinence    HPI:   Patient is a 61-year-old female that is [facility]  Patient states that nocturia has greatly decreased to once nightly but stress incontinence and daytime frequency has only decreased slightly.  Patient has a history of recurrent urinary tract infection and has been asymptomatic for 3 months.  Urine in clinic indicated trace leukocytes, all other parameters were negative.  PVR was 146 mL with a repeat of 32 mL.  No abd/pelvic pain and no exac/rel factors.  No hematuria.  No urolithiasis.  No reported adverse side effects to VESIcare, however, but patient does have a history of constipation.    Allergies:  Latex, natural rubber; Ace inhibitors; and Diclo gel [diclofenac sodium]    Medications:  has a current medication list which includes the following prescription(s): clobazam, clopidogrel, estradiol, ezetimibe, furosemide, gabapentin, isosorbide mononitrate, levocetirizine, levothyroxine, lidocaine, metoprolol succinate, pantoprazole, phenytoin, potassium chloride, ranolazine, repatha sureclick, solifenacin, and zonisamide.    Review of Systems:  General: No fever, chills, fatigability, or weight loss.  Skin: No rashes, itching, or changes in color or texture of skin.  Chest: Denies SCALES, cyanosis, wheezing, cough, and sputum production.  Abdomen: Appetite fine. No weight loss. Denies diarrhea, abdominal pain, hematemesis, or blood in stool.  Musculoskeletal: No joint stiffness or swelling. Denies back pain.  : As above.  All other review of systems negative.    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    PSH:   has a past surgical history that includes Abdominal surgery; Back surgery; Fracture  surgery; Cardiac surgery; Hysterectomy; Tonsillectomy; Vascular surgery; Esophageal manometry with measurement of impedance (N/A, 2/23/2021); Oophorectomy; and Breast biopsy (Left).    FamHx: family history includes Cancer in her brother; Hyperlipidemia in her mother; Hypertension in her mother and sister; Marfan syndrome in her daughter; Seizures in her father; Stroke in her father.    SocHx:  reports that she quit smoking about 7 years ago. She has never used smokeless tobacco. She reports that she does not drink alcohol and does not use drugs.      Physical Exam:  Vitals:    02/10/22 0947   BP: 133/84   Pulse: 90   Temp: 97.5 °F (36.4 °C)     General: A&Ox3, no apparent distress, no deformities  Neck: No masses, normal thyroid  Lungs: normal inspiration, no use of accessory muscles  Heart: normal pulse, no arrhythmias  Abdomen: Soft, NT, ND, no masses, no hernias, no hepatosplenomegaly  Lymphatic: Neck and groin nodes negative    Labs/Studies:   See HPI    Impression/Plan:   Mixed incontinence, stress greater than urge, nocturia and daytime frequency  Patient remain on current dose of VESIcare and I placed a referral for PT pelvic floor training.  Patient will return to clinic in 3 months.  If patient does not have a complete resolved.  I will refer her for possible Botox and InterStim.

## 2022-03-04 ENCOUNTER — CLINICAL SUPPORT (OUTPATIENT)
Dept: REHABILITATION | Facility: HOSPITAL | Age: 62
End: 2022-03-04
Payer: MEDICARE

## 2022-03-04 DIAGNOSIS — N32.81 OAB (OVERACTIVE BLADDER): ICD-10-CM

## 2022-03-04 DIAGNOSIS — R35.1 NOCTURIA: ICD-10-CM

## 2022-03-04 DIAGNOSIS — R35.0 URINARY FREQUENCY: ICD-10-CM

## 2022-03-04 PROCEDURE — 97162 PT EVAL MOD COMPLEX 30 MIN: CPT

## 2022-03-04 PROCEDURE — 97530 THERAPEUTIC ACTIVITIES: CPT

## 2022-03-05 PROBLEM — R35.0 URINARY FREQUENCY: Status: ACTIVE | Noted: 2022-03-05

## 2022-03-05 PROBLEM — R35.1 NOCTURIA: Status: ACTIVE | Noted: 2022-03-05

## 2022-03-06 NOTE — PLAN OF CARE
[facility]  Pelvic Health Physical Therapy Initial Evaluation      Visit Date: 3/4/2022   Name: Evelyne Francisco  Clinic Number: [id number]  Therapy Diagnosis:   Encounter Diagnoses   Name Primary?    OAB (overactive bladder)     Nocturia     Urinary frequency      Physician: May Camacho NP    Physician Orders: PT Eval and Treat  Medical Diagnosis from Referral: OAB (overactive bladder) [N32.81]  Evaluation Date: 3/4/2022  Authorization Period Expiration: 02/10/2023  Plan of Care Expiration: 06/02/2022  Progress Note Due: 04/03/2022   Visit # / Visits Authorized: 1/1  FOTO: 1    Precautions: universal, blood clots, epilepsy (2-3 seizures per month), HTN, HLD, CAD, hypothyroidism    Time In: 12:45 pm (pt late)  Time Out: 1:30 pm  Total Appointment Time (timed & untimed codes): 45 minutes    Subjective     Date of onset: over a year ago    History of current condition - Evelyne reports: Has been having urinary leakage for over a year. Started Vesicare in November of 2021. Reports it did not work at first but she thinks it is working now because she does not smell urine anymore. She states she still gets leakage sometimes with heavy laughing but did not leak with this the other day. Pt reports she has been doing kegels since she was young. Pt also reports a lot of constipation and recurrent UTI (this is now resolved - urologist told her this was due to constipation). Has kidney cysts and stones. Has had nocturia since 2007.   Has tried to use a dildo to see if she can tolerate it, and it still hurts a little but less than when attempting intercourse.    Bladder History: cysts on kidney, stones, recurrent UTI (now resolved)  Frequency of urination:   Daytime: every hour           Nighttime: 3-4x per night (was every hour, now is at 4, 5, 6, and 7 am)  Difficulty initiating urine stream: sometimes  Urine stream: strong, weak the second stream  Complete emptying: Yes  Urinary urgency: No,  able to delay the urge for as long as desired  Bladder leakage: Yes, DENA with big laugh only  Frequency of incidents: rarely (when asked how much she was leaking before Vesicare, she states none but she would smell it)  Amount leaked (urine): few drops  Pain/Bleeding: No    Form of protection: panty liner  Number of pads required in 24 hours: 1    Bowel History: chronic constipation, had polyp removed with colonoscopy recently  Frequency of bowel movements: once a day (every morning)  Difficulty initiating BM: Yes if constipated  Quality/Shape of BM: [place] Stool Chart 1-4; type 3 is when she is constipated (has to push a lot), type 4 causes smearing  Complete emptying: No  Fecal urgency: yes if she drinks prune juice sometimes  Colon leakage: some smearing in underwear and when wiping when she pees if her stool is soft  Frequency of incidents: every week if she does not take a laxative  Amount leaked (bowels): streaking/staining  OTC medication/supplementation?: takes probiotic, drinks prune juice if bloated and this works to clean her out  Pain/Bleeding: Yes, unsure if coming from vagina or coming from rectum    Types of fluid intake: water (64+ per day), lemonade if she goes out to eat; stopped drinking sodas and smoking when she found out about the seizures (about 8 years ago for smoking)  Diet: eliz and mustard greens, black eyed peas, carrots, baked meats (no fried), salads recently; daughter cooks her a lot of vegetables recently (trying to help her eat more fiber)    OB/GYN History: , caesarean, painful periods, menopause and painful vaginal penetration, total hysterectomy  Sexually active?: Yes  Pain with vaginal exams, intercourse or tampon use?: Yes with intercourse  Location: Introitus and Deep pelvic floor muscles   Description: sharp, achy  Pelvic pain duration: Occurs only during provocation  Appropriate lubrication/hydration?: No, supplemental needed      Medical History: Evelyne  has a past  medical history of Blood clotting tendency, Coronary artery disease, Fever blister, HLD (hyperlipidemia), Hypertension, Hypothyroidism (12/7/2020), Renal cyst (7/12/2021), Seizures, Sleep apnea, and Temporal lobe epilepsy (4/16/2016).     Surgical History: Evelyne Francisco  has a past surgical history that includes Abdominal surgery; Back surgery; Fracture surgery; Cardiac surgery; Hysterectomy; Tonsillectomy; Vascular surgery; Esophageal manometry with measurement of impedance (N/A, 2/23/2021); Oophorectomy; and Breast biopsy (Left).    Medications: Evelyne has a current medication list which includes the following prescription(s): clobazam, clopidogrel, estradiol, ezetimibe, furosemide, gabapentin, isosorbide mononitrate, levocetirizine, levothyroxine, lidocaine, metoprolol succinate, pantoprazole, phenytoin, potassium chloride, ranolazine, repatha sureclick, solifenacin, and zonisamide.    Allergies:   Review of patient's allergies indicates:   Allergen Reactions    Latex, natural rubber Hives, Shortness Of Breath and Swelling     Throat swelling  Throat swelling  Other reaction(s): Hives  Throat swelling    Ace inhibitors Other (See Comments)     Coughing  Other reaction(s): Unknown    Diclo gel [diclofenac sodium] Other (See Comments)     Chest pain        Imaging:  PVR on 11/10/2021: 16 mL  PVR on 02/10/2022: 146 mL, repeat of 32 mL      Prior Therapy/Previous treatment included: no PT, Vesicare  Social History: daughter lives with her  Occupation: on disability  Prior Level of Function: decreased nocturia, urinary frequency, DENA, constipation, decreased pain with intercourse  Current Level of Function: see above    Habitus: well developed, well nourished  Abuse/Neglect: No      Pts goals: main goal is improved bowel movements, stop urinary leakage    OBJECTIVE     BREATHING MECHANICS ASSESSMENT   Thorax Assessment During Quiet Respiration: Decreased excursion of abdominal wall  and Decreased  excursion bilaterally of lateral ribs   Thorax Assessment During Deep Respiration: Decreased excursion of abdominal wall  and Decreased excursion bilaterally of lateral ribs       ABDOMINAL ASSESSMENT - NT TODAY SECONDARY TO TIME RESTRAINTS    VAGINAL PELVIC FLOOR EXAM - NT TODAY SECONDARY TO TIME RESTRAINTS      Limitation/Restriction for FOTO Urinary Problem Survey    Therapist reviewed FOTO scores for Evelyne Francisco on 3/4/2022.   FOTO documents entered into EPIC - see Media section.    Limitation Score at Eval: 46%  Expected Score at Discharge: 35%       TREATMENT     Treatment Time In: 1:10 pm  Treatment Time Out: 1:21 pm  Total Treatment time (time-based codes) separate from Evaluation: 11 minutes    Therapeutic Activity Patient participated in dynamic functional therapeutic activities to improve functional performance for 11 minutes. Including: Education as described below.     Patient Education Provided:   - Instructed on general anatomy/physiology  - Role of therapy in multi-disciplinary team  - Instructed in purpose of physical therapy and the benefits/risks of treatment  - Instructed in alternative methods of treatment  - Risks of refusing treatment  - POC and goals for therapy  - Instructed on general anatomy/physiology of urinary/bowel system     Also educated in: behavior modifications    Home Exercises Provided:  Written Home Exercises Provided: Carisa Stubbs demonstrated good  understanding of the education provided.     See EMR under Patient Instructions for exercises provided 3/4/2022.    Assessment     Evelyne is a 61 y.o. female referred to outpatient physical therapy with a medical diagnosis of OAB (overactive bladder) [N32.81]. Pt presents with pelvic floor tenderness, decreased pelvic muscle strength, decreased endurance of the pelvic muscles, decreased phasic ability of the pelvic muscles, increased frequency of urination, increased nocturia, poor coordination of pelvic floor  muscles during ADL's leading to urinary or fecal leakage and poor diet. These deficits have reduced pt's ability to participate in ADLs and desired recreational activities without limitation and have therefore decreased QOL.    Pt will benefit from skilled physical therapy to address the deficits stated above, provide pt/family education, maximize pt's level of independence, and therefore increase overall QOL.    Pt prognosis is Good.       Plan of care discussed with patient: Yes  Pt's spiritual, cultural and educational needs considered and patient is agreeable to the plan of care and goals as stated below:       Anticipated Barriers for therapy: level of understanding of current condition    Medical Necessity is demonstrated by the following    History  Co-morbidities and personal factors that may impact the plan of care Co-morbidities:   coping style/mechanism and level of undertstanding of current condition    Personal Factors:   no deficits     moderate   Examination  Body Structures and Functions, activity limitations and participation restrictions that may impact the plan of care Body Regions/Systems/Functions:  pelvic floor tenderness, decreased pelvic muscle strength, decreased endurance of the pelvic muscles, decreased phasic ability of the pelvic muscles, increased frequency of urination, increased nocturia, poor coordination of pelvic floor muscles during ADL's leading to urinary or fecal leakage and poor diet    Activity Limitations:  delaying urge to urinate or have a BM, initiating a BM, Initiating urine stream, intercourse/vaginal exam/tampon use without pain, sleep uninterrupted by excessive nocturia, incontinence with ADLs and Pain with ADLs    Participation Restrictions:  all ADLs/iADLs uninterrupted by urinary incontinence/urgency/frequency, all ADLs/iADLs uninterrupted by fecal incontinence/urgency/frequency, social activities with friends/family, relationship with spouse/partner, regularly  "having a comfortable BM and Sleep restrictions    Activity limitations:   Learning and applying knowledge  no deficits    General Tasks and Commands  no deficits    Communication  no deficits    Mobility  no deficits    Self care  no deficits    Domestic Life  no deficits    Interactions/Relationships  no deficits    Life Areas  no deficits    Community and Social Life  no deficits       high   Clinical Presentation evolving clinical presentation with changing clinical characteristics moderate   Decision Making/ Complexity Score: moderate       Goals:  Short Term Goals: 6 weeks   - Pt will be I in diaphragmatic breathing with proper technique to promote relaxation and pelvic floor functional mobility for improved urinary continence with ADLs and improved QOL.  - Pt will report decreased pain with insertion to improve tolerance to gynecological exams and tampon use.  - Pt to correctly and consistently perform "the knack" prior to coughing, laughing or sneezing to decrease risk of leakage.  - Pt to report improved restorative sleeping, waking up no more than 2-3x/night due to urge to urinate.  - Pt will report a 50% reduction in frequency of leakage to demonstrate improved pelvic floor coordination needed for continence with ADLs.  - Pt to demonstrate proper positioning on commode with breathing techniques to decrease strain with BM to enable pt to feel empty after BM.  - Pt to demonstrate independence with performing bowel massage to help with gut motility.  - Pt will be educated on fluid intake and dietary considerations for improving stool quality for improved voiding of stool.  - Pt will report [place] Stool Chart type 3 or 4 stools 50% of the time she has a bowel movement for improvement of fecal continence and improved QOL.    Long Term Goals: 12 weeks   - Pt to be I with home plan for carry over after discharge.    - Pt will report elimination of pain with insertion to improve tolerance to gynecological exams " and tampon use.  - Pt to report improved restorative sleeping, waking up no more than 1-2x/night due to urge to urinate.  - Pt to report elimination of incontinence with ADLs to demonstrate improved pelvic floor muscle strength and coordination.  - Pt to be able to bulge pelvic floor with proper technique, which is needed for comfortable BM and complete evacuation.  - Pt will be compliant with considerations for fluid intake and dietary factors for improving stool quality for improved voiding of stool.  - Pt will report [place] Stool Chart type 3 or 4 stools 75% of the time she has a bowel movement for improvement of fecal continence and improved QOL.  - Pt to demonstrate an improved score in the FOTO Urinary Problem survey to 35% or less to demonstrate improving pelvic floor function for improved urinary continence with ADLs and improved QOL.    Plan     Plan of care Certification: 3/4/2022 to 06/02/2022.    Outpatient Physical Therapy 1-2 times weekly for 12 weeks to include the following interventions: therapeutic exercises, therapeutic activity, neuromuscular re-education, manual therapy, modalities PRN, patient/family education and self care/home management    I appreciate your consult and look forward to participating in this patient's care.    Nneka Motley, PT, DPT

## 2022-03-08 ENCOUNTER — PES CALL (OUTPATIENT)
Dept: ADMINISTRATIVE | Facility: CLINIC | Age: 62
End: 2022-03-08
Payer: MEDICARE

## 2022-03-30 ENCOUNTER — CLINICAL SUPPORT (OUTPATIENT)
Dept: REHABILITATION | Facility: HOSPITAL | Age: 62
End: 2022-03-30
Payer: MEDICARE

## 2022-03-30 DIAGNOSIS — R35.0 URINARY FREQUENCY: ICD-10-CM

## 2022-03-30 DIAGNOSIS — R35.1 NOCTURIA: Primary | ICD-10-CM

## 2022-03-30 PROCEDURE — 97112 NEUROMUSCULAR REEDUCATION: CPT

## 2022-03-30 PROCEDURE — 97530 THERAPEUTIC ACTIVITIES: CPT

## 2022-03-30 NOTE — PATIENT INSTRUCTIONS
"Sip on water throughout the day, don't chug! Stop drinking a lot around 2 hours before you go to sleep.    Every 2-4 hours is "normal" urinary frequency. If it has only been an hour since your last pee, it is probably not time to go unless you chugged a bunch of fluid in that hour. Try to hold for an hour and 15 minutes this week. Distract yourself and do some kegels to make an urge pass.    We are looking to see a strong stream of 10 seconds or more  "

## 2022-03-30 NOTE — PROGRESS NOTES
Pelvic Health Physical Therapy   Treatment Note     Name: Evelyne Francisco  Clinic Number: [id number]    Therapy Diagnosis:   Encounter Diagnoses   Name Primary?    Nocturia Yes    Urinary frequency      Physician: May Camacho NP    Visit Date: 3/30/2022    Physician Orders: PT Eval and Treat  Medical Diagnosis from Referral: OAB (overactive bladder) [N32.81]  Evaluation Date: 3/4/2022  Authorization Period Expiration: 06/01/2022  Plan of Care Expiration: 06/02/2022  Progress Note Due: 04/03/2022   Visit # / Visits Authorized: 1/20  Cancelled Visits: 0  No Show Visits: 0  FOTO: 1     Precautions: universal, blood clots, epilepsy (2-3 seizures per month), HTN, HLD, CAD, hypothyroidism    Time In: 7:15 am (pt late)  Time Out: 8:00 am  Total Billable Time: 45 minutes    Subjective     Pt reports today: Is just now returning to therapy because she got 2 cortisone shots in her knees. Had a near fall in [place]'s Saturday so her back is hurting. Her knees are feeling a little better. R knee hurts worse than L because she has a bone spur in it (will remove after weight loss). Still going to the bathroom a lot.    She was N/A compliant with home exercise program.  Response to previous treatment: N/A  Functional change: N/A    Pain Pre-Treatment: 8/10  Pain Post-Treatment: 8/10  Location: low back, hips, and R knee    Constitutional Symptoms Review: The patient denies having any constitutional symptoms.     Objective     See EMR under MEDIA for written consent provided 3/30/2022  Chaperone: declined    VAGINAL PELVIC FLOOR EXAM    EXTERNAL ASSESSMENT  Introitus: WNL  Skin condition: WNL  Sensation: WNL   Pain:  NT  Pelvic clock assessment: non-tender to palpation  Voluntary contraction: visible lift  Voluntary relaxation: visible drop  Involuntary contraction: bulge  Bearing down: visible lift      INTERNAL ASSESSMENT  Pain: globally painful, worst at B deep and mod layers and B OI but also with mild  "tenderness at B superficial layers of PFM  Sensation: able to localized pressure appropriately   Vaginal vault: stenotic at layer 2 and 3, within normal limits at layer 1  Muscle Bulk: hypertonus (see above)  Muscle Power: 3+/5  Muscle Endurance: 10 sec  # Reps To Fatigue: NT    Fast Contractions in 10 seconds: 1 (could not relax)     Quality of contraction: slow relaxation and incomplete relaxation   Specificity: WNL   Involuntary Contraction: bulge  Bearing Down: reflex tightening, able to bear down with cue to push finger out    Coordination: tends to hold breath during PFM contration   Prolapse check:Grade 1 cystocele  Comments: decreased relaxation, globally painful    Evelyne participated in neuromuscular re-education activities to develop Coordination, Control, Down training, Proprioception and Sense for 15 minutes including:     Internal:  Performed assessment of PF coordination      Evelyne participated in dynamic functional therapeutic activities to improve functional performance for 30 minutes, including: Education as described below.      Home Exercises Provided and Patient Education Provided     Education Provided:   - behavior modifications  Discussed progression of plan of care with patient; educated pt in activity modification; reviewed HEP with pt. Pt demonstrated and verbalized understanding of all instruction and was provided with a handout of HEP (see Patient Instructions).  - Normal urinary urge process  - Introduced bladder retraining  - Concentrated water is irritating to the bladder and can make you have more urge  - Don't chug water (sip throughout the day)  - Stop drinking a lot about 2 hours before bed  - Every 2-4 hours is "normal" frequency  - Use distraction and kegels to get an urge to pass  - Strong stream of 10 or more seconds  - If it's only been an hour, it's probably not time to go    Written Home Exercises Provided: yes.  Exercises were reviewed and Evelyne was able to " "demonstrate them prior to the end of the session.  Evelyne demonstrated good  understanding of the education provided.     See EMR under Patient Instructions for exercises provided 3/30/2022.    Assessment     Pt with good tolerance for treatment today, reporting understanding of education provided on bladder retraining. PFM examination reveals decreased coordination, poor relaxation ability, and global pain. These are likely driving pt's urinary symptoms, as strength an endurance of the PFM are within normal limits. At next visit will assess impact of strategies learned at this visit for bladder retraining and will initiate downtraining.    Evleyne Is progressing well towards her goals.   Pt prognosis is Good.     Pt will continue to benefit from skilled outpatient physical therapy to address the deficits listed in the problem list box on initial evaluation, provide pt/family education and to maximize pt's level of independence in the home and community environment.     Pt's spiritual, cultural and educational needs considered and pt agreeable to plan of care and goals.     Anticipated barriers to physical therapy: level of understanding of current condition    Goals:  Short Term Goals: 6 weeks   - Pt will be I in diaphragmatic breathing with proper technique to promote relaxation and pelvic floor functional mobility for improved urinary continence with ADLs and improved QOL.  - Pt will report decreased pain with insertion to improve tolerance to gynecological exams and tampon use.  - Pt to correctly and consistently perform "the knack" prior to coughing, laughing or sneezing to decrease risk of leakage.  - Pt to report improved restorative sleeping, waking up no more than 2-3x/night due to urge to urinate.  - Pt will report a 50% reduction in frequency of leakage to demonstrate improved pelvic floor coordination needed for continence with ADLs.  - Pt to demonstrate proper positioning on commode with breathing " techniques to decrease strain with BM to enable pt to feel empty after BM.  - Pt to demonstrate independence with performing bowel massage to help with gut motility.  - Pt will be educated on fluid intake and dietary considerations for improving stool quality for improved voiding of stool.  - Pt will report Docena Stool Chart type 3 or 4 stools 50% of the time she has a bowel movement for improvement of fecal continence and improved QOL.     Long Term Goals: 12 weeks   - Pt to be I with home plan for carry over after discharge.    - Pt will report elimination of pain with insertion to improve tolerance to gynecological exams and tampon use.  - Pt to report improved restorative sleeping, waking up no more than 1-2x/night due to urge to urinate.  - Pt to report elimination of incontinence with ADLs to demonstrate improved pelvic floor muscle strength and coordination.  - Pt to be able to bulge pelvic floor with proper technique, which is needed for comfortable BM and complete evacuation.  - Pt will be compliant with considerations for fluid intake and dietary factors for improving stool quality for improved voiding of stool.  - Pt will report Docena Stool Chart type 3 or 4 stools 75% of the time she has a bowel movement for improvement of fecal continence and improved QOL.  - Pt to demonstrate an improved score in the FOTO Urinary Problem survey to 35% or less to demonstrate improving pelvic floor function for improved urinary continence with ADLs and improved QOL.    Plan     Continue per established POC as tolerated.    Nneka Motley, PT, DPT

## 2022-04-01 ENCOUNTER — PATIENT OUTREACH (OUTPATIENT)
Dept: ADMINISTRATIVE | Facility: OTHER | Age: 62
End: 2022-04-01
Payer: MEDICARE

## 2022-04-01 NOTE — PROGRESS NOTES
Health Maintenance Due   Topic Date Due    TETANUS VACCINE  Never done     Updates were requested from care everywhere.  Chart was reviewed for overdue Proactive [facility] Encounters ([facility]) topics (CRS, Breast Cancer Screening, Eye exam)  Health Maintenance has been updated.  LINKS immunization registry triggered.  Immunizations were reconciled.

## 2022-04-04 ENCOUNTER — HOSPITAL ENCOUNTER (OUTPATIENT)
Dept: CARDIOLOGY | Facility: HOSPITAL | Age: 62
Discharge: HOME | End: 2022-04-04
Attending: INTERNAL MEDICINE
Payer: MEDICARE

## 2022-04-04 ENCOUNTER — OFFICE VISIT (OUTPATIENT)
Dept: NEUROLOGY | Facility: CLINIC | Age: 62
End: 2022-04-04
Payer: MEDICARE

## 2022-04-04 VITALS
BODY MASS INDEX: 39.99 KG/M2 | SYSTOLIC BLOOD PRESSURE: 133 MMHG | HEIGHT: 65 IN | DIASTOLIC BLOOD PRESSURE: 84 MMHG | WEIGHT: 240 LBS

## 2022-04-04 DIAGNOSIS — G40.919 REFRACTORY EPILEPSY: ICD-10-CM

## 2022-04-04 DIAGNOSIS — R41.89 COGNITIVE CHANGE: Primary | ICD-10-CM

## 2022-04-04 DIAGNOSIS — F32.A DEPRESSION, UNSPECIFIED DEPRESSION TYPE: ICD-10-CM

## 2022-04-04 DIAGNOSIS — R06.02 SOB (SHORTNESS OF BREATH): ICD-10-CM

## 2022-04-04 LAB
AORTIC ROOT ANNULUS: 3.08 CM
ASCENDING AORTA: 3.16 CM
AV INDEX (PROSTH): 0.79
AV MEAN GRADIENT: 3 MMHG
AV PEAK GRADIENT: 6 MMHG
AV VALVE AREA: 2.49 CM2
AV VELOCITY RATIO: 0.73
BSA FOR ECHO PROCEDURE: 2.23 M2
CV ECHO LV RWT: 0.52 CM
DOP CALC AO PEAK VEL: 1.24 M/S
DOP CALC AO VTI: 27.9 CM
DOP CALC LVOT AREA: 3.1 CM2
DOP CALC LVOT DIAMETER: 2 CM
DOP CALC LVOT PEAK VEL: 0.91 M/S
DOP CALC LVOT STROKE VOLUME: 69.39 CM3
DOP CALC RVOT PEAK VEL: 0.58 M/S
DOP CALC RVOT VTI: 14.6 CM
DOP CALCLVOT PEAK VEL VTI: 22.1 CM
E WAVE DECELERATION TIME: 189.75 MSEC
E/A RATIO: 0.84
E/E' RATIO: 9 M/S
ECHO EF ESTIMATED: 65 %
ECHO LV POSTERIOR WALL: 1.03 CM (ref 0.6–1.1)
EJECTION FRACTION: 55 %
FRACTIONAL SHORTENING: 36 % (ref 28–44)
INTERVENTRICULAR SEPTUM: 0.86 CM (ref 0.6–1.1)
IVRT: 85.63 MSEC
LA MAJOR: 4.9 CM
LA MINOR: 4.3 CM
LA WIDTH: 4.32 CM
LEFT ATRIUM SIZE: 2.9 CM
LEFT ATRIUM VOLUME INDEX MOD: 17.8 ML/M2
LEFT ATRIUM VOLUME INDEX: 22.8 ML/M2
LEFT ATRIUM VOLUME MOD: 38.07 CM3
LEFT ATRIUM VOLUME: 48.78 CM3
LEFT INTERNAL DIMENSION IN SYSTOLE: 2.58 CM (ref 2.1–4)
LEFT VENTRICLE DIASTOLIC VOLUME INDEX: 32.64 ML/M2
LEFT VENTRICLE DIASTOLIC VOLUME: 69.84 ML
LEFT VENTRICLE MASS INDEX: 55 G/M2
LEFT VENTRICLE SYSTOLIC VOLUME INDEX: 11.3 ML/M2
LEFT VENTRICLE SYSTOLIC VOLUME: 24.24 ML
LEFT VENTRICULAR INTERNAL DIMENSION IN DIASTOLE: 4 CM (ref 3.5–6)
LEFT VENTRICULAR MASS: 117.36 G
LV LATERAL E/E' RATIO: 9 M/S
LV SEPTAL E/E' RATIO: 9 M/S
LVOT MG: 1.99 MMHG
LVOT MV: 0.67 CM/S
MV PEAK A VEL: 0.96 M/S
MV PEAK E VEL: 0.81 M/S
MV STENOSIS PRESSURE HALF TIME: 55.03 MS
MV VALVE AREA P 1/2 METHOD: 4 CM2
PISA TR MAX VEL: 2.5 M/S
PV MEAN GRADIENT: 0.78 MMHG
PV PEAK VELOCITY: 0.71 CM/S
RA MAJOR: 4.03 CM
RA PRESSURE: 3 MMHG
RA WIDTH: 3 CM
RIGHT VENTRICULAR END-DIASTOLIC DIMENSION: 2.83 CM
STJ: 2.27 CM
TDI LATERAL: 0.09 M/S
TDI SEPTAL: 0.09 M/S
TDI: 0.09 M/S
TR MAX PG: 25 MMHG
TV REST PULMONARY ARTERY PRESSURE: 28 MMHG

## 2022-04-04 PROCEDURE — 99999 PR PBB SHADOW E&M-EST. PATIENT-LVL II: CPT | Mod: PBBFAC,,, | Performed by: STUDENT IN AN ORGANIZED HEALTH CARE EDUCATION/TRAINING PROGRAM

## 2022-04-04 PROCEDURE — 96121 PR NEUROBEHAVIORAL STAT EXAM, EA ADDTL HR: ICD-10-PCS | Mod: S$PBB,,, | Performed by: STUDENT IN AN ORGANIZED HEALTH CARE EDUCATION/TRAINING PROGRAM

## 2022-04-04 PROCEDURE — 99212 OFFICE O/P EST SF 10 MIN: CPT | Mod: PBBFAC,25 | Performed by: STUDENT IN AN ORGANIZED HEALTH CARE EDUCATION/TRAINING PROGRAM

## 2022-04-04 PROCEDURE — 99499 UNLISTED E&M SERVICE: CPT | Mod: S$PBB,,, | Performed by: STUDENT IN AN ORGANIZED HEALTH CARE EDUCATION/TRAINING PROGRAM

## 2022-04-04 PROCEDURE — 99999 PR PBB SHADOW E&M-EST. PATIENT-LVL II: ICD-10-PCS | Mod: PBBFAC,,, | Performed by: STUDENT IN AN ORGANIZED HEALTH CARE EDUCATION/TRAINING PROGRAM

## 2022-04-04 PROCEDURE — 93306 ECHO (CUPID ONLY): ICD-10-PCS | Mod: 26,,, | Performed by: INTERNAL MEDICINE

## 2022-04-04 PROCEDURE — 93306 TTE W/DOPPLER COMPLETE: CPT | Mod: 26,,, | Performed by: INTERNAL MEDICINE

## 2022-04-04 PROCEDURE — 96121 NUBHVL XM PHY/QHP EA ADDL HR: CPT | Mod: PBBFAC | Performed by: STUDENT IN AN ORGANIZED HEALTH CARE EDUCATION/TRAINING PROGRAM

## 2022-04-04 PROCEDURE — 93306 TTE W/DOPPLER COMPLETE: CPT

## 2022-04-04 PROCEDURE — 96116 PR NEUROBEHAVIORAL STATUS EXAM BY PSYCH/PHYS: ICD-10-PCS | Mod: S$PBB,,, | Performed by: STUDENT IN AN ORGANIZED HEALTH CARE EDUCATION/TRAINING PROGRAM

## 2022-04-04 PROCEDURE — 96116 NUBHVL XM PHYS/QHP 1ST HR: CPT | Mod: PBBFAC,59 | Performed by: STUDENT IN AN ORGANIZED HEALTH CARE EDUCATION/TRAINING PROGRAM

## 2022-04-04 PROCEDURE — 96121 NUBHVL XM PHY/QHP EA ADDL HR: CPT | Mod: S$PBB,,, | Performed by: STUDENT IN AN ORGANIZED HEALTH CARE EDUCATION/TRAINING PROGRAM

## 2022-04-04 PROCEDURE — 99499 NO LOS: ICD-10-PCS | Mod: S$PBB,,, | Performed by: STUDENT IN AN ORGANIZED HEALTH CARE EDUCATION/TRAINING PROGRAM

## 2022-04-04 PROCEDURE — 96116 NUBHVL XM PHYS/QHP 1ST HR: CPT | Mod: S$PBB,,, | Performed by: STUDENT IN AN ORGANIZED HEALTH CARE EDUCATION/TRAINING PROGRAM

## 2022-04-04 NOTE — PATIENT INSTRUCTIONS
Today you completed the interview portion of your neuropsychological evaluation. The next step will be to come in for a testing appointment on 04/18/2022. Some helpful information is included below to help you be ready for your testing appointment.    Please call Dr. Luu at [phone] or send a [employer] message with any questions.     In the meantime, please see below for recommendations and resources we discussed during your visit.     Please review the sleep hygiene handout we discussed.  Consider reaching out to the [employer] for support resources. Their web site is: [email or web address].[email or web address]      _____________________  Kevin Luu, Ph.D.  Neuropsychologist  [facility]  Department of Neurology      Instructions for your upcoming neuropsychological assessment    If you must cancel your appointment please do so 48 hours in advance of your scheduled appointment.   Please take all of your medications as prescribed on the date of testing, unless specifically asked not to do so.   Be sure to eat a full breakfast the morning before testing if you typically eat in the morning.   Bring any water, snacks, or other food supplies you may need for a 2-4 hour appointment.  Do your best to get a good night of sleep before testing.  There is nothing you should do or need to do to study for the tests. Just come ready to do your best.

## 2022-04-04 NOTE — PROGRESS NOTES
"CONFIDENTIAL NEUROPSYCHOLOGICAL EVALUATION    NAME:  Evelyne Francisco DATE OF SERVICE: 2022   MRN#:  [id number] EDUCATION: 13   AGE: 61 y.o. HANDEDNESS: Right    : 1960 RACE: Black or    SEX: Female REFERRAL: Payton Estevez MD ;  Neurology, [facility]     Referral question and neuropsychological necessity: Ms. Francisco is a 61 y.o., right-handed, [place]  Woman with 13 years of formal education who was referred by her neurologist due to cognitive concerns in the context of refractyory temporal lobe epilepsy.    Evaluation methods: I had the pleasure of seeing Evelyne Francisco on 2022 in person at the [facility], Department of Neurology. Data sources for the below report include review of the available medical record and an interview with the patient. At the outset of the appointment, the undersigned explained the rationale for the evaluation along with the limits of confidentiality; and verbal informed consent for this evaluation was obtained.    Note: Due to safety concerns and administrative policy during the COVID-19 pandemic the patient, the undersigned, and the examiner wore masks throughout our interview.     Summary and Impressions     Ms. Francisco is a 61 y.o., right-handed, Black or , female with 13 years of formal education. She was referred by her neurologist due to cognitive concerns in the context of refractory temporal lobe epilepsy. Briefly, Ms. Francisco had the onset of seizures in ; available neurology notes and Ms. Francisco's report during interview describe aura of a "strange feeling" in her head before seizure onset. Seizures are described as complex partial seizures (CPSz; staring spells sometimes accompanied by spitting) occurring out of wakefulness and sleep, and less-frequent secondarily-generalized tonic clonic seizures (2GTC). She has not received benefit from 5 anti-seizure medications (ASM; VPA, LTG, OXC, " LEV, & VNS). Ms. Francisco estimated that with her current regimen of ONF, DIL, and ZNS with VNS she is having an estimated 3-4 seizures a month although she has a difficult time tracking seizures that occur out of sleep and as such she was concerned that this may be an underestimate. Prior EMU evaluations and brain imaging most-frequently identify the right temporal lobe as a likely epileptogenic zone; however one prior EMU evaluation identified two seizures originating from the left temporal region. She is currently undergoing work-up for consideration of neurosurgery.     During interview, Ms. Francisco reported that she has perceived changes in her memory, attention and concentration, expressive and receptive language, information processing speed, and her ability to inhibit comments. Emotionally, Ms. Francisco reported that due to epilepsy she has anxiety about being in public due to fear of having a seizure; and thoughts of depression and social isolation. She denied current or past suicidal ideation.      Ms. Francisco has a history of refractory epilepsy thought temporal lobe in origin and is currently undergoing pre-surgical work-up for consideration of neurosurgical treatment options for treatment of such. She does not have psychological or medical concerns that would preclude gathering neuropsychological test data at this time. As such, formal neuropsychological testing is clinically indicated in order to aid in differential diagnosis, treatment planning, and consideration of surgical risk.    ICD-10-CM Diagnoses     1. Cognitive change     2. Refractory epilepsy     3. Depression, unspecified depression type           Plan/ Recommendations     Provider Recommendations:   1. On the basis of the above summary, neuropsychological testing is clinically indicated at this time. Ms. Francisco has been scheduled for comprehensive neuropsychological testing. A detailed report including detailed diagnostic information  "and recommendations will be completed after testing has been completed.       Patient Recommendations:   1. The next step in your care is to complete neuropsychological testing. Please review your after visit summary for more information about your testing appointment.     2. Please review the sleep hygiene handout we discussed for recommendations to help improve your sleep.     3. Consider working with sleep medicine to trouble-shoot your CPAP device. Good quality sleep is important in reducing your risk of cognitive decline and your seizure risk.     Presenting concerns      Presenting Problem/Primary Concern: Cognitive and emotioanl changes in the context of epilepsy.     Seizures: Ms. Francisco described that she has an aura of "a feeling" on the right side of her head in the frontotemporal region, with evolution to seizure within a few seconds. She then is amnestic of her semiology. She described that most seizures are CPSz. Seizures occur out of sleep described as wandering events, during wakefulness described as staring spells with spitting or wandering and responding incoherently to others, and less-common secondarily-generalized tonic clonic seizures.     Frequency: Last seizure was 02/18/2022 on which date she had three during wakefulness described as above. Seizures occur out of sleep at least monthly per her estimate although these events are difficult for her to estimate due to limited supervision at night. She reported her last 2GTC occurred on 07/04/2021.     Current ASMs/ Side-effects: Onfi, Dilantin, and Zonisamide: She reported that the Zonisamide and dilantin in particular are subjectively related to memory difficulties, in addition to changes she attributed to epilepsy.     Interventions: VNS device - She reported reduced seizure duration but no change in seizure frequency. She has failed 5 prior ASMs as above.     Reported Provoking Factors: Heat/ sweating, poor sleep, strong perfumes/ certain " "types of perfumes.    Onset of Cognitive Concerns: Concurrently with seizures and medications for seizures.    Course of Cognitive Concerns: Ms. Francisco reported that her cognitive skills have been slowly worsening. Ms. Francisco has noticed that her medications affect memory in particular along with epilepsy.    Characterization of Cognitive Concerns  Attention/ working memory: Ms. Francisco reported difficulties with concentration, sustaining attention, paying attention to others and mental tracking. She'll often start multiple tasks and be unable to finish them due to distractibility.    Processing speed: Ms. Francisco reported difficulties with slowed thinking speed.    Language: Ms. Francisco reported difficulties with expressive language, specifically word-finding difficulty and receptive language, specifically understanding complex speech or long sentences and understanding written information that used to be easy to comprehend, e.g. the Bible. She reported errors in pronouncing words that she does not notice but others comment on, e.g. "[place]" for [place]"    Visual-spatial/ navigation: Ms. Francisco denied difficulties with visual-spatial skills or navigation.    Psychomotor: Ms. Francisco denied psychomotor difficulties.    Memory: Ms. Francisco reported difficulties with forgetfulness for recent events and forgetfulness for recent conversations.    Decision making: Ms. Francisco reported difficulties with inhibiting herself from speaking her mind, noting "I don't hold my peace like I used to."    Behavioral changes: Ms. Francisco denied changes in behavior.    Orientation: Ms. Francisco denied errors in orientation to person, place, or situation. She reported confusion regarding dates.     Current Mental Health  Current mood:  Ms. Francisco described her mood as "good," except having been sent to the wrong floor due to confusion regarding a heart test.     She reported that seizures lead to feelings of " "depression and feelings of isolation due to her limitations. She feels that others "threw me away" due to her having seizures in their presence. She discussed anxiety about going out in public spaces due to possible embarrassment due to seizures. She also reported that seizures affect her willingness to search for a romantic partner. To cope she walks in her backyard.       Hallucinations and delusions: Ms. Francisco reported visual illusions - seeing snakes in her periphery that are not there when she looks.    Physical Status  Pain: Ms. Francisco denied pain.   Sleep: Ms. Francisco reported an average sleep duration of 5-6 hours per night. She reported falling asleep after 1-2 hours awake in bed. She awakens often to use the restroom and has a hard time returning to sleep. She reported early awakening attributed to her having a routine early morning schedule. She has ABELARDO and her CPAP is broken.   Energy: Ms. Francisco reported fatigue.   Exercise/ therapy: Ms. Francisco  Walks in her yard, every morning Monday through Friday for an hour.   Balance/ gait: Ms. Francisco denied a history of gait disturbances.   Falls: Ms. Francisco reported a history of falls during seizures with hits to the head. She did not report falls in other contexts.   Sensory changes: Ms. Francisco     Functional Abilities/Changes:   Medical appointments/adherence: Her daughter helps. She uses a reminder system to good effect.  Medication management: Independent and effective and uses a pillbox.  Diet/nutrition: Denied difficulties with diet or dietary management.  Household duties: Reported that they are independent and effective with household chores and tasks due to cognitive concerns.  Bill paying: Reported that they are independent and effective in bill-paying.  Self-care: Reported that they are independent and effective with self-care activities.    Prior Neuropsychological Assessment: Ms. Francisco reported that she has not had prior " "neuropsychological testing.       Medical History     Relevant past medical history:  Past Medical History:   Diagnosis Date    Blood clotting tendency     Coronary artery disease     Fever blister     HLD (hyperlipidemia)     Hypertension     Hypothyroidism 12/7/2020    Renal cyst 7/12/2021    Seizures     Sleep apnea     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       Relevant early developmental history: Ms. Francisco denied any personal history of early life concerns that affected her cognitive functioning or development.    Additional neurological: Ms. Francisco denied a history of known neurologic concerns, except as documented above.    Relevant neuroimaging/ diagnostic studies:    Brain MRI: Ms. Francisco completed an brain MRI on 03/05/2021 at [facility] with stated indication of Epilepsy, which was interpreted as follows:   "1.No acute intracranial abnormalities.   2.Approximately 1 cm T2 hyperintense, lobular lesion along the   greater wing of the right sphenoid bone slightly abuts the right   temporal pole, just posterolateral to the right foramen rotundum.   This is suspicious for a cephalocele/encephalocele.   3.Mild supratentorial chronic microvascular ischemic change."    vEEG: Ms. Francisco presented for video EEG monitoring at [facility] between 01/18/2021 and 01/23/2021. Her results were interpreted as follows:   "IMPRESSION:   Abnormal continuous video EEG due to   -          Three focal seizures with altered awareness, onset from   the right temporal region (day 4)   -          Rare, right temporal sharp-waves during wakefulness and   sleep   -          Intermittent runs of focal right temporal slowing"      Results for orders placed or performed during the hospital encounter of 10/11/20   CT Head Without Contrast    Narrative    EXAMINATION:  CT HEAD WITHOUT CONTRAST    CLINICAL HISTORY:  Altered mental status;    TECHNIQUE:  Routine noncontrast head " "CT.    COMPARISON:  None    FINDINGS:  There is no acute intracranial hemorrhage or abnormal extra-axial fluid collection.  There is no abnormal increased or decreased density within the brain parenchyma.  Gray-white differentiation preserved.  Normal ventricles.  There is no intracranial mass or mass effect.  The calvarium is intact.  Visualized paranasal sinuses and mastoids are well aerated.      Impression    Negative head CT.    All CT scans at this facility are performed  using dose modulation techniques as appropriate to performed exam including the following:  automated exposure control; adjustment of mA and/or kV according to the patients size (this includes techniques or standardized protocols for targeted exams where dose is matched to indication/reason for exam: i.e. extremities or head);  iterative reconstruction technique.      Electronically signed by: Yoseph English MD  Date:    10/11/2020  Time:    14:18     EEG: Ms. Francisco completed an EEG on 12/02/2020. That study was interpreted as follows: "There are no epileptiform discharges or lateralizing signs. No typical events were recorded. There is no electrographic evidence of seizure.There is no electrographic evidence of status epilepticus."     vEEG: Ms. Francisco presented for video EEG monitoring between 04/07/2016 and 04/16/2016. Her results were interpreted as follows:   "FINAL IMPRESSION:  Classification:  Complex partial seizures.  Localization:  Lateral frontal, anterior temporal.  Lateralization:  Two seizures evolved from the right and they were longer with   some overt clinical manifestations and 2 evolved from the left."    Relevant family history: Ms. Francisco denied a family history of early-onset cognitive decline or relevant neurologic illness in any first-degree relatives.     Current medications: Ms. Francisco has a current medication list which includes the following prescription(s): clobazam, clopidogrel, estradiol, ezetimibe, " furosemide, gabapentin, isosorbide mononitrate, levocetirizine, levothyroxine, lidocaine, metoprolol succinate, pantoprazole, phenytoin, potassium chloride, ranolazine, repatha sureclick, solifenacin, and zonisamide.    Review of patient's allergies indicates:   Allergen Reactions    Latex, natural rubber Hives, Shortness Of Breath and Swelling     Throat swelling  Throat swelling  Other reaction(s): Hives  Throat swelling    Ace inhibitors Other (See Comments)     Coughing  Other reaction(s): Unknown    Diclo gel [diclofenac sodium] Other (See Comments)     Chest pain       Mental Health History     Mental Health History: Ms. Francisco reported a history of past therapy due to marital discord through a psychiatrist; however, that provider reportedly fell asleep repeatedly during sessions and she discontinued that arrangement. This was prior to the onset of epilepsy.     Family Mental Health History: Ms. Francisco denied a relevant family history of mental health concerns.     Assessment of Risk: Ms. Francisco denied past or present suicidal ideation, plan, or intent.  Based on today's interview, she was deemed to be at a low risk of harm to self at this time.    Substance Use: Ms. Francisco denied a history of problematic alcohol or recreational substance use. She does not drink since discovering she has epilepsy.   Tobacco: Former smoker 27 pack-years.       Social History     Educational/ Linguistic History  Language:Ms. Francisco's first language is English.   Education level: She completed 13 years of formal education.   Education trajectory: She did not report a history of attention problems, learning difficulties, or academic supports.     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   Work status: She has disability due to her back and epilepsy; and also [employer] benefits trhough her late .    Living/Social History  Born/raised[place]  Current living  situation:  Single family home with her daughter.   Social support:  Limited to her daughter.     Behavioral Observations     Appearance:  Ms. Francisco was unaccompanied to the appointment although transported by her daughter. She was well dressed; well groomed; and appeared her stated age. Eye contact was appropriate.    Gait/ Motor: No fine or gross motor abnormalities were observed. Gait was within normal limits.      Sensory: Corrected vision and hearing appeared adequate for testing purposes. .    Speech/ language: Speech was normal in rate, volume, tone, and prosody. Receptive language was notable for mild difficulty with comprehension. Expressive language was notable for a few isolated word-pronunciation errors and isolated word-finding errors for uncommon words, e.g. pill-box. She used circumlocution to good effect.     Thought processes:  Ms. Francisco appeared alert and oriented to person, place, time, and situation. Her thought processes appeared logical, sequential, and goal oriented. There was no evidence of hallucination or delusions. Insight and judgment appeared intact.    Mood/affect:  Rapport was easy to establish and maintain. her affect was braod in range, appeared generally euthymic, and was consistent with stated mood, as best as could be determined through a mask. Instances of tearfulness occurred when discussing how epilepsy limits her life. Behavior was within normal limits.     Billing Documentation     Time spent in review of pertinent records, conducting face to face interview with the patient, and documenting history: 128 minutes; 18003 & [id number](x1).    Signatures     Thank you for the opportunity to assist in the care of your patient. Please do not hesitate to contact me at [phone] or via Epic staff message if I can be of further assistance.          _____________________  Kevin Luu, Ph.D.  Neuropsychologist  [facility]  Department of Neurology

## 2022-04-05 ENCOUNTER — TELEPHONE (OUTPATIENT)
Dept: CARDIOLOGY | Facility: CLINIC | Age: 62
End: 2022-04-05
Payer: MEDICARE

## 2022-04-05 NOTE — TELEPHONE ENCOUNTER
----- Message from Richie Butt MD sent at 4/5/2022  5:02 AM CDT -----  Please tell pt:  Echo shows normal systolic function

## 2022-04-05 NOTE — TELEPHONE ENCOUNTER
The patient has been notified of this information and all questions answered.  Echo shows normal systolic function. Patient verbalized understanding.

## 2022-04-08 ENCOUNTER — OFFICE VISIT (OUTPATIENT)
Dept: CARDIOLOGY | Facility: CLINIC | Age: 62
End: 2022-04-08
Payer: MEDICARE

## 2022-04-08 ENCOUNTER — LAB VISIT (OUTPATIENT)
Dept: LAB | Facility: HOSPITAL | Age: 62
End: 2022-04-08
Attending: INTERNAL MEDICINE
Payer: MEDICARE

## 2022-04-08 VITALS
BODY MASS INDEX: 38.35 KG/M2 | HEART RATE: 94 BPM | SYSTOLIC BLOOD PRESSURE: 128 MMHG | OXYGEN SATURATION: 99 % | WEIGHT: 230.19 LBS | DIASTOLIC BLOOD PRESSURE: 76 MMHG | HEIGHT: 65 IN

## 2022-04-08 DIAGNOSIS — R06.02 SOB (SHORTNESS OF BREATH): ICD-10-CM

## 2022-04-08 DIAGNOSIS — I10 ESSENTIAL HYPERTENSION: ICD-10-CM

## 2022-04-08 DIAGNOSIS — E78.00 PURE HYPERCHOLESTEROLEMIA: ICD-10-CM

## 2022-04-08 DIAGNOSIS — Z95.5 HX OF HEART ARTERY STENT: ICD-10-CM

## 2022-04-08 DIAGNOSIS — Z95.5 HX OF HEART ARTERY STENT: Primary | ICD-10-CM

## 2022-04-08 DIAGNOSIS — I25.10 CORONARY ARTERY DISEASE INVOLVING NATIVE CORONARY ARTERY OF NATIVE HEART WITHOUT ANGINA PECTORIS: ICD-10-CM

## 2022-04-08 DIAGNOSIS — E78.00 PURE HYPERCHOLESTEROLEMIA: Chronic | ICD-10-CM

## 2022-04-08 DIAGNOSIS — I10 PRIMARY HYPERTENSION: Chronic | ICD-10-CM

## 2022-04-08 DIAGNOSIS — I25.10 CORONARY ARTERY DISEASE INVOLVING NATIVE CORONARY ARTERY OF NATIVE HEART WITHOUT ANGINA PECTORIS: Primary | ICD-10-CM

## 2022-04-08 LAB
ANION GAP SERPL CALC-SCNC: 8 MMOL/L (ref 8–16)
APTT BLDCRRT: 26.2 SEC (ref 21–32)
BASOPHILS # BLD AUTO: 0.03 K/UL (ref 0–0.2)
BASOPHILS NFR BLD: 0.8 % (ref 0–1.9)
BUN SERPL-MCNC: 14 MG/DL (ref 6–20)
CALCIUM SERPL-MCNC: 9.4 MG/DL (ref 8.7–10.5)
CHLORIDE SERPL-SCNC: 110 MMOL/L (ref 95–110)
CO2 SERPL-SCNC: 24 MMOL/L (ref 23–29)
CREAT SERPL-MCNC: 0.8 MG/DL (ref 0.5–1.4)
DIFFERENTIAL METHOD: ABNORMAL
EOSINOPHIL # BLD AUTO: 0.1 K/UL (ref 0–0.5)
EOSINOPHIL NFR BLD: 2 % (ref 0–8)
ERYTHROCYTE [DISTWIDTH] IN BLOOD BY AUTOMATED COUNT: 13.1 % (ref 11.5–14.5)
EST. GFR  (AFRICAN AMERICAN): >60 ML/MIN/1.73 M^2
EST. GFR  (NON AFRICAN AMERICAN): >60 ML/MIN/1.73 M^2
GLUCOSE SERPL-MCNC: 91 MG/DL (ref 70–110)
HCT VFR BLD AUTO: 41.5 % (ref 37–48.5)
HGB BLD-MCNC: 13.1 G/DL (ref 12–16)
IMM GRANULOCYTES # BLD AUTO: 0.01 K/UL (ref 0–0.04)
IMM GRANULOCYTES NFR BLD AUTO: 0.3 % (ref 0–0.5)
INR PPP: 1 (ref 0.8–1.2)
LYMPHOCYTES # BLD AUTO: 1.9 K/UL (ref 1–4.8)
LYMPHOCYTES NFR BLD: 47.6 % (ref 18–48)
MCH RBC QN AUTO: 31.8 PG (ref 27–31)
MCHC RBC AUTO-ENTMCNC: 31.6 G/DL (ref 32–36)
MCV RBC AUTO: 101 FL (ref 82–98)
MONOCYTES # BLD AUTO: 0.3 K/UL (ref 0.3–1)
MONOCYTES NFR BLD: 8.6 % (ref 4–15)
NEUTROPHILS # BLD AUTO: 1.6 K/UL (ref 1.8–7.7)
NEUTROPHILS NFR BLD: 40.7 % (ref 38–73)
NRBC BLD-RTO: 0 /100 WBC
PLATELET # BLD AUTO: 217 K/UL (ref 150–450)
PMV BLD AUTO: 10.4 FL (ref 9.2–12.9)
POTASSIUM SERPL-SCNC: 4.4 MMOL/L (ref 3.5–5.1)
PROTHROMBIN TIME: 10.4 SEC (ref 9–12.5)
RBC # BLD AUTO: 4.12 M/UL (ref 4–5.4)
SODIUM SERPL-SCNC: 142 MMOL/L (ref 136–145)
WBC # BLD AUTO: 3.95 K/UL (ref 3.9–12.7)

## 2022-04-08 PROCEDURE — 99215 PR OFFICE/OUTPT VISIT, EST, LEVL V, 40-54 MIN: ICD-10-PCS | Mod: S$PBB,,, | Performed by: INTERNAL MEDICINE

## 2022-04-08 PROCEDURE — 36415 COLL VENOUS BLD VENIPUNCTURE: CPT | Performed by: INTERNAL MEDICINE

## 2022-04-08 PROCEDURE — 99999 PR PBB SHADOW E&M-EST. PATIENT-LVL IV: ICD-10-PCS | Mod: PBBFAC,,, | Performed by: INTERNAL MEDICINE

## 2022-04-08 PROCEDURE — 85730 THROMBOPLASTIN TIME PARTIAL: CPT | Performed by: INTERNAL MEDICINE

## 2022-04-08 PROCEDURE — 99214 OFFICE O/P EST MOD 30 MIN: CPT | Mod: PBBFAC | Performed by: INTERNAL MEDICINE

## 2022-04-08 PROCEDURE — 85025 COMPLETE CBC W/AUTO DIFF WBC: CPT | Performed by: INTERNAL MEDICINE

## 2022-04-08 PROCEDURE — 85610 PROTHROMBIN TIME: CPT | Performed by: INTERNAL MEDICINE

## 2022-04-08 PROCEDURE — 99999 PR PBB SHADOW E&M-EST. PATIENT-LVL IV: CPT | Mod: PBBFAC,,, | Performed by: INTERNAL MEDICINE

## 2022-04-08 PROCEDURE — 80048 BASIC METABOLIC PNL TOTAL CA: CPT | Performed by: INTERNAL MEDICINE

## 2022-04-08 PROCEDURE — 99215 OFFICE O/P EST HI 40 MIN: CPT | Mod: S$PBB,,, | Performed by: INTERNAL MEDICINE

## 2022-04-08 NOTE — PROGRESS NOTES
Subjective:   Patient ID:  Evelyne Francisco is a 61 y.o. female who presents for follow-up of Follow-up  echo nml lv function, Pt still with SCALES.NMT -(-)  PCI 6-21    Hyperlipidemia  This is a chronic problem. The current episode started more than 1 year ago. Recent lipid tests were reviewed and are variable. Pertinent negatives include no chest pain or shortness of breath. Current antihyperlipidemic treatment includes ezetimibe. The current treatment provides moderate improvement of lipids. Compliance problems include medication side effects.    Coronary Artery Disease  Presents for follow-up visit. Pertinent negatives include no chest pain, chest tightness, dizziness, leg swelling, muscle weakness, palpitations, shortness of breath or weight gain. Risk factors include hyperlipidemia. The symptoms have been stable. Compliance with diet is variable. Compliance with exercise is variable. Compliance with medications is good.       Review of Systems   Constitutional: Negative. Negative for weight gain.   HENT: Negative.    Eyes: Negative.    Cardiovascular: Negative.  Negative for chest pain, leg swelling and palpitations.   Respiratory: Negative.  Negative for chest tightness and shortness of breath.    Endocrine: Negative.    Hematologic/Lymphatic: Negative.    Skin: Negative.    Musculoskeletal: Negative for muscle weakness.   Gastrointestinal: Negative.    Genitourinary: Negative.    Neurological: Negative.  Negative for dizziness.   Psychiatric/Behavioral: Negative.    Allergic/Immunologic: Negative.      Family History   Problem Relation Age of Onset    Hypertension Mother     Hyperlipidemia Mother     Stroke Father     Seizures Father     Hypertension Sister     Cancer Brother     Marfan syndrome Daughter      Past Medical History:   Diagnosis Date    Blood clotting tendency     Coronary artery disease     Fever blister     HLD (hyperlipidemia)     Hypertension     Hypothyroidism  2020    Renal cyst 2021    Seizures     Sleep apnea     Temporal lobe epilepsy 2016     Social History     Socioeconomic History    Marital status:    Tobacco Use    Smoking status: Former Smoker     Quit date: 2014     Years since quittin.7    Smokeless tobacco: Never Used   Substance and Sexual Activity    Alcohol use: No    Drug use: No    Sexual activity: Not Currently     Partners: Male     Current Outpatient Medications on File Prior to Visit   Medication Sig Dispense Refill    cloBAZam (ONFI) 2.5 mg/mL Susp Take 4 mLs (10 mg total) by mouth 2 (two) times daily. 360 mL 5    clopidogreL (PLAVIX) 75 mg tablet Take 75 mg by mouth once daily.      estradioL (ESTRACE) 2 MG tablet Take 1 tablet (2 mg total) by mouth once daily. 30 tablet 11    ezetimibe (ZETIA) 10 mg tablet Take 10 mg by mouth once daily.      furosemide (LASIX) 40 MG tablet Take 1 tablet (40 mg total) by mouth once daily. 30 tablet 11    gabapentin (NEURONTIN) 100 MG capsule Take 100 mg by mouth 3 (three) times daily.      isosorbide mononitrate (IMDUR) 60 MG 24 hr tablet Take 1 tablet (60 mg total) by mouth every 12 (twelve) hours. 90 tablet 7    levocetirizine (XYZAL) 5 MG tablet Take 1 tablet (5 mg total) by mouth every evening. 90 tablet 3    levothyroxine (SYNTHROID) 100 MCG tablet Take 1 tablet (100 mcg total) by mouth before breakfast. 90 tablet 1    LIDOcaine (LIDODERM) 5 % PUT 1 PATCH TO AFFECTED AREA FOR 12 HOURS AS NEEDED FOR PAIN; TAKE OFF FOR AT LEAST 12 HOURS BEFORE APPLYING ANOTHER PATCH      metoprolol succinate (TOPROL-XL) 50 MG 24 hr tablet Take 1 tablet (50 mg total) by mouth once daily. 90 tablet 1    pantoprazole (PROTONIX) 40 MG tablet Take 1 tablet (40 mg total) by mouth 2 (two) times daily. 180 tablet 3    phenytoin (DILANTIN) 100 MG ER capsule Take 2 capsules (200 mg total) by mouth 2 (two) times daily. 360 capsule 3    potassium chloride (MICRO-K) 10 MEQ CpSR Take 10  mEq by mouth once.      ranolazine (RANEXA) 1,000 mg Tb12 Take 1,000 mg by mouth 2 (two) times daily.      REPATHA SURECLICK 140 mg/mL PnIj SMARTSI Milligram(s) SUB-Q Every 2 Weeks 2 each 11    solifenacin (VESICARE) 10 MG tablet Take 1 tablet (10 mg total) by mouth once daily. 30 tablet 11    zonisamide (ZONEGRAN) 100 MG Cap Take 3 capsules (300 mg total) by mouth 2 (two) times daily. 540 capsule 3     No current facility-administered medications on file prior to visit.     Review of patient's allergies indicates:   Allergen Reactions    Latex, natural rubber Hives, Shortness Of Breath and Swelling     Throat swelling  Throat swelling  Other reaction(s): Hives  Throat swelling    Ace inhibitors Other (See Comments)     Coughing  Other reaction(s): Unknown    Diclo gel [diclofenac sodium] Other (See Comments)     Chest pain       Objective:     Physical Exam  Vitals and nursing note reviewed.   Constitutional:       Appearance: She is well-developed.   HENT:      Head: Normocephalic and atraumatic.   Eyes:      Conjunctiva/sclera: Conjunctivae normal.      Pupils: Pupils are equal, round, and reactive to light.   Cardiovascular:      Rate and Rhythm: Normal rate and regular rhythm.      Pulses: Intact distal pulses.      Heart sounds: Normal heart sounds.   Pulmonary:      Effort: Pulmonary effort is normal.      Breath sounds: Normal breath sounds.   Abdominal:      General: Bowel sounds are normal.      Palpations: Abdomen is soft.   Musculoskeletal:         General: Normal range of motion.      Cervical back: Normal range of motion and neck supple.   Skin:     General: Skin is warm and dry.   Neurological:      Mental Status: She is alert and oriented to person, place, and time.         Assessment:     1. Coronary artery disease involving native coronary artery of native heart without angina pectoris    2. Essential hypertension    3. Hx of heart artery stent    4. Primary hypertension    5. Pure  hypercholesterolemia        Plan:     Coronary artery disease involving native coronary artery of native heart without angina pectoris    Essential hypertension    Hx of heart artery stent    Primary hypertension    Pure hypercholesterolemia    Continue ranexa, imdur- cad  Continue zetia-hlp  Continue metoprolol, lasix-htn     [facility]

## 2022-04-11 ENCOUNTER — TELEPHONE (OUTPATIENT)
Dept: CARDIOLOGY | Facility: CLINIC | Age: 62
End: 2022-04-11
Payer: MEDICARE

## 2022-04-11 NOTE — TELEPHONE ENCOUNTER
Called patient to discuss lab results. Pt verbalized understanding. All questions answered. Pt will call back with any other questions or concerns.     ----- Message from Richie Butt MD sent at 4/10/2022  6:19 AM CDT -----  Please tell pt:  BMP is normal

## 2022-04-13 ENCOUNTER — TELEPHONE (OUTPATIENT)
Dept: INTERNAL MEDICINE | Facility: CLINIC | Age: 62
End: 2022-04-13
Payer: MEDICARE

## 2022-04-13 NOTE — TELEPHONE ENCOUNTER
----- Message from Zo Pulliam sent at 4/13/2022  3:34 PM CDT -----  Contact: Self   Patient is returning a phone call.  Who left a message for the patient: office  Does patient know what this is regarding:    Would you like a call back, or a response through your [facility] portal?:   call back  Comments:

## 2022-04-14 ENCOUNTER — OFFICE VISIT (OUTPATIENT)
Dept: INTERNAL MEDICINE | Facility: CLINIC | Age: 62
End: 2022-04-14
Payer: MEDICARE

## 2022-04-14 ENCOUNTER — HOSPITAL ENCOUNTER (OUTPATIENT)
Dept: RADIOLOGY | Facility: HOSPITAL | Age: 62
Discharge: HOME | End: 2022-04-14
Attending: FAMILY MEDICINE
Payer: MEDICARE

## 2022-04-14 VITALS
SYSTOLIC BLOOD PRESSURE: 130 MMHG | WEIGHT: 230.81 LBS | HEART RATE: 87 BPM | BODY MASS INDEX: 38.45 KG/M2 | DIASTOLIC BLOOD PRESSURE: 80 MMHG | TEMPERATURE: 96 F | OXYGEN SATURATION: 97 % | HEIGHT: 65 IN

## 2022-04-14 DIAGNOSIS — E03.9 HYPOTHYROIDISM, UNSPECIFIED TYPE: Primary | ICD-10-CM

## 2022-04-14 DIAGNOSIS — R06.09 DYSPNEA ON EXERTION: ICD-10-CM

## 2022-04-14 DIAGNOSIS — G40.109 TEMPORAL LOBE EPILEPSY: ICD-10-CM

## 2022-04-14 DIAGNOSIS — Z95.5 HX OF HEART ARTERY STENT: ICD-10-CM

## 2022-04-14 DIAGNOSIS — M54.2 NECK PAIN: ICD-10-CM

## 2022-04-14 DIAGNOSIS — I10 PRIMARY HYPERTENSION: ICD-10-CM

## 2022-04-14 PROCEDURE — 99215 OFFICE O/P EST HI 40 MIN: CPT | Mod: PBBFAC | Performed by: FAMILY MEDICINE

## 2022-04-14 PROCEDURE — 99999 PR PBB SHADOW E&M-EST. PATIENT-LVL V: ICD-10-PCS | Mod: PBBFAC,,, | Performed by: FAMILY MEDICINE

## 2022-04-14 PROCEDURE — 72050 XR CERVICAL SPINE COMPLETE 5 VIEW: ICD-10-PCS | Mod: 26,,, | Performed by: RADIOLOGY

## 2022-04-14 PROCEDURE — 99214 OFFICE O/P EST MOD 30 MIN: CPT | Mod: S$PBB,,, | Performed by: FAMILY MEDICINE

## 2022-04-14 PROCEDURE — 99999 PR PBB SHADOW E&M-EST. PATIENT-LVL V: CPT | Mod: PBBFAC,,, | Performed by: FAMILY MEDICINE

## 2022-04-14 PROCEDURE — 99214 PR OFFICE/OUTPT VISIT, EST, LEVL IV, 30-39 MIN: ICD-10-PCS | Mod: S$PBB,,, | Performed by: FAMILY MEDICINE

## 2022-04-14 PROCEDURE — 72050 X-RAY EXAM NECK SPINE 4/5VWS: CPT | Mod: TC

## 2022-04-14 PROCEDURE — 72050 X-RAY EXAM NECK SPINE 4/5VWS: CPT | Mod: 26,,, | Performed by: RADIOLOGY

## 2022-04-14 RX ORDER — METOPROLOL SUCCINATE 50 MG/1
50 TABLET, EXTENDED RELEASE ORAL DAILY
Qty: 90 TABLET | Refills: 1 | Status: SHIPPED | OUTPATIENT
Start: 2022-04-14 | End: 2023-03-10 | Stop reason: SDUPTHER

## 2022-04-14 RX ORDER — TIZANIDINE 4 MG/1
TABLET ORAL
COMMUNITY
Start: 2021-11-01 | End: 2022-10-20

## 2022-04-14 RX ORDER — LEVOTHYROXINE SODIUM 100 UG/1
100 TABLET ORAL
Qty: 90 TABLET | Refills: 1 | Status: SHIPPED | OUTPATIENT
Start: 2022-04-14 | End: 2022-10-12 | Stop reason: SDUPTHER

## 2022-04-14 RX ORDER — MIDAZOLAM 5 MG/.1ML
SPRAY NASAL
COMMUNITY
Start: 2022-02-04

## 2022-04-14 RX ORDER — NAPROXEN 500 MG/1
500 TABLET ORAL 2 TIMES DAILY PRN
COMMUNITY
Start: 2022-03-18 | End: 2022-10-20

## 2022-04-14 NOTE — PROGRESS NOTES
Subjective:       Patient ID: Evelyne Francisco is a 61 y.o. female.    Chief Complaint: Follow-up    She is followed by cardiology for coronary artery disease with stent.  She has had increased shortness of breath with walking.  She reports that she has to stop walking because of the degree of shortness of breath.  She is status stool for cardiac catheterization next week.  She also has seizure disorder followed by Neurology.  She is also followed by Urology for stress incontinence.  She is on medication for hypothyroidism.  Recent CBC BMP was reviewed.  She reports intermittent episodes right neck pain.  She reports her seizures are not convulsive.  Chest x-ray from last visit was reviewed.    Review of Systems   Constitutional: Negative for chills and fever.   HENT: Negative for congestion.    Respiratory: Positive for shortness of breath. Negative for cough, chest tightness and wheezing.    Cardiovascular: Negative for chest pain, palpitations and leg swelling.   Gastrointestinal: Negative for abdominal distention and abdominal pain.   Endocrine: Negative for cold intolerance and heat intolerance.   Musculoskeletal: Positive for back pain, neck pain and neck stiffness.   Neurological: Negative for dizziness, weakness, light-headedness and headaches.       Objective:      Physical Exam  Constitutional:       General: She is not in acute distress.     Appearance: She is well-developed. She is not ill-appearing or diaphoretic.   Neck:      Thyroid: No thyromegaly.      Comments: No thyroid nodules palpated  Cardiovascular:      Rate and Rhythm: Normal rate and regular rhythm.      Heart sounds: Normal heart sounds. No murmur heard.    No gallop.   Pulmonary:      Effort: Pulmonary effort is normal. No respiratory distress.      Breath sounds: Normal breath sounds. No wheezing, rhonchi or rales.   Abdominal:      General: Bowel sounds are normal.      Palpations: Abdomen is soft. There is no mass.       Tenderness: There is no abdominal tenderness.   Musculoskeletal:      Cervical back: Neck supple.      Comments: No neck tenderness.  Range of motion cervical spine is normal   Lymphadenopathy:      Cervical: No cervical adenopathy.   Skin:     General: Skin is warm and dry.      Coloration: Skin is not pale.      Findings: No erythema.   Neurological:      Mental Status: She is alert and oriented to person, place, and time.         Lab Visit on 04/08/2022   Component Date Value Ref Range Status    aPTT 04/08/2022 26.2  21.0 - 32.0 sec Final    WBC 04/08/2022 3.95  3.90 - 12.70 K/uL Final    RBC 04/08/2022 4.12  4.00 - 5.40 M/uL Final    Hemoglobin 04/08/2022 13.1  12.0 - 16.0 g/dL Final    Hematocrit 04/08/2022 41.5  37.0 - 48.5 % Final    MCV 04/08/2022 101 (A) 82 - 98 fL Final    MCH 04/08/2022 31.8 (A) 27.0 - 31.0 pg Final    MCHC 04/08/2022 31.6 (A) 32.0 - 36.0 g/dL Final    RDW 04/08/2022 13.1  11.5 - 14.5 % Final    Platelets 04/08/2022 217  150 - 450 K/uL Final    MPV 04/08/2022 10.4  9.2 - 12.9 fL Final    Immature Granulocytes 04/08/2022 0.3  0.0 - 0.5 % Final    Gran # (ANC) 04/08/2022 1.6 (A) 1.8 - 7.7 K/uL Final    Immature Grans (Abs) 04/08/2022 0.01  0.00 - 0.04 K/uL Final    Lymph # 04/08/2022 1.9  1.0 - 4.8 K/uL Final    Mono # 04/08/2022 0.3  0.3 - 1.0 K/uL Final    Eos # 04/08/2022 0.1  0.0 - 0.5 K/uL Final    Baso # 04/08/2022 0.03  0.00 - 0.20 K/uL Final    nRBC 04/08/2022 0  0 /100 WBC Final    Gran % 04/08/2022 40.7  38.0 - 73.0 % Final    Lymph % 04/08/2022 47.6  18.0 - 48.0 % Final    Mono % 04/08/2022 8.6  4.0 - 15.0 % Final    Eosinophil % 04/08/2022 2.0  0.0 - 8.0 % Final    Basophil % 04/08/2022 0.8  0.0 - 1.9 % Final    Differential Method 04/08/2022 Automated   Final    Sodium 04/08/2022 142  136 - 145 mmol/L Final    Potassium 04/08/2022 4.4  3.5 - 5.1 mmol/L Final    Chloride 04/08/2022 110  95 - 110 mmol/L Final    CO2 04/08/2022 24  23 - 29 mmol/L Final     Glucose 04/08/2022 91  70 - 110 mg/dL Final    BUN 04/08/2022 14  6 - 20 mg/dL Final    Creatinine 04/08/2022 0.8  0.5 - 1.4 mg/dL Final    Calcium 04/08/2022 9.4  8.7 - 10.5 mg/dL Final    Anion Gap 04/08/2022 8  8 - 16 mmol/L Final    eGFR if African American 04/08/2022 >60.0  >60 mL/min/1.73 m^2 Final    eGFR if non African American 04/08/2022 >60.0  >60 mL/min/1.73 m^2 Final    Prothrombin Time 04/08/2022 10.4  9.0 - 12.5 sec Final    INR 04/08/2022 1.0  0.8 - 1.2 Final     Assessment:       1. Hypothyroidism, unspecified type    2. Neck pain    3. Primary hypertension    4. Hx of heart artery stent    5. Dyspnea on exertion    6. BMI 38.0-38.9,adult    7. Temporal lobe epilepsy        Plan:     Medications reviewed.  Levothyroxine metoprolol refilled.  TSH free T4 ordered.  Will x-ray cervical spine.  Recommend myoflex muscle relaxant cream to neck twice a day.  Follow-up in 6 months BMI elevated weight reduction discuss regards dieting    Hypothyroidism, unspecified type  -     TSH; Future; Expected date: 04/14/2022  -     T4, Free; Future; Expected date: 04/14/2022    Neck pain  -     X-Ray Cervical Spine Complete 5 view; Future; Expected date: 04/14/2022    Primary hypertension    Hx of heart artery stent    Dyspnea on exertion    BMI 38.0-38.9,adult    Temporal lobe epilepsy    Other orders  -     metoprolol succinate (TOPROL-XL) 50 MG 24 hr tablet; Take 1 tablet (50 mg total) by mouth once daily.  Dispense: 90 tablet; Refill: 1  -     levothyroxine (SYNTHROID) 100 MCG tablet; Take 1 tablet (100 mcg total) by mouth before breakfast.  Dispense: 90 tablet; Refill: 1

## 2022-04-18 ENCOUNTER — PATIENT MESSAGE (OUTPATIENT)
Dept: INTERNAL MEDICINE | Facility: CLINIC | Age: 62
End: 2022-04-18
Payer: MEDICARE

## 2022-04-18 ENCOUNTER — OFFICE VISIT (OUTPATIENT)
Dept: NEUROLOGY | Facility: CLINIC | Age: 62
End: 2022-04-18
Payer: MEDICARE

## 2022-04-18 DIAGNOSIS — R41.89 COGNITIVE CHANGE: Primary | ICD-10-CM

## 2022-04-18 DIAGNOSIS — R45.89 ANXIETY ABOUT HEALTH: ICD-10-CM

## 2022-04-18 DIAGNOSIS — G40.919 REFRACTORY EPILEPSY: ICD-10-CM

## 2022-04-18 PROCEDURE — 99212 OFFICE O/P EST SF 10 MIN: CPT | Mod: PBBFAC | Performed by: STUDENT IN AN ORGANIZED HEALTH CARE EDUCATION/TRAINING PROGRAM

## 2022-04-18 PROCEDURE — 96133 NRPSYC TST EVAL PHYS/QHP EA: CPT | Mod: ,,, | Performed by: STUDENT IN AN ORGANIZED HEALTH CARE EDUCATION/TRAINING PROGRAM

## 2022-04-18 PROCEDURE — 99499 UNLISTED E&M SERVICE: CPT | Mod: S$PBB,,, | Performed by: STUDENT IN AN ORGANIZED HEALTH CARE EDUCATION/TRAINING PROGRAM

## 2022-04-18 PROCEDURE — 96139 PSYCL/NRPSYC TST TECH EA: CPT | Mod: ,,, | Performed by: STUDENT IN AN ORGANIZED HEALTH CARE EDUCATION/TRAINING PROGRAM

## 2022-04-18 PROCEDURE — 96138 PR PSYCH/NEUROPSYCH TEST ADMIN/SCORING, BY TECH, 2+ TESTS, 1ST 30 MIN: ICD-10-PCS | Mod: ,,, | Performed by: STUDENT IN AN ORGANIZED HEALTH CARE EDUCATION/TRAINING PROGRAM

## 2022-04-18 PROCEDURE — 96139 PR PSYCH/NEUROPSYCH TEST ADMIN/SCORING, BY TECH, 2+ TESTS, EA ADDTL 30 MIN: ICD-10-PCS | Mod: ,,, | Performed by: STUDENT IN AN ORGANIZED HEALTH CARE EDUCATION/TRAINING PROGRAM

## 2022-04-18 PROCEDURE — 99499 NO LOS: ICD-10-PCS | Mod: S$PBB,,, | Performed by: STUDENT IN AN ORGANIZED HEALTH CARE EDUCATION/TRAINING PROGRAM

## 2022-04-18 PROCEDURE — 96138 PSYCL/NRPSYC TECH 1ST: CPT | Mod: ,,, | Performed by: STUDENT IN AN ORGANIZED HEALTH CARE EDUCATION/TRAINING PROGRAM

## 2022-04-18 PROCEDURE — 99999 PR PBB SHADOW E&M-EST. PATIENT-LVL II: ICD-10-PCS | Mod: PBBFAC,,, | Performed by: STUDENT IN AN ORGANIZED HEALTH CARE EDUCATION/TRAINING PROGRAM

## 2022-04-18 PROCEDURE — 96133 PR NEUROPSYCHOLOGIC TEST EVAL SVCS, EA ADDTL HR: ICD-10-PCS | Mod: ,,, | Performed by: STUDENT IN AN ORGANIZED HEALTH CARE EDUCATION/TRAINING PROGRAM

## 2022-04-18 PROCEDURE — 96132 NRPSYC TST EVAL PHYS/QHP 1ST: CPT | Mod: ,,, | Performed by: STUDENT IN AN ORGANIZED HEALTH CARE EDUCATION/TRAINING PROGRAM

## 2022-04-18 PROCEDURE — 99999 PR PBB SHADOW E&M-EST. PATIENT-LVL II: CPT | Mod: PBBFAC,,, | Performed by: STUDENT IN AN ORGANIZED HEALTH CARE EDUCATION/TRAINING PROGRAM

## 2022-04-18 PROCEDURE — 96132 PR NEUROPSYCHOLOGIC TEST EVAL SVCS, 1ST HR: ICD-10-PCS | Mod: ,,, | Performed by: STUDENT IN AN ORGANIZED HEALTH CARE EDUCATION/TRAINING PROGRAM

## 2022-04-19 NOTE — PROGRESS NOTES
CONFIDENTIAL NEUROPSYCHOLOGICAL EVALUATION    NAME:  Evelyne Francisco DATE OF SERVICE: 2022   MRN#:  [id number] EDUCATION: 13   AGE: 61 y.o. HANDEDNESS: Right    : 1960 RACE: Black or    SEX: Female REFERRAL: Payton Estevez MD;  Neurology, [facility]     Referral question and neuropsychological necessity: Ms. Francisco is a 61 y.o., right-handed, [place] woman with 13 years of formal education who was referred by her neurologist due to cognitive concerns in the context of refractyory temporal lobe epilepsy. She is also undergoing workup for consideration of neurosurgical options for treatment of refractory epilepsy at [facility] and these results may assist with those considerations.    Evaluation methods: I had the pleasure of seeing Evelyne Francisco in person for interview on 2022 and again for testing on 2022 at the [facility], Department of Neurology. Data sources for the below report include a review of available medical records, interview with the patient, and administration of a series of neuropsychological tests, listed in the Results section of this report. At the outset of the appointment, the undersigned explained the rationale for the evaluation along with the limits of confidentiality; and verbal informed consent for this evaluation was obtained.    Note: Due to safety concerns and administrative policy during the COVID-19 pandemic the patient, the undersigned, and the examiner wore masks throughout testing and interview. The effect of wearing masks on neuropsychological test performance is unknown.     Summary and Impressions     Summary of History:  Ms. Francisco is a 61 y.o., right-handed, Black or , female with 13 years of formal education. She was referred by her neurologist/epilepstologist due to cognitive concerns in the context of refractory temporal lobe epilepsy. Briefly, Ms. Francisco had the onset of seizures in  "2009. Available neurology notes and Ms. Francisco's report during interview describe aura of a "strange feeling" in her head before seizure onset. Seizures are described as complex partial seizures (CPSz; staring spells sometimes accompanied by spitting) occurring out of wakefulness and sleep, and less-frequent secondarily-generalized tonic clonic seizures (2GTC). She has previously been tried unsuccessfully on 4 anti-seizure medications (VPA, LTG, OXC, LEV) and did not perceive reduction in seizure frequency from VNS. Ms. Francisco estimated that with her current regimen of Onfi, Dilantin, and Zonegran with VNS she is having 3-4 seizures a month. However, she has a difficult time tracking seizures that occur out of sleep and as such she was concerned that this may be an underestimate. Prior EMU evaluations and brain imaging most-frequently identify the right temporal lobe as a possible epileptogenic zone; however, one prior EMU evaluation identified two seizures originating from the left temporal region. Brain imaging is briefly notable for a "hyperintense, lobular lesion along the greater wing of the right sphenoid bone slightly abuts the right temporal pole, just posterolateral to the right foramen rotundum" thought "suspicious for a cephalocele/encephalocele" and for chronic ischemic changes. She is currently undergoing work-up for consideration of neurosurgery.    During interview, Ms. Francisco reported that she has perceived changes in her memory, attention and concentration, expressive and receptive language, information processing speed, and her ability to stop herself from saying things she would prefer not to have said. Emotionally, Ms. Francisco reported that due to epilepsy she has anxiety about being in public due to fear of having a seizure; and thoughts of depression and social isolation. She denied current or past suicidal ideation.    Results:   Aldana Findings:    Ms. Francisco is a woman of low-average " estimated baseline cognitive functioning and Borderline current intellectual functioning (FSIQ = 71) without significant inter-index discrepancies.   Performances in the following areas were within normal limits, suggesting intact cognitive functioning in relevant domains:   Learning, retention, and free recall on a verbal list-learning task   Recognition of visual information and verbal information presented with narrative structure   Information processing speed   Simple visual-perceptual skills   Phonemic and semantic verbal fluency   Auditory working memory not requiring mental arithmetic   Speeded set-shifting   Bilateral fine motor dexterity   Performances in the following areas were below normal limits, suggesting weakness or decline in relevant domains:   Naming to confrontation   Fluid reasoning and problem-solving   Verbal and visual abstract reasoning   Perceptual-organizational skills   Encoding and free recall for visual information, particularly with added complexity; intact recognition for simple visual information   On general screening measures of depression and anxiety, Ms. Francisco endorsed minimal, I.e. non-clinical depression and anxiety.   On a measure of quality of life factors among individuals with epilepsy, Ms. Francisco endorsed that seizure worry has a clinically significant adverse effect on her quality of life.     Diagnostic/ Etiologic Considerations: In the context of the above cognitive performances, Ms. Francisco reported that she is generally independent and effective with instrumental and basic activities of daily living, and what inefficiencies she has are either subtle requiring some oversight by her daughter or are due to non-cognitive factors. Etiologically, epilepsy is thought the primary etiology for her cognitive changes. Side-effects from her ASM regimen are also likely contributory to her memory and expressive language difficulties in particular; something  Ms. Francisco has expressed insight into. Because these factors alone could explain her performances, there is low suspicion for a co-occurring neurocognitive process at this time.    Psychologically, Ms. Francisco' clinically-significant level of anxiety appears circumscribed to the effect epilepsy has on her life, in the form of seizure worry. As such, anxiety about health is coded to reflect these concerns.       Surgical Considerations  Localizing information:  Test results most-consistently suggest prefrontal systems dysfunction. There is some evidence for bilateral right greater than left temporal systems dysfunction, assuming typical cortical lateralization in this strongly right-handed individual.    Concordance Diagnostic Studies: Neuropsychological test performance is generally concordant with video EEG studies suggesting bilateral temporal lobe seizure onset with right predominance; and with brain MRI. Testing may implicate frontal systems dysfunction in addition to what would be indicated by her available neurodiagnostic studies.     Ms. Francisco' treatment plan also includes PET, Wada, and possible DARCI. Her epilepsy team may consider whether the results of those studies are concordant with these findings when they are made available.      Mood Factors: Ms. Francisco did not report clinically-significant depression that would increase her risk of negative outcome following neurosurgery. She reported that her seizures are a specific source of anxiety and worry which affects her quality of life. Effective seizure control may have the added benefit of reduced anxiety.     Education Factors: Due to borderline current intellectual functioning and low performances across higher-order reasoning skills, consider dedicating additional time to pre-surgical education and consent; and involve a trusted caregiver in the education process if Ms. Francisco is amenable.     Surgery Risk: Given that Ms. Francisco did not  demonstrate any domains of paulino and dense impairment, she is likely to perceive some degree of cognitive change following intervention. She is estimated to perceive the least amount of cognitive change and functional decline if a right temporal lobe intervention site is found to be a target for intervention. Ms. Francisco does not have clinically-significant depression or other psychological factors that would put her at increased risk for negative surgical outcome.     If her lesion on brain MRI is found to be a surgical target and is an encephalocele, this would further reduce concern for significant neuropsychological declines following surgery.    Diagnoses  1. Cognitive change     2. Refractory epilepsy  Ambulatory referral/consult to Neuropsychology   3. Anxiety about health        Provider Recommendations:   1. Ms. Francisco will be encouraged to continue to follow with her neurology team for management of epilepsy and next steps in her treatment.     2. Ms. Francisco has clinically-significant anxiety about her seizures but denied other sources of anxiety. If seizure control cannot be realistically achieved, consider pharmacologic options for treatment of anxiety.     3. Ms. Francisco will be encouraged to provide the results of this assessment to her surgical team to aid in planning and considerations for epilepsy surgery.     4. Ms. Francisco reported that her CPAP is broken and she no longer has use of it. Her primary care, sleep medicine, or other treating providers may be in a good position to help her regain use of that device.     5. Ms. Francisco will be provided with the following recommendations during a feedback appointment.     6. If there is need for repeat testing, the scores from this evaluation should serve as a baseline for comparison.     Patient Recommendations:  1. The next step in your care is to follow up with your referring neurology provider and other members of your epilepsy team.      2. You may wish to explore the information and support resources available through the Epile[employer] (www.epilepsy.com).    3. You reported difficulties falling asleep, frequent night-time awakening with difficulty returning to sleep, and loss of use of your CPAP due to it being broken. As you know, good sleep is important to help reduce your seizures. Quality sleep is also important for your cognitive functioning and long-term brain health. The following recommendations may be helpful to improve sleep:  · Work with your doctors to repair or replace your CPAP  · Do not lay in bed awake for more than 20 minutes at a time. Instead get out of bed and do something boring in low-light until you feel tired then return to bed to sleep.   · Avoid bright lights and screens in bed and shortly before bed.  · Stick to your bedtime routine and do not make big changes to the time you go to bed from one day to the next.   · Try to get exercise daily, ideally at least a few hours before your bedtime.     4. For management of stress/anxiety related to your seizures, I recommend daily practice of relaxation strategies (e.g., deep breathing, progressive muscle relaxation, mindfulness).   · The smartphone ozzy [id number] can help guide you through a deep breathing exercise that may help with anxiety.  · There are also excellent free videos for guided meditation, muscle relaxation, and mindfulness on [employer] or other video platforms.   · I recommend trying some and finding something that works.   · For any of these skills, they only work if you practice them regularly. I recommend first choosing one skill and practicing it ten minutes a day when you do not feel anxious or distressed. Then you can use these skills when you need them.     5. You may benefit from the use of compensatory strategies to optimize your daily cognitive functioning, including the following:  · Break down larger tasks into small, manageable  "tasks.  · Do your most important tasks during the time of day when you feel most alert/awake.  · Ask people to repeat or clarify information as needed. Have them summarize the take home points from a conversation.  · Repeat important information back to someone to make sure you got it right and to improve later recall.  · Put important items like keys, wallet, cell phone, and glasses in the same place every day so you're less likely to misplace them.  · Set aside some time each week to plan ahead for the tasks you will need to complete. Use this time to prioritize the tasks and set alarms so you won't forget to complete them before the deadline.  · Use alarms, notes, and checklists as needed to keep track of tasks, deadlines, and appointments.  · Use self-talk to help you concentrate and keep track of the steps in a task. For example, talk yourself (out loud or internally) though the steps involved in a recipe as you complete them.    6. Consider planning with your daughter or another friend, family member, or caregiver ways that you can go out of the house in social settings and feel safe/ confident. You reported that worry about seizures has reduced your social engagement due to fear of having a seizure in public. For your long-term cognitive and mental health continued social engagement is important.     7. The results from this assessment should serve as a baseline against which any future comparisons may be made. If you experience cognitive changes in the future; or if you pursue surgery and wish to update your treatment plan, a future assessment should compare your new test scores to your performances during this assessment.        Presenting concerns      Presenting Problem/Primary Concern: Cognitive and emotioanl changes in the context of epilepsy.      Seizures: Ms. Francisco described that she has an aura of "a feeling" on the right side of her head in the frontotemporal region, with evolution to " seizure within a few seconds. She then is amnestic of her semiology. She described that most seizures are CPSz. Seizures occur out of sleep described as wandering events, during wakefulness described as staring spells with spitting or wandering and responding incoherently to others, and less-common secondarily-generalized tonic clonic seizures.      Frequency: Last seizure was 02/18/2022 on which date she had three during wakefulness described as above. Seizures occur out of sleep at least monthly per her estimate although these events are difficult for her to estimate due to limited supervision at night. She reported her last 2GTC occurred on 07/04/2021.      Current ASMs/ Side-effects: Onfi, Dilantin, and Zonisamide: She reported that the Zonisamide and dilantin in particular are subjectively related to memory difficulties, in addition to those changes she attributed to epilepsy.      Interventions: VNS device - She reported reduced seizure duration but no change in seizure frequency. She has failed 5 prior ASMs as above.      Reported Provoking Factors: Heat/ sweating, poor sleep, strong perfumes/ certain types of perfumes.     Onset of Cognitive Concerns: Concurrently with seizures and medications for seizures.     Course of Cognitive Concerns: Ms. Francisco reported that her cognitive skills have been slowly worsening. Ms. Francisco has noticed that her medications affect memory in particular along with epilepsy.     Characterization of Cognitive Concerns  Attention/ working memory: Ms. Francisco reported difficulties with concentration, sustaining attention, paying attention to others and mental tracking. She'll often start multiple tasks and be unable to finish them due to distractibility.     Processing speed: Ms. Francisco reported difficulties with slowed thinking speed.     Language: Ms. Francisco reported difficulties with expressive language, specifically word-finding difficulty and receptive language,  "specifically understanding complex speech or long sentences and understanding written information that used to be easy to comprehend, e.g. the Bible. She reported errors in pronouncing words that she does not notice but others comment on, e.g. "[place]" for [place]"     Visual-spatial/ navigation: Ms. Francisco denied difficulties with visual-spatial skills or navigation.     Psychomotor: Ms. Francisco denied psychomotor difficulties.     Memory: Ms. Francisco reported difficulties with forgetfulness for recent events and forgetfulness for recent conversations.     Decision making: Ms. Francisco reported difficulties with inhibiting herself from speaking her mind, noting "I don't hold my peace like I used to."     Behavioral changes: Ms. Francisco denied changes in behavior.     Orientation: Ms. Francisco denied errors in orientation to person, place, or situation. She reported confusion regarding dates.      Current Mental Health  Current mood:  Ms. Francisco described her mood as "good," except having been sent to the wrong floor due to confusion regarding a heart test.      She reported that seizures lead to feelings of depression and feelings of isolation due to her limitations. She feels that others "threw me away" due to her having seizures in their presence. She discussed anxiety about going out in public spaces due to possible embarrassment due to seizures. She also reported that seizures affect her willingness to search for a romantic partner. To cope she walks in her backyard.       Hallucinations and delusions: Ms. Francisco reported visual illusions - seeing snakes in her periphery that are not there when she looks.     Physical Status  Pain: Ms. Francisco denied pain.   Sleep: Ms. Francisco reported an average sleep duration of 5-6 hours per night. She reported falling asleep after 1-2 hours awake in bed. She awakens often to use the restroom and has a hard time returning to sleep. She reported early awakening " attributed to her having a routine early morning schedule. She has ABELARDO and her CPAP is broken.   Energy: Ms. Francisco reported fatigue.   Exercise/ therapy: Ms. Francisco  Walks in her yard, every morning Monday through Friday for an hour.   Balance/ gait: Ms. Francisco denied a history of gait disturbances.   Falls: Ms. Francisco reported a history of falls during seizures with hits to the head. She did not report falls in other contexts.   Sensory changes: Ms. Francisco      Functional Abilities/Changes:   Medical appointments/adherence: Her daughter helps. She uses a reminder system to good effect.  Medication management: Independent and effective and uses a pillbox.  Diet/nutrition: Denied difficulties with diet or dietary management.  Household duties: Reported that they are independent and effective with household chores and tasks.  Bill paying: Reported that they are independent and effective in bill-paying.  Self-care: Reported that they are independent and effective with self-care activities.     Prior Neuropsychological Assessment: Ms. Francisco reported that she has not had prior neuropsychological testing.     Medical History     Relevant past medical history:  Past Medical History:   Diagnosis Date    Blood clotting tendency     Coronary artery disease     Fever blister     HLD (hyperlipidemia)     Hypertension     Hypothyroidism 12/7/2020    Renal cyst 7/12/2021    Seizures     Sleep apnea     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     Relevant early developmental history: Ms. Francisco denied any personal history of early life concerns that affected her cognitive functioning or development.     Additional neurological: Ms. Francisco denied a history of known neurologic concerns, except as documented above.     Relevant neuroimaging/ diagnostic studies:  Brain MRI: Ms. Francisco completed an brain MRI on 03/05/2021 at [facility] with stated indication of Epilepsy, which was interpreted as follows:  "  "1.No acute intracranial abnormalities.   2.Approximately 1 cm T2 hyperintense, lobular lesion along the   greater wing of the right sphenoid bone slightly abuts the right   temporal pole, just posterolateral to the right foramen rotundum.   This is suspicious for a cephalocele/encephalocele.   3.Mild supratentorial chronic microvascular ischemic change."     vEEG: Ms. Francisco presented for video EEG monitoring at [facility] between 01/18/2021 and 01/23/2021. Her results were interpreted as follows:   "IMPRESSION:   Abnormal continuous video EEG due to   -          Three focal seizures with altered awareness, onset from   the right temporal region (day 4)   -          Rare, right temporal sharp-waves during wakefulness and   sleep   -          Intermittent runs of focal right temporal slowing"            Results for orders placed or performed during the hospital encounter of 10/11/20   CT Head Without Contrast     Narrative     EXAMINATION:  CT HEAD WITHOUT CONTRAST     CLINICAL HISTORY:  Altered mental status;     TECHNIQUE:  Routine noncontrast head CT.     COMPARISON:  None     FINDINGS:  There is no acute intracranial hemorrhage or abnormal extra-axial fluid collection.  There is no abnormal increased or decreased density within the brain parenchyma.  Gray-white differentiation preserved.  Normal ventricles.  There is no intracranial mass or mass effect.  The calvarium is intact.  Visualized paranasal sinuses and mastoids are well aerated.        Impression     Negative head CT.     All CT scans at this facility are performed  using dose modulation techniques as appropriate to performed exam including the following:  automated exposure control; adjustment of mA and/or kV according to the patients size (this includes techniques or standardized protocols for targeted exams where dose is matched to indication/reason for exam: i.e. extremities or head);  iterative reconstruction " "technique.        Electronically signed by:       Yoseph English MD  Date:                                        10/11/2020  Time:                                       14:18      EEG: Ms. Francisco completed an EEG on 12/02/2020. That study was interpreted as follows: "There are no epileptiform discharges or lateralizing signs. No typical events were recorded. There is no electrographic evidence of seizure.There is no electrographic evidence of status epilepticus."      vEEG: Ms. Francisco presented for video EEG monitoring between 04/07/2016 and 04/16/2016. Her results were interpreted as follows:   "FINAL IMPRESSION:  Classification:  Complex partial seizures.  Localization:  Lateral frontal, anterior temporal.  Lateralization:  Two seizures evolved from the right and they were longer with   some overt clinical manifestations and 2 evolved from the left."     Relevant family history: Ms. Francisco denied a family history of early-onset cognitive decline or relevant neurologic illness in any first-degree relatives.     Current medications: Ms. Francisco has a current medication list which includes the following prescription(s): clobazam, clopidogrel, estradiol, ezetimibe, furosemide, gabapentin, isosorbide mononitrate, levocetirizine, levothyroxine, lidocaine, metoprolol succinate, naproxen, nayzilam, pantoprazole, phenytoin, potassium chloride, ranolazine, repatha sureclick, solifenacin, tizanidine, and zonisamide.    Review of patient's allergies indicates:   Allergen Reactions    Latex, natural rubber Hives, Shortness Of Breath and Swelling     Throat swelling  Throat swelling  Other reaction(s): Hives  Throat swelling    Ace inhibitors Other (See Comments)     Coughing  Other reaction(s): Unknown    Diclo gel [diclofenac sodium] Other (See Comments)     Chest pain       Mental Health History     Mental Health History: Ms. Francisco reported a history of past therapy due to marital discord through a psychiatrist; " however, that provider reportedly fell asleep repeatedly during sessions and she discontinued that arrangement. This was prior to the onset of epilepsy.      Family Mental Health History: Ms. Francisco denied a relevant family history of mental health concerns.      Assessment of Risk: Ms. Francisco denied past or present suicidal ideation, plan, or intent.  Based on today's interview, she was deemed to be at a low risk of harm to self at this time.     Substance Use: Ms. Francisco denied a history of problematic alcohol or recreational substance use. She does not drink since discovering she has epilepsy.   Tobacco: Former smoker 27 pack-years.         Social History      Educational/ Linguistic History  Language:Ms. Francisco's first language is English.   Education level: She completed 13 years of formal education.   Education trajectory: She did not report a history of attention problems, learning difficulties, or academic supports.      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   Work status: She has disability due to her back and epilepsy; and also [employer] benefits through her late .     Living/Social History  Born[place]raised[place]  Current living situation:  Single family home with her daughter.   Social support:  Limited to her daughter.       Behavioral Observations     Appearance:  Ms. Francisco arrived on time for her appointment. She was appropriately dressed; well groomed; and appeared her stated age. Eye contact was appropriate.    Gait/ Motor: No fine or gross motor abnormalities were observed. Gait was within normal limits.      Sensory: Corrected vision and hearing were appropriate for testing purposes.     Speech/ language: Speech was generally normal in, volume, tone, and prosody; with the episodic vocal changes observed attributable to VNS. Receptive language appeared generally intact. Expressive language was notable for word-finding errors and  associated speech pauses.     Thought processes:  Ms. Francisco appeared alert and oriented to person, place, time, and situation. Her thought processes appeared logical, sequential, and goal oriented. There was no evidence of hallucination or delusions. Insight and judgment appeared intact.    Mood/affect:  Rapport was easy to establish and maintain. Her affect was broad in range, appeared euthymic, and was consistent with stated mood, as best as could be determined through a mask. Behavior was within normal limits.    Test observations:  During testing, she understood and retained task instructions, completed tasks at a moderate, steady pace, followed instructions, and tolerated testing without the need for breaks or accommodation.    Results     Tests Administered (Manual norms used unless otherwise indicated): Measures of task engagement (available to qualified providers upon request), [employer] - Test of Premorbid Functioning (TOPF), Wechsler Adult Intelligence Scale 4th Ed. (WAIS-IV),Controlled Oral Word Association Test (COWAT; Karel Norms), Semantic Fluency (Animals; Karel Norms), [place] Naming Test (BNT; Karel Norms), Gunner Auditory Verbal Learning Test (RAVLT), Wechsler Memory Scale, 4th Ed. Logical Memory (WMS IV-LM), Wechsler Memory Scale, 4th Ed. Visual Reproduction (WMS IV-VR), Gunner-Osterrieth Complex Figure Test (RCFT), [place] Card Sorting Test (WCST) - 64 card version, Trail Making Test (TMT A/B; Karel Norms), Grooved Pegboard Test (Karel Norms), Cifuentes Depression Inventory, 2nd Ed. (BDI-2); and Cifuentes Anxiety Inventory (ROGER), Quality of Life in Epilepsy, 31-Item (QOLIE-31).    Performance Validity: Ms. Francisco completed both stand-alone and embedded measures of task engagement. Below-cutoff performance on any one stand-alone measure and/ or any two embedded measures of task engagement is highly unlikely to occur outside the context of poor or inconsistent effort during testing.  Ms. Francisco scored below predetermined cutoff on one embedded measure of task engagement though at a level with a relatively high base rate among clinical samples of 15%, and above predetermined cutoffs on all other administered measures of task engagement. As such, there is no evidence to suggest poor or inconsistent engagement and their performance is deemed to be a reasonable reflection of their day-to-day cognitive status.     PREMORBID FUNCTIONING Raw Score Type of Standardized Score Standardized Score Percentile/CP Descriptor   TOPF simple + pred. eFSIQ - SS 86 18 Low Average   INTELLECTUAL FUNCTIONING Raw Score Type of Standardized Score Standardized Score Percentile/CP Descriptor   WAIS-IV FSIQ - SS 71 3 Borderline   VCI - SS 72 3 Borderline   GRAYSON - SS 79 8 Borderline   WMI - SS 71 3 Borderline   PSI - SS 81 10 Low Average   LANGUAGE FUNCTIONING Raw Score Type of Standardized Score Standardized Score Percentile/CP Descriptor   WAIS-IV Vocabulary 16 ss 5 5 Below Average   WAIS-IV Information 6 ss 5 5 Below Average   BNT-60 33 Tscore 34 5 Below Average   FAS 32 Tscore 46 34 Average   Animal Naming 18 Tscore 53 62 Average   VISUOSPATIAL FUNCTIONING Raw Score Type of Standardized Score Standardized Score Percentile/CP Descriptor   WAIS-IV Block Design 16 ss 5 5 Below Average   WAIS-IV Visual Puzzles 11 ss 9 37 Average   RCFT Copy   Exceptionally Low (27.5) due to a piecemeal and outside-in approach to the figure copy that led to errors in duplication or multiple aspects of the figure. The resultant rendering adequately reflected the gestalt of the target stimulus. Suggests perceptual-organizational deficit and not paulino perceptual error.   VR Copy 42 - - 26-50 Average   LEARNING & MEMORY Raw Score Type of Standardized Score Standardized Score Percentile/CP Descriptor   RAVLT         Trials 1-5 (4, 5, 9, 10, 9) 37 Tscore 46 34 Average   List B 6 Tscore 61 86 High Average   Trial 6 (short delay) 8 Tscore 50 50  Average   30-min Recall (long delay) 5 Tscore 43 25 Average   Recognition (10 hits, 3 FP) - Tscore 35 7 Below Average   WMS-IV Subtests         LM I 18 ss 7 16 Low Average   LM II 5 ss 3 1 Exceptionally Low    LM Recognition 24 - - 26-50 Average   VR I 24 ss 5 5 Below Average   VR II 7 ss 5 5 Below Average   VR II Recognition 4 - - 17-25 Low Average   RCFT         IR 9 Tscore 5 <0.1 Exceptionally Low    DR 8.5 Tscore 3 <0.1 Exceptionally Low    ATTENTION/WORKING MEMORY Raw Score Type of Standardized Score Standardized Score Percentile/CP Descriptor   WAIS-IV Digit Span 17 ss 5 5 Below Average         DS Forward 6 ss 5 5 Below Average         DS Backward 5 ss 6 9 Low Average         DS Sequence 6 ss 7 16 Low Average         Longest Digit Forward 7 - - - -         Longest Digit Backward 4 - - - -         Longest Digit Sequence 2 - - - -   WAIS-IV Arithmetic 8 ss 5 5 Below Average   MENTAL PROCESSING SPEED Raw Score Type of Standardized Score Standardized Score Percentile/CP Descriptor   WAIS-IV Symbol Search 18 ss 6 9 Low Average   WAIS-IV Coding 42 ss 7 16 Low Average   TMT A (0 errors) 36 Tscore 50 50 Average   EXECUTIVE FUNCTIONING Raw Score Type of Standardized Score Standardized Score Percentile/CP Descriptor   TMT B (0 errors) 96 Tscore 51 54 Average   WAIS-IV Similarities 15 ss 5 5 Below Average   WAIS-IV Matrix Reasoning 7 ss 5 5 Below Average   WCST-64         Total Correct 29 SS - - -   Total Errors 35 SS 73 4 Below Average   Perseverative Resp. 24 SS 79 8 Below Average   Perseverative Err. 20 SS 78 7 Below Average   Nonperseverative Err. 15 SS 80 9 Low Average   Concept. Level Response 23 SS 77 6 Below Average   Categories Completed 2 - - 11-16 Low Average   FMS 0 - - - -   Learning to Learn -30.42 - - <1 Exceptionally Low    FRONTOMOTOR  Raw Score Type of Standardized Score Standardized Score Percentile/CP Descriptor   GPT  Tscore 39 14 Low Average   GPD  Tscore 37 9 Low Average   HANDEDNESS  Raw Score Type of Standardized Score Standardized Score Percentile/CP Descriptor   Laila Laterality Inventory 98% - - - Strongly Right-handed   MOOD & PERSONALITY Raw Score Type of Standardized Score Standardized Score Percentile/CP Descriptor   BDI-2 3 - - - Minimal   ROGER 4 - - - Minimal   QOLIE-31         Seizure Worry 5 Tscore 29 2 Clinically Significant   Overall Quality of Life 77.5 Tscore 56 71 WNL   Emotional Well-Being 92 Tscore 63 90 WNL   Energy/Fatigue 40 Tscore 43 23 WNL   Cognitive 61.38 Tscore 51 52 WNL   Medication Effects 69.43 Tscore 55 68 WNL   Social Function 41 Tscore 40 16 WNL   Overall Score - Tscore 46 36 WNL   ss = scaled score (mean = 10, SD = 3); SS = standard score (mean = 100, SD = 15); Tscore mean = 50, SD = 10; zscore (mean = 0.00, SD = 1); WNL = Within Normal Limits.       Billing Documentation     Time spent in review of pertinent records, conducting face to face interview with the patient, and documenting history: 128 minutes; [id number] & 56307(x1) Billed separately.  Time on review of neuropsychological test data, data interpretation, providing feedback to the patient, and documentation of my interpretation: 343 minutes; [id number] &46373 (x5).  Psychometrist time spent in the administration and scoring of 2 or more neuropsychological tests 330 minutes; 18847 & 01052 (x10).     Signatures     Thank you for the opportunity to assist in the care of your patient. Please do not hesitate to contact me at [phone] or via Epic staff message if I can be of further assistance.          _____________________  Kevin Luu, Ph.D.  Neuropsychologist  [facility]  Department of Neurology

## 2022-04-22 NOTE — PROGRESS NOTES
Can you provide her with a copy to take to [facility]? They can also have access as well on care everywhere.

## 2022-04-25 ENCOUNTER — PATIENT MESSAGE (OUTPATIENT)
Dept: NEUROLOGY | Facility: CLINIC | Age: 62
End: 2022-04-25
Payer: MEDICARE

## 2022-04-26 ENCOUNTER — HOSPITAL ENCOUNTER (OUTPATIENT)
Facility: HOSPITAL | Age: 62
Discharge: HOME | End: 2022-04-26
Attending: INTERNAL MEDICINE | Admitting: INTERNAL MEDICINE
Payer: MEDICARE

## 2022-04-26 DIAGNOSIS — I25.110 ATHEROSCLEROSIS OF NATIVE CORONARY ARTERY OF NATIVE HEART WITH UNSTABLE ANGINA PECTORIS: ICD-10-CM

## 2022-04-26 DIAGNOSIS — I20.89 ANGINAL EQUIVALENT: Primary | ICD-10-CM

## 2022-04-26 DIAGNOSIS — I20.89 ANGINA AT REST: ICD-10-CM

## 2022-04-26 DIAGNOSIS — Z98.61 S/P PTCA (PERCUTANEOUS TRANSLUMINAL CORONARY ANGIOPLASTY): ICD-10-CM

## 2022-04-26 LAB — CATH EF QUANTITATIVE: 60 %

## 2022-04-26 PROCEDURE — 93458 L HRT ARTERY/VENTRICLE ANGIO: CPT | Performed by: INTERNAL MEDICINE

## 2022-04-26 PROCEDURE — 93458 PR CATH PLACE/CORON ANGIO, IMG SUPER/INTERP,W LEFT HEART VENTRICULOGRAPHY: ICD-10-PCS | Mod: 26,,, | Performed by: INTERNAL MEDICINE

## 2022-04-26 PROCEDURE — 99153 MOD SED SAME PHYS/QHP EA: CPT | Performed by: INTERNAL MEDICINE

## 2022-04-26 PROCEDURE — 25000003 PHARM REV CODE 250: Performed by: INTERNAL MEDICINE

## 2022-04-26 PROCEDURE — 93010 EKG 12-LEAD: ICD-10-PCS | Mod: ,,, | Performed by: INTERNAL MEDICINE

## 2022-04-26 PROCEDURE — 99152 MOD SED SAME PHYS/QHP 5/>YRS: CPT | Mod: ,,, | Performed by: INTERNAL MEDICINE

## 2022-04-26 PROCEDURE — 93458 L HRT ARTERY/VENTRICLE ANGIO: CPT | Mod: 26,,, | Performed by: INTERNAL MEDICINE

## 2022-04-26 PROCEDURE — C1769 GUIDE WIRE: HCPCS | Performed by: INTERNAL MEDICINE

## 2022-04-26 PROCEDURE — 27201423 OPTIME MED/SURG SUP & DEVICES STERILE SUPPLY: Performed by: INTERNAL MEDICINE

## 2022-04-26 PROCEDURE — 99152 PR MOD CONSCIOUS SEDATION, SAME PHYS, 5+ YRS, FIRST 15 MIN: ICD-10-PCS | Mod: ,,, | Performed by: INTERNAL MEDICINE

## 2022-04-26 PROCEDURE — 63600175 PHARM REV CODE 636 W HCPCS: Performed by: INTERNAL MEDICINE

## 2022-04-26 PROCEDURE — 93010 ELECTROCARDIOGRAM REPORT: CPT | Mod: ,,, | Performed by: INTERNAL MEDICINE

## 2022-04-26 PROCEDURE — 25500020 PHARM REV CODE 255: Performed by: INTERNAL MEDICINE

## 2022-04-26 PROCEDURE — C1894 INTRO/SHEATH, NON-LASER: HCPCS | Performed by: INTERNAL MEDICINE

## 2022-04-26 PROCEDURE — 99152 MOD SED SAME PHYS/QHP 5/>YRS: CPT | Performed by: INTERNAL MEDICINE

## 2022-04-26 PROCEDURE — 93005 ELECTROCARDIOGRAM TRACING: CPT

## 2022-04-26 RX ORDER — VERAPAMIL HYDROCHLORIDE 2.5 MG/ML
INJECTION, SOLUTION INTRAVENOUS
Status: DISCONTINUED | OUTPATIENT
Start: 2022-04-26 | End: 2022-04-26 | Stop reason: HOSPADM

## 2022-04-26 RX ORDER — ATROPINE SULFATE 0.1 MG/ML
0.5 INJECTION INTRAVENOUS
Status: DISCONTINUED | OUTPATIENT
Start: 2022-04-26 | End: 2022-04-26 | Stop reason: HOSPADM

## 2022-04-26 RX ORDER — FENTANYL CITRATE 50 UG/ML
INJECTION, SOLUTION INTRAMUSCULAR; INTRAVENOUS
Status: DISCONTINUED | OUTPATIENT
Start: 2022-04-26 | End: 2022-04-26 | Stop reason: HOSPADM

## 2022-04-26 RX ORDER — ACETAMINOPHEN 325 MG/1
650 TABLET ORAL EVERY 4 HOURS PRN
Status: DISCONTINUED | OUTPATIENT
Start: 2022-04-26 | End: 2022-04-26 | Stop reason: HOSPADM

## 2022-04-26 RX ORDER — FUROSEMIDE 40 MG/1
40 TABLET ORAL 2 TIMES DAILY
Qty: 60 TABLET | Refills: 11 | Status: SHIPPED | OUTPATIENT
Start: 2022-04-26 | End: 2022-10-12

## 2022-04-26 RX ORDER — DIPHENHYDRAMINE HCL 50 MG
50 CAPSULE ORAL ONCE
Status: COMPLETED | OUTPATIENT
Start: 2022-04-26 | End: 2022-04-26

## 2022-04-26 RX ORDER — NITROGLYCERIN 5 MG/ML
INJECTION, SOLUTION INTRAVENOUS
Status: DISCONTINUED | OUTPATIENT
Start: 2022-04-26 | End: 2022-04-26 | Stop reason: HOSPADM

## 2022-04-26 RX ORDER — SODIUM CHLORIDE 9 MG/ML
INJECTION, SOLUTION INTRAVENOUS CONTINUOUS
Status: ACTIVE | OUTPATIENT
Start: 2022-04-26 | End: 2022-04-26

## 2022-04-26 RX ORDER — LIDOCAINE HYDROCHLORIDE 20 MG/ML
INJECTION, SOLUTION EPIDURAL; INFILTRATION; INTRACAUDAL; PERINEURAL
Status: DISCONTINUED | OUTPATIENT
Start: 2022-04-26 | End: 2022-04-26 | Stop reason: HOSPADM

## 2022-04-26 RX ORDER — HEPARIN SODIUM 1000 [USP'U]/ML
INJECTION, SOLUTION INTRAVENOUS; SUBCUTANEOUS
Status: DISCONTINUED | OUTPATIENT
Start: 2022-04-26 | End: 2022-04-26 | Stop reason: HOSPADM

## 2022-04-26 RX ORDER — ONDANSETRON 8 MG/1
8 TABLET, ORALLY DISINTEGRATING ORAL EVERY 8 HOURS PRN
Status: DISCONTINUED | OUTPATIENT
Start: 2022-04-26 | End: 2022-04-26 | Stop reason: HOSPADM

## 2022-04-26 RX ORDER — MIDAZOLAM HYDROCHLORIDE 1 MG/ML
INJECTION INTRAMUSCULAR; INTRAVENOUS
Status: DISCONTINUED | OUTPATIENT
Start: 2022-04-26 | End: 2022-04-26 | Stop reason: HOSPADM

## 2022-04-26 RX ORDER — NITROGLYCERIN 0.4 MG/1
0.4 TABLET SUBLINGUAL EVERY 5 MIN PRN
Status: DISCONTINUED | OUTPATIENT
Start: 2022-04-26 | End: 2022-04-26 | Stop reason: HOSPADM

## 2022-04-26 RX ORDER — HYDROCODONE BITARTRATE AND ACETAMINOPHEN 5; 325 MG/1; MG/1
1 TABLET ORAL EVERY 4 HOURS PRN
Status: DISCONTINUED | OUTPATIENT
Start: 2022-04-26 | End: 2022-04-26 | Stop reason: HOSPADM

## 2022-04-26 RX ORDER — OXYCODONE HYDROCHLORIDE 5 MG/1
10 TABLET ORAL EVERY 4 HOURS PRN
Status: DISCONTINUED | OUTPATIENT
Start: 2022-04-26 | End: 2022-04-26 | Stop reason: HOSPADM

## 2022-04-26 RX ADMIN — SODIUM CHLORIDE: 0.9 INJECTION, SOLUTION INTRAVENOUS at 07:04

## 2022-04-26 RX ADMIN — DIPHENHYDRAMINE HYDROCHLORIDE 50 MG: 50 CAPSULE ORAL at 07:04

## 2022-04-26 NOTE — Clinical Note
100 ml of contrast were injected throughout the case. 0 mL of contrast was the total wasted during the case. 100 mL was the total amount used during the case.

## 2022-04-26 NOTE — PLAN OF CARE
Pt discharged via WC to daughter's care at this time. Discharge instructions and sedation precautions reviewed with pt and daughter at BS. Pt ambulatory to bathroom with steady gait prior to DC. C/O moderate pain to L thumb, L wrist dressing CDI. Pt instructed to take Tylenol or Ibuprofen as needed for pain.

## 2022-04-26 NOTE — Clinical Note
The catheter was inserted over the wire into the ostial  left coronary artery. An angiography was performed of the left coronary arteries. Multiple views were taken. Over 035J guidewire

## 2022-04-26 NOTE — DISCHARGE INSTRUCTIONS
"  Post-op Heart Catheterization    1. DIET: It is advisable for you to follow a diet that limits the intake of salt, sugar, saturated fats and cholesterol.     2. DRIVING: Due to sedation you received during your procedure, DO NOT drive or operate machinery for 24 hours. Avoid making critical decisions or signing legal documents until tomorrow.    3. ACTIVITY: AVOID activities that require bending of the affected arm/wrist for 3 days and submerging the site in water for 3 days. REMOVE the dressing the day after  the procedure, and shower.  Apply a bandaid to site after shower.  WEAR wrist immobilizer until tomorrow night.    You may RESUME your normal activities or prescribed exercise program as instructed by your physician after 3 days.                                                                                                                 4. WOUND CARE: It is not unusual to have a small amount of bruising to appear at or near the puncture site. It is also common to have a tender "knot" develop beneath the skin at the puncture site of the wrist/arm. This is usually scar tissue and is not a cause for concern or alarm. This tender knot may take several weeks to fully resolve. The bruise will usually spread over several days. However, if the lump gets bigger, call your doctor immediately.    5. DISCOMFORT: For general discomfort at the puncture site, you may take 1 or 2 Acetaminophen (Tylenol) tablets every 4 - 6 hours as needed. (Do not take more than 4000 mg a day)    6. SIGNS AND SYMPTOMS TO REPORT:  Call your physician immediately if any of the following occur:                                            1. Loss of feeling, warmth or color to the affected arm/wrist                                                                                                          2. Mild beeding from the site                 3. Pain that is sudden, sharp or persistent in the affected arm/wrist                 4. " "Swelling or a change in "lump" size, increased redness or drainage at the puncture site                                                                               5. High fever (101 degrees or higher)    7. GO TO  THE EMERGENCY ROOM OR CALL 911 IF YOU HAVE: Chest pains or discomforts not relieved with 3 nitroglycerin doses (sublingual tablets or spray), numbness or severe pain of limb, if your limb becomes cold or discolored or if you develop uncontrolled bleeding from the puncture site (quickly apply firm, direct pressure above the site).   Post-op Heart Catheterization    1. DIET: It is advisable for you to follow a diet that limits the intake of salt, sugar, saturated fats and cholesterol.     2. DRIVING: Due to sedation you received during your procedure, DO NOT drive or operate machinery for 24 hours. Avoid making critical decisions or signing legal documents until tomorrow.    3. ACTIVITY: AVOID activities that require bending of the affected arm/wrist for 3 days and submerging the site in water for 3 days. REMOVE the dressing the day after  the procedure, and shower.  Apply a bandaid to site after shower.  WEAR wrist immobilizer until tomorrow night.    You may RESUME your normal activities or prescribed exercise program as instructed by your physician after 3 days.                                                                                                                 4. WOUND CARE: It is not unusual to have a small amount of bruising to appear at or near the puncture site. It is also common to have a tender "knot" develop beneath the skin at the puncture site of the wrist/arm. This is usually scar tissue and is not a cause for concern or alarm. This tender knot may take several weeks to fully resolve. The bruise will usually spread over several days. However, if the lump gets bigger, call your doctor immediately.    5. DISCOMFORT: For general discomfort at the puncture site, you may take 1 or 2 " "Acetaminophen (Tylenol) tablets every 4 - 6 hours as needed. (Do not take more than 4000 mg a day)    6. SIGNS AND SYMPTOMS TO REPORT:  Call your physician immediately if any of the following occur:                                            1. Loss of feeling, warmth or color to the affected arm/wrist                                                                                                          2. Mild beeding from the site                 3. Pain that is sudden, sharp or persistent in the affected arm/wrist                 4. Swelling or a change in "lump" size, increased redness or drainage at the puncture site                                                                               5. High fever (101 degrees or higher)    7. GO TO  THE EMERGENCY ROOM OR CALL 911 IF YOU HAVE: Chest pains or discomforts not relieved with 3 nitroglycerin doses (sublingual tablets or spray), numbness or severe pain of limb, if your limb becomes cold or discolored or if you develop uncontrolled bleeding from the puncture site (quickly apply firm, direct pressure above the site).   "

## 2022-04-26 NOTE — Clinical Note
The catheter was inserted over the wire into the ostium   right coronary artery. Hemodynamics were performed.  An angiography was performed of the right coronary arteries. Multiple views were taken. Over 035J guidewire

## 2022-04-26 NOTE — Clinical Note
The catheter was inserted over the wire into the left ventricle. Hemodynamics were performed.  and Pullback was recorded.  The angiography was performed via power injection. The injected amount was 20 mL contrast at 12 mL/s. The PSI from the power injection was 800. Over 035J guidewire

## 2022-04-26 NOTE — Clinical Note
The radial band was applied to the left radial artery. 9 cc's of air were inserted into the closure device.

## 2022-04-26 NOTE — Clinical Note
The catheter was removed from the left coronary artery. Unable to cannulate coaxially. Over 035J guidewire

## 2022-04-26 NOTE — H&P
Subjective:   Patient ID:  Evelyne Francisco is a 61 y.o. female who presents for follow-up of Follow-up  echo nml lv function, Pt still with SCALES.NMT -(-)  PCI 6-21     Hyperlipidemia  This is a chronic problem. The current episode started more than 1 year ago. Recent lipid tests were reviewed and are variable. Pertinent negatives include no chest pain or shortness of breath. Current antihyperlipidemic treatment includes ezetimibe. The current treatment provides moderate improvement of lipids. Compliance problems include medication side effects.    Coronary Artery Disease  Presents for follow-up visit. Pertinent negatives include no chest pain, chest tightness, dizziness, leg swelling, muscle weakness, palpitations, shortness of breath or weight gain. Risk factors include hyperlipidemia. The symptoms have been stable. Compliance with diet is variable. Compliance with exercise is variable. Compliance with medications is good.         Review of Systems   Constitutional: Negative. Negative for weight gain.   HENT: Negative.    Eyes: Negative.    Cardiovascular: Negative.  Negative for chest pain, leg swelling and palpitations.   Respiratory: Negative.  Negative for chest tightness and shortness of breath.    Endocrine: Negative.    Hematologic/Lymphatic: Negative.    Skin: Negative.    Musculoskeletal: Negative for muscle weakness.   Gastrointestinal: Negative.    Genitourinary: Negative.    Neurological: Negative.  Negative for dizziness.   Psychiatric/Behavioral: Negative.    Allergic/Immunologic: Negative.             Family History   Problem Relation Age of Onset    Hypertension Mother      Hyperlipidemia Mother      Stroke Father      Seizures Father      Hypertension Sister      Cancer Brother      Marfan syndrome Daughter             Past Medical History:   Diagnosis Date    Blood clotting tendency      Coronary artery disease      Fever blister      HLD (hyperlipidemia)      Hypertension       Hypothyroidism 2020    Renal cyst 2021    Seizures      Sleep apnea      Temporal lobe epilepsy 2016      Social History            Socioeconomic History    Marital status:    Tobacco Use    Smoking status: Former Smoker       Quit date: 2014       Years since quittin.7    Smokeless tobacco: Never Used   Substance and Sexual Activity    Alcohol use: No    Drug use: No    Sexual activity: Not Currently       Partners: Male             Current Outpatient Medications on File Prior to Visit   Medication Sig Dispense Refill    cloBAZam (ONFI) 2.5 mg/mL Susp Take 4 mLs (10 mg total) by mouth 2 (two) times daily. 360 mL 5    clopidogreL (PLAVIX) 75 mg tablet Take 75 mg by mouth once daily.        estradioL (ESTRACE) 2 MG tablet Take 1 tablet (2 mg total) by mouth once daily. 30 tablet 11    ezetimibe (ZETIA) 10 mg tablet Take 10 mg by mouth once daily.        furosemide (LASIX) 40 MG tablet Take 1 tablet (40 mg total) by mouth once daily. 30 tablet 11    gabapentin (NEURONTIN) 100 MG capsule Take 100 mg by mouth 3 (three) times daily.        isosorbide mononitrate (IMDUR) 60 MG 24 hr tablet Take 1 tablet (60 mg total) by mouth every 12 (twelve) hours. 90 tablet 7    levocetirizine (XYZAL) 5 MG tablet Take 1 tablet (5 mg total) by mouth every evening. 90 tablet 3    levothyroxine (SYNTHROID) 100 MCG tablet Take 1 tablet (100 mcg total) by mouth before breakfast. 90 tablet 1    LIDOcaine (LIDODERM) 5 % PUT 1 PATCH TO AFFECTED AREA FOR 12 HOURS AS NEEDED FOR PAIN; TAKE OFF FOR AT LEAST 12 HOURS BEFORE APPLYING ANOTHER PATCH        metoprolol succinate (TOPROL-XL) 50 MG 24 hr tablet Take 1 tablet (50 mg total) by mouth once daily. 90 tablet 1    pantoprazole (PROTONIX) 40 MG tablet Take 1 tablet (40 mg total) by mouth 2 (two) times daily. 180 tablet 3    phenytoin (DILANTIN) 100 MG ER capsule Take 2 capsules (200 mg total) by mouth 2 (two) times daily. 360 capsule 3     potassium chloride (MICRO-K) 10 MEQ CpSR Take 10 mEq by mouth once.        ranolazine (RANEXA) 1,000 mg Tb12 Take 1,000 mg by mouth 2 (two) times daily.        REPATHA SURECLICK 140 mg/mL PnIj SMARTSI Milligram(s) SUB-Q Every 2 Weeks 2 each 11    solifenacin (VESICARE) 10 MG tablet Take 1 tablet (10 mg total) by mouth once daily. 30 tablet 11    zonisamide (ZONEGRAN) 100 MG Cap Take 3 capsules (300 mg total) by mouth 2 (two) times daily. 540 capsule 3      No current facility-administered medications on file prior to visit.            Review of patient's allergies indicates:   Allergen Reactions    Latex, natural rubber Hives, Shortness Of Breath and Swelling       Throat swelling  Throat swelling  Other reaction(s): Hives  Throat swelling    Ace inhibitors Other (See Comments)       Coughing  Other reaction(s): Unknown    Diclo gel [diclofenac sodium] Other (See Comments)       Chest pain         Objective:      Physical Exam  Vitals and nursing note reviewed.   Constitutional:       Appearance: She is well-developed.   HENT:      Head: Normocephalic and atraumatic.   Eyes:      Conjunctiva/sclera: Conjunctivae normal.      Pupils: Pupils are equal, round, and reactive to light.   Cardiovascular:      Rate and Rhythm: Normal rate and regular rhythm.      Pulses: Intact distal pulses.      Heart sounds: Normal heart sounds.   Pulmonary:      Effort: Pulmonary effort is normal.      Breath sounds: Normal breath sounds.   Abdominal:      General: Bowel sounds are normal.      Palpations: Abdomen is soft.   Musculoskeletal:         General: Normal range of motion.      Cervical back: Normal range of motion and neck supple.   Skin:     General: Skin is warm and dry.   Neurological:      Mental Status: She is alert and oriented to person, place, and time.          Assessment:      1. Coronary artery disease involving native coronary artery of native heart without angina pectoris    2. Essential hypertension     3. Hx of heart artery stent    4. Primary hypertension    5. Pure hypercholesterolemia          Plan:      Coronary artery disease involving native coronary artery of native heart without angina pectoris     Essential hypertension     Hx of heart artery stent     Primary hypertension     Pure hypercholesterolemia     Continue ranexa, imdur- cad  Continue zetia-hlp  Continue metoprolol, lasix-htn     [facility]

## 2022-04-26 NOTE — BRIEF OP NOTE
<[facility] - Cath Lab ([facility])  Surgery Department  Operative Note    SUMMARY     Date of Procedure: 4/26/2022     Procedure: Procedure(s) (LRB):  CATHETERIZATION, HEART, LEFT (Left)     Surgeon(s) and Role:     * Richie Butt MD - Primary    Assisting Surgeon: None    Pre-Operative Diagnosis: Atherosclerosis of native coronary artery of native heart with unstable angina pectoris [I25.110]S/P PTCA (percutaneous transluminal coronary angioplasty) [Z98.61]    Post-Operative Diagnosis: Post-Op Diagnosis Codes:     * Atherosclerosis of native coronary artery of native heart with unstable angina pectoris [I25.110]     * S/P PTCA (percutaneous transluminal coronary angioplasty) [Z98.61]    Anesthesia: RN IV Sedation    Technical Procedures Used: LHC    Description of the Findings of the Procedure: LHC, DX: ANGINAL EQUIVALENT, FINDINGS- NONOBSTRUCTIVE DS, RCA , LCX STENT PATENT, RAMUS STENT 50%, NML LV FUNCTION    Significant Surgical Tasks Conducted by the Assistant(s), if Applicable: NONE    Complications: No    Estimated Blood Loss (EBL): < 50 cc           Implants: * No implants in log *    Specimens:   Specimen (24h ago, onward)            None                  Condition: Good    Disposition: PACU - hemodynamically stable.    Attestation: I was present and scrubbed for the entire procedure.

## 2022-04-26 NOTE — Clinical Note
The catheter was inserted over the wire into the ostium   right coronary artery. Hemodynamics were performed.  An angiography was performed of the right coronary arteries. Multiple views were taken. Over wholey guidewire

## 2022-04-26 NOTE — Clinical Note
The catheter was inserted over the wire into the ostial  left coronary artery. Hemodynamics were performed.  An angiography was performed of the left coronary arteries.

## 2022-04-28 ENCOUNTER — OFFICE VISIT (OUTPATIENT)
Dept: NEUROLOGY | Facility: CLINIC | Age: 62
End: 2022-04-28
Payer: MEDICARE

## 2022-04-28 VITALS
HEART RATE: 68 BPM | TEMPERATURE: 98 F | SYSTOLIC BLOOD PRESSURE: 118 MMHG | DIASTOLIC BLOOD PRESSURE: 74 MMHG | WEIGHT: 230.81 LBS | HEIGHT: 65 IN | BODY MASS INDEX: 38.45 KG/M2 | OXYGEN SATURATION: 99 % | RESPIRATION RATE: 18 BRPM

## 2022-04-28 DIAGNOSIS — G40.919 REFRACTORY EPILEPSY: ICD-10-CM

## 2022-04-28 DIAGNOSIS — R45.89 ANXIETY ABOUT HEALTH: ICD-10-CM

## 2022-04-28 DIAGNOSIS — R41.89 COGNITIVE CHANGE: Primary | ICD-10-CM

## 2022-04-28 PROCEDURE — 99212 OFFICE O/P EST SF 10 MIN: CPT | Mod: PBBFAC | Performed by: STUDENT IN AN ORGANIZED HEALTH CARE EDUCATION/TRAINING PROGRAM

## 2022-04-28 PROCEDURE — 99499 UNLISTED E&M SERVICE: CPT | Mod: S$PBB,,, | Performed by: STUDENT IN AN ORGANIZED HEALTH CARE EDUCATION/TRAINING PROGRAM

## 2022-04-28 PROCEDURE — 99999 PR PBB SHADOW E&M-EST. PATIENT-LVL II: ICD-10-PCS | Mod: PBBFAC,,, | Performed by: STUDENT IN AN ORGANIZED HEALTH CARE EDUCATION/TRAINING PROGRAM

## 2022-04-28 PROCEDURE — 99999 PR PBB SHADOW E&M-EST. PATIENT-LVL II: CPT | Mod: PBBFAC,,, | Performed by: STUDENT IN AN ORGANIZED HEALTH CARE EDUCATION/TRAINING PROGRAM

## 2022-04-28 PROCEDURE — 99499 NO LOS: ICD-10-PCS | Mod: S$PBB,,, | Performed by: STUDENT IN AN ORGANIZED HEALTH CARE EDUCATION/TRAINING PROGRAM

## 2022-04-28 NOTE — PROGRESS NOTES
Ms. Francisco attended a in-person feedback session to discuss the results from her recent neuropsychological testing (see report dated 04/18/2022). We discussed her test results, diagnoses, and my recommendations. Ms. Francisco voiced her understanding of the information provided, and voiced her intention to follow through on the recommendations provided. All questions were answered to her satisfaction and Ms. Francisco was provided with a copy of her report. she was encouraged to contact our office with any future questions or concerns.         _____________________  Kevin Luu, Ph.D.  Neuropsychologist  [facility]  Department of Neurology     Billing Information:   Time spent discussing test results with the patient: 48 minutes  Total time spent for this encounter, including discussion of test results, review of pertinent records, and documentation of this encounter: 60 minutes  [id number] (1) Billed separately.

## 2022-05-01 ENCOUNTER — PATIENT MESSAGE (OUTPATIENT)
Dept: CARDIOLOGY | Facility: CLINIC | Age: 62
End: 2022-05-01
Payer: MEDICARE

## 2022-05-01 DIAGNOSIS — I25.10 CORONARY ARTERY DISEASE INVOLVING NATIVE CORONARY ARTERY OF NATIVE HEART WITHOUT ANGINA PECTORIS: Primary | ICD-10-CM

## 2022-05-02 ENCOUNTER — OFFICE VISIT (OUTPATIENT)
Dept: NEUROLOGY | Facility: CLINIC | Age: 62
End: 2022-05-02
Payer: MEDICARE

## 2022-05-02 ENCOUNTER — PATIENT OUTREACH (OUTPATIENT)
Dept: ADMINISTRATIVE | Facility: OTHER | Age: 62
End: 2022-05-02
Payer: MEDICARE

## 2022-05-02 VITALS
HEIGHT: 65 IN | HEART RATE: 101 BPM | RESPIRATION RATE: 16 BRPM | WEIGHT: 223.13 LBS | SYSTOLIC BLOOD PRESSURE: 115 MMHG | BODY MASS INDEX: 37.18 KG/M2 | DIASTOLIC BLOOD PRESSURE: 77 MMHG | OXYGEN SATURATION: 95 %

## 2022-05-02 DIAGNOSIS — G40.109 TEMPORAL LOBE EPILEPSY: ICD-10-CM

## 2022-05-02 DIAGNOSIS — R45.89 ANXIETY ABOUT HEALTH: ICD-10-CM

## 2022-05-02 DIAGNOSIS — G40.919 REFRACTORY EPILEPSY: Primary | ICD-10-CM

## 2022-05-02 PROCEDURE — 99215 PR OFFICE/OUTPT VISIT, EST, LEVL V, 40-54 MIN: ICD-10-PCS | Mod: S$PBB,,, | Performed by: NURSE PRACTITIONER

## 2022-05-02 PROCEDURE — 99999 PR PBB SHADOW E&M-EST. PATIENT-LVL V: ICD-10-PCS | Mod: PBBFAC,,, | Performed by: NURSE PRACTITIONER

## 2022-05-02 PROCEDURE — 99215 OFFICE O/P EST HI 40 MIN: CPT | Mod: S$PBB,,, | Performed by: NURSE PRACTITIONER

## 2022-05-02 PROCEDURE — 99999 PR PBB SHADOW E&M-EST. PATIENT-LVL V: CPT | Mod: PBBFAC,,, | Performed by: NURSE PRACTITIONER

## 2022-05-02 PROCEDURE — 99215 OFFICE O/P EST HI 40 MIN: CPT | Mod: PBBFAC | Performed by: NURSE PRACTITIONER

## 2022-05-02 RX ORDER — CLOPIDOGREL BISULFATE 75 MG/1
75 TABLET ORAL DAILY
Qty: 90 TABLET | Refills: 3 | Status: SHIPPED | OUTPATIENT
Start: 2022-05-02

## 2022-05-02 NOTE — PROGRESS NOTES
Health Maintenance Due   Topic Date Due    TETANUS VACCINE  Never done    COVID-19 Vaccine (4 - Booster for Moderna series) 04/20/2022     Updates were requested from care everywhere.  Chart was reviewed for overdue Proactive [facility]counters (MARIANO) topics (CRS, Breast Cancer Screening, Eye exam)  Health Maintenance has been updated.  LINKS immunization registry triggered.  Immunizations were reconciled.

## 2022-05-02 NOTE — PROGRESS NOTES
Subjective:       Patient ID: Evelyne Francisco is a 61 y.o. female.    Chief Complaint: Temporal lobe epilepsy           HPI       BACKGROUND HISTORY         The patient is here for seizure evaluation and a 4th opinion.    Saw Dr. Monge, Dr. Hobbs and Dr. Smith in the past.     The patient started having seizures 11 years ago (2009) at the age of 49 .The patient describes aura of strange feeling in her head that she cannot describe. The patient is amnestic to the seizure after that. The seizures are mostly described as Complex Partial (staring, unresponsiveness, sometimes accompanied by spitting) and some of the them Grand Mal (GTC). The patient is currently not driving and has 2-5 seizure a month. No history of meningitis or encephalitis No toxic exposure or lead poisoning. Her father had posttraumatic family history of epilepsy and her nephew might be having seizures.  No history of strokes or aneurysmal bleeding. No history of TBI. Routine EEGs have been normal. EMU in 2016 captured 2 seizures with B/L TL localization. Brain MRI was reported as unremarkable. Failed VPA, LTG, OXC, LEV and VNS. She was on  mg BID and  mg BID. LCM,  ESL and BRV were too expensive. She said that CLB helped control the seizures but the insurance would not pay for it. Added CLB 20 mg BID.Explained to the patient that failing 2 AEDs typically means that chance of achieving seizure-freedom using AEDs alone is <10% and we should explore other treatments like epilepsy surgery.Could not tolerate CLB 20 mg BID.  Was approved to be seen at [facility].  Titrated PHT to 200 mg BID since levels were very low. Undergoing epilepsy surgery evaluation at [facility] by Dr. Nunez. EMU monitoring in  captured 3 stereotypical seizures originating form RT TL. The plan includes CNP, PET, WADA +/- DARCI. Trying to solve some [facility] issues [facility]     Last seizure 01-.AED Regimen:  mg BID , ZNS  300 mg BID and CLB 3 ml/ 4 ml (7.5 mg/10 mg) . On  mg BID for pain.        INTERVAL HISTORY     Patient present for follow up Epilepsy management. Patient doing well.  Last seizures occurred approximately 04-. The patient  Reports she didn't fall out during her event, No HILARY, No LOC. She reports her daughter helped guided her to her bed. She recall drooling, but no shaking, no jerking, no tongue biting, and no urinary incontinence.  She slept afterwards. Unable to state how long event last or details of what happened. Patient reportsher seizures have been less frequent, she has gone 1 or 2 months without an event. The patient reports when she has an event free month usually the following months she well have have 2 to 3 in a day.    Tolerating AED regimen   mg BID,  mg BID. CLB 10 mg BID (4 mL BID), she reports compliances with medications. She denies having to use the Nayzilam (NS Midazolam) for prolonged and/or clusters of seizures at this time but will continue the medication.      Patient continues to follow up with [facility] by Dr. Nunez. For surgical evaluation . She recently completed the CNP testing on 04- for Epilepsy surgical evalution  work up.    Patient request letter for travel, she will be flying to [place] this month and letter needed to show medically necessity for AED mediations and seizure device while traveling, letter completed.         Review of Systems   Constitutional: Positive for fatigue. Negative for appetite change.   HENT: Negative for hearing loss and tinnitus.    Eyes: Negative for photophobia and visual disturbance.   Respiratory: Negative for apnea and shortness of breath.    Cardiovascular: Negative for chest pain and palpitations.   Gastrointestinal: Negative for nausea and vomiting.   Endocrine: Negative for cold intolerance and heat intolerance.   Genitourinary: Negative for difficulty urinating and urgency.   Musculoskeletal: Positive for  arthralgias. Negative for back pain, gait problem, joint swelling, myalgias, neck pain and neck stiffness.   Skin: Negative for color change and rash.   Allergic/Immunologic: Negative for environmental allergies and immunocompromised state.   Neurological: Positive for seizures. Negative for dizziness, tremors, syncope, facial asymmetry, speech difficulty, weakness, light-headedness, numbness and headaches.   Hematological: Negative for adenopathy. Does not bruise/bleed easily.   Psychiatric/Behavioral: Negative for agitation, behavioral problems, confusion, decreased concentration, dysphoric mood, hallucinations, self-injury, sleep disturbance and suicidal ideas. The patient is not hyperactive.              Current Outpatient Medications:     cloBAZam (ONFI) 2.5 mg/mL Susp, Take 4 mLs (10 mg total) by mouth 2 (two) times daily., Disp: 360 mL, Rfl: 5    clopidogreL (PLAVIX) 75 mg tablet, Take 75 mg by mouth once daily., Disp: , Rfl:     estradioL (ESTRACE) 2 MG tablet, Take 1 tablet (2 mg total) by mouth once daily., Disp: 30 tablet, Rfl: 11    ezetimibe (ZETIA) 10 mg tablet, Take 10 mg by mouth once daily., Disp: , Rfl:     furosemide (LASIX) 40 MG tablet, Take 1 tablet (40 mg total) by mouth 2 (two) times daily., Disp: 60 tablet, Rfl: 11    isosorbide mononitrate (IMDUR) 60 MG 24 hr tablet, Take 1 tablet (60 mg total) by mouth every 12 (twelve) hours., Disp: 90 tablet, Rfl: 7    levocetirizine (XYZAL) 5 MG tablet, Take 1 tablet (5 mg total) by mouth every evening., Disp: 90 tablet, Rfl: 3    levothyroxine (SYNTHROID) 100 MCG tablet, Take 1 tablet (100 mcg total) by mouth before breakfast., Disp: 90 tablet, Rfl: 1    LIDOcaine (LIDODERM) 5 %, PUT 1 PATCH TO AFFECTED AREA FOR 12 HOURS AS NEEDED FOR PAIN; TAKE OFF FOR AT LEAST 12 HOURS BEFORE APPLYING ANOTHER PATCH, Disp: , Rfl:     metoprolol succinate (TOPROL-XL) 50 MG 24 hr tablet, Take 1 tablet (50 mg total) by mouth once daily., Disp: 90 tablet, Rfl:  1    naproxen (NAPROSYN) 500 MG tablet, Take 500 mg by mouth 2 (two) times daily as needed., Disp: , Rfl:     NAYZILAM 5 mg/spray (0.1 mL) Spry, INSTILL 1 SPRAY IN NASAL ROUTE ONCE AS NEEDED. (1 SPRAY FOR PROLONGED SEIZURE OR CLUSTERS OF SEIZURES. MAY REPEAT ONCE IN OPPOSITE NOSTRIL AFTER 10 MINUTES.), Disp: , Rfl:     pantoprazole (PROTONIX) 40 MG tablet, Take 1 tablet (40 mg total) by mouth 2 (two) times daily., Disp: 180 tablet, Rfl: 3    phenytoin (DILANTIN) 100 MG ER capsule, Take 2 capsules by mouth twice daily, Disp: 360 capsule, Rfl: 3    potassium chloride (MICRO-K) 10 MEQ CpSR, Take 10 mEq by mouth once., Disp: , Rfl:     ranolazine (RANEXA) 1,000 mg Tb12, Take 1,000 mg by mouth 2 (two) times daily., Disp: , Rfl:     REPATHA SURECLICK 140 mg/mL PnIj, SMARTSI Milligram(s) SUB-Q Every 2 Weeks, Disp: 2 each, Rfl: 11    solifenacin (VESICARE) 10 MG tablet, Take 1 tablet (10 mg total) by mouth once daily., Disp: 30 tablet, Rfl: 11    tiZANidine (ZANAFLEX) 4 MG tablet, Take by mouth., Disp: , Rfl:     zonisamide (ZONEGRAN) 100 MG Cap, Take 3 capsules (300 mg total) by mouth 2 (two) times daily., Disp: 540 capsule, Rfl: 3  Past Medical History:   Diagnosis Date    Blood clotting tendency     Coronary artery disease     Fever blister     HLD (hyperlipidemia)     Hypertension     Hypothyroidism 2020    Renal cyst 2021    Seizures     Sleep apnea     Temporal lobe epilepsy 2016     Past Surgical History:   Procedure Laterality Date    ABDOMINAL SURGERY      BACK SURGERY      BREAST BIOPSY Left     benign    CARDIAC SURGERY      ESOPHAGEAL MANOMETRY WITH MEASUREMENT OF IMPEDANCE N/A 2021    Procedure: MANOMETRY, ESOPHAGUS, WITH IMPEDANCE MEASUREMENT;  Surgeon: Sumanth Curran RN;  Location: [facility];  Service: Endoscopy;  Laterality: N/A;    FRACTURE SURGERY      HYSTERECTOMY      LEFT HEART CATHETERIZATION Left 2022    Procedure: CATHETERIZATION, HEART, LEFT;   Surgeon: Richie Butt MD;  Location: [facility] CATH LAB;  Service: Cardiology;  Laterality: Left;    OOPHORECTOMY      TONSILLECTOMY      VASCULAR SURGERY       Social History     Socioeconomic History    Marital status:    Tobacco Use    Smoking status: Former Smoker     Quit date: 2014     Years since quittin.8    Smokeless tobacco: Never Used   Substance and Sexual Activity    Alcohol use: No    Drug use: No    Sexual activity: Not Currently     Partners: Male             Past/Current Medical/Surgical History, Past/Current Social History, Past/Current Family History and Past/Current Medications were reviewed in detail.        Objective:           VITAL SIGNS WERE REVIEWED      GENERAL APPEARANCE:     The patient looks fatigued.     BMI 40.32     No signs of respiratory distress.    Normal breathing pattern.    No dysmorphic features    Normal eye contact.     GENERAL MEDICAL EXAM:    HEENT:  Head is atraumatic normocephalic.     Neck and Axillae: No JVD. No visible lesions    Cardiopulmonary: No cyanosis. No tachypnea. Normal respiratory effort.VNS in place.    Gastrointestinal/Urogenital:  No jaundice. No stomas or lesions. No visible hernias. No catheters.     Skin, Hair and Nails: No pathognonomic skin rash. No neurofibromatosis. No visible lesions.No stigmata of autoimmune disease. No clubbing.    Limbs: No varicose veins. No visible swelling.    Muskoskeletal: No visible deformities.No visible lesions.               Neurologic Exam     Mental Status   Oriented to person, place, and time.   Follows 3 step commands.   Attention: normal. Concentration: normal.   Speech: speech is normal   Level of consciousness: alert  Knowledge: good.   Able to name object. Able to read. Able to repeat. Normal comprehension.     Cranial Nerves   Cranial nerves II through XII intact.     CN II   Visual fields full to confrontation.   Visual acuity: normal with correction  Right visual field deficit:  none  Left visual field deficit: none     CN III, IV, VI   Pupils are equal, round, and reactive to light.  Extraocular motions are normal.   Right pupil: Size: 2 mm. Shape: regular. Reactivity: brisk. Consensual response: intact. Accommodation: intact.   Left pupil: Size: 2 mm. Shape: regular. Reactivity: brisk. Consensual response: intact. Accommodation: intact.   CN III: no CN III palsy  CN VI: no CN VI palsy  Nystagmus: none   Diplopia: none  Ophthalmoparesis: none  Upgaze: normal  Downgaze: normal  Conjugate gaze: present  Vestibulo-ocular reflex: present    CN V   Facial sensation intact.   Right facial sensation deficit: none  Left facial sensation deficit: none    CN VII   Facial expression full, symmetric.   Right facial weakness: none  Left facial weakness: none    CN VIII   CN VIII normal.   Hearing: intact  Right Rinne: AC > BC  Left Rinne: AC > BC  Mendes: does not lateralize     CN IX, X   CN IX normal.   CN X normal.   Palate: symmetric    CN XI   CN XI normal.   Right sternocleidomastoid strength: normal  Left sternocleidomastoid strength: normal  Right trapezius strength: normal  Left trapezius strength: normal    CN XII   CN XII normal.   Tongue: not atrophic  Fasciculations: absent  Tongue deviation: none    Motor Exam   Muscle bulk: normal  Overall muscle tone: normal  Right arm tone: normal  Left arm tone: normal  Right arm pronator drift: absent  Left arm pronator drift: absent  Right leg tone: normal  Left leg tone: normal    Strength   Strength 5/5 throughout.   Right neck flexion: 5/5  Left neck flexion: 5/5  Right neck extension: 5/5  Left neck extension: 5/5  Right deltoid: 5/5  Left deltoid: 5/5  Right biceps: 5/5  Left biceps: 5/5  Right triceps: 5/5  Left triceps: 5/5  Right wrist flexion: 5/5  Left wrist flexion: 5/5  Right wrist extension: 5/5  Left wrist extension: 5/5  Right interossei: 5/5  Left interossei: 5/5  Right iliopsoas: 5/5  Left iliopsoas: 5/5  Right quadriceps: 5/5  Left  quadriceps: 5/5  Right hamstrin/5  Left hamstrin/5  Right glutei: 5/5  Left glutei: 5/5  Right anterior tibial: 5/5  Left anterior tibial: 5/5  Right posterior tibial: 5/5  Left posterior tibial: 5/5  Right peroneal: 5/5  Left peroneal: 5/5  Right gastroc: 5/5  Left gastroc: 5/5    Sensory Exam   Light touch normal.   Right arm light touch: normal  Left arm light touch: normal  Right leg light touch: normal  Left leg light touch: normal  Vibration normal.   Right arm vibration: normal  Left arm vibration: normal  Right leg vibration: normal  Left leg vibration: normal  Proprioception normal.   Right arm proprioception: normal  Left arm proprioception: normal  Right leg proprioception: normal  Left leg proprioception: normal  Pinprick normal.   Right arm pinprick: normal  Left arm pinprick: normal  Right leg pinprick: normal  Left leg pinprick: normal  Graphesthesia: normal  Stereognosis: normal    Gait, Coordination, and Reflexes     Gait  Gait: normal    Coordination   Romberg: negative  Finger to nose coordination: normal  Heel to shin coordination: normal  Tandem walking coordination: normal    Tremor   Resting tremor: absent  Intention tremor: absent  Action tremor: absent    Reflexes   Right brachioradialis: 2+  Left brachioradialis: 2+  Right biceps: 2+  Left biceps: 2+  Right triceps: 2+  Left triceps: 2+  Right patellar: 2+  Left patellar: 2+  Right achilles: 2+  Left achilles: 2+  Right plantar: normal  Left plantar: normal  Right Marie: absent  Left Marie: absent  Right ankle clonus: absent  Left ankle clonus: absent  Right pendular knee jerk: absent  Left pendular knee jerk: absent      Lab Results   Component Value Date    WBC 3.95 2022    HGB 13.1 2022    HCT 41.5 2022     (H) 2022     2022     Sodium   Date Value Ref Range Status   2022 142 136 - 145 mmol/L Final     Potassium   Date Value Ref Range Status   2022 4.4 3.5 - 5.1 mmol/L  Final     Chloride   Date Value Ref Range Status   04/08/2022 110 95 - 110 mmol/L Final     CO2   Date Value Ref Range Status   04/08/2022 24 23 - 29 mmol/L Final     Glucose   Date Value Ref Range Status   04/08/2022 91 70 - 110 mg/dL Final     BUN   Date Value Ref Range Status   04/08/2022 14 6 - 20 mg/dL Final     Creatinine   Date Value Ref Range Status   04/08/2022 0.8 0.5 - 1.4 mg/dL Final     Calcium   Date Value Ref Range Status   04/08/2022 9.4 8.7 - 10.5 mg/dL Final     Total Protein   Date Value Ref Range Status   12/07/2020 6.9 6.0 - 8.4 g/dL Final     Albumin   Date Value Ref Range Status   12/07/2020 3.5 3.5 - 5.2 g/dL Final     Total Bilirubin   Date Value Ref Range Status   12/07/2020 0.3 0.1 - 1.0 mg/dL Final     Comment:     For infants and newborns, interpretation of results should be based  on gestational age, weight and in agreement with clinical  observations.  Premature Infant recommended reference ranges:  Up to 24 hours.............<8.0 mg/dL  Up to 48 hours............<12.0 mg/dL  3-5 days..................<15.0 mg/dL  6-29 days.................<15.0 mg/dL       Alkaline Phosphatase   Date Value Ref Range Status   12/07/2020 150 (H) 55 - 135 U/L Final     AST   Date Value Ref Range Status   12/07/2020 14 10 - 40 U/L Final     ALT   Date Value Ref Range Status   12/07/2020 13 10 - 44 U/L Final     Anion Gap   Date Value Ref Range Status   04/08/2022 8 8 - 16 mmol/L Final     eGFR if    Date Value Ref Range Status   04/08/2022 >60.0 >60 mL/min/1.73 m^2 Final     eGFR if non    Date Value Ref Range Status   04/08/2022 >60.0 >60 mL/min/1.73 m^2 Final     Comment:     Calculation used to obtain the estimated glomerular filtration  rate (eGFR) is the CKD-EPI equation.        No results found for: [id number]  Lab Results   Component Value Date    TSH 0.561 04/14/2022    FREET4 0.89 04/14/2022 2016-2020    Brain MRI Unremarkable    EEG TL Seizures  B/L        [facility] EMU     3 RT TL Seizures     Labs Reviewed:    05-    ZNS level 39 NL (10- 40)    CLB level 55.7 NL ( )   AED LEVELS     AED: PHT NORMAL LEVEL RANGE: 10-20   DATE LEVEL   09- 12.9   10- 15.0   01- 06.3   05- 01.1   05- (200 mg BID) 14.0   07- 11.9   01- 11.0   05- 8.3 L added PHT 30 mg po additional dose HS            AED: ZNS NORMAL LEVEL RANGE: 10-40   DATE LEVEL   09- 13   07- 37   01- 39   05- 39               AED: CLB NORMAL LEVEL RANGE:    DATE LEVEL   10- 176   07- 193   01- 139   05- 55.7                 Reviewed the neuroimaging independently       Assessment:       1. Refractory epilepsy    2. Temporal lobe epilepsy    3. Anxiety about health          EPILEPSY CLASSIFICATION      SEMIOLOGY: DIALEPTIC (FOCAL WITH HILARY) WITH ICTAL SPITTING -->GTC     EPILEPTOGENIC ZONE (S):  RT TL     ETIOLOGY: UNKNOWN     PRIOR AEDS: VPA, LTG, OXC, LEV    CURRENT AEDS: ZNS, PHT, CLB, (GBP LBP)    LAST SEIZURE DATE: 01-      COMPREHENSIVE LIST OF AEDs:     Acetazolamide (AZM-Diamox)   Benzos: clonazepam (CZP Klonopin), lorazepam (LZP-Ativan), diazepam (DZP-Valium), clorazepate (CLZ- Tranxene)  Brivaracetam (BRV-Briviact)  Cannabidiol (CBD- Epidiolex)  Carbamazepine (CBZ-Tegretol)  Cenobamate (CNB-Xcopri)  Clobazam (CLB-Onfi)  Eslicarbazepine (ESL-Aptiom)  Ethosuximide (ESX-Zarontin)  Felbamate (FBM-Felbatol)  Gabapentin (GBP-Neurontin)  Lacosamide (LCM-Vimpat)  Lamotrigine (LTG-Lamitcal)  Levetiracetam (LEV- Keppra)  Oxcarbazepine (OXC-Trileptal)  Perampanel (PML-Fycompa)  Phenobarbital (PB)  Phenytoin (PHT-Dilantin)  Pregabalin (PGB-Lyrica)  Primidone (PRM)   Retigabine (RTG- Potiga) Discontinued in 2017  Rufinamide (RFN-Benzil)  Stiripentol (STP-Diacomit)  Tiagabine (TGB-Gabitril)  Topiramate (TPM-Topamax)  Valproate (VPA-Depakote)  Vigabatrin (VGB-Sabril)  Zonisamide  (ZNS-Zonegran)        Plan:         INTRACTABLE-MEDICALLY REFRACTORY NON-LESIONAL RT TLE UNDERGOING SURGICAL EVALUATION              The patient was encouraged to maintain full traditional seizure precautions which include but not limited to avoid driving, avoid high altitudes, avoid being close to fire or fire source, avoid being close to a body of water or swimming alone, avoid operating heavy machinery and avoid using sharp objects if possible. The patient was encouraged to shower (without accumulation of water) instead of taking a bath if unsupervised. The patient was made aware that these precautions are especially important during concurrent illness. Adequate sleep and avoidance of alcohol as important measures to assure good seizure control were discussed with the patient. The patient was also advised not to care for children without company. The patient was advised to pad the side rails with pillows and blankets if applicable.I strongly recommended lowering the bed to the floor level to decrease the risk of falls.     Continue  mg BID check level      Continue  mg BID check level      Change CLB 10 mg BID Check level .     Continue Nayzilam (NS Midazolam) for prolonged and/or clusters of seizures.     Seizure detection device wristband  Empatica device not VNS     Letter completed for travel     Continue AEDs INDEFINITELY, FOR GOOD AND NEVER SKIP A DOSE. The patient verbalized full understanding. Stressed the extreme medical, personal safety, public safety and legal importance of compliance and seizure control. Will give as many refills as possible with or without face-to-face evaluation to make sure the patient and any patient with epilepsy will never run out of medications. Running out of seizure medications should never happen under our care. I explained to the patient that he should not, under any circumstances, adjust or stop taking his seizure medication without discussing with me. The  patient verbalized full understanding.     AVOID any substance that could lower seizure threshold including but not limited to:      ALCOHOL AND WITHDRAWAL      TRAMADOL.     DEMEROL      ALL STIMULANTS-ALL ADHD MEDICATIONS.      CLOZAPINE.      BUPROPION.     CIPROFLOXACIN          MEDICAL/SURGICAL COMORBIDITIES     All relevant medical comorbidities noted and managed by primary care physician and medical care team.          MISCELLANEOUS MEDICAL PROBLEMS       HEALTHY LIFESTYLE AND PREVENTATIVE CARE    Encouraged the patient to adhere to the age-appropriate health maintenance guidelines including screening tests and vaccinations.     Discussed the overall importance of healthy lifestyle, optimal weight, exercise, healthy diet, good sleep hygiene and avoiding drugs including smoking, alcohol and recreational drugs. The patient verbalized full understanding.       Advised the patient to follow COVID-19 prevention measures. S/P vaccination           RTC in 6 months          Stacey Adan, MSN NP      Collaborating Provider: Payton Estevez MD, FAAN Neurologist/Epileptologist            TOTAL E/M 53 M

## 2022-05-02 NOTE — LETTER
May 2, 2022    Evelyne Francisco  [address] [place] [address]             [facility]  Neurology  [address] [place], [place] [address] [facility] [address]  Phone: [phone]  Fax: [phone]   May 2, 2022     Patient: Evelyne Francisco   YOB: 1960   Date of Visit: 5/2/2022       To Whom it May Concern:    Evelyne Francisco was seen in my clinic on 5/2/2022. Patient is currently under my care for Neurology. Diagnosis:  Intractable- Medically Refractory Non-lesional RT Temporal Epilepsy. She is required to have at all times antiepileptic medications and VNS device for treatment, that includes: Zonergran (ZNS)  300 mg orally twice a day, Dilantin 200 mg orally twice per day, Clobazam (CLB) 10 Mg/4mL orally twice a day. The patient also has  Nayzilam (NS Midazolam) for prolonged and/or clusters of seizures.     The patient has a Vagal Nerve Stimulator that is medically necessary to have at all times to manage condition.         If you have any questions or concerns, please don't hesitate to call.    Sincerely,         Serenity Adan NP

## 2022-05-03 ENCOUNTER — LAB VISIT (OUTPATIENT)
Dept: LAB | Facility: HOSPITAL | Age: 62
End: 2022-05-03
Attending: NURSE PRACTITIONER
Payer: MEDICARE

## 2022-05-03 DIAGNOSIS — G40.109 TEMPORAL LOBE EPILEPSY: ICD-10-CM

## 2022-05-03 PROCEDURE — 80185 ASSAY OF PHENYTOIN TOTAL: CPT | Performed by: NURSE PRACTITIONER

## 2022-05-03 PROCEDURE — 80339 ANTIEPILEPTICS NOS 1-3: CPT | Performed by: NURSE PRACTITIONER

## 2022-05-03 NOTE — DISCHARGE SUMMARY
[facility] (Hospital)  Discharge Summary      Admit Date: 4/26/2022    Discharge Date and Time: 4/26/2022 10:50 AM    Attending Physician: No att. providers found     Reason for Admission: [facility]    Procedures Performed: Procedure(s) (LRB):  CATHETERIZATION, HEART, LEFT (Left)    Hospital Course (synopsis of major diagnoses, care, treatment, and services provided during the course of the hospital stay): Pre-Operative Diagnosis: Atherosclerosis of native coronary artery of native heart with unstable angina pectoris [I25.110]S/P PTCA (percutaneous transluminal coronary angioplasty) [Z98.61]     Post-Operative Diagnosis: Post-Op Diagnosis Codes:     * Atherosclerosis of native coronary artery of native heart with unstable angina pectoris [I25.110]     * S/P PTCA (percutaneous transluminal coronary angioplasty) [Z98.61]     Anesthesia: RN IV Sedation     Technical Procedures Used: [facility]     Description of the Findings of the Procedure: [facility], DX: ANGINAL EQUIVALENT, FINDINGS- NONOBSTRUCTIVE DS, RCA , LCX STENT PATENT, RAMUS STENT 50%, NML LV FUNCTION     Significant Surgical Tasks Conducted by the Assistant(s), if Applicable: NONE     Complications: No     Goals of Care Treatment Preferences:  Code Status: Full Code      Consults: none    Significant Diagnostic Studies: Labs: All labs within the past 24 hours have been reviewed    Final Diagnoses:    Principal Problem: <principal problem not specified>   Secondary Diagnoses: There are no hospital problems to display for this patient.      Discharged Condition: good    Disposition: Home or Self Care    Follow Up/Patient Instructions:   1 week  Medications:  Reconciled Home Medications:      Medication List      Change how you take these medications    furosemide 40 MG tablet  Commonly known as: LASIX  Take 1 tablet (40 mg total) by mouth 2 (two) times daily.  What changed: when to take this        Continue taking these medications    cloBAZam 2.5 mg/mL Susp  Commonly  known as: ONFI  Take 4 mLs (10 mg total) by mouth 2 (two) times daily.     estradioL 2 MG tablet  Commonly known as: ESTRACE  Take 1 tablet (2 mg total) by mouth once daily.     ezetimibe 10 mg tablet  Commonly known as: ZETIA  Take 10 mg by mouth once daily.     isosorbide mononitrate 60 MG 24 hr tablet  Commonly known as: IMDUR  Take 1 tablet (60 mg total) by mouth every 12 (twelve) hours.     levocetirizine 5 MG tablet  Commonly known as: XYZAL  Take 1 tablet (5 mg total) by mouth every evening.     levothyroxine 100 MCG tablet  Commonly known as: SYNTHROID  Take 1 tablet (100 mcg total) by mouth before breakfast.     LIDOcaine 5 %  Commonly known as: LIDODERM  PUT 1 PATCH TO AFFECTED AREA FOR 12 HOURS AS NEEDED FOR PAIN; TAKE OFF FOR AT LEAST 12 HOURS BEFORE APPLYING ANOTHER PATCH     metoprolol succinate 50 MG 24 hr tablet  Commonly known as: TOPROL-XL  Take 1 tablet (50 mg total) by mouth once daily.     naproxen 500 MG tablet  Commonly known as: NAPROSYN  Take 500 mg by mouth 2 (two) times daily as needed.     NAYZILAM 5 mg/spray (0.1 mL) Spry  Generic drug: midazolam  INSTILL 1 SPRAY IN NASAL ROUTE ONCE AS NEEDED. (1 SPRAY FOR PROLONGED SEIZURE OR CLUSTERS OF SEIZURES. MAY REPEAT ONCE IN OPPOSITE NOSTRIL AFTER 10 MINUTES.)     pantoprazole 40 MG tablet  Commonly known as: PROTONIX  Take 1 tablet (40 mg total) by mouth 2 (two) times daily.     phenytoin 100 MG ER capsule  Commonly known as: DILANTIN  Take 2 capsules by mouth twice daily     potassium chloride 10 MEQ Cpsr  Commonly known as: MICRO-K  Take 10 mEq by mouth once.     ranolazine 1,000 mg Tb12  Commonly known as: RANEXA  Take 1,000 mg by mouth 2 (two) times daily.     REPATHA SURECLICK 140 mg/mL Pnij  Generic drug: evolocumab  SMARTSI Milligram(s) SUB-Q Every 2 Weeks     solifenacin 10 MG tablet  Commonly known as: VESICARE  Take 1 tablet (10 mg total) by mouth once daily.     tiZANidine 4 MG tablet  Commonly known as: ZANAFLEX  Take by  mouth.     zonisamide 100 MG Cap  Commonly known as: ZONEGRAN  Take 3 capsules (300 mg total) by mouth 2 (two) times daily.          No discharge procedures on file.

## 2022-05-04 ENCOUNTER — PATIENT MESSAGE (OUTPATIENT)
Dept: NEUROLOGY | Facility: CLINIC | Age: 62
End: 2022-05-04
Payer: MEDICARE

## 2022-05-04 LAB — PHENYTOIN SERPL-MCNC: 8.3 UG/ML (ref 10–20)

## 2022-05-05 ENCOUNTER — PATIENT MESSAGE (OUTPATIENT)
Dept: NEUROLOGY | Facility: CLINIC | Age: 62
End: 2022-05-05
Payer: MEDICARE

## 2022-05-05 ENCOUNTER — TELEPHONE (OUTPATIENT)
Dept: NEUROLOGY | Facility: CLINIC | Age: 62
End: 2022-05-05
Payer: MEDICARE

## 2022-05-05 DIAGNOSIS — G40.109 TEMPORAL LOBE EPILEPSY: ICD-10-CM

## 2022-05-05 DIAGNOSIS — G40.109 TEMPORAL LOBE EPILEPSY: Primary | ICD-10-CM

## 2022-05-05 RX ORDER — CLOBAZAM 2.5 MG/ML
20 SUSPENSION ORAL 2 TIMES DAILY
Qty: 64 ML | Refills: 0 | Status: SHIPPED | OUTPATIENT
Start: 2022-05-05 | End: 2022-05-05 | Stop reason: SDUPTHER

## 2022-05-05 RX ORDER — CLOBAZAM 2.5 MG/ML
20 SUSPENSION ORAL 2 TIMES DAILY
Qty: 120 ML | Refills: 0 | Status: SHIPPED | OUTPATIENT
Start: 2022-05-05 | End: 2022-05-10 | Stop reason: SDUPTHER

## 2022-05-05 NOTE — TELEPHONE ENCOUNTER
----- Message from Beba Shepard sent at 5/5/2022  2:33 PM CDT -----  Contact: Randa(daughter)  Randa called to consult with nurse or staff regarding the patients medication. She states she had spoke with the nurse to get the prescription locally because they are in [place] and she would like a call back. Randa would like a call back and can be reached at [phone]. Thanks/

## 2022-05-05 NOTE — TELEPHONE ENCOUNTER
Spoke with daughter advised her that . I have contacted the new pharmacy the would like medication sent to and have sent request  . She verbalized understanding .

## 2022-05-05 NOTE — TELEPHONE ENCOUNTER
Spoke with daughter advised her that medication has been pend to MD . To check with pharmacy in about 30 min . She verbalized understandign .

## 2022-05-05 NOTE — TELEPHONE ENCOUNTER
----- Message from Es Diane sent at 5/5/2022 12:26 PM CDT -----  Regarding: refill  Contact: daughter  Randa Francisco calling regarding 2 seizers yesterday. Pt went out of town and forgot her medication.  A request for clobazam 2.5mg per ml.    [facility] [address] [facility] [address]   Phone # [phone]    Randa can be reached at [phone]

## 2022-05-06 ENCOUNTER — PATIENT MESSAGE (OUTPATIENT)
Dept: NEUROLOGY | Facility: CLINIC | Age: 62
End: 2022-05-06
Payer: MEDICARE

## 2022-05-06 ENCOUNTER — TELEPHONE (OUTPATIENT)
Dept: NEUROLOGY | Facility: CLINIC | Age: 62
End: 2022-05-06
Payer: MEDICARE

## 2022-05-06 LAB
CLOBAZAM SERPL-MCNC: 55.7 NG/ML (ref 30–300)
NORCLOBAZAM SERPL-MCNC: 1380 NG/ML (ref 300–3000)
ZONISAMIDE SERPL-MCNC: 22 MCG/ML (ref 10–40)

## 2022-05-06 NOTE — PROGRESS NOTES
Labs Reviewed:    05-    ZNS level 39 NL (10- 40)    CLB level 55.7 NL ( )     PHT level 8.3 Low  (10- 20) Dr. Estevez added additional PHT 30 mg po Nightly to regimen

## 2022-05-06 NOTE — TELEPHONE ENCOUNTER
----- Message from Serenity Adan NP sent at 5/6/2022  4:09 PM CDT -----  Labs Reviewed:    05-    ZNS level 39 NL (10- 40)    CLB level 55.7 NL ( )     PHT level 8.3 Low  (10- 20) Dr. Estevez added additional PHT 30 mg po Nightly to regimen

## 2022-05-06 NOTE — TELEPHONE ENCOUNTER
Spoke with patient and per   Payton Estevez MD        If she hit her head she will need to be evaluated.      Patient stated that she did not hit her head. Informed patient that is she feels any worse to report to the ER and call to make a f/u appt. Patient verbalized understanding. EMA

## 2022-05-10 ENCOUNTER — OFFICE VISIT (OUTPATIENT)
Dept: UROLOGY | Facility: CLINIC | Age: 62
End: 2022-05-10
Payer: MEDICARE

## 2022-05-10 VITALS
SYSTOLIC BLOOD PRESSURE: 116 MMHG | DIASTOLIC BLOOD PRESSURE: 78 MMHG | HEART RATE: 89 BPM | BODY MASS INDEX: 37.75 KG/M2 | TEMPERATURE: 97 F | WEIGHT: 226.88 LBS

## 2022-05-10 DIAGNOSIS — R35.1 NOCTURIA: ICD-10-CM

## 2022-05-10 DIAGNOSIS — N39.46 MIXED INCONTINENCE: ICD-10-CM

## 2022-05-10 DIAGNOSIS — N32.81 OAB (OVERACTIVE BLADDER): Primary | ICD-10-CM

## 2022-05-10 DIAGNOSIS — N39.3 STRESS INCONTINENCE: ICD-10-CM

## 2022-05-10 PROBLEM — G89.29 CHRONIC PAIN OF RIGHT KNEE: Status: RESOLVED | Noted: 2020-12-07 | Resolved: 2022-05-10

## 2022-05-10 PROBLEM — M79.604 PAIN OF RIGHT LOWER EXTREMITY: Status: RESOLVED | Noted: 2021-05-31 | Resolved: 2022-05-10

## 2022-05-10 PROBLEM — R39.89 ABNORMAL URINE COLOR: Status: RESOLVED | Noted: 2021-01-28 | Resolved: 2022-05-10

## 2022-05-10 PROBLEM — H53.8 BLURRED VISION, LEFT EYE: Status: RESOLVED | Noted: 2021-01-28 | Resolved: 2022-05-10

## 2022-05-10 PROBLEM — M54.2 NECK PAIN: Status: RESOLVED | Noted: 2022-04-14 | Resolved: 2022-05-10

## 2022-05-10 PROBLEM — M25.561 CHRONIC PAIN OF RIGHT KNEE: Status: RESOLVED | Noted: 2020-12-07 | Resolved: 2022-05-10

## 2022-05-10 PROBLEM — R06.09 DYSPNEA ON EXERTION: Status: RESOLVED | Noted: 2022-02-04 | Resolved: 2022-05-10

## 2022-05-10 LAB
BILIRUB SERPL-MCNC: NORMAL MG/DL
BLOOD URINE, POC: NORMAL
CLARITY, POC UA: NORMAL
COLOR, POC UA: NORMAL
GLUCOSE UR QL STRIP: NORMAL
KETONES UR QL STRIP: NORMAL
LEUKOCYTE ESTERASE URINE, POC: NORMAL
NITRITE, POC UA: NORMAL
PH, POC UA: 5
POC RESIDUAL URINE VOLUME: 15 ML (ref 0–100)
PROTEIN, POC: NORMAL
SPECIFIC GRAVITY, POC UA: 1.02
UROBILINOGEN, POC UA: NORMAL

## 2022-05-10 PROCEDURE — 99999 PR PBB SHADOW E&M-EST. PATIENT-LVL IV: ICD-10-PCS | Mod: PBBFAC,,, | Performed by: NURSE PRACTITIONER

## 2022-05-10 PROCEDURE — 99214 OFFICE O/P EST MOD 30 MIN: CPT | Mod: S$PBB,,, | Performed by: NURSE PRACTITIONER

## 2022-05-10 PROCEDURE — 99999 PR PBB SHADOW E&M-EST. PATIENT-LVL IV: CPT | Mod: PBBFAC,,, | Performed by: NURSE PRACTITIONER

## 2022-05-10 PROCEDURE — 51798 US URINE CAPACITY MEASURE: CPT | Mod: PBBFAC | Performed by: NURSE PRACTITIONER

## 2022-05-10 PROCEDURE — 99214 OFFICE O/P EST MOD 30 MIN: CPT | Mod: PBBFAC | Performed by: NURSE PRACTITIONER

## 2022-05-10 PROCEDURE — 81002 URINALYSIS NONAUTO W/O SCOPE: CPT | Mod: PBBFAC | Performed by: NURSE PRACTITIONER

## 2022-05-10 PROCEDURE — 99214 PR OFFICE/OUTPT VISIT, EST, LEVL IV, 30-39 MIN: ICD-10-PCS | Mod: S$PBB,,, | Performed by: NURSE PRACTITIONER

## 2022-05-10 NOTE — PROGRESS NOTES
Chief Complaint:   Three month follow-up physical therapy pelvic floor training and VESIcare    HPI:   Patient is a 62-year-old female that is presenting as a 3 month follow-up to PT pelvic floor training and VESIcare.  Patient states very little resolved and urinary symptoms, mixed incontinence, nocturia and daytime frequency, with VESIcare.  Patient states that she did not complete physical therapy, did not find it relevant.    Allergies:  Latex, natural rubber; Ace inhibitors; and Diclo gel [diclofenac sodium]    Medications:  has a current medication list which includes the following prescription(s): clobazam, clopidogrel, estradiol, ezetimibe, furosemide, isosorbide mononitrate, levocetirizine, levothyroxine, metoprolol succinate, naproxen, nayzilam, pantoprazole, phenytoin, phenytoin, potassium chloride, ranolazine, repatha sureclick, solifenacin, zonisamide, lidocaine, and tizanidine.    Review of Systems:  General: No fever, chills, fatigability, or weight loss.  Skin: No rashes, itching, or changes in color or texture of skin.  Chest: Denies SCALES, cyanosis, wheezing, cough, and sputum production.  Abdomen: Appetite fine. No weight loss. Denies diarrhea, abdominal pain, hematemesis, or blood in stool.  Musculoskeletal: No joint stiffness or swelling. Denies back pain.  : As above.  All other review of systems negative.    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    PSH:   has a past surgical history that includes Abdominal surgery; Back surgery; Fracture surgery; Cardiac surgery; Hysterectomy; Tonsillectomy; Vascular surgery; Esophageal manometry with measurement of impedance (N/A, 2/23/2021); Oophorectomy; Breast biopsy (Left); and Left heart catheterization (Left, 4/26/2022).    FamHx: family  history includes Cancer in her brother; Hyperlipidemia in her mother; Hypertension in her mother and sister; Marfan syndrome in her daughter; Seizures in her father; Stroke in her father.    SocHx:  reports that she quit smoking about 7 years ago. She has never used smokeless tobacco. She reports that she does not drink alcohol and does not use drugs.      Physical Exam:  Vitals:    05/10/22 0842   BP: 116/78   Pulse: 89   Temp: 97.3 °F (36.3 °C)     General: A&Ox3, no apparent distress, no deformities  Neck: No masses, normal thyroid  Lungs: normal inspiration, no use of accessory muscles  Heart: normal pulse, no arrhythmias  Abdomen: Soft, NT, ND, no masses, no hernias, no hepatosplenomegaly  Labs/Studies:   Urine in clinic is negative  PVR was 15 mL    Impression/Plan:   Overactive bladder  Patient has failed to oral medications, therefore, it is reasonable to proceed with additional treatment options.  Patient was scheduled to see MD to discuss possible Botox and InterStim.

## 2022-05-11 ENCOUNTER — PATIENT MESSAGE (OUTPATIENT)
Dept: INTERNAL MEDICINE | Facility: CLINIC | Age: 62
End: 2022-05-11
Payer: MEDICARE

## 2022-05-12 ENCOUNTER — PATIENT MESSAGE (OUTPATIENT)
Dept: INTERNAL MEDICINE | Facility: CLINIC | Age: 62
End: 2022-05-12
Payer: MEDICARE

## 2022-05-16 ENCOUNTER — OFFICE VISIT (OUTPATIENT)
Dept: INTERNAL MEDICINE | Facility: CLINIC | Age: 62
End: 2022-05-16
Payer: MEDICARE

## 2022-05-16 ENCOUNTER — LAB VISIT (OUTPATIENT)
Dept: LAB | Facility: HOSPITAL | Age: 62
End: 2022-05-16
Attending: PHYSICIAN ASSISTANT
Payer: MEDICARE

## 2022-05-16 VITALS
WEIGHT: 220.69 LBS | RESPIRATION RATE: 18 BRPM | HEIGHT: 65 IN | DIASTOLIC BLOOD PRESSURE: 70 MMHG | HEART RATE: 104 BPM | SYSTOLIC BLOOD PRESSURE: 112 MMHG | OXYGEN SATURATION: 96 % | BODY MASS INDEX: 36.77 KG/M2 | TEMPERATURE: 98 F

## 2022-05-16 DIAGNOSIS — D72.819 LEUKOPENIA, UNSPECIFIED TYPE: Primary | ICD-10-CM

## 2022-05-16 DIAGNOSIS — I10 PRIMARY HYPERTENSION: Chronic | ICD-10-CM

## 2022-05-16 DIAGNOSIS — D64.9 ANEMIA, UNSPECIFIED TYPE: ICD-10-CM

## 2022-05-16 DIAGNOSIS — K92.1 BLOOD IN STOOL: Primary | ICD-10-CM

## 2022-05-16 DIAGNOSIS — K92.1 BLOOD IN STOOL: ICD-10-CM

## 2022-05-16 LAB
ALBUMIN SERPL BCP-MCNC: 3.8 G/DL (ref 3.5–5.2)
ALP SERPL-CCNC: 138 U/L (ref 55–135)
ALT SERPL W/O P-5'-P-CCNC: 32 U/L (ref 10–44)
ANION GAP SERPL CALC-SCNC: 11 MMOL/L (ref 8–16)
AST SERPL-CCNC: 22 U/L (ref 10–40)
BASOPHILS # BLD AUTO: 0.01 K/UL (ref 0–0.2)
BASOPHILS NFR BLD: 0.3 % (ref 0–1.9)
BILIRUB SERPL-MCNC: 0.3 MG/DL (ref 0.1–1)
BUN SERPL-MCNC: 15 MG/DL (ref 8–23)
CALCIUM SERPL-MCNC: 9.2 MG/DL (ref 8.7–10.5)
CHLORIDE SERPL-SCNC: 110 MMOL/L (ref 95–110)
CO2 SERPL-SCNC: 22 MMOL/L (ref 23–29)
CREAT SERPL-MCNC: 0.9 MG/DL (ref 0.5–1.4)
DIFFERENTIAL METHOD: ABNORMAL
EOSINOPHIL # BLD AUTO: 0.1 K/UL (ref 0–0.5)
EOSINOPHIL NFR BLD: 1.4 % (ref 0–8)
ERYTHROCYTE [DISTWIDTH] IN BLOOD BY AUTOMATED COUNT: 13.2 % (ref 11.5–14.5)
EST. GFR  (AFRICAN AMERICAN): >60 ML/MIN/1.73 M^2
EST. GFR  (NON AFRICAN AMERICAN): >60 ML/MIN/1.73 M^2
GLUCOSE SERPL-MCNC: 103 MG/DL (ref 70–110)
HCT VFR BLD AUTO: 37.7 % (ref 37–48.5)
HGB BLD-MCNC: 12.9 G/DL (ref 12–16)
IMM GRANULOCYTES # BLD AUTO: 0 K/UL (ref 0–0.04)
IMM GRANULOCYTES NFR BLD AUTO: 0 % (ref 0–0.5)
LYMPHOCYTES # BLD AUTO: 1.7 K/UL (ref 1–4.8)
LYMPHOCYTES NFR BLD: 48.3 % (ref 18–48)
MCH RBC QN AUTO: 32.6 PG (ref 27–31)
MCHC RBC AUTO-ENTMCNC: 34.2 G/DL (ref 32–36)
MCV RBC AUTO: 95 FL (ref 82–98)
MONOCYTES # BLD AUTO: 0.3 K/UL (ref 0.3–1)
MONOCYTES NFR BLD: 8 % (ref 4–15)
NEUTROPHILS # BLD AUTO: 1.5 K/UL (ref 1.8–7.7)
NEUTROPHILS NFR BLD: 42 % (ref 38–73)
NRBC BLD-RTO: 0 /100 WBC
PLATELET # BLD AUTO: 172 K/UL (ref 150–450)
PMV BLD AUTO: 9.1 FL (ref 9.2–12.9)
POTASSIUM SERPL-SCNC: 4.1 MMOL/L (ref 3.5–5.1)
PROT SERPL-MCNC: 7.1 G/DL (ref 6–8.4)
RBC # BLD AUTO: 3.96 M/UL (ref 4–5.4)
SODIUM SERPL-SCNC: 143 MMOL/L (ref 136–145)
WBC # BLD AUTO: 3.52 K/UL (ref 3.9–12.7)

## 2022-05-16 PROCEDURE — 80053 COMPREHEN METABOLIC PANEL: CPT | Performed by: PHYSICIAN ASSISTANT

## 2022-05-16 PROCEDURE — 99215 OFFICE O/P EST HI 40 MIN: CPT | Mod: PBBFAC | Performed by: PHYSICIAN ASSISTANT

## 2022-05-16 PROCEDURE — 36415 COLL VENOUS BLD VENIPUNCTURE: CPT | Performed by: PHYSICIAN ASSISTANT

## 2022-05-16 PROCEDURE — 99214 OFFICE O/P EST MOD 30 MIN: CPT | Mod: S$PBB,,, | Performed by: PHYSICIAN ASSISTANT

## 2022-05-16 PROCEDURE — 99999 PR PBB SHADOW E&M-EST. PATIENT-LVL V: ICD-10-PCS | Mod: PBBFAC,,, | Performed by: PHYSICIAN ASSISTANT

## 2022-05-16 PROCEDURE — 99214 PR OFFICE/OUTPT VISIT, EST, LEVL IV, 30-39 MIN: ICD-10-PCS | Mod: S$PBB,,, | Performed by: PHYSICIAN ASSISTANT

## 2022-05-16 PROCEDURE — 99999 PR PBB SHADOW E&M-EST. PATIENT-LVL V: CPT | Mod: PBBFAC,,, | Performed by: PHYSICIAN ASSISTANT

## 2022-05-16 PROCEDURE — 85025 COMPLETE CBC W/AUTO DIFF WBC: CPT | Performed by: PHYSICIAN ASSISTANT

## 2022-05-16 NOTE — PROGRESS NOTES
"Subjective:      Patient ID: Evelyne Francisco is a 62 y.o. female.    Chief Complaint: Rectal Bleeding    Patient is new to me, being seen today for rectal bleeding.  Originally started late last week.  2 episodes of blood in toilet and on toilet paper after bowel movement on Thursday and Friday.  Large amount of blood in water, to the point she could not see through the water.  Denies pain.  Denies known hemmorhoids.     Colonoscopy 2021, repeat in 10 years.  Called GI and they told her she had diverticulosis and internal hemorrhoids at that time.       Patient is on plavix.      Last visit April 2022 with Dr. Malcolm.    Review of Systems   Constitutional: Positive for fatigue. Negative for chills, diaphoresis and fever.   HENT: Negative for congestion, rhinorrhea and sore throat.    Respiratory: Negative for cough, shortness of breath and wheezing.    Gastrointestinal: Positive for blood in stool and constipation. Negative for abdominal pain, diarrhea, nausea and vomiting.   Skin: Negative for rash.   Neurological: Positive for weakness (last week, improving). Negative for dizziness, light-headedness and headaches.       Objective:   /70   Pulse 104   Temp 97.7 °F (36.5 °C) (Tympanic)   Resp 18   Ht 5' 5" (1.651 m)   Wt 100.1 kg (220 lb 10.9 oz)   LMP  (LMP Unknown)   SpO2 96%   BMI 36.72 kg/m²   Physical Exam  Exam conducted with a chaperone present (Kannan Morrell MA).   Constitutional:       General: She is not in acute distress.     Appearance: She is well-developed. She is not ill-appearing or diaphoretic.   HENT:      Head: Normocephalic and atraumatic.      Right Ear: External ear normal.      Left Ear: External ear normal.   Eyes:      General: Lids are normal.         Right eye: No discharge.         Left eye: No discharge.      Conjunctiva/sclera: Conjunctivae normal.      Right eye: Right conjunctiva is not injected.      Left eye: Left conjunctiva is not injected. "   Pulmonary:      Effort: Pulmonary effort is normal. No respiratory distress.   Genitourinary:     Rectum: Normal.   Skin:     General: Skin is warm and dry.      Findings: No rash.   Neurological:      Mental Status: She is alert and oriented to person, place, and time.   Psychiatric:         Speech: Speech normal.         Behavior: Behavior normal.         Thought Content: Thought content normal.         Judgment: Judgment normal.       Assessment:      1. Blood in stool    2. Primary hypertension    3. Anemia, unspecified type       Plan:   Blood in stool  -     Occult blood x 1, stool; Future; Expected date: 05/16/2022  -     CBC Auto Differential; Future; Expected date: 05/16/2022  -     Comprehensive Metabolic Panel; Future; Expected date: 05/16/2022    Primary hypertension   Controlled     Anemia, unspecified type   Stable     Additional recs pending above eval    Discussed worsening signs/symptoms and when to return to clinic or go to ED.   Patient expresses understanding and agrees with treatment plan.

## 2022-05-24 ENCOUNTER — HOSPITAL ENCOUNTER (EMERGENCY)
Facility: HOSPITAL | Age: 62
Discharge: HOME | End: 2022-05-24
Attending: EMERGENCY MEDICINE
Payer: MEDICARE

## 2022-05-24 VITALS
OXYGEN SATURATION: 100 % | BODY MASS INDEX: 37.06 KG/M2 | RESPIRATION RATE: 18 BRPM | SYSTOLIC BLOOD PRESSURE: 145 MMHG | TEMPERATURE: 98 F | HEART RATE: 89 BPM | DIASTOLIC BLOOD PRESSURE: 87 MMHG | HEIGHT: 65 IN | WEIGHT: 222.44 LBS

## 2022-05-24 DIAGNOSIS — S62.662A CLOSED NONDISPLACED FRACTURE OF DISTAL PHALANX OF RIGHT MIDDLE FINGER, INITIAL ENCOUNTER: Primary | ICD-10-CM

## 2022-05-24 DIAGNOSIS — R07.9 CHEST PAIN: ICD-10-CM

## 2022-05-24 DIAGNOSIS — M25.551 RIGHT HIP PAIN: ICD-10-CM
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
## 2022-05-24 PROCEDURE — 25000003 PHARM REV CODE 250: Performed by: NURSE PRACTITIONER

## 2022-05-24 PROCEDURE — 29130 APPL FINGER SPLINT STATIC: CPT | Mod: F8

## 2022-05-24 PROCEDURE — 99284 EMERGENCY DEPT VISIT MOD MDM: CPT | Mod: 25

## 2022-05-24 RX ORDER — IBUPROFEN 800 MG/1
800 TABLET ORAL EVERY 6 HOURS PRN
Qty: 20 TABLET | Refills: 0 | Status: SHIPPED | OUTPATIENT
Start: 2022-05-24 | End: 2022-10-20

## 2022-05-24 RX ORDER — HYDROCODONE BITARTRATE AND ACETAMINOPHEN 10; 325 MG/1; MG/1
1 TABLET ORAL
Status: COMPLETED | OUTPATIENT
Start: 2022-05-24 | End: 2022-05-24

## 2022-05-24 RX ORDER — HYDROCODONE BITARTRATE AND ACETAMINOPHEN 5; 325 MG/1; MG/1
1 TABLET ORAL EVERY 4 HOURS PRN
Qty: 18 TABLET | Refills: 0 | Status: SHIPPED | OUTPATIENT
Start: 2022-05-24 | End: 2022-12-15

## 2022-05-24 RX ORDER — METHOCARBAMOL 750 MG/1
750 TABLET, FILM COATED ORAL 4 TIMES DAILY
Qty: 40 TABLET | Refills: 0 | Status: SHIPPED | OUTPATIENT
Start: 2022-05-24 | End: 2022-06-03

## 2022-05-24 RX ADMIN — HYDROCODONE BITARTRATE AND ACETAMINOPHEN 1 TABLET: 10; 325 TABLET ORAL at 11:05

## 2022-05-25 NOTE — ED PROVIDER NOTES
Encounter Date: 5/24/2022       History     Chief Complaint   Patient presents with    Fall     Trip and fall earlier tonight while cleaning and pulled a ceramic bowl onto her chest and right hand.     Patient is a 62-year-old female who presents with complaints of chest wall pain, right hand pain and right hip pain.  Patient states that she fell tonight when she tripped on some weights.  She denies any head injury or loss of consciousness.  She reports taking ibuprofen 800 mg prior to arrival with no relief of symptoms.  Patient shows no signs distress at this time.        Review of patient's allergies indicates:   Allergen Reactions    Latex, natural rubber Hives, Shortness Of Breath and Swelling     Throat swelling  Throat swelling  Other reaction(s): Hives  Throat swelling    Ace inhibitors Other (See Comments)     Coughing  Other reaction(s): Unknown    Diclo gel [diclofenac sodium] Other (See Comments)     Chest pain     Past Medical History:   Diagnosis Date    Blood clotting tendency     Coronary artery disease     Fever blister     HLD (hyperlipidemia)     Hypertension     Hypothyroidism 12/7/2020    Renal cyst 7/12/2021    Seizures     Sleep apnea     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     Past Surgical History:   Procedure Laterality Date    ABDOMINAL SURGERY      BACK SURGERY      BREAST BIOPSY Left     benign    CARDIAC SURGERY      ESOPHAGEAL MANOMETRY WITH MEASUREMENT OF IMPEDANCE N/A 2/23/2021    Procedure: MANOMETRY, ESOPHAGUS, WITH IMPEDANCE MEASUREMENT;  Surgeon: Sumanth Curran RN;  Location: [facility] ENDO;  Service: Endoscopy;  Laterality: N/A;    FRACTURE SURGERY      HYSTERECTOMY      LEFT HEART CATHETERIZATION Left 4/26/2022    Procedure: CATHETERIZATION, HEART, LEFT;  Surgeon: Richie Butt MD;  Location: [facility] CATH LAB;  Service: Cardiology;  Laterality: Left;    OOPHORECTOMY      TONSILLECTOMY      VASCULAR SURGERY       Family History   Problem Relation Age of Onset     Hypertension Mother     Hyperlipidemia Mother     Stroke Father     Seizures Father     Hypertension Sister     Cancer Brother     Marfan syndrome Daughter      Social History     Tobacco Use    Smoking status: Former Smoker     Quit date: 2014     Years since quittin.9    Smokeless tobacco: Never Used   Substance Use Topics    Alcohol use: No    Drug use: No     Review of Systems   Constitutional: Negative for fever.   HENT: Negative for sore throat.    Respiratory: Negative for shortness of breath.    Cardiovascular: Positive for chest pain ( chest wall).   Gastrointestinal: Negative for nausea.   Genitourinary: Negative for dysuria.   Musculoskeletal: Positive for arthralgias (Right hand, right hip). Negative for back pain.   Skin: Negative for rash.   Neurological: Negative for weakness.   Hematological: Does not bruise/bleed easily.       Physical Exam     Initial Vitals [22]   BP Pulse Resp Temp SpO2   (!) 145/87 89 18 97.9 °F (36.6 °C) 100 %      MAP       --         Physical Exam    Nursing note and vitals reviewed.  Constitutional: She appears well-developed and well-nourished.   HENT:   Head: Normocephalic and atraumatic.   Eyes: EOM are normal. Pupils are equal, round, and reactive to light.   Neck: Neck supple.   Normal range of motion.  Cardiovascular: Normal rate, regular rhythm, normal heart sounds and intact distal pulses.   Pulmonary/Chest: Breath sounds normal. No respiratory distress. She has no wheezes. She has no rhonchi. She has no rales. She exhibits tenderness.   Abdominal: Abdomen is soft. Bowel sounds are normal.   Musculoskeletal:         General: Normal range of motion.        Hands:       Cervical back: Normal range of motion and neck supple.      Right hip: Tenderness present. No deformity or bony tenderness. Normal range of motion.      Left hip: Normal.     Neurological: She is alert and oriented to person, place, and time. She has normal strength and  normal reflexes.   Skin: Skin is warm and dry.         ED Course   Procedures  Labs Reviewed - No data to display       Imaging Results          X-Ray Hip 2 or 3 views Right (with Pelvis when performed) (Final result)  Result time 05/24/22 21:30:40    Final result by Christopher Chase MD (05/24/22 21:30:40)                 Impression:      Negative exam.      Electronically signed by: Christopher Chase  Date:    05/24/2022  Time:    21:30             Narrative:    EXAMINATION:  XR HIP WITH PELVIS WHEN PERFORMED, 2 OR 3  VIEWS RIGHT    CLINICAL HISTORY:  Pain in right hip    TECHNIQUE:  Standard radiography performed.    COMPARISON:  None    FINDINGS:  No fracture or dislocation.  Mild arthritic change involving both hips.                               X-Ray Hand 3 view Right (Final result)  Result time 05/24/22 21:31:05    Final result by Christopher Chase MD (05/24/22 21:31:05)                 Impression:      See above.      Electronically signed by: Christopher Chase  Date:    05/24/2022  Time:    21:31             Narrative:    EXAMINATION:  XR HAND COMPLETE 3 VIEW RIGHT    CLINICAL HISTORY:  right hand pain;    TECHNIQUE:  Standard radiography performed.    COMPARISON:  None    FINDINGS:  Comminuted fracture involving the distal phalanx of the 3rd finger.                               X-Ray Chest AP Portable (Final result)  Result time 05/24/22 21:31:49    Final result by Christopher Chase MD (05/24/22 21:31:49)                 Impression:      No acute findings.      Electronically signed by: Christopher Chase  Date:    05/24/2022  Time:    21:31             Narrative:    EXAMINATION:  XR CHEST AP PORTABLE    CLINICAL HISTORY:  Chest pain, unspecified    COMPARISON:  10/14/2021    FINDINGS:  Lung fields are clear.  Heart size is normal.  Neural stimulator implanted on the left.    No pleural fluid or pneumothorax.    No adenopathy is identified.    No significant osseous abnormality.                                 Medications - No data to  display  Medical Decision Making:   ED Management:  Patient informed of finger fracture and other imaging reports.  Patient instructed to follow-up with her orthopedist for further evaluation and management.  Patient to return to the emergency room with any worsening symptoms.  No distress noted at time of discharge.                      Clinical Impression:   Final diagnoses:  [R07.9] Chest pain  [M25.551] Right hip pain  [S62.662A] Closed nondisplaced fracture of distal phalanx of right middle finger, initial encounter (Primary)  [W19.XXXA] Fall, initial encounter          ED Disposition Condition    Discharge Stable        ED Prescriptions     Medication Sig Dispense Start Date End Date Auth. Provider    methocarbamoL (ROBAXIN) 750 MG Tab Take 1 tablet (750 mg total) by mouth 4 (four) times daily. for 10 days 40 tablet 5/24/2022 6/3/2022 Anton Kimball NP    ibuprofen (ADVIL,MOTRIN) 800 MG tablet Take 1 tablet (800 mg total) by mouth every 6 (six) hours as needed for Pain. 20 tablet 5/24/2022  Anton Kimball NP    HYDROcodone-acetaminophen (NORCO) 5-325 mg per tablet Take 1 tablet by mouth every 4 (four) hours as needed for Pain. 18 tablet 5/24/2022  Anton Kimball NP        Follow-up Information     Follow up With Specialties Details Why Contact Info    Sergio Malcolm MD Family Medicine  As needed [address] [facility] [address] [phone]             Anton Kimball NP  05/24/22 1678

## 2022-05-25 NOTE — FIRST PROVIDER EVALUATION
"Medical screening exam completed.  I have conducted a focused provider triage encounter, findings are as follows:    Brief history of present illness:    Patient reports he tripped over a dumbbell and grabbed the surrounding both table.  Patient reports pain to the chest right hand and right hip and upper leg.    Vitals:    05/24/22 2041   BP: (!) 145/87   Pulse: 89   Resp: 18   Temp: 97.9 °F (36.6 °C)   TempSrc: Oral   SpO2: 100%   Weight: 100.9 kg (222 lb 7.1 oz)   Height: 5' 5" (1.651 m)       Pertinent physical exam:  nad    Brief workup plan:  xrays     Preliminary workup initiated; this workup will be continued and followed by the physician or advanced practice provider that is assigned to the patient when roomed.  "

## 2022-06-03 ENCOUNTER — PES CALL (OUTPATIENT)
Dept: ADMINISTRATIVE | Facility: CLINIC | Age: 62
End: 2022-06-03
Payer: MEDICARE

## 2022-06-08 ENCOUNTER — OFFICE VISIT (OUTPATIENT)
Dept: CARDIOLOGY | Facility: CLINIC | Age: 62
End: 2022-06-08
Payer: MEDICARE

## 2022-06-08 VITALS
OXYGEN SATURATION: 98 % | WEIGHT: 224 LBS | HEART RATE: 79 BPM | DIASTOLIC BLOOD PRESSURE: 70 MMHG | SYSTOLIC BLOOD PRESSURE: 132 MMHG | HEIGHT: 65 IN | BODY MASS INDEX: 37.32 KG/M2

## 2022-06-08 DIAGNOSIS — I25.10 CORONARY ARTERY DISEASE INVOLVING NATIVE CORONARY ARTERY OF NATIVE HEART WITHOUT ANGINA PECTORIS: Primary | ICD-10-CM

## 2022-06-08 DIAGNOSIS — Z95.5 HX OF HEART ARTERY STENT: ICD-10-CM

## 2022-06-08 DIAGNOSIS — I10 PRIMARY HYPERTENSION: ICD-10-CM

## 2022-06-08 DIAGNOSIS — E78.00 PURE HYPERCHOLESTEROLEMIA: Chronic | ICD-10-CM

## 2022-06-08 PROCEDURE — 99999 PR PBB SHADOW E&M-EST. PATIENT-LVL V: ICD-10-PCS | Mod: PBBFAC,,, | Performed by: INTERNAL MEDICINE

## 2022-06-08 PROCEDURE — 99214 OFFICE O/P EST MOD 30 MIN: CPT | Mod: S$PBB,,, | Performed by: INTERNAL MEDICINE

## 2022-06-08 PROCEDURE — 99999 PR PBB SHADOW E&M-EST. PATIENT-LVL V: CPT | Mod: PBBFAC,,, | Performed by: INTERNAL MEDICINE

## 2022-06-08 PROCEDURE — 99215 OFFICE O/P EST HI 40 MIN: CPT | Mod: PBBFAC | Performed by: INTERNAL MEDICINE

## 2022-06-08 PROCEDURE — 99214 PR OFFICE/OUTPT VISIT, EST, LEVL IV, 30-39 MIN: ICD-10-PCS | Mod: S$PBB,,, | Performed by: INTERNAL MEDICINE

## 2022-06-08 NOTE — PROGRESS NOTES
Subjective:   Patient ID:  Evelyne Francisco is a 62 y.o. female who presents for follow-up of No chief complaint on file.  s/ cath nonobstructive ds, patent stents:    Summary       · The pre-procedure left ventricular end diastolic pressure was 30.  · The post-procedure left ventricular end diastolic pressure was 33.  · The ejection fraction was calculated to be 60%.  · The Ramus lesion was 50% stenosed.  · The estimated blood loss was none.  · There was non-obstructive coronary artery disease..  · Patent stents in RCA, LCx and LAD     Pt with balance problem, no dizziness or syncope.Patient denies CP, angina or anginal equivalent.  Hyperlipidemia  This is a chronic problem. The current episode started more than 1 year ago. Recent lipid tests were reviewed and are variable. Pertinent negatives include no chest pain or shortness of breath. Current antihyperlipidemic treatment includes ezetimibe. The current treatment provides moderate improvement of lipids. Compliance problems include medication side effects.    Coronary Artery Disease  Presents for follow-up visit. Pertinent negatives include no chest pain, chest tightness, dizziness, leg swelling, muscle weakness, palpitations, shortness of breath or weight gain. Risk factors include hyperlipidemia. The symptoms have been stable. Compliance with diet is variable. Compliance with exercise is variable. Compliance with medications is good.       Review of Systems   Constitutional: Negative. Negative for weight gain.   HENT: Negative.    Eyes: Negative.    Cardiovascular: Negative.  Negative for chest pain, leg swelling and palpitations.   Respiratory: Negative.  Negative for chest tightness and shortness of breath.    Endocrine: Negative.    Hematologic/Lymphatic: Negative.    Skin: Negative.    Musculoskeletal: Negative for muscle weakness.   Gastrointestinal: Negative.    Genitourinary: Negative.    Neurological: Negative.  Negative for dizziness.    Psychiatric/Behavioral: Negative.    Allergic/Immunologic: Negative.      Family History   Problem Relation Age of Onset    Hypertension Mother     Hyperlipidemia Mother     Stroke Father     Seizures Father     Hypertension Sister     Cancer Brother     Marfan syndrome Daughter      Past Medical History:   Diagnosis Date    Blood clotting tendency     Coronary artery disease     Fever blister     HLD (hyperlipidemia)     Hypertension     Hypothyroidism 2020    Renal cyst 2021    Seizures     Sleep apnea     Temporal lobe epilepsy 2016     Social History     Socioeconomic History    Marital status:    Tobacco Use    Smoking status: Former Smoker     Quit date: 2014     Years since quittin.9    Smokeless tobacco: Never Used   Substance and Sexual Activity    Alcohol use: No    Drug use: No    Sexual activity: Not Currently     Partners: Male     Current Outpatient Medications on File Prior to Visit   Medication Sig Dispense Refill    cloBAZam (ONFI) 2.5 mg/mL Susp Take 4 mLs (10 mg total) by mouth 2 (two) times daily. 360 mL 5    clopidogreL (PLAVIX) 75 mg tablet Take 1 tablet (75 mg total) by mouth once daily. 90 tablet 3    estradioL (ESTRACE) 2 MG tablet Take 1 tablet (2 mg total) by mouth once daily. 30 tablet 11    ezetimibe (ZETIA) 10 mg tablet Take 10 mg by mouth once daily.      furosemide (LASIX) 40 MG tablet Take 1 tablet (40 mg total) by mouth 2 (two) times daily. 60 tablet 11    HYDROcodone-acetaminophen (NORCO) 5-325 mg per tablet Take 1 tablet by mouth every 4 (four) hours as needed for Pain. 18 tablet 0    ibuprofen (ADVIL,MOTRIN) 800 MG tablet Take 1 tablet (800 mg total) by mouth every 6 (six) hours as needed for Pain. 20 tablet 0    isosorbide mononitrate (IMDUR) 60 MG 24 hr tablet Take 1 tablet (60 mg total) by mouth every 12 (twelve) hours. 90 tablet 7    levocetirizine (XYZAL) 5 MG tablet Take 1 tablet (5 mg total) by mouth every  evening. 90 tablet 3    levothyroxine (SYNTHROID) 100 MCG tablet Take 1 tablet (100 mcg total) by mouth before breakfast. 90 tablet 1    LIDOcaine (LIDODERM) 5 % PUT 1 PATCH TO AFFECTED AREA FOR 12 HOURS AS NEEDED FOR PAIN; TAKE OFF FOR AT LEAST 12 HOURS BEFORE APPLYING ANOTHER PATCH      metoprolol succinate (TOPROL-XL) 50 MG 24 hr tablet Take 1 tablet (50 mg total) by mouth once daily. 90 tablet 1    naproxen (NAPROSYN) 500 MG tablet Take 500 mg by mouth 2 (two) times daily as needed.      NAYZILAM 5 mg/spray (0.1 mL) [place] INSTILL 1 SPRAY IN NASAL ROUTE ONCE AS NEEDED. (1 SPRAY FOR PROLONGED SEIZURE OR CLUSTERS OF SEIZURES. MAY REPEAT ONCE IN OPPOSITE NOSTRIL AFTER 10 MINUTES.)      pantoprazole (PROTONIX) 40 MG tablet Take 1 tablet (40 mg total) by mouth 2 (two) times daily. 180 tablet 3    phenytoin (DILANTIN) 100 MG ER capsule Take 2 capsules by mouth twice daily 360 capsule 3    phenytoin (DILANTIN) 30 MG ER capsule Take 1 capsule (30 mg total) by mouth every evening. 90 capsule 3    potassium chloride (MICRO-K) 10 MEQ CpSR Take 10 mEq by mouth once.      ranolazine (RANEXA) 1,000 mg Tb12 Take 1,000 mg by mouth 2 (two) times daily.      REPATHA SURECLICK 140 mg/mL PnIj SMARTSI Milligram(s) SUB-Q Every 2 Weeks 2 each 11    solifenacin (VESICARE) 10 MG tablet Take 1 tablet (10 mg total) by mouth once daily. 30 tablet 11    tiZANidine (ZANAFLEX) 4 MG tablet Take by mouth.      zonisamide (ZONEGRAN) 100 MG Cap Take 3 capsules (300 mg total) by mouth 2 (two) times daily. 540 capsule 3     No current facility-administered medications on file prior to visit.     Review of patient's allergies indicates:   Allergen Reactions    Latex, natural rubber Hives, Shortness Of Breath and Swelling     Throat swelling  Throat swelling  Other reaction(s): Hives  Throat swelling    Ace inhibitors Other (See Comments)     Coughing  Other reaction(s): Unknown    Diclo gel [diclofenac sodium] Other (See  Comments)     Chest pain       Objective:     Physical Exam  Vitals and nursing note reviewed.   Constitutional:       Appearance: She is well-developed.   HENT:      Head: Normocephalic and atraumatic.   Eyes:      Conjunctiva/sclera: Conjunctivae normal.      Pupils: Pupils are equal, round, and reactive to light.   Cardiovascular:      Rate and Rhythm: Normal rate and regular rhythm.      Pulses: Intact distal pulses.      Heart sounds: Normal heart sounds.   Pulmonary:      Effort: Pulmonary effort is normal.      Breath sounds: Normal breath sounds.   Abdominal:      General: Bowel sounds are normal.      Palpations: Abdomen is soft.   Musculoskeletal:         General: Normal range of motion.      Cervical back: Normal range of motion and neck supple.   Skin:     General: Skin is warm and dry.   Neurological:      Mental Status: She is alert and oriented to person, place, and time.         Assessment:     1. Coronary artery disease involving native coronary artery of native heart without angina pectoris    2. Primary hypertension    3. Hx of heart artery stent    4. Pure hypercholesterolemia        Plan:     Coronary artery disease involving native coronary artery of native heart without angina pectoris    Primary hypertension    Hx of heart artery stent    Pure hypercholesterolemia      Continue ranexa, imdur- cad  Continue repatha, stop zeti-hlp  Continue metoprolol, lasix-htn   exercise

## 2022-06-13 ENCOUNTER — OFFICE VISIT (OUTPATIENT)
Dept: NEUROLOGY | Facility: CLINIC | Age: 62
End: 2022-06-13
Payer: MEDICARE

## 2022-06-13 VITALS
BODY MASS INDEX: 36.87 KG/M2 | OXYGEN SATURATION: 99 % | SYSTOLIC BLOOD PRESSURE: 104 MMHG | DIASTOLIC BLOOD PRESSURE: 72 MMHG | WEIGHT: 221.31 LBS | HEIGHT: 65 IN | RESPIRATION RATE: 16 BRPM | HEART RATE: 83 BPM

## 2022-06-13 DIAGNOSIS — Z51.81 ENCOUNTER FOR MONITORING ANTICONVULSANT THERAPY: ICD-10-CM

## 2022-06-13 DIAGNOSIS — Z79.899 ENCOUNTER FOR MONITORING ANTICONVULSANT THERAPY: ICD-10-CM

## 2022-06-13 DIAGNOSIS — G40.019 PARTIAL IDIOPATHIC EPILEPSY WITH SEIZURES OF LOCALIZED ONSET, INTRACTABLE, WITHOUT STATUS EPILEPTICUS: Primary | ICD-10-CM

## 2022-06-13 DIAGNOSIS — G40.919 REFRACTORY EPILEPSY: ICD-10-CM

## 2022-06-13 DIAGNOSIS — G40.109 TEMPORAL LOBE EPILEPSY: ICD-10-CM

## 2022-06-13 PROBLEM — F41.8 ANXIETY ABOUT HEALTH: Status: ACTIVE | Noted: 2022-06-13

## 2022-06-13 PROBLEM — R45.89 ANXIETY ABOUT HEALTH: Status: ACTIVE | Noted: 2022-06-13

## 2022-06-13 PROCEDURE — 99999 PR PBB SHADOW E&M-EST. PATIENT-LVL V: ICD-10-PCS | Mod: PBBFAC,,, | Performed by: NURSE PRACTITIONER

## 2022-06-13 PROCEDURE — 99215 OFFICE O/P EST HI 40 MIN: CPT | Mod: PBBFAC | Performed by: NURSE PRACTITIONER

## 2022-06-13 PROCEDURE — 99999 PR PBB SHADOW E&M-EST. PATIENT-LVL V: CPT | Mod: PBBFAC,,, | Performed by: NURSE PRACTITIONER

## 2022-06-13 PROCEDURE — 99214 OFFICE O/P EST MOD 30 MIN: CPT | Mod: S$PBB,,, | Performed by: NURSE PRACTITIONER

## 2022-06-13 PROCEDURE — 99214 PR OFFICE/OUTPT VISIT, EST, LEVL IV, 30-39 MIN: ICD-10-PCS | Mod: S$PBB,,, | Performed by: NURSE PRACTITIONER

## 2022-06-13 NOTE — PROGRESS NOTES
Subjective:       Patient ID: Evelyne Francisco is a 62 y.o. female.    Chief Complaint: Refactory epilepsy          HPI       BACKGROUND HISTORY         The patient is here for seizure evaluation and a 4th opinion.    Saw Dr. Monge, Dr. Hobbs and Dr. Smith in the past.     The patient started having seizures 11 years ago (2009) at the age of 49 .The patient describes aura of strange feeling in her head that she cannot describe. The patient is amnestic to the seizure after that. The seizures are mostly described as Complex Partial (staring, unresponsiveness, sometimes accompanied by spitting) and some of the them Grand Mal (GTC). The patient is currently not driving and has 2-5 seizure a month. No history of meningitis or encephalitis No toxic exposure or lead poisoning. Her father had posttraumatic family history of epilepsy and her nephew might be having seizures.  No history of strokes or aneurysmal bleeding. No history of TBI. Routine EEGs have been normal. EMU in 2016 captured 2 seizures with B/L TL localization. Brain MRI was reported as unremarkable. Failed VPA, LTG, OXC, LEV and VNS. She was on  mg BID and  mg BID. LCM,  ESL and BRV were too expensive. She said that CLB helped control the seizures but the insurance would not pay for it. Added CLB 20 mg BID.Explained to the patient that failing 2 AEDs typically means that chance of achieving seizure-freedom using AEDs alone is <10% and we should explore other treatments like epilepsy surgery.Could not tolerate CLB 20 mg BID.  Was approved to be seen at [facility].  Titrated PHT to 200 mg BID since levels were very low. Undergoing epilepsy surgery evaluation at [facility] by Dr. Nunez. EMU monitoring in  captured 3 stereotypical seizures originating form RT TL. The plan includes CNP, PET, WADA +/- DARCI. Trying to solve some billing issues with [facility].     Last seizure 01-.AED Regimen:  mg BID ,   mg BID and CLB 3 ml/ 4 ml (7.5 mg/10 mg) . On  mg BID for pain.        Patient present for follow up Epilepsy management. Patient doing well.  Last seizures occurred approximately 04-. The patient  Reports she didn't fall out during her event, No HILARY, No LOC. She reports her daughter helped guided her to her bed. She recall drooling, but no shaking, no jerking, no tongue biting, and no urinary incontinence.  She slept afterwards. Unable to state how long event last or details of what happened. Patient reportsher seizures have been less frequent, she has gone 1 or 2 months without an event. The patient reports when she has an event free month usually the following months she well have have 2 to 3 in a day.    Tolerating AED regimen   mg BID,  mg BID. CLB 10 mg BID (4 mL BID), she reports compliances with medications. She denies having to use the Nayzilam (NS Midazolam) for prolonged and/or clusters of seizures at this time but will continue the medication.      Patient continues to follow up with [facility] by Dr. Nunez. For surgical evaluation . She recently completed the CNP testing on 04- for Epilepsy surgical evalution  work up.    Patient request letter for travel, she will be flying to [place] this month and letter needed to show medically necessity for AED mediations and seizure device while traveling, letter completed.     INTERVAL HISTORY     Patient present for follow up Epilepsy management. Patient doing well.  Last seizures occurred approximately May 31, 2022. The patient report she was in the car and she had a  HILARY, and LOC denies hitting her head. Patient unable to recall details, witnessed by daughter. No shaking, no jerking, no tongue biting, and no urinary incontinence. She slept afterwards and states she felt tired. Unable to state how long event last or details of what happened. The patient report she had 4 seizure events last month. The patient flew to [facility]  on 05-04- 2022 and forgot her medication at home, she missed 2 doses of her Clozabam. Tolerating AED regimen   mg BID,  mg BID, PHT 30 mg po HS started (05-07/22). CLB 10 mg BID (4 mL BID)Labs Reviewed: 05- ZNS level 39 NL (10- 40), CLB level 55.7 NL ( ), PHT level 8.3 Low  (10- 20) Dr. Estevez added additional PHT 30 mg po Nightly to regimen She denies having to use the Nayzilam (NS Midazolam) for prolonged and/or clusters of seizures at this time but will continue the medication.      Patient continues to follow up with [facility] by Dr. Nunez. For surgical evaluation       Review of Systems   Constitutional: Positive for fatigue. Negative for appetite change.   HENT: Negative for hearing loss and tinnitus.    Eyes: Negative for photophobia and visual disturbance.   Respiratory: Negative for apnea and shortness of breath.    Cardiovascular: Negative for chest pain and palpitations.   Gastrointestinal: Negative for nausea and vomiting.   Endocrine: Negative for cold intolerance and heat intolerance.   Genitourinary: Negative for difficulty urinating and urgency.   Musculoskeletal: Positive for arthralgias. Negative for back pain, gait problem, joint swelling, myalgias, neck pain and neck stiffness.   Skin: Negative for color change and rash.   Allergic/Immunologic: Negative for environmental allergies and immunocompromised state.   Neurological: Positive for seizures. Negative for dizziness, tremors, syncope, facial asymmetry, speech difficulty, weakness, light-headedness, numbness and headaches.   Hematological: Negative for adenopathy. Does not bruise/bleed easily.   Psychiatric/Behavioral: Negative for agitation, behavioral problems, confusion, decreased concentration, dysphoric mood, hallucinations, self-injury, sleep disturbance and suicidal ideas. The patient is not hyperactive.              Current Outpatient Medications:     cloBAZam (ONFI) 2.5 mg/mL Susp, Take 4 mLs (10 mg total) by  mouth 2 (two) times daily., Disp: 360 mL, Rfl: 5    clopidogreL (PLAVIX) 75 mg tablet, Take 1 tablet (75 mg total) by mouth once daily., Disp: 90 tablet, Rfl: 3    estradioL (ESTRACE) 2 MG tablet, Take 1 tablet (2 mg total) by mouth once daily., Disp: 30 tablet, Rfl: 11    furosemide (LASIX) 40 MG tablet, Take 1 tablet (40 mg total) by mouth 2 (two) times daily., Disp: 60 tablet, Rfl: 11    HYDROcodone-acetaminophen (NORCO) 5-325 mg per tablet, Take 1 tablet by mouth every 4 (four) hours as needed for Pain., Disp: 18 tablet, Rfl: 0    ibuprofen (ADVIL,MOTRIN) 800 MG tablet, Take 1 tablet (800 mg total) by mouth every 6 (six) hours as needed for Pain., Disp: 20 tablet, Rfl: 0    isosorbide mononitrate (IMDUR) 60 MG 24 hr tablet, TAKE 1 TABLET BY MOUTH EVERY 12 HOURS, Disp: 90 tablet, Rfl: 0    levocetirizine (XYZAL) 5 MG tablet, Take 1 tablet (5 mg total) by mouth every evening., Disp: 90 tablet, Rfl: 3    levothyroxine (SYNTHROID) 100 MCG tablet, Take 1 tablet (100 mcg total) by mouth before breakfast., Disp: 90 tablet, Rfl: 1    LIDOcaine (LIDODERM) 5 %, PUT 1 PATCH TO AFFECTED AREA FOR 12 HOURS AS NEEDED FOR PAIN; TAKE OFF FOR AT LEAST 12 HOURS BEFORE APPLYING ANOTHER PATCH, Disp: , Rfl:     metoprolol succinate (TOPROL-XL) 50 MG 24 hr tablet, Take 1 tablet (50 mg total) by mouth once daily., Disp: 90 tablet, Rfl: 1    naproxen (NAPROSYN) 500 MG tablet, Take 500 mg by mouth 2 (two) times daily as needed., Disp: , Rfl:     NAYZILAM 5 mg/spray (0.1 mL) Spry, INSTILL 1 SPRAY IN NASAL ROUTE ONCE AS NEEDED. (1 SPRAY FOR PROLONGED SEIZURE OR CLUSTERS OF SEIZURES. MAY REPEAT ONCE IN OPPOSITE NOSTRIL AFTER 10 MINUTES.), Disp: , Rfl:     pantoprazole (PROTONIX) 40 MG tablet, Take 1 tablet (40 mg total) by mouth 2 (two) times daily., Disp: 180 tablet, Rfl: 3    phenytoin (DILANTIN) 100 MG ER capsule, Take 2 capsules by mouth twice daily, Disp: 360 capsule, Rfl: 3    phenytoin (DILANTIN) 30 MG ER capsule, Take  1 capsule (30 mg total) by mouth every evening., Disp: 90 capsule, Rfl: 3    potassium chloride (MICRO-K) 10 MEQ CpSR, Take 10 mEq by mouth once., Disp: , Rfl:     ranolazine (RANEXA) 1,000 mg Tb12, Take 1,000 mg by mouth 2 (two) times daily., Disp: , Rfl:     REPATHA SURECLICK 140 mg/mL PnIj, SMARTSI Milligram(s) SUB-Q Every 2 Weeks, Disp: 2 each, Rfl: 11    solifenacin (VESICARE) 10 MG tablet, Take 1 tablet (10 mg total) by mouth once daily., Disp: 30 tablet, Rfl: 11    tiZANidine (ZANAFLEX) 4 MG tablet, Take by mouth., Disp: , Rfl:     zonisamide (ZONEGRAN) 100 MG Cap, Take 3 capsules (300 mg total) by mouth 2 (two) times daily., Disp: 540 capsule, Rfl: 3  Past Medical History:   Diagnosis Date    Blood clotting tendency     Coronary artery disease     Fever blister     HLD (hyperlipidemia)     Hypertension     Hypothyroidism 2020    Renal cyst 2021    Seizures     Sleep apnea     Temporal lobe epilepsy 2016     Past Surgical History:   Procedure Laterality Date    ABDOMINAL SURGERY      BACK SURGERY      BREAST BIOPSY Left     benign    CARDIAC SURGERY      ESOPHAGEAL MANOMETRY WITH MEASUREMENT OF IMPEDANCE N/A 2021    Procedure: MANOMETRY, ESOPHAGUS, WITH IMPEDANCE MEASUREMENT;  Surgeon: Sumanth Curran RN;  Location: [facility] ENDO;  Service: Endoscopy;  Laterality: N/A;    FRACTURE SURGERY      HYSTERECTOMY      LEFT HEART CATHETERIZATION Left 2022    Procedure: CATHETERIZATION, HEART, LEFT;  Surgeon: Richie Butt MD;  Location: [facility] CATH LAB;  Service: Cardiology;  Laterality: Left;    OOPHORECTOMY      TONSILLECTOMY      VASCULAR SURGERY       Social History     Socioeconomic History    Marital status:    Tobacco Use    Smoking status: Former Smoker     Quit date: 2014     Years since quittin.9    Smokeless tobacco: Never Used   Substance and Sexual Activity    Alcohol use: No    Drug use: No    Sexual activity: Not Currently      Partners: Male             Past/Current Medical/Surgical History, Past/Current Social History, Past/Current Family History and Past/Current Medications were reviewed in detail.        Objective:           VITAL SIGNS WERE REVIEWED      GENERAL APPEARANCE:     The patient looks fatigued.     BMI 36.83 KG     No signs of respiratory distress.    Normal breathing pattern.    No dysmorphic features    Normal eye contact.     GENERAL MEDICAL EXAM:    HEENT:  Head is atraumatic normocephalic.     Neck and Axillae: No JVD. No visible lesions    Cardiopulmonary: No cyanosis. No tachypnea. Normal respiratory effort.VNS in place.    Gastrointestinal/Urogenital:  No jaundice. No stomas or lesions. No visible hernias. No catheters.     Skin, Hair and Nails: No pathognonomic skin rash. No neurofibromatosis. No visible lesions.No stigmata of autoimmune disease. No clubbing.    Limbs: No varicose veins. No visible swelling.    Muskoskeletal: No visible deformities.No visible lesions.               Neurologic Exam     Mental Status   Oriented to person, place, and time.   Follows 3 step commands.   Attention: normal. Concentration: normal.   Speech: speech is normal   Level of consciousness: alert  Knowledge: good.   Able to name object. Able to read. Able to repeat. Normal comprehension.     Cranial Nerves   Cranial nerves II through XII intact.     CN II   Visual fields full to confrontation.   Visual acuity: normal with correction  Right visual field deficit: none  Left visual field deficit: none     CN III, IV, VI   Pupils are equal, round, and reactive to light.  Extraocular motions are normal.   Right pupil: Size: 2 mm. Shape: regular. Reactivity: brisk. Consensual response: intact. Accommodation: intact.   Left pupil: Size: 2 mm. Shape: regular. Reactivity: brisk. Consensual response: intact. Accommodation: intact.   CN III: no CN III palsy  CN VI: no CN VI palsy  Nystagmus: none   Diplopia: none  Ophthalmoparesis:  none  Upgaze: normal  Downgaze: normal  Conjugate gaze: present  Vestibulo-ocular reflex: present    CN V   Facial sensation intact.   Right facial sensation deficit: none  Left facial sensation deficit: none    CN VII   Facial expression full, symmetric.   Right facial weakness: none  Left facial weakness: none    CN VIII   CN VIII normal.   Hearing: intact  Right Rinne: AC > BC  Left Rinne: AC > BC  Mendes: does not lateralize     CN IX, X   CN IX normal.   CN X normal.   Palate: symmetric    CN XI   CN XI normal.   Right sternocleidomastoid strength: normal  Left sternocleidomastoid strength: normal  Right trapezius strength: normal  Left trapezius strength: normal    CN XII   CN XII normal.   Tongue: not atrophic  Fasciculations: absent  Tongue deviation: none    Motor Exam   Muscle bulk: normal  Overall muscle tone: normal  Right arm tone: normal  Left arm tone: normal  Right arm pronator drift: absent  Left arm pronator drift: absent  Right leg tone: normal  Left leg tone: normal    Strength   Strength 5/5 throughout.   Right neck flexion: 5/5  Left neck flexion: 5/5  Right neck extension: 5/5  Left neck extension: 5/5  Right deltoid: 5/5  Left deltoid: 5/5  Right biceps: 5/5  Left biceps: 5/5  Right triceps: 5/5  Left triceps: 5/5  Right wrist flexion: 5/5  Left wrist flexion: 5/5  Right wrist extension: 5/5  Left wrist extension: 5/5  Right interossei: 5/5  Left interossei: 5/5  Right iliopsoas: 5/5  Left iliopsoas: 5/5  Right quadriceps: 5/5  Left quadriceps: 5/5  Right hamstrin/5  Left hamstrin/5  Right glutei: 5/5  Left glutei: 5/5  Right anterior tibial: 5/5  Left anterior tibial: 5/5  Right posterior tibial: 5/5  Left posterior tibial: 5/5  Right peroneal: 5/5  Left peroneal: 5/5  Right gastroc: 5/5  Left gastroc: 5/5    Sensory Exam   Light touch normal.   Right arm light touch: normal  Left arm light touch: normal  Right leg light touch: normal  Left leg light touch: normal  Vibration normal.    Right arm vibration: normal  Left arm vibration: normal  Right leg vibration: normal  Left leg vibration: normal  Proprioception normal.   Right arm proprioception: normal  Left arm proprioception: normal  Right leg proprioception: normal  Left leg proprioception: normal  Pinprick normal.   Right arm pinprick: normal  Left arm pinprick: normal  Right leg pinprick: normal  Left leg pinprick: normal  Graphesthesia: normal  Stereognosis: normal    Gait, Coordination, and Reflexes     Gait  Gait: normal    Coordination   Romberg: negative  Finger to nose coordination: normal  Heel to shin coordination: normal  Tandem walking coordination: normal    Tremor   Resting tremor: absent  Intention tremor: absent  Action tremor: absent    Reflexes   Right brachioradialis: 2+  Left brachioradialis: 2+  Right biceps: 2+  Left biceps: 2+  Right triceps: 2+  Left triceps: 2+  Right patellar: 2+  Left patellar: 2+  Right achilles: 2+  Left achilles: 2+  Right plantar: normal  Left plantar: normal  Right Marie: absent  Left Marie: absent  Right ankle clonus: absent  Left ankle clonus: absent  Right pendular knee jerk: absent  Left pendular knee jerk: absent      Lab Results   Component Value Date    WBC 3.52 (L) 05/16/2022    HGB 12.9 05/16/2022    HCT 37.7 05/16/2022    MCV 95 05/16/2022     05/16/2022     Sodium   Date Value Ref Range Status   05/16/2022 143 136 - 145 mmol/L Final     Potassium   Date Value Ref Range Status   05/16/2022 4.1 3.5 - 5.1 mmol/L Final     Chloride   Date Value Ref Range Status   05/16/2022 110 95 - 110 mmol/L Final     CO2   Date Value Ref Range Status   05/16/2022 22 (L) 23 - 29 mmol/L Final     Glucose   Date Value Ref Range Status   05/16/2022 103 70 - 110 mg/dL Final     BUN   Date Value Ref Range Status   05/16/2022 15 8 - 23 mg/dL Final     Creatinine   Date Value Ref Range Status   05/16/2022 0.9 0.5 - 1.4 mg/dL Final     Calcium   Date Value Ref Range Status   05/16/2022 9.2 8.7 -  10.5 mg/dL Final     Total Protein   Date Value Ref Range Status   05/16/2022 7.1 6.0 - 8.4 g/dL Final     Albumin   Date Value Ref Range Status   05/16/2022 3.8 3.5 - 5.2 g/dL Final     Total Bilirubin   Date Value Ref Range Status   05/16/2022 0.3 0.1 - 1.0 mg/dL Final     Comment:     For infants and newborns, interpretation of results should be based  on gestational age, weight and in agreement with clinical  observations.    Premature Infant recommended reference ranges:  Up to 24 hours.............<8.0 mg/dL  Up to 48 hours............<12.0 mg/dL  3-5 days..................<15.0 mg/dL  6-29 days.................<15.0 mg/dL       Alkaline Phosphatase   Date Value Ref Range Status   05/16/2022 138 (H) 55 - 135 U/L Final     AST   Date Value Ref Range Status   05/16/2022 22 10 - 40 U/L Final     ALT   Date Value Ref Range Status   05/16/2022 32 10 - 44 U/L Final     Anion Gap   Date Value Ref Range Status   05/16/2022 11 8 - 16 mmol/L Final     eGFR if    Date Value Ref Range Status   05/16/2022 >60 >60 mL/min/1.73 m^2 Final     eGFR if non    Date Value Ref Range Status   05/16/2022 >60 >60 mL/min/1.73 m^2 Final     Comment:     Calculation used to obtain the estimated glomerular filtration  rate (eGFR) is the CKD-EPI equation.        No results found for: [id number]  Lab Results   Component Value Date    TSH 0.561 04/14/2022    FREET4 0.89 04/14/2022 2016-2020    Brain MRI Unremarkable    EEG TL Seizures  B/L       [facility]     3 RT TL Seizures     Labs Reviewed:    05-    ZNS level 39 NL (10- 40)    CLB level 55.7 NL ( )   AED LEVELS     AED: PHT NORMAL LEVEL RANGE: 10-20   DATE LEVEL   09- 12.9   10- 15.0   01- 06.3   05- 01.1   05- (200 mg BID) 14.0   07- 11.9   01- 11.0   05- 8.3 L added PHT 30 mg po additional dose HS            AED: ZNS NORMAL LEVEL RANGE: 10-40   DATE LEVEL   09- 13    07- 37   01- 39   05- 39               AED: CLB NORMAL LEVEL RANGE:    DATE LEVEL   10- 176   07- 193   01- 139   05- 55.7                 Reviewed the neuroimaging independently       Assessment:       1. Partial idiopathic epilepsy with seizures of localized onset, intractable, without status epilepticus    2. Temporal lobe epilepsy    3. Encounter for monitoring anticonvulsant therapy    4. Refractory epilepsy          EPILEPSY CLASSIFICATION      SEMIOLOGY: DIALEPTIC (FOCAL WITH HILARY) WITH ICTAL SPITTING -->GTC     EPILEPTOGENIC ZONE (S):  RT TL     ETIOLOGY: UNKNOWN     PRIOR AEDS: VPA, LTG, OXC, LEV    CURRENT AEDS: ZNS, PHT, CLB, (GBP LBP)    LAST SEIZURE DATE: 05-      COMPREHENSIVE LIST OF AEDs:     Acetazolamide (AZM-Diamox)   Benzos: clonazepam (CZP Klonopin), lorazepam (LZP-Ativan), diazepam (DZP-Valium), clorazepate (CLZ- Tranxene)  Brivaracetam (BRV-Briviact)  Cannabidiol (CBD- Epidiolex)  Carbamazepine (CBZ-Tegretol)  Cenobamate (CNB-Xcopri)  Clobazam (CLB-Onfi)  Eslicarbazepine (ESL-Aptiom)  Ethosuximide (ESX-Zarontin)  Felbamate (FBM-Felbatol)  Gabapentin (GBP-Neurontin)  Lacosamide (LCM-Vimpat)  Lamotrigine (LTG-Lamitcal)  Levetiracetam (LEV- Keppra)  Oxcarbazepine (OXC-Trileptal)  Perampanel (PML-Fycompa)  Phenobarbital (PB)  Phenytoin (PHT-Dilantin)  Pregabalin (PGB-Lyrica)  Primidone (PRM)   Retigabine (RTG- Potiga) Discontinued in 2017  Rufinamide (RFN-Benzil)  Stiripentol (STP-Diacomit)  Tiagabine (TGB-Gabitril)  Topiramate (TPM-Topamax)  Valproate (VPA-Depakote)  Vigabatrin (VGB-Sabril)  Zonisamide (ZNS-Zonegran)        Plan:         INTRACTABLE-MEDICALLY REFRACTORY NON-LESIONAL RT TLE UNDERGOING SURGICAL EVALUATION              The patient was encouraged to maintain full traditional seizure precautions which include but not limited to avoid driving, avoid high altitudes, avoid being close to fire or fire source, avoid being close  to a body of water or swimming alone, avoid operating heavy machinery and avoid using sharp objects if possible. The patient was encouraged to shower (without accumulation of water) instead of taking a bath if unsupervised. The patient was made aware that these precautions are especially important during concurrent illness. Adequate sleep and avoidance of alcohol as important measures to assure good seizure control were discussed with the patient. The patient was also advised not to care for children without company. The patient was advised to pad the side rails with pillows and blankets if applicable.I strongly recommended lowering the bed to the floor level to decrease the risk of falls.     Continue  mg BID check level 6 months       Continue  mg BID check level 6 months     Continue PHT 30 mg po HS       Continue  CLB 10 mg BID Check level . 6 months           Continue Nayzilam (NS Midazolam) for prolonged and/or clusters of seizures.     Continue AEDs INDEFINITELY, FOR GOOD AND NEVER SKIP A DOSE. The patient verbalized full understanding. Stressed the extreme medical, personal safety, public safety and legal importance of compliance and seizure control. Will give as many refills as possible with or without face-to-face evaluation to make sure the patient and any patient with epilepsy will never run out of medications. Running out of seizure medications should never happen under our care. I explained to the patient that he should not, under any circumstances, adjust or stop taking his seizure medication without discussing with me. The patient verbalized full understanding.     AVOID any substance that could lower seizure threshold including but not limited to:      ALCOHOL AND WITHDRAWAL      TRAMADOL.     DEMEROL      ALL STIMULANTS-ALL ADHD MEDICATIONS.      CLOZAPINE.      BUPROPION.     CIPROFLOXACIN          MEDICAL/SURGICAL COMORBIDITIES     All relevant medical comorbidities noted and  managed by primary care physician and medical care team.          MISCELLANEOUS MEDICAL PROBLEMS       HEALTHY LIFESTYLE AND PREVENTATIVE CARE    Encouraged the patient to adhere to the age-appropriate health maintenance guidelines including screening tests and vaccinations.     Discussed the overall importance of healthy lifestyle, optimal weight, exercise, healthy diet, good sleep hygiene and avoiding drugs including smoking, alcohol and recreational drugs. The patient verbalized full understanding.       Advised the patient to follow COVID-19 prevention measures. S/P vaccination           RTC in 6 months          Stacey Adan, MSN NP      Collaborating Provider: Payton Estevez MD, FAAN Neurologist/Epileptologist            TOTAL E/M 30 M

## 2022-06-16 ENCOUNTER — DOCUMENTATION ONLY (OUTPATIENT)
Dept: REHABILITATION | Facility: HOSPITAL | Age: 62
End: 2022-06-16
Payer: MEDICARE

## 2022-06-16 DIAGNOSIS — R35.1 NOCTURIA: Primary | ICD-10-CM

## 2022-06-16 DIAGNOSIS — R35.0 URINARY FREQUENCY: ICD-10-CM

## 2022-06-16 NOTE — PROGRESS NOTES
"[facility]  Pelvic Floor Physical Therapy Discharge Note    Name: Evelyne Francisco  Clinic Number: [id number]    Therapy Diagnosis:   Encounter Diagnoses   Name Primary?    Nocturia Yes    Urinary frequency      Physician: May Camacho NP     Physician Orders: PT Eval and Treat  Medical Diagnosis from Referral: OAB (overactive bladder) [N32.81]  Evaluation Date: 3/4/2022      Date of Last visit: 3/30/2022  Total Visits Received: 1 + 1 evaluation    ASSESSMENT      From 3/30/2022: Pt with good tolerance for treatment today, reporting understanding of education provided on bladder retraining. PFM examination reveals decreased coordination, poor relaxation ability, and global pain. These are likely driving pt's urinary symptoms, as strength an endurance of the PFM are within normal limits. At next visit will assess impact of strategies learned at this visit for bladder retraining and will initiate downtraining.    Discharge reason: Patient has not attended therapy since 3/30/2022.    Discharge FOTO Score: N/A    Goals (none met):  Short Term Goals: 6 weeks   - Pt will be I in diaphragmatic breathing with proper technique to promote relaxation and pelvic floor functional mobility for improved urinary continence with ADLs and improved QOL.  - Pt will report decreased pain with insertion to improve tolerance to gynecological exams and tampon use.  - Pt to correctly and consistently perform "the knack" prior to coughing, laughing or sneezing to decrease risk of leakage.  - Pt to report improved restorative sleeping, waking up no more than 2-3x/night due to urge to urinate.  - Pt will report a 50% reduction in frequency of leakage to demonstrate improved pelvic floor coordination needed for continence with ADLs.  - Pt to demonstrate proper positioning on commode with breathing techniques to decrease strain with BM to enable pt to feel empty after BM.  - Pt to demonstrate " independence with performing bowel massage to help with gut motility.  - Pt will be educated on fluid intake and dietary considerations for improving stool quality for improved voiding of stool.  - Pt will report Sully Stool Chart type 3 or 4 stools 50% of the time she has a bowel movement for improvement of fecal continence and improved QOL.     Long Term Goals: 12 weeks   - Pt to be I with home plan for carry over after discharge.    - Pt will report elimination of pain with insertion to improve tolerance to gynecological exams and tampon use.  - Pt to report improved restorative sleeping, waking up no more than 1-2x/night due to urge to urinate.  - Pt to report elimination of incontinence with ADLs to demonstrate improved pelvic floor muscle strength and coordination.  - Pt to be able to bulge pelvic floor with proper technique, which is needed for comfortable BM and complete evacuation.  - Pt will be compliant with considerations for fluid intake and dietary factors for improving stool quality for improved voiding of stool.  - Pt will report Sully Stool Chart type 3 or 4 stools 75% of the time she has a bowel movement for improvement of fecal continence and improved QOL.  - Pt to demonstrate an improved score in the FOTO Urinary Problem survey to 35% or less to demonstrate improving pelvic floor function for improved urinary continence with ADLs and improved QOL.    PLAN     This patient is discharged from PT.    Nneka Motley, PT, DPT

## 2022-06-24 ENCOUNTER — TELEPHONE (OUTPATIENT)
Dept: UROLOGY | Facility: CLINIC | Age: 62
End: 2022-06-24
Payer: MEDICARE

## 2022-06-27 ENCOUNTER — PES CALL (OUTPATIENT)
Dept: ADMINISTRATIVE | Facility: CLINIC | Age: 62
End: 2022-06-27
Payer: MEDICARE

## 2022-06-28 ENCOUNTER — HOSPITAL ENCOUNTER (EMERGENCY)
Facility: HOSPITAL | Age: 62
Discharge: HOME | End: 2022-06-28
Attending: EMERGENCY MEDICINE
Payer: MEDICARE

## 2022-06-28 ENCOUNTER — TELEPHONE (OUTPATIENT)
Dept: INTERNAL MEDICINE | Facility: CLINIC | Age: 62
End: 2022-06-28
Payer: MEDICARE

## 2022-06-28 VITALS
HEIGHT: 65 IN | HEART RATE: 95 BPM | RESPIRATION RATE: 31 BRPM | WEIGHT: 216.19 LBS | TEMPERATURE: 100 F | BODY MASS INDEX: 36.02 KG/M2 | SYSTOLIC BLOOD PRESSURE: 155 MMHG | DIASTOLIC BLOOD PRESSURE: 76 MMHG | OXYGEN SATURATION: 100 %

## 2022-06-28 DIAGNOSIS — R06.02 SOB (SHORTNESS OF BREATH): ICD-10-CM

## 2022-06-28 DIAGNOSIS — U07.1 LAB TEST POSITIVE FOR DETECTION OF COVID-19 VIRUS: Primary | ICD-10-CM

## 2022-06-28 LAB
ALBUMIN SERPL BCP-MCNC: 4 G/DL (ref 3.5–5.2)
ALP SERPL-CCNC: 165 U/L (ref 55–135)
ALT SERPL W/O P-5'-P-CCNC: 34 U/L (ref 10–44)
ANION GAP SERPL CALC-SCNC: 10 MMOL/L (ref 8–16)
AST SERPL-CCNC: 37 U/L (ref 10–40)
BASOPHILS # BLD AUTO: 0.02 K/UL (ref 0–0.2)
BASOPHILS NFR BLD: 0.4 % (ref 0–1.9)
BILIRUB SERPL-MCNC: 0.3 MG/DL (ref 0.1–1)
BNP SERPL-MCNC: 62 PG/ML (ref 0–99)
BUN SERPL-MCNC: 9 MG/DL (ref 8–23)
CALCIUM SERPL-MCNC: 9.3 MG/DL (ref 8.7–10.5)
CHLORIDE SERPL-SCNC: 108 MMOL/L (ref 95–110)
CK SERPL-CCNC: 57 U/L (ref 20–180)
CO2 SERPL-SCNC: 22 MMOL/L (ref 23–29)
CREAT SERPL-MCNC: 1 MG/DL (ref 0.5–1.4)
CTP QC/QA: YES
DIFFERENTIAL METHOD: ABNORMAL
EOSINOPHIL # BLD AUTO: 0 K/UL (ref 0–0.5)
EOSINOPHIL NFR BLD: 0.4 % (ref 0–8)
ERYTHROCYTE [DISTWIDTH] IN BLOOD BY AUTOMATED COUNT: 14 % (ref 11.5–14.5)
EST. GFR  (AFRICAN AMERICAN): >60 ML/MIN/1.73 M^2
EST. GFR  (NON AFRICAN AMERICAN): >60 ML/MIN/1.73 M^2
FERRITIN SERPL-MCNC: 29 NG/ML (ref 20–300)
GLUCOSE SERPL-MCNC: 110 MG/DL (ref 70–110)
HCT VFR BLD AUTO: 40.3 % (ref 37–48.5)
HCV AB SERPL QL IA: NEGATIVE
HEP C VIRUS HOLD SPECIMEN: NORMAL
HGB BLD-MCNC: 13.5 G/DL (ref 12–16)
HIV 1+2 AB+HIV1 P24 AG SERPL QL IA: NEGATIVE
IMM GRANULOCYTES # BLD AUTO: 0.02 K/UL (ref 0–0.04)
IMM GRANULOCYTES NFR BLD AUTO: 0.4 % (ref 0–0.5)
LDH SERPL L TO P-CCNC: 424 U/L (ref 110–260)
LYMPHOCYTES # BLD AUTO: 0.5 K/UL (ref 1–4.8)
LYMPHOCYTES NFR BLD: 9 % (ref 18–48)
MCH RBC QN AUTO: 31.6 PG (ref 27–31)
MCHC RBC AUTO-ENTMCNC: 33.5 G/DL (ref 32–36)
MCV RBC AUTO: 94 FL (ref 82–98)
MONOCYTES # BLD AUTO: 0.6 K/UL (ref 0.3–1)
MONOCYTES NFR BLD: 10.5 % (ref 4–15)
NEUTROPHILS # BLD AUTO: 4.3 K/UL (ref 1.8–7.7)
NEUTROPHILS NFR BLD: 79.3 % (ref 38–73)
NRBC BLD-RTO: 0 /100 WBC
PLATELET # BLD AUTO: 188 K/UL (ref 150–450)
PMV BLD AUTO: 9.9 FL (ref 9.2–12.9)
POTASSIUM SERPL-SCNC: 4.7 MMOL/L (ref 3.5–5.1)
PROT SERPL-MCNC: 8.5 G/DL (ref 6–8.4)
RBC # BLD AUTO: 4.27 M/UL (ref 4–5.4)
SARS-COV-2 RDRP RESP QL NAA+PROBE: POSITIVE
SODIUM SERPL-SCNC: 140 MMOL/L (ref 136–145)
TROPONIN I SERPL DL<=0.01 NG/ML-MCNC: <0.006 NG/ML (ref 0–0.03)
WBC # BLD AUTO: 5.43 K/UL (ref 3.9–12.7)

## 2022-06-28 PROCEDURE — 93005 ELECTROCARDIOGRAM TRACING: CPT

## 2022-06-28 PROCEDURE — U0002 COVID-19 LAB TEST NON-CDC: HCPCS | Performed by: EMERGENCY MEDICINE

## 2022-06-28 PROCEDURE — 93010 EKG 12-LEAD: ICD-10-PCS | Mod: ,,, | Performed by: INTERNAL MEDICINE

## 2022-06-28 PROCEDURE — 87389 HIV-1 AG W/HIV-1&-2 AB AG IA: CPT | Performed by: NURSE PRACTITIONER

## 2022-06-28 PROCEDURE — 84484 ASSAY OF TROPONIN QUANT: CPT | Performed by: NURSE PRACTITIONER

## 2022-06-28 PROCEDURE — 93010 ELECTROCARDIOGRAM REPORT: CPT | Mod: ,,, | Performed by: INTERNAL MEDICINE

## 2022-06-28 PROCEDURE — 82728 ASSAY OF FERRITIN: CPT | Performed by: NURSE PRACTITIONER

## 2022-06-28 PROCEDURE — 83880 ASSAY OF NATRIURETIC PEPTIDE: CPT | Performed by: NURSE PRACTITIONER

## 2022-06-28 PROCEDURE — 96360 HYDRATION IV INFUSION INIT: CPT

## 2022-06-28 PROCEDURE — 80053 COMPREHEN METABOLIC PANEL: CPT | Performed by: NURSE PRACTITIONER

## 2022-06-28 PROCEDURE — 82550 ASSAY OF CK (CPK): CPT | Performed by: NURSE PRACTITIONER

## 2022-06-28 PROCEDURE — 99285 EMERGENCY DEPT VISIT HI MDM: CPT | Mod: 25

## 2022-06-28 PROCEDURE — 85025 COMPLETE CBC W/AUTO DIFF WBC: CPT | Performed by: NURSE PRACTITIONER

## 2022-06-28 PROCEDURE — 25000003 PHARM REV CODE 250: Performed by: EMERGENCY MEDICINE

## 2022-06-28 PROCEDURE — 83615 LACTATE (LD) (LDH) ENZYME: CPT | Performed by: NURSE PRACTITIONER

## 2022-06-28 PROCEDURE — 36415 COLL VENOUS BLD VENIPUNCTURE: CPT | Performed by: NURSE PRACTITIONER

## 2022-06-28 PROCEDURE — 86803 HEPATITIS C AB TEST: CPT | Performed by: NURSE PRACTITIONER

## 2022-06-28 RX ADMIN — SODIUM CHLORIDE 1000 ML: 0.9 INJECTION, SOLUTION INTRAVENOUS at 04:06

## 2022-06-28 NOTE — ED PROVIDER NOTES
SCRIBE #1 NOTE: I, Radha Anne, am scribing for, and in the presence of, Ольга George MD. I have scribed the entire note.      History      Chief Complaint   Patient presents with    Shortness of Breath     PT c/o SOB , pt tested positive for Covid this morning       Review of patient's allergies indicates:   Allergen Reactions    Latex, natural rubber Hives, Shortness Of Breath and Swelling     Throat swelling  Throat swelling  Other reaction(s): Hives  Throat swelling    Ace inhibitors Other (See Comments)     Coughing  Other reaction(s): Unknown    Diclo gel [diclofenac sodium] Other (See Comments)     Chest pain        HPI   HPI    6/28/2022, 5:02 PM   History obtained from the patient      History of Present Illness: Evelyne Francisco is a 62 y.o. female patient with a PMHx of CAD, HLD, HTN, hypothyroidism, seizures,a nd temporal lobe epilepsy who presents to the Emergency Department for general malaise which onset gradually when she woke up in the middle of the night last night. Pt reports that she tested positive for COVID-19 this morning. Symptoms are constant and moderate in severity. No mitigating or exacerbating factors reported. Associated sxs include chills, generalized myalgias, headache, low grade fever (Tnow 100), decreased PO intake, and generalized weakness. Patient denies any SOB, CP, sore throat, numbness, diaphoresis, N/V/D, and all other sxs at this time. No prior Tx reported. No further complaints or concerns at this time.         Arrival mode: Personal vehicle      PCP: Sergio Malcolm MD       Past Medical History:  Past Medical History:   Diagnosis Date    Blood clotting tendency     Coronary artery disease     Fever blister     HLD (hyperlipidemia)     Hypertension     Hypothyroidism 12/7/2020    Renal cyst 7/12/2021    Seizures     Sleep apnea     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       Past Surgical History:  Past Surgical History:   Procedure Laterality Date     ABDOMINAL SURGERY      BACK SURGERY      BREAST BIOPSY Left     benign    CARDIAC SURGERY      ESOPHAGEAL MANOMETRY WITH MEASUREMENT OF IMPEDANCE N/A 2021    Procedure: MANOMETRY, ESOPHAGUS, WITH IMPEDANCE MEASUREMENT;  Surgeon: Sumanth Curran RN;  Location: [facility];  Service: Endoscopy;  Laterality: N/A;    FRACTURE SURGERY      HYSTERECTOMY      LEFT HEART CATHETERIZATION Left 2022    Procedure: CATHETERIZATION, HEART, LEFT;  Surgeon: Richie Butt MD;  Location: [facility] CATH LAB;  Service: Cardiology;  Laterality: Left;    OOPHORECTOMY      TONSILLECTOMY      VASCULAR SURGERY           Family History:  Family History   Problem Relation Age of Onset    Hypertension Mother     Hyperlipidemia Mother     Stroke Father     Seizures Father     Hypertension Sister     Cancer Brother     Marfan syndrome Daughter        Social History:  Social History     Tobacco Use    Smoking status: Former Smoker     Quit date: 2014     Years since quittin.0    Smokeless tobacco: Never Used   Substance and Sexual Activity    Alcohol use: No    Drug use: No    Sexual activity: Not Currently     Partners: Male       ROS   Review of Systems   Constitutional: Positive for appetite change (decreasd PO intake), chills and fever (Tnow 100). Negative for diaphoresis.        (+) general malaise   HENT: Negative for sore throat.    Respiratory: Negative for shortness of breath.    Cardiovascular: Negative for chest pain.   Gastrointestinal: Negative for diarrhea, nausea and vomiting.   Genitourinary: Negative for dysuria.   Musculoskeletal: Positive for myalgias (generalized). Negative for back pain.   Skin: Negative for rash.   Neurological: Positive for weakness (generalized) and headaches. Negative for numbness.   Hematological: Does not bruise/bleed easily.   All other systems reviewed and are negative.    Physical Exam      Initial Vitals [22 1531]   BP Pulse Resp Temp SpO2   (!) 156/83  "(!) 111 (!) 22 100 °F (37.8 °C) 98 %      MAP       --          Physical Exam  Nursing Notes and Vital Signs Reviewed.  Constitutional: Patient is in no acute distress. Well-developed and well-nourished.  Head: Atraumatic. Normocephalic.  Eyes: PERRL. EOM intact. Conjunctivae are not pale. No scleral icterus.  ENT: Mucous membranes are moist. Oropharynx is clear and symmetric.    Neck: Supple. Full ROM. No lymphadenopathy.  Cardiovascular: Regular rate. Regular rhythm. No murmurs, rubs, or gallops. Distal pulses are 2+ and symmetric.  Pulmonary/Chest: No respiratory distress. Clear to auscultation bilaterally. No wheezing or rales.  Abdominal: Soft and non-distended.  There is no tenderness.  No rebound, guarding, or rigidity. G  Musculoskeletal: Moves all extremities. No obvious deformities. No edema.  Skin: Warm and dry.  Neurological:  Alert, awake, and appropriate.  Normal speech.  No acute focal neurological deficits are appreciated.  Psychiatric: Normal affect. Good eye contact. Appropriate in content.    ED Course    Procedures  ED Vital Signs:  Vitals:    06/28/22 1531 06/28/22 1632 06/28/22 1730 06/28/22 1745   BP: (!) 156/83  (!) 143/76 (!) 155/76   Pulse: (!) 111 98 98 95   Resp: (!) 22  20 (!) 31   Temp: 100 °F (37.8 °C)      TempSrc: Oral      SpO2: 98%  99% 100%   Weight: 98.1 kg (216 lb 2.6 oz)      Height: 5' 5" (1.651 m)          Abnormal Lab Results:  Labs Reviewed   CBC W/ AUTO DIFFERENTIAL - Abnormal; Notable for the following components:       Result Value    MCH 31.6 (*)     Lymph # 0.5 (*)     Gran % 79.3 (*)     Lymph % 9.0 (*)     All other components within normal limits    Narrative:     Release to patient->Immediate   COMPREHENSIVE METABOLIC PANEL - Abnormal; Notable for the following components:    CO2 22 (*)     Total Protein 8.5 (*)     Alkaline Phosphatase 165 (*)     All other components within normal limits    Narrative:     Release to patient->Immediate   LACTATE DEHYDROGENASE - " Abnormal; Notable for the following components:     (*)     All other components within normal limits    Narrative:     Release to patient->Immediate   SARS-COV-2 RDRP GENE - Abnormal; Notable for the following components:    POC Rapid COVID Positive (*)     All other components within normal limits    Narrative:     This test utilizes isothermal nucleic acid amplification   technology to detect the SARS-CoV-2 RdRp nucleic acid segment.   The analytical sensitivity (limit of detection) is 125 genome   equivalents/mL.   A POSITIVE result implies infection with the SARS-CoV-2 virus;   the patient is presumed to be contagious.     A NEGATIVE result means that SARS-CoV-2 nucleic acids are not   present above the limit of detection. A NEGATIVE result should be   treated as presumptive. It does not rule out the possibility of   COVID-19 and should not be the sole basis for treatment decisions.   If COVID-19 is strongly suspected based on clinical and exposure   history, re-testing using an alternate molecular assay should be   considered.   This test is only for use under the Food and Drug   Administration s Emergency Use Authorization (EUA).   Commercial kits are provided by [employer].   Performance characteristics of the EUA have been independently   verified by [facility] Department of   Pathology and Laboratory Medicine.   _________________________________________________________________   The authorized Fact Sheet for Healthcare Providers and the authorized Fact   Sheet for Patients of the ID NOW COVID-19 are available on the FDA   website:     https://www.fda.gov/media/582704/download  https://www.fda.gov/media/084054/download           CK    Narrative:     Release to patient->Immediate   TROPONIN I    Narrative:     Release to patient->Immediate   B-TYPE NATRIURETIC PEPTIDE    Narrative:     Release to patient->Immediate   HIV 1 / 2 ANTIBODY    Narrative:     Release to patient->Immediate    HEPATITIS C ANTIBODY    Narrative:     Release to patient->Immediate   HEP C VIRUS HOLD SPECIMEN    Narrative:     Release to patient->Immediate   FERRITIN   LACTATE DEHYDROGENASE   FERRITIN    Narrative:     Release to patient->Immediate        All Lab Results:  Results for orders placed or performed during the hospital encounter of 06/28/22   CBC auto differential   Result Value Ref Range    WBC 5.43 3.90 - 12.70 K/uL    RBC 4.27 4.00 - 5.40 M/uL    Hemoglobin 13.5 12.0 - 16.0 g/dL    Hematocrit 40.3 37.0 - 48.5 %    MCV 94 82 - 98 fL    MCH 31.6 (H) 27.0 - 31.0 pg    MCHC 33.5 32.0 - 36.0 g/dL    RDW 14.0 11.5 - 14.5 %    Platelets 188 150 - 450 K/uL    MPV 9.9 9.2 - 12.9 fL    Immature Granulocytes 0.4 0.0 - 0.5 %    Gran # (ANC) 4.3 1.8 - 7.7 K/uL    Immature Grans (Abs) 0.02 0.00 - 0.04 K/uL    Lymph # 0.5 (L) 1.0 - 4.8 K/uL    Mono # 0.6 0.3 - 1.0 K/uL    Eos # 0.0 0.0 - 0.5 K/uL    Baso # 0.02 0.00 - 0.20 K/uL    nRBC 0 0 /100 WBC    Gran % 79.3 (H) 38.0 - 73.0 %    Lymph % 9.0 (L) 18.0 - 48.0 %    Mono % 10.5 4.0 - 15.0 %    Eosinophil % 0.4 0.0 - 8.0 %    Basophil % 0.4 0.0 - 1.9 %    Differential Method Automated    Comprehensive metabolic panel   Result Value Ref Range    Sodium 140 136 - 145 mmol/L    Potassium 4.7 3.5 - 5.1 mmol/L    Chloride 108 95 - 110 mmol/L    CO2 22 (L) 23 - 29 mmol/L    Glucose 110 70 - 110 mg/dL    BUN 9 8 - 23 mg/dL    Creatinine 1.0 0.5 - 1.4 mg/dL    Calcium 9.3 8.7 - 10.5 mg/dL    Total Protein 8.5 (H) 6.0 - 8.4 g/dL    Albumin 4.0 3.5 - 5.2 g/dL    Total Bilirubin 0.3 0.1 - 1.0 mg/dL    Alkaline Phosphatase 165 (H) 55 - 135 U/L    AST 37 10 - 40 U/L    ALT 34 10 - 44 U/L    Anion Gap 10 8 - 16 mmol/L    eGFR if African American >60 >60 mL/min/1.73 m^2    eGFR if non African American >60 >60 mL/min/1.73 m^2   CPK   Result Value Ref Range    CPK 57 20 - 180 U/L   Troponin I   Result Value Ref Range    Troponin I <0.006 0.000 - 0.026 ng/mL   Brain natriuretic peptide    Result Value Ref Range    BNP 62 0 - 99 pg/mL   HIV 1/2 Ag/Ab (4th Gen)   Result Value Ref Range    HIV 1/2 Ag/Ab Negative Negative   Hepatitis C Antibody   Result Value Ref Range    Hepatitis C Ab Negative Negative   HCV Virus Hold Specimen   Result Value Ref Range    HEP C Virus Hold Specimen Hold for HCV sendout    Lactate Dehydrogenase   Result Value Ref Range     (H) 110 - 260 U/L   Ferritin   Result Value Ref Range    Ferritin 29 20.0 - 300.0 ng/mL   POCT COVID-19 Rapid Screening   Result Value Ref Range    POC Rapid COVID Positive (A) Negative     Acceptable Yes          Imaging Results:  Imaging Results          X-Ray Chest 1 View (Final result)  Result time 06/28/22 16:03:17    Final result by SERA Roman Sr., MD (06/28/22 16:03:17)                 Impression:      1. There is no evidence of an acute pulmonary process.  2. There is a ligamentous anchor projected over the right humeral head.  .      Electronically signed by: David Roman MD  Date:    06/28/2022  Time:    16:03             Narrative:    EXAMINATION:  XR CHEST 1 VIEW    CLINICAL HISTORY:  Shortness of breath    COMPARISON:  05/24/2022    FINDINGS:  The size of the heart is normal.  There is no evidence of an acute pulmonary process.  There is no pneumothorax.  The costophrenic angles are sharp.  There is a ligamentous anchor projected over the right humeral head.                               The EKG was ordered, reviewed, and independently interpreted by the ED provider.  Interpretation time: 15:56  Rate: 101 BPM  Rhythm: sinus tachycardia  Interpretation: Nonspecific ST and T wave abnormality. No STEMI.           The Emergency Provider reviewed the vital signs and test results, which are outlined above.    ED Discussion     5:10 PM: Reassessed pt at this time, covid positive, CXR normal, non ill appearing, stable for outpatient monitoring. Discussed with pt all pertinent ED information and results. Discussed pt  dx and plan of tx. Gave pt all f/u and return to the ED instructions. All questions and concerns were addressed at this time. Pt expresses understanding of information and instructions, and is comfortable with plan to discharge. Pt is stable for discharge.    I discussed with patient and/or family/caretaker that evaluation in the ED does not suggest any emergent or life threatening medical conditions requiring immediate intervention beyond what was provided in the ED, and I believe patient is safe for discharge.  Regardless, an unremarkable evaluation in the ED does not preclude the development or presence of a serious of life threatening condition. As such, patient was instructed to return immediately for any worsening or change in current symptoms.           ED Medication(s):  Medications   sodium chloride 0.9% bolus 1,000 mL (0 mLs Intravenous Stopped 6/28/22 1733)     Discharge Medication List as of 6/28/2022  5:08 PM           Follow-up Information     Sergio Malcolm MD. Schedule an appointment as soon as possible for a visit in 2 days.    Specialty: Family Medicine  Why: Return to the Emergency Room, If symptoms worsen  Contact information:  [id number] [place] [address] [id number] [phone]                           Medical Decision Making    Medical Decision Making:   Clinical Tests:   Lab Tests: Ordered and Reviewed  Radiological Study: Ordered and Reviewed  Medical Tests: Ordered and Reviewed           Scribe Attestation:   Scribe #1: I performed the above scribed service and the documentation accurately describes the services I performed. I attest to the accuracy of the note.    Attending:   Physician Attestation Statement for Scribe #1: I, Ольга George MD, personally performed the services described in this documentation, as scribed by Radha Anne, in my presence, and it is both accurate and complete.          Clinical Impression       ICD-10-CM ICD-9-CM   1. Lab test positive for  detection of COVID-19 virus  U07.1 079.89   2. SOB (shortness of breath)  R06.02 786.05       Disposition:   Disposition: Discharged  Condition: Stable         Ольга George MD  06/28/22 1955

## 2022-06-28 NOTE — FIRST PROVIDER EVALUATION
Medical screening exam completed.  I have conducted a focused provider triage encounter, findings are as follows:    Brief history of present illness:  62-year-old female with complaint of shortness of breath over the past 2 days.  Patient reports cough congestion.  Patient reports she was recently diagnosed with COVID.  Patient reports history of heart failure and wants to be evaluated.    There were no vitals filed for this visit.    Pertinent physical exam: BBSCTA

## 2022-06-28 NOTE — TELEPHONE ENCOUNTER
----- Message from Lashawn Barkley sent at 6/28/2022  9:14 AM CDT -----  Contact: self/[phone]  Pt has taken home test for Covid and it was Positive  with symptoms of cough/Mucus/shortness of breath/headache/weakness/loss of taste.she wants to know what she can do Please give call back at [phone].Thanks ar

## 2022-06-29 ENCOUNTER — HOSPITAL ENCOUNTER (EMERGENCY)
Facility: HOSPITAL | Age: 62
Discharge: HOME | End: 2022-06-29
Attending: FAMILY MEDICINE
Payer: MEDICARE

## 2022-06-29 ENCOUNTER — TELEPHONE (OUTPATIENT)
Dept: INTERNAL MEDICINE | Facility: CLINIC | Age: 62
End: 2022-06-29
Payer: MEDICARE

## 2022-06-29 ENCOUNTER — INFUSION (OUTPATIENT)
Dept: INFECTIOUS DISEASES | Facility: HOSPITAL | Age: 62
End: 2022-06-29
Attending: EMERGENCY MEDICINE
Payer: MEDICARE

## 2022-06-29 VITALS
DIASTOLIC BLOOD PRESSURE: 76 MMHG | HEART RATE: 99 BPM | OXYGEN SATURATION: 98 % | RESPIRATION RATE: 19 BRPM | SYSTOLIC BLOOD PRESSURE: 132 MMHG | TEMPERATURE: 98 F

## 2022-06-29 VITALS
HEIGHT: 65 IN | BODY MASS INDEX: 36.03 KG/M2 | WEIGHT: 216.25 LBS | SYSTOLIC BLOOD PRESSURE: 126 MMHG | DIASTOLIC BLOOD PRESSURE: 71 MMHG | TEMPERATURE: 98 F | OXYGEN SATURATION: 100 % | RESPIRATION RATE: 16 BRPM | HEART RATE: 82 BPM

## 2022-06-29 DIAGNOSIS — U07.1 LAB TEST POSITIVE FOR DETECTION OF COVID-19 VIRUS: ICD-10-CM

## 2022-06-29 DIAGNOSIS — U07.1 COVID-19: Primary | ICD-10-CM

## 2022-06-29 DIAGNOSIS — G40.909 SEIZURE DISORDER: Primary | ICD-10-CM

## 2022-06-29 LAB
ALBUMIN SERPL BCP-MCNC: 3.8 G/DL (ref 3.5–5.2)
ALP SERPL-CCNC: 145 U/L (ref 55–135)
ALT SERPL W/O P-5'-P-CCNC: 27 U/L (ref 10–44)
ANION GAP SERPL CALC-SCNC: 9 MMOL/L (ref 8–16)
AST SERPL-CCNC: 22 U/L (ref 10–40)
BACTERIA #/AREA URNS HPF: ABNORMAL /HPF
BASOPHILS # BLD AUTO: 0.01 K/UL (ref 0–0.2)
BASOPHILS NFR BLD: 0.2 % (ref 0–1.9)
BILIRUB SERPL-MCNC: 0.3 MG/DL (ref 0.1–1)
BILIRUB UR QL STRIP: NEGATIVE
BUN SERPL-MCNC: 9 MG/DL (ref 8–23)
CALCIUM SERPL-MCNC: 8.9 MG/DL (ref 8.7–10.5)
CHLORIDE SERPL-SCNC: 112 MMOL/L (ref 95–110)
CLARITY UR: ABNORMAL
CO2 SERPL-SCNC: 21 MMOL/L (ref 23–29)
COLOR UR: ABNORMAL
CREAT SERPL-MCNC: 0.9 MG/DL (ref 0.5–1.4)
DIFFERENTIAL METHOD: ABNORMAL
EOSINOPHIL # BLD AUTO: 0 K/UL (ref 0–0.5)
EOSINOPHIL NFR BLD: 0 % (ref 0–8)
ERYTHROCYTE [DISTWIDTH] IN BLOOD BY AUTOMATED COUNT: 14.3 % (ref 11.5–14.5)
EST. GFR  (AFRICAN AMERICAN): >60 ML/MIN/1.73 M^2
EST. GFR  (NON AFRICAN AMERICAN): >60 ML/MIN/1.73 M^2
GLUCOSE SERPL-MCNC: 117 MG/DL (ref 70–110)
GLUCOSE UR QL STRIP: NEGATIVE
HCT VFR BLD AUTO: 39.7 % (ref 37–48.5)
HGB BLD-MCNC: 12.8 G/DL (ref 12–16)
HGB UR QL STRIP: ABNORMAL
HYALINE CASTS #/AREA URNS LPF: 0 /LPF
IMM GRANULOCYTES # BLD AUTO: 0.02 K/UL (ref 0–0.04)
IMM GRANULOCYTES NFR BLD AUTO: 0.4 % (ref 0–0.5)
KETONES UR QL STRIP: NEGATIVE
LEUKOCYTE ESTERASE UR QL STRIP: NEGATIVE
LYMPHOCYTES # BLD AUTO: 1.2 K/UL (ref 1–4.8)
LYMPHOCYTES NFR BLD: 21.7 % (ref 18–48)
MCH RBC QN AUTO: 31.4 PG (ref 27–31)
MCHC RBC AUTO-ENTMCNC: 32.2 G/DL (ref 32–36)
MCV RBC AUTO: 97 FL (ref 82–98)
MICROSCOPIC COMMENT: ABNORMAL
MONOCYTES # BLD AUTO: 0.8 K/UL (ref 0.3–1)
MONOCYTES NFR BLD: 15.1 % (ref 4–15)
NEUTROPHILS # BLD AUTO: 3.3 K/UL (ref 1.8–7.7)
NEUTROPHILS NFR BLD: 62.6 % (ref 38–73)
NITRITE UR QL STRIP: NEGATIVE
NRBC BLD-RTO: 0 /100 WBC
PH UR STRIP: 6 [PH] (ref 5–8)
PLATELET # BLD AUTO: 159 K/UL (ref 150–450)
PMV BLD AUTO: 9.7 FL (ref 9.2–12.9)
POTASSIUM SERPL-SCNC: 4.5 MMOL/L (ref 3.5–5.1)
PROT SERPL-MCNC: 7.2 G/DL (ref 6–8.4)
PROT UR QL STRIP: ABNORMAL
RBC # BLD AUTO: 4.08 M/UL (ref 4–5.4)
RBC #/AREA URNS HPF: 72 /HPF (ref 0–4)
SODIUM SERPL-SCNC: 142 MMOL/L (ref 136–145)
SP GR UR STRIP: 1.02 (ref 1–1.03)
SQUAMOUS #/AREA URNS HPF: 2 /HPF
UNIDENT CRYS URNS QL MICRO: ABNORMAL
URN SPEC COLLECT METH UR: ABNORMAL
UROBILINOGEN UR STRIP-ACNC: NEGATIVE EU/DL
WBC # BLD AUTO: 5.29 K/UL (ref 3.9–12.7)
WBC #/AREA URNS HPF: 13 /HPF (ref 0–5)
WBC CLUMPS URNS QL MICRO: ABNORMAL

## 2022-06-29 PROCEDURE — 85025 COMPLETE CBC W/AUTO DIFF WBC: CPT | Performed by: NURSE PRACTITIONER

## 2022-06-29 PROCEDURE — 80053 COMPREHEN METABOLIC PANEL: CPT | Performed by: NURSE PRACTITIONER

## 2022-06-29 PROCEDURE — 63600175 PHARM REV CODE 636 W HCPCS: Performed by: INTERNAL MEDICINE

## 2022-06-29 PROCEDURE — 87086 URINE CULTURE/COLONY COUNT: CPT | Performed by: NURSE PRACTITIONER

## 2022-06-29 PROCEDURE — 99283 EMERGENCY DEPT VISIT LOW MDM: CPT | Mod: 25

## 2022-06-29 PROCEDURE — 81000 URINALYSIS NONAUTO W/SCOPE: CPT | Performed by: NURSE PRACTITIONER

## 2022-06-29 PROCEDURE — M0222 HC IV INJECTION, BEBTELOVIMAB, INCL POST ADMIN MONIT: HCPCS | Performed by: INTERNAL MEDICINE

## 2022-06-29 RX ORDER — ONDANSETRON 4 MG/1
4 TABLET, ORALLY DISINTEGRATING ORAL
Status: DISCONTINUED | OUTPATIENT
Start: 2022-06-29 | End: 2022-06-30

## 2022-06-29 RX ORDER — DIPHENHYDRAMINE HYDROCHLORIDE 50 MG/ML
25 INJECTION INTRAMUSCULAR; INTRAVENOUS
Status: DISCONTINUED | OUTPATIENT
Start: 2022-06-29 | End: 2022-06-30

## 2022-06-29 RX ORDER — BEBTELOVIMAB 87.5 MG/ML
175 INJECTION, SOLUTION INTRAVENOUS
Status: COMPLETED | OUTPATIENT
Start: 2022-06-29 | End: 2022-06-29

## 2022-06-29 RX ORDER — ALBUTEROL SULFATE 90 UG/1
2 AEROSOL, METERED RESPIRATORY (INHALATION)
Status: DISCONTINUED | OUTPATIENT
Start: 2022-06-29 | End: 2022-06-30

## 2022-06-29 RX ORDER — EPINEPHRINE 0.3 MG/.3ML
0.3 INJECTION SUBCUTANEOUS
Status: DISCONTINUED | OUTPATIENT
Start: 2022-06-29 | End: 2022-06-30

## 2022-06-29 RX ORDER — ACETAMINOPHEN 325 MG/1
650 TABLET ORAL
Status: DISCONTINUED | OUTPATIENT
Start: 2022-06-29 | End: 2022-06-30

## 2022-06-29 RX ADMIN — BEBTELOVIMAB 175 MG: 87.5 INJECTION, SOLUTION INTRAVENOUS at 02:06

## 2022-06-29 NOTE — PROGRESS NOTES
Approximately, 5-10 minutes after infusion, patient accidentally removed IV with cathelon intact.  Education regarding infusion and continued social distancing and s/s to report to MD reviewed with patient.  During education, patient verbalized that she would like to take a nap.  Pt further stated that she had a seizure at home about an hour prior.  (Patient declined any health concerns other than congestion during pre infusion assessment).  Pt /60 P 78 R18 T 98.6.  Patient then stood ambulating toward the door stating that she was having a seizure and needed the bathroom, pt gate unsteady during this time (nurse at pt side guiding to recliner), pt not processing directions/information.  Notified Nurse WENDY Dunlap, NAN.  Hospital medicine, NP and WENDY Dunlap RN enter the room to assist in returning patient to chair.  Pt post-ictal aeb drowsy and difficulty processing directions.  No urinary nor fecal incontinence noted.  No drooling.  No grand mal type body movements.  Notified daughter.  Daughter further reported that pt had another seizure prior to the one within the past hour earlier that day.  Reporting multiple recent seizures.  Wheeled pt to ER with daughter.  Report given to triage nurse.  Pt awaiting further evaluation:  AAO to person, place and time of day and able to move all extremities. Speech is clear.  Respirations are even and non labored.  Pt did state that she was drowsy.  Advised patient and daughter further if you have any further COVID related symptoms that have not improved or feel you're having a reaction to your infusion please notify your primary care physician, go to the nearest emergency room or call 911. Patient discharged with escort to front door of hospital in no apparent distress. Tolerated infusion well. Instructed patient to notify primary care physician if symptoms do not resolve or worsen and to continue to quarantine for the full 5-10 day period.  Patient verbalized intent to  comply.

## 2022-06-29 NOTE — FIRST PROVIDER EVALUATION
"Medical screening exam completed.  I have conducted a focused provider triage encounter, findings are as follows:    Brief history of present illness:  Patient was an IV outpatient infusion clinic for COVID-19 treatment and had a seizure.  Patient has history of seizures takes several medications and has not missed any doses.    Vitals:    06/29/22 1627   BP: 130/61   BP Location: Right arm   Patient Position: Sitting   Pulse: 98   Resp: 20   Temp: 98.1 °F (36.7 °C)   TempSrc: Oral   SpO2: 98%   Weight: 98.1 kg (216 lb 4.3 oz)   Height: 5' 5" (1.651 m)       Pertinent physical exam:  NAD    Brief workup plan:  Lab work    Preliminary workup initiated; this workup will be continued and followed by the physician or advanced practice provider that is assigned to the patient when roomed.  "

## 2022-06-30 ENCOUNTER — TELEPHONE (OUTPATIENT)
Dept: NEUROLOGY | Facility: CLINIC | Age: 62
End: 2022-06-30
Payer: MEDICARE

## 2022-06-30 ENCOUNTER — OFFICE VISIT (OUTPATIENT)
Dept: INTERNAL MEDICINE | Facility: CLINIC | Age: 62
End: 2022-06-30
Payer: MEDICARE

## 2022-06-30 VITALS — DIASTOLIC BLOOD PRESSURE: 71 MMHG | SYSTOLIC BLOOD PRESSURE: 126 MMHG

## 2022-06-30 DIAGNOSIS — R05.9 COUGH: ICD-10-CM

## 2022-06-30 DIAGNOSIS — U07.1 COVID-19: Primary | ICD-10-CM

## 2022-06-30 DIAGNOSIS — I10 PRIMARY HYPERTENSION: Chronic | ICD-10-CM

## 2022-06-30 PROCEDURE — 99214 PR OFFICE/OUTPT VISIT, EST, LEVL IV, 30-39 MIN: ICD-10-PCS | Mod: CR,95,, | Performed by: PHYSICIAN ASSISTANT

## 2022-06-30 PROCEDURE — 99214 OFFICE O/P EST MOD 30 MIN: CPT | Mod: CR,95,, | Performed by: PHYSICIAN ASSISTANT

## 2022-06-30 RX ORDER — PROMETHAZINE HYDROCHLORIDE AND DEXTROMETHORPHAN HYDROBROMIDE 6.25; 15 MG/5ML; MG/5ML
5 SYRUP ORAL NIGHTLY PRN
Qty: 120 ML | Refills: 0 | Status: SHIPPED | OUTPATIENT
Start: 2022-06-30 | End: 2022-10-12

## 2022-06-30 RX ORDER — BENZONATATE 200 MG/1
200 CAPSULE ORAL 3 TIMES DAILY PRN
Qty: 30 CAPSULE | Refills: 0 | Status: SHIPPED | OUTPATIENT
Start: 2022-06-30 | End: 2022-07-10

## 2022-06-30 NOTE — PROGRESS NOTES
Subjective:      Patient ID: Evelyne Francisco is a 62 y.o. female.    Chief Complaint: No chief complaint on file.    The patient location is: [place]  The chief complaint leading to consultation is: COVID    Visit type: audiovisual    Face to Face time with patient: 8:59-9:10  15 minutes of total time spent on the encounter, which includes face to face time and non-face to face time preparing to see the patient (eg, review of tests), Obtaining and/or reviewing separately obtained history, Documenting clinical information in the electronic or other health record, Independently interpreting results (not separately reported) and communicating results to the patient/family/caregiver, or Care coordination (not separately reported).     Each patient to whom he or she provides medical services by telemedicine is:  (1) informed of the relationship between the physician and patient and the respective role of any other health care provider with respect to management of the patient; and (2) notified that he or she may decline to receive medical services by telemedicine and may withdraw from such care at any time.    Notes: Patient is new to me, being seen today for COVID.  Tested positive 6/28, sinus symptoms and fever had started earlier that day.     Evaluated in ER for initial diagnosis, COVID +, otherwise work up unremarkable    Received infusion therapy yesterday, seizure after completion, was evaluated in ER at that time   PMH partial idiopathic epilepsy w seizures     Cough  The current episode started in the past 7 days. The problem has been waxing and waning. The problem occurs constantly. The cough is productive of purulent sputum. Associated symptoms include chills, a fever, myalgias, nasal congestion, postnasal drip, rhinorrhea, a sore throat and wheezing. Pertinent negatives include no chest pain, ear congestion, ear pain, headaches, heartburn, hemoptysis, rash, shortness of breath or sweats. Nothing  aggravates the symptoms. She has tried rest for the symptoms. The treatment provided mild relief. Her past medical history is significant for pneumonia. There is no history of asthma, bronchiectasis, bronchitis, COPD, emphysema or environmental allergies.     Review of Systems   Constitutional: Positive for chills, fatigue and fever.   HENT: Positive for postnasal drip, rhinorrhea and sore throat. Negative for ear pain.    Respiratory: Positive for cough (productive) and wheezing. Negative for hemoptysis and shortness of breath.    Cardiovascular: Negative for chest pain and leg swelling.   Gastrointestinal: Negative for heartburn.   Musculoskeletal: Positive for myalgias.   Skin: Negative for rash.   Allergic/Immunologic: Negative for environmental allergies.   Neurological: Positive for weakness. Negative for headaches.       Objective:   /71   LMP  (LMP Unknown)   Physical Exam  Constitutional:       General: She is not in acute distress.     Appearance: She is well-developed. She is not ill-appearing or diaphoretic.   HENT:      Head: Normocephalic and atraumatic.      Right Ear: External ear normal.      Left Ear: External ear normal.   Eyes:      General: Lids are normal.         Right eye: No discharge.         Left eye: No discharge.      Conjunctiva/sclera: Conjunctivae normal.      Right eye: Right conjunctiva is not injected.      Left eye: Left conjunctiva is not injected.   Pulmonary:      Effort: Pulmonary effort is normal. No respiratory distress.      Comments: Speaks in full sentences without increased respiratory effort  Skin:     General: Skin is warm and dry.      Findings: No rash.   Neurological:      Mental Status: She is alert and oriented to person, place, and time.   Psychiatric:         Speech: Speech normal.         Behavior: Behavior normal.         Thought Content: Thought content normal.         Judgment: Judgment normal.       Assessment:      1. COVID-19    2. Cough    3.  Primary hypertension       Plan:   COVID-19  -     COVID-19 Home Symptom Monitoring  - Duration (days): 14    Cough  -     promethazine-dextromethorphan (PROMETHAZINE-DM) 6.25-15 mg/5 mL Syrp; Take 5 mLs by mouth nightly as needed (Cough).  Dispense: 120 mL; Refill: 0  -     benzonatate (TESSALON) 200 MG capsule; Take 1 capsule (200 mg total) by mouth 3 (three) times daily as needed for Cough.  Dispense: 30 capsule; Refill: 0    Primary hypertension   Controlled     Patient to contact Neuro regarding ER f/u appt     Quarantine and symptomatic care     Discussed worsening signs/symptoms and when to return to clinic or go to ED.   Patient expresses understanding and agrees with treatment plan.

## 2022-06-30 NOTE — TELEPHONE ENCOUNTER
----- Message from Francia Kate sent at 6/30/2022  9:16 AM CDT -----  Contact: Self [phone]  Would like to receive medical advice.    Would they like a call back or a response via [facility]:  call back    Additional information: Calling to speak with the nurse regarding pt had two seizures on yesterday and had a ER visit.

## 2022-06-30 NOTE — ED NOTES
Pt d/c in a good condition, AOx4, in no acute distress. Home care and follow-up discussed. Pt verbalized understanding of d/c teachings.

## 2022-06-30 NOTE — ED PROVIDER NOTES
SCRIBE #1 NOTE: I, Thelma Phipps, am scribing for, and in the presence of, Becky Concepcion MD. I have scribed the entire note.       History     Chief Complaint   Patient presents with    Seizures     PT came in for covid infusion and had seizure after infusion complete     Review of patient's allergies indicates:   Allergen Reactions    Latex, natural rubber Hives, Shortness Of Breath and Swelling     Throat swelling  Throat swelling  Other reaction(s): Hives  Throat swelling    Ace inhibitors Other (See Comments)     Coughing  Other reaction(s): Unknown    Diclo gel [diclofenac sodium] Other (See Comments)     Chest pain         History of Present Illness     HPI    6/29/2022, 8:39 PM  History obtained from the patient      History of Present Illness: Evelyne Francisco is a 62 y.o. female patient with a PMHx of CAD, HLD, HTN, and seizures who presents to the Emergency Department for evaluation of a seizure which onset suddenly today. Pt came in for a covid infusion and had seizure after infusion completed. Symptoms are resolved and moderate in severity. No mitigating or exacerbating factors reported. No associated sxs. Patient denies any fever, vomiting, dizziness, numbness, and all other sxs at this time. No further complaints or concerns at this time.       Arrival mode: Personal vehicle    PCP: Sergio Malcolm MD        Past Medical History:  Past Medical History:   Diagnosis Date    Blood clotting tendency     Coronary artery disease     Fever blister     HLD (hyperlipidemia)     Hypertension     Hypothyroidism 12/7/2020    Renal cyst 7/12/2021    Seizures     Sleep apnea     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       Past Surgical History:  Past Surgical History:   Procedure Laterality Date    ABDOMINAL SURGERY      BACK SURGERY      BREAST BIOPSY Left     benign    CARDIAC SURGERY      ESOPHAGEAL MANOMETRY WITH MEASUREMENT OF IMPEDANCE N/A 2/23/2021    Procedure: MANOMETRY,  ESOPHAGUS, WITH IMPEDANCE MEASUREMENT;  Surgeon: Sumanth Curran RN;  Location[facility] ENDO;  Service: Endoscopy;  Laterality: N/A;    FRACTURE SURGERY      HYSTERECTOMY      LEFT HEART CATHETERIZATION Left 2022    Procedure: CATHETERIZATION, HEART, LEFT;  Surgeon: Richie Butt MD;  Location: [facility] CATH LAB;  Service: Cardiology;  Laterality: Left;    OOPHORECTOMY      TONSILLECTOMY      VASCULAR SURGERY           Family History:  Family History   Problem Relation Age of Onset    Hypertension Mother     Hyperlipidemia Mother     Stroke Father     Seizures Father     Hypertension Sister     Cancer Brother     Marfan syndrome Daughter        Social History:  Social History     Tobacco Use    Smoking status: Former Smoker     Quit date: 2014     Years since quittin.0    Smokeless tobacco: Never Used   Substance and Sexual Activity    Alcohol use: No    Drug use: No    Sexual activity: Not Currently     Partners: Male        Review of Systems     Review of Systems   Constitutional: Negative for fever.   HENT: Negative for sore throat.    Respiratory: Negative for shortness of breath.    Cardiovascular: Negative for chest pain.   Gastrointestinal: Negative for nausea and vomiting.   Genitourinary: Negative for dysuria.   Musculoskeletal: Negative for back pain.   Skin: Negative for rash.   Neurological: Positive for seizures. Negative for dizziness, weakness and numbness.   Hematological: Does not bruise/bleed easily.   All other systems reviewed and are negative.       Physical Exam     Initial Vitals [22 1627]   BP Pulse Resp Temp SpO2   130/61 98 20 98.1 °F (36.7 °C) 98 %      MAP       --          Physical Exam  Nursing Notes and Vital Signs Reviewed.  Constitutional: Patient is in no acute distress. Well-developed and well-nourished.  Head: Atraumatic. Normocephalic.  Eyes: PERRL. EOM intact. Conjunctivae are not pale. No scleral icterus.  ENT: Mucous membranes are moist.  "Oropharynx is clear and symmetric.    Neck: Supple. Full ROM. No lymphadenopathy.  Cardiovascular: Regular rate. Regular rhythm. No murmurs, rubs, or gallops. Distal pulses are 2+ and symmetric.  Pulmonary/Chest: No respiratory distress. Clear to auscultation bilaterally. No wheezing or rales.  Abdominal: Soft and non-distended.  There is no tenderness.  No rebound, guarding, or rigidity. Good bowel sounds.  Genitourinary: No CVA tenderness  Musculoskeletal: Moves all extremities. No obvious deformities. No edema. No calf tenderness.  Skin: Warm and dry.  Neurological:  Alert, awake, and appropriate.  Normal speech.  No acute focal neurological deficits are appreciated.  Psychiatric: Normal affect. Good eye contact. Appropriate in content.     ED Course   Procedures  ED Vital Signs:  Vitals:    06/29/22 1627 06/29/22 2032 06/29/22 2101   BP: 130/61 126/71 126/71   Pulse: 98 82 82   Resp: 20  16   Temp: 98.1 °F (36.7 °C)     TempSrc: Oral     SpO2: 98% 100% 100%   Weight: 98.1 kg (216 lb 4.3 oz)     Height: 5' 5" (1.651 m)         Abnormal Lab Results:  Labs Reviewed   CBC W/ AUTO DIFFERENTIAL - Abnormal; Notable for the following components:       Result Value    MCH 31.4 (*)     Mono % 15.1 (*)     All other components within normal limits   COMPREHENSIVE METABOLIC PANEL - Abnormal; Notable for the following components:    Chloride 112 (*)     CO2 21 (*)     Glucose 117 (*)     Alkaline Phosphatase 145 (*)     All other components within normal limits   URINALYSIS, REFLEX TO URINE CULTURE - Abnormal; Notable for the following components:    Color, UA Orange (*)     Appearance, UA Hazy (*)     Protein, UA 1+ (*)     Occult Blood UA 2+ (*)     All other components within normal limits    Narrative:     Specimen Source->Urine   URINALYSIS MICROSCOPIC - Abnormal; Notable for the following components:    RBC, UA 72 (*)     WBC, UA 13 (*)     All other components within normal limits    Narrative:     Specimen " Source->Urine   CULTURE, URINE        All Lab Results:  Results for orders placed or performed during the hospital encounter of 06/29/22   CBC auto differential   Result Value Ref Range    WBC 5.29 3.90 - 12.70 K/uL    RBC 4.08 4.00 - 5.40 M/uL    Hemoglobin 12.8 12.0 - 16.0 g/dL    Hematocrit 39.7 37.0 - 48.5 %    MCV 97 82 - 98 fL    MCH 31.4 (H) 27.0 - 31.0 pg    MCHC 32.2 32.0 - 36.0 g/dL    RDW 14.3 11.5 - 14.5 %    Platelets 159 150 - 450 K/uL    MPV 9.7 9.2 - 12.9 fL    Immature Granulocytes 0.4 0.0 - 0.5 %    Gran # (ANC) 3.3 1.8 - 7.7 K/uL    Immature Grans (Abs) 0.02 0.00 - 0.04 K/uL    Lymph # 1.2 1.0 - 4.8 K/uL    Mono # 0.8 0.3 - 1.0 K/uL    Eos # 0.0 0.0 - 0.5 K/uL    Baso # 0.01 0.00 - 0.20 K/uL    nRBC 0 0 /100 WBC    Gran % 62.6 38.0 - 73.0 %    Lymph % 21.7 18.0 - 48.0 %    Mono % 15.1 (H) 4.0 - 15.0 %    Eosinophil % 0.0 0.0 - 8.0 %    Basophil % 0.2 0.0 - 1.9 %    Differential Method Automated    Comprehensive metabolic panel   Result Value Ref Range    Sodium 142 136 - 145 mmol/L    Potassium 4.5 3.5 - 5.1 mmol/L    Chloride 112 (H) 95 - 110 mmol/L    CO2 21 (L) 23 - 29 mmol/L    Glucose 117 (H) 70 - 110 mg/dL    BUN 9 8 - 23 mg/dL    Creatinine 0.9 0.5 - 1.4 mg/dL    Calcium 8.9 8.7 - 10.5 mg/dL    Total Protein 7.2 6.0 - 8.4 g/dL    Albumin 3.8 3.5 - 5.2 g/dL    Total Bilirubin 0.3 0.1 - 1.0 mg/dL    Alkaline Phosphatase 145 (H) 55 - 135 U/L    AST 22 10 - 40 U/L    ALT 27 10 - 44 U/L    Anion Gap 9 8 - 16 mmol/L    eGFR if African American >60 >60 mL/min/1.73 m^2    eGFR if non African American >60 >60 mL/min/1.73 m^2   Urinalysis, Reflex to Urine Culture Urine, Clean Catch    Specimen: Urine   Result Value Ref Range    Specimen UA Urine, Clean Catch     Color, UA Orange (A) Yellow, Straw, Angelica    Appearance, UA Hazy (A) Clear    pH, UA 6.0 5.0 - 8.0    Specific Gravity, UA 1.020 1.005 - 1.030    Protein, UA 1+ (A) Negative    Glucose, UA Negative Negative    Ketones, UA Negative Negative     Bilirubin (UA) Negative Negative    Occult Blood UA 2+ (A) Negative    Nitrite, UA Negative Negative    Urobilinogen, UA Negative <2.0 EU/dL    Leukocytes, UA Negative Negative   Urinalysis Microscopic   Result Value Ref Range    RBC, UA 72 (H) 0 - 4 /hpf    WBC, UA 13 (H) 0 - 5 /hpf    WBC Clumps, UA Rare None-Rare    Bacteria Rare None-Occ /hpf    Squam Epithel, UA 2 /hpf    Hyaline Casts, UA 0 0-1/lpf /lpf    Unclass Karen UA Rare None-Moderate    Microscopic Comment SEE COMMENT                   The Emergency Provider reviewed the vital signs and test results, which are outlined above.     ED Discussion       8:42 PM: Reassessed pt at this time. Discussed with pt all pertinent ED information and results. Discussed pt dx and plan of tx. Gave pt all f/u and return to the ED instructions. All questions and concerns were addressed at this time. Pt expresses understanding of information and instructions, and is comfortable with plan to discharge. Pt is stable for discharge.    I discussed with patient and/or family/caretaker that evaluation in the ED does not suggest any emergent or life threatening medical conditions requiring immediate intervention beyond what was provided in the ED, and I believe patient is safe for discharge.  Regardless, an unremarkable evaluation in the ED does not preclude the development or presence of a serious of life threatening condition. As such, patient was instructed to return immediately for any worsening or change in current symptoms.         Medical Decision Making:   Clinical Tests:   Lab Tests: Ordered and Reviewed           ED Medication(s):  Medications - No data to display    Discharge Medication List as of 6/29/2022  8:39 PM           Follow-up Information     Schedule an appointment as soon as possible for a visit  with Sergio Malcolm MD.    Specialty: Family Medicine  Why: As needed  Contact information:  [address] [facility] [address] [phone]                              Scribe Attestation:   Scribe #1: I performed the above scribed service and the documentation accurately describes the services I performed. I attest to the accuracy of the note.     Attending:   Physician Attestation Statement for Scribe #1: I, Becky Concepcion MD, personally performed the services described in this documentation, as scribed by Thelma Phipps, in my presence, and it is both accurate and complete.           Clinical Impression       ICD-10-CM ICD-9-CM   1. Seizure disorder  G40.909 345.90       Disposition:   Disposition: Discharged  Condition: Stable         Becky Concepcion MD  06/30/22 8296

## 2022-07-01 ENCOUNTER — OFFICE VISIT (OUTPATIENT)
Dept: NEUROLOGY | Facility: CLINIC | Age: 62
End: 2022-07-01
Payer: MEDICARE

## 2022-07-01 ENCOUNTER — PATIENT MESSAGE (OUTPATIENT)
Dept: INTERNAL MEDICINE | Facility: CLINIC | Age: 62
End: 2022-07-01
Payer: MEDICARE

## 2022-07-01 ENCOUNTER — NURSE TRIAGE (OUTPATIENT)
Dept: ADMINISTRATIVE | Facility: CLINIC | Age: 62
End: 2022-07-01
Payer: MEDICARE

## 2022-07-01 DIAGNOSIS — R41.89 COGNITIVE CHANGE: ICD-10-CM

## 2022-07-01 DIAGNOSIS — R45.89 ANXIETY ABOUT HEALTH: ICD-10-CM

## 2022-07-01 DIAGNOSIS — Z51.81 ENCOUNTER FOR MONITORING ANTICONVULSANT THERAPY: ICD-10-CM

## 2022-07-01 DIAGNOSIS — G40.219 PARTIAL SYMPTOMATIC EPILEPSY WITH COMPLEX PARTIAL SEIZURES, INTRACTABLE, WITHOUT STATUS EPILEPTICUS: ICD-10-CM

## 2022-07-01 DIAGNOSIS — G40.919 REFRACTORY EPILEPSY: ICD-10-CM

## 2022-07-01 DIAGNOSIS — G40.109 TEMPORAL LOBE EPILEPSY: Primary | ICD-10-CM

## 2022-07-01 DIAGNOSIS — G40.019 PARTIAL IDIOPATHIC EPILEPSY WITH SEIZURES OF LOCALIZED ONSET, INTRACTABLE, WITHOUT STATUS EPILEPTICUS: ICD-10-CM

## 2022-07-01 DIAGNOSIS — U07.1 COVID: ICD-10-CM

## 2022-07-01 DIAGNOSIS — Z79.899 ENCOUNTER FOR MONITORING ANTICONVULSANT THERAPY: ICD-10-CM

## 2022-07-01 LAB — BACTERIA UR CULT: NO GROWTH

## 2022-07-01 PROCEDURE — 99215 OFFICE O/P EST HI 40 MIN: CPT | Mod: CR,95,, | Performed by: NURSE PRACTITIONER

## 2022-07-01 PROCEDURE — 99215 PR OFFICE/OUTPT VISIT, EST, LEVL V, 40-54 MIN: ICD-10-PCS | Mod: CR,95,, | Performed by: NURSE PRACTITIONER

## 2022-07-01 NOTE — PROGRESS NOTES
Subjective:       Patient ID: Evelyne Francisco is a 62 y.o. female.    Chief Complaint: Seizures and Hospital Follow Up          HPI       BACKGROUND HISTORY         The patient is here for seizure evaluation and a 4th opinion.    Saw Dr. Monge, Dr. Hobbs and Dr. Smith in the past.     The patient started having seizures 11 years ago (2009) at the age of 49 .The patient describes aura of strange feeling in her head that she cannot describe. The patient is amnestic to the seizure after that. The seizures are mostly described as Complex Partial (staring, unresponsiveness, sometimes accompanied by spitting) and some of the them Grand Mal (GTC). The patient is currently not driving and has 2-5 seizure a month. No history of meningitis or encephalitis No toxic exposure or lead poisoning. Her father had posttraumatic family history of epilepsy and her nephew might be having seizures.  No history of strokes or aneurysmal bleeding. No history of TBI. Routine EEGs have been normal. EMU in 2016 captured 2 seizures with B/L TL localization. Brain MRI was reported as unremarkable. Failed VPA, LTG, OXC, LEV and VNS. She was on  mg BID and  mg BID. LCM,  ESL and BRV were too expensive. She said that CLB helped control the seizures but the insurance would not pay for it. Added CLB 20 mg BID.Explained to the patient that failing 2 AEDs typically means that chance of achieving seizure-freedom using AEDs alone is <10% and we should explore other treatments like epilepsy surgery.Could not tolerate CLB 20 mg BID.  Was approved to be seen at [facility].  Titrated PHT to 200 mg BID since levels were very low. Undergoing epilepsy surgery evaluation at [facility] by Dr. Nunez. EMU monitoring in  captured 3 stereotypical seizures originating form RT TL. The plan includes CNP, PET, WADA +/- DARCI. Trying to solve some billing issues [facility]. Last seizure 01-.AED Regimen:  mg BID ,   mg BID and CLB 3 ml/ 4 ml (7.5 mg/10 mg) . On  mg BID for pain.  Patient present for follow up Epilepsy management. Patient doing well.  Last seizures occurred approximately 04-. The patient  Reports she didn't fall out during her event, No HILARY, No LOC. She reports her daughter helped guided her to her bed. She recall drooling, but no shaking, no jerking, no tongue biting, and no urinary incontinence.  She slept afterwards. Unable to state how long event last or details of what happened. Patient reportsher seizures have been less frequent, she has gone 1 or 2 months without an event. The patient reports when she has an event free month usually the following months she well have have 2 to 3 in a day.    Tolerating AED regimen   mg BID,  mg BID. CLB 10 mg BID (4 mL BID), she reports compliances with medications. She denies having to use the Nayzilam (NS Midazolam) for prolonged and/or clusters of seizures at this time but will continue the medication.      Patient continues to follow up with [facility] by Dr. Nunez. For surgical evaluation . She recently completed the CNP testing on 04- for Epilepsy surgical evalution  work up.    Patient request letter for travel, she will be flying to [place] this month and letter needed to show medically necessity for AED mediations and seizure device while traveling, letter completed.     Patient Last seizures occurred approximately May 31, 2022. The patient report she was in the car and she had a  HILARY, and LOC denies hitting her head. Patient unable to recall details, witnessed by daughter. No shaking, no jerking, no tongue biting, and no urinary incontinence. She slept afterwards and states she felt tired. Unable to state how long event last or details of what happened. The patient report she had 4 seizure events last month. The patient flew to [facility] on 05-04- 2022 and forgot her medication at home, she missed 2 doses of her  "Clozabam. Tolerating AED regimen   mg BID,  mg BID, PHT 30 mg po HS started (05-07/22). CLB 10 mg BID (4 mL BID)Labs Reviewed: 05- ZNS level 39 NL (10- 40), CLB level 55.7 NL ( ), PHT level 8.3 Low  (10- 20) Dr. Estevez added additional PHT 30 mg po Nightly to regimen She denies having to use the Nayzilam (NS Midazolam) for prolonged and/or clusters of seizures at this time but will continue the medication.      Patient continues to follow up with [facility] by Dr. Nunez. For surgical evaluation     INTERVAL HISTORY     07-: Patient present for hospital follow up. Patient reports on 06/28/2022 she was on the phone with a neighbor and she stated "I am about to have a seizure". The patient reportedly had 2 seizure events, no rescue medication used, unable to give details of event. The patient was evaluate at the ER, work up revealed she tested positive for COVID, UA negative. Patient  Symptomatic coughing and congestion, low grade fever, generalized myalgia, body aches, chills, decreased oral intake. Patient states she did not miss any doses of medications. Reports to taking   mg BID,  mg BID,  PHT 30 mg po HS and CLB 10 mg BID. AED levels have not been assessed.         Review of Systems   Constitutional: Positive for chills, fatigue and fever. Negative for appetite change.   HENT: Positive for rhinorrhea, sinus pressure, sinus pain and sneezing. Negative for hearing loss and tinnitus.    Eyes: Negative for photophobia and visual disturbance.   Respiratory: Positive for cough. Negative for apnea and shortness of breath.    Cardiovascular: Negative for chest pain and palpitations.   Gastrointestinal: Negative for nausea and vomiting.   Endocrine: Negative for cold intolerance and heat intolerance.   Genitourinary: Negative for difficulty urinating and urgency.   Musculoskeletal: Positive for arthralgias. Negative for back pain, gait problem, joint swelling, myalgias, neck " pain and neck stiffness.   Skin: Negative for color change and rash.   Allergic/Immunologic: Negative for environmental allergies and immunocompromised state.   Neurological: Positive for seizures. Negative for dizziness, tremors, syncope, facial asymmetry, speech difficulty, weakness, light-headedness, numbness and headaches.   Hematological: Negative for adenopathy. Does not bruise/bleed easily.   Psychiatric/Behavioral: Negative for agitation, behavioral problems, confusion, decreased concentration, dysphoric mood, hallucinations, self-injury, sleep disturbance and suicidal ideas. The patient is not hyperactive.              Current Outpatient Medications:     benzonatate (TESSALON) 200 MG capsule, Take 1 capsule (200 mg total) by mouth 3 (three) times daily as needed for Cough., Disp: 30 capsule, Rfl: 0    cloBAZam (ONFI) 2.5 mg/mL Susp, Take 4 mLs (10 mg total) by mouth 2 (two) times daily., Disp: 360 mL, Rfl: 5    clopidogreL (PLAVIX) 75 mg tablet, Take 1 tablet (75 mg total) by mouth once daily., Disp: 90 tablet, Rfl: 3    estradioL (ESTRACE) 2 MG tablet, Take 1 tablet (2 mg total) by mouth once daily., Disp: 30 tablet, Rfl: 11    furosemide (LASIX) 40 MG tablet, Take 1 tablet (40 mg total) by mouth 2 (two) times daily., Disp: 60 tablet, Rfl: 11    HYDROcodone-acetaminophen (NORCO) 5-325 mg per tablet, Take 1 tablet by mouth every 4 (four) hours as needed for Pain., Disp: 18 tablet, Rfl: 0    ibuprofen (ADVIL,MOTRIN) 800 MG tablet, Take 1 tablet (800 mg total) by mouth every 6 (six) hours as needed for Pain., Disp: 20 tablet, Rfl: 0    isosorbide mononitrate (IMDUR) 60 MG 24 hr tablet, TAKE 1 TABLET BY MOUTH EVERY 12 HOURS, Disp: 90 tablet, Rfl: 0    levocetirizine (XYZAL) 5 MG tablet, Take 1 tablet (5 mg total) by mouth every evening., Disp: 90 tablet, Rfl: 3    levothyroxine (SYNTHROID) 100 MCG tablet, Take 1 tablet (100 mcg total) by mouth before breakfast., Disp: 90 tablet, Rfl: 1    LIDOcaine (LIDODERM)  5 %, PUT 1 PATCH TO AFFECTED AREA FOR 12 HOURS AS NEEDED FOR PAIN; TAKE OFF FOR AT LEAST 12 HOURS BEFORE APPLYING ANOTHER PATCH, Disp: , Rfl:     metoprolol succinate (TOPROL-XL) 50 MG 24 hr tablet, Take 1 tablet (50 mg total) by mouth once daily., Disp: 90 tablet, Rfl: 1    naproxen (NAPROSYN) 500 MG tablet, Take 500 mg by mouth 2 (two) times daily as needed., Disp: , Rfl:     NAYZILAM 5 mg/spray (0.1 mL) Spry, INSTILL 1 SPRAY IN NASAL ROUTE ONCE AS NEEDED. (1 SPRAY FOR PROLONGED SEIZURE OR CLUSTERS OF SEIZURES. MAY REPEAT ONCE IN OPPOSITE NOSTRIL AFTER 10 MINUTES.), Disp: , Rfl:     pantoprazole (PROTONIX) 40 MG tablet, Take 1 tablet (40 mg total) by mouth 2 (two) times daily., Disp: 180 tablet, Rfl: 3    phenytoin (DILANTIN) 100 MG ER capsule, Take 2 capsules by mouth twice daily, Disp: 360 capsule, Rfl: 3    phenytoin (DILANTIN) 30 MG ER capsule, Take 1 capsule (30 mg total) by mouth every evening., Disp: 90 capsule, Rfl: 3    potassium chloride (MICRO-K) 10 MEQ CpSR, Take 10 mEq by mouth once., Disp: , Rfl:     promethazine-dextromethorphan (PROMETHAZINE-DM) 6.25-15 mg/5 mL Syrp, Take 5 mLs by mouth nightly as needed (Cough)., Disp: 120 mL, Rfl: 0    ranolazine (RANEXA) 1,000 mg Tb12, Take 1,000 mg by mouth 2 (two) times daily., Disp: , Rfl:     REPATHA SURECLICK 140 mg/mL PnIj, SMARTSI Milligram(s) SUB-Q Every 2 Weeks, Disp: 2 each, Rfl: 11    solifenacin (VESICARE) 10 MG tablet, Take 1 tablet (10 mg total) by mouth once daily., Disp: 30 tablet, Rfl: 11    tiZANidine (ZANAFLEX) 4 MG tablet, Take by mouth., Disp: , Rfl:     zonisamide (ZONEGRAN) 100 MG Cap, Take 3 capsules (300 mg total) by mouth 2 (two) times daily., Disp: 540 capsule, Rfl: 3  Past Medical History:   Diagnosis Date    Blood clotting tendency     Coronary artery disease     Fever blister     HLD (hyperlipidemia)     Hypertension     Hypothyroidism 2020    Renal cyst 2021    Seizures     Sleep apnea     Temporal lobe epilepsy  2016     Past Surgical History:   Procedure Laterality Date    ABDOMINAL SURGERY      BACK SURGERY      BREAST BIOPSY Left     benign    CARDIAC SURGERY      ESOPHAGEAL MANOMETRY WITH MEASUREMENT OF IMPEDANCE N/A 2021    Procedure: MANOMETRY, ESOPHAGUS, WITH IMPEDANCE MEASUREMENT;  Surgeon: Sumanth Curran RN;  Location: [facility];  Service: Endoscopy;  Laterality: N/A;    FRACTURE SURGERY      HYSTERECTOMY      LEFT HEART CATHETERIZATION Left 2022    Procedure: CATHETERIZATION, HEART, LEFT;  Surgeon: Richie Butt MD;  Location[facility] CATH LAB;  Service: Cardiology;  Laterality: Left;    OOPHORECTOMY      TONSILLECTOMY      VASCULAR SURGERY       Social History     Socioeconomic History    Marital status:    Tobacco Use    Smoking status: Former Smoker     Quit date: 2014     Years since quittin.0    Smokeless tobacco: Never Used   Substance and Sexual Activity    Alcohol use: No    Drug use: No    Sexual activity: Not Currently     Partners: Male             Past/Current Medical/Surgical History, Past/Current Social History, Past/Current Family History and Past/Current Medications were reviewed in detail.        Objective:           VITAL SIGNS WERE REVIEWED      GENERAL APPEARANCE:     The patient looks fatigued.     BMI 36.83 KG     No signs of respiratory distress.    Normal breathing pattern.    No dysmorphic features    Normal eye contact.     GENERAL MEDICAL EXAM:    HEENT:  Head is atraumatic normocephalic.     Neck and Axillae: No JVD. No visible lesions    Cardiopulmonary: No cyanosis. No tachypnea. Normal respiratory effort.VNS in place.    Gastrointestinal/Urogenital:  No jaundice. No stomas or lesions. No visible hernias. No catheters.     Skin, Hair and Nails: No pathognonomic skin rash. No neurofibromatosis. No visible lesions.No stigmata of autoimmune disease. No clubbing.    Limbs: No varicose veins. No visible swelling.    Muskoskeletal: No visible deformities.No  visible lesions.               Neurologic Exam     Mental Status   Oriented to person, place, and time.   Follows 3 step commands.   Attention: normal. Concentration: normal.   Speech: speech is normal   Level of consciousness: alert  Knowledge: good.   Able to name object. Able to read. Able to repeat. Normal comprehension.     Cranial Nerves   Cranial nerves II through XII intact.     CN II   Visual fields full to confrontation.   Visual acuity: normal with correction  Right visual field deficit: none  Left visual field deficit: none     CN III, IV, VI   Pupils are equal, round, and reactive to light.  Extraocular motions are normal.   Right pupil: Size: 2 mm. Shape: regular. Reactivity: brisk. Consensual response: intact. Accommodation: intact.   Left pupil: Size: 2 mm. Shape: regular. Reactivity: brisk. Consensual response: intact. Accommodation: intact.   CN III: no CN III palsy  CN VI: no CN VI palsy  Nystagmus: none   Diplopia: none  Ophthalmoparesis: none  Upgaze: normal  Downgaze: normal  Conjugate gaze: present  Vestibulo-ocular reflex: present    CN V   Facial sensation intact.   Right facial sensation deficit: none  Left facial sensation deficit: none    CN VII   Facial expression full, symmetric.   Right facial weakness: none  Left facial weakness: none    CN VIII   CN VIII normal.   Hearing: intact  Right Rinne: AC > BC  Left Rinne: AC > BC  Mendes: does not lateralize     CN IX, X   CN IX normal.   CN X normal.   Palate: symmetric    CN XI   CN XI normal.   Right sternocleidomastoid strength: normal  Left sternocleidomastoid strength: normal  Right trapezius strength: normal  Left trapezius strength: normal    CN XII   CN XII normal.   Tongue: not atrophic  Fasciculations: absent  Tongue deviation: none    Motor Exam   Muscle bulk: normal  Overall muscle tone: normal  Right arm tone: normal  Left arm tone: normal  Right arm pronator drift: absent  Left arm pronator drift: absent  Right leg tone:  normal  Left leg tone: normal    Strength   Strength 5/5 throughout.   Right neck flexion: 5/5  Left neck flexion: 5/5  Right neck extension: 5/5  Left neck extension: 5/5  Right deltoid: 5/5  Left deltoid: 5/5  Right biceps: 5/5  Left biceps: 5/5  Right triceps: 5/5  Left triceps: 5/5  Right wrist flexion: 5/5  Left wrist flexion: 5/5  Right wrist extension: 5/5  Left wrist extension: 5/5  Right interossei: 5/5  Left interossei: 5/5  Right iliopsoas: 5/5  Left iliopsoas: 5/5  Right quadriceps: 5/5  Left quadriceps: 5/5  Right hamstrin/5  Left hamstrin/5  Right glutei: 5/5  Left glutei: 5/5  Right anterior tibial: 5/5  Left anterior tibial: 5/5  Right posterior tibial: 5/5  Left posterior tibial: 5/5  Right peroneal: 5/5  Left peroneal: 5/5  Right gastroc: 5/5  Left gastroc: 5/5    Sensory Exam   Light touch normal.   Right arm light touch: normal  Left arm light touch: normal  Right leg light touch: normal  Left leg light touch: normal  Vibration normal.   Right arm vibration: normal  Left arm vibration: normal  Right leg vibration: normal  Left leg vibration: normal  Proprioception normal.   Right arm proprioception: normal  Left arm proprioception: normal  Right leg proprioception: normal  Left leg proprioception: normal  Pinprick normal.   Right arm pinprick: normal  Left arm pinprick: normal  Right leg pinprick: normal  Left leg pinprick: normal  Graphesthesia: normal  Stereognosis: normal    Gait, Coordination, and Reflexes     Gait  Gait: normal    Coordination   Romberg: negative  Finger to nose coordination: normal  Heel to shin coordination: normal  Tandem walking coordination: normal    Tremor   Resting tremor: absent  Intention tremor: absent  Action tremor: absent    Reflexes   Right brachioradialis: 2+  Left brachioradialis: 2+  Right biceps: 2+  Left biceps: 2+  Right triceps: 2+  Left triceps: 2+  Right patellar: 2+  Left patellar: 2+  Right achilles: 2+  Left achilles: 2+  Right plantar:  normal  Left plantar: normal  Right Marie: absent  Left Marie: absent  Right ankle clonus: absent  Left ankle clonus: absent  Right pendular knee jerk: absent  Left pendular knee jerk: absent      Lab Results   Component Value Date    WBC 5.29 06/29/2022    HGB 12.8 06/29/2022    HCT 39.7 06/29/2022    MCV 97 06/29/2022     06/29/2022     Sodium   Date Value Ref Range Status   06/29/2022 142 136 - 145 mmol/L Final     Potassium   Date Value Ref Range Status   06/29/2022 4.5 3.5 - 5.1 mmol/L Final     Chloride   Date Value Ref Range Status   06/29/2022 112 (H) 95 - 110 mmol/L Final     CO2   Date Value Ref Range Status   06/29/2022 21 (L) 23 - 29 mmol/L Final     Glucose   Date Value Ref Range Status   06/29/2022 117 (H) 70 - 110 mg/dL Final     BUN   Date Value Ref Range Status   06/29/2022 9 8 - 23 mg/dL Final     Creatinine   Date Value Ref Range Status   06/29/2022 0.9 0.5 - 1.4 mg/dL Final     Calcium   Date Value Ref Range Status   06/29/2022 8.9 8.7 - 10.5 mg/dL Final     Total Protein   Date Value Ref Range Status   06/29/2022 7.2 6.0 - 8.4 g/dL Final     Albumin   Date Value Ref Range Status   06/29/2022 3.8 3.5 - 5.2 g/dL Final     Total Bilirubin   Date Value Ref Range Status   06/29/2022 0.3 0.1 - 1.0 mg/dL Final     Comment:     For infants and newborns, interpretation of results should be based  on gestational age, weight and in agreement with clinical  observations.    Premature Infant recommended reference ranges:  Up to 24 hours.............<8.0 mg/dL  Up to 48 hours............<12.0 mg/dL  3-5 days..................<15.0 mg/dL  6-29 days.................<15.0 mg/dL       Alkaline Phosphatase   Date Value Ref Range Status   06/29/2022 145 (H) 55 - 135 U/L Final     AST   Date Value Ref Range Status   06/29/2022 22 10 - 40 U/L Final     ALT   Date Value Ref Range Status   06/29/2022 27 10 - 44 U/L Final     Anion Gap   Date Value Ref Range Status   06/29/2022 9 8 - 16 mmol/L Final     eGFR  if    Date Value Ref Range Status   06/29/2022 >60 >60 mL/min/1.73 m^2 Final     eGFR if non    Date Value Ref Range Status   06/29/2022 >60 >60 mL/min/1.73 m^2 Final     Comment:     Calculation used to obtain the estimated glomerular filtration  rate (eGFR) is the CKD-EPI equation.        No results found for: [id number]  Lab Results   Component Value Date    TSH 0.561 04/14/2022    FREET4 0.89 04/14/2022 2016-2020    Brain MRI Unremarkable    EEG TL Seizures  B/L       [facility] EMU     3 RT TL Seizures     Labs Reviewed:    05-    ZNS level 39 NL (10- 40)    CLB level 55.7 NL ( )     AED LEVELS     AED: PHT NORMAL LEVEL RANGE: 10-20   DATE LEVEL   09- 12.9   10- 15.0   01- 06.3   05- 01.1   05- (200 mg BID) 14.0   07- 11.9   01- 11.0   05- 8.3 L added PHT 30 mg po additional dose HS    12- 17.5 NL       AED: ZNS NORMAL LEVEL RANGE: 10-40   DATE LEVEL   09- 13   07- 37   01- 39   05- 39   12- 29           AED: CLB NORMAL LEVEL RANGE:    DATE LEVEL   10- 176   07- 193   01- 139   05- 55.7                 Reviewed the neuroimaging independently       Assessment:       1. Temporal lobe epilepsy    2. Partial idiopathic epilepsy with seizures of localized onset, intractable, without status epilepticus    3. Refractory epilepsy    4. Anxiety about health    5. Cognitive change    6. Encounter for monitoring anticonvulsant therapy    7. Partial symptomatic epilepsy with complex partial seizures, intractable, without status epilepticus          EPILEPSY CLASSIFICATION      SEMIOLOGY: DIALEPTIC (FOCAL WITH HILARY) WITH ICTAL SPITTING -->GTC     EPILEPTOGENIC ZONE (S):  RT TL     ETIOLOGY: UNKNOWN     PRIOR AEDS: VPA, LTG, OXC, LEV    CURRENT AEDS: ZNS, PHT, CLB, (GBP LBP)    LAST SEIZURE DATE: 06-      COMPREHENSIVE LIST OF AEDs:      Acetazolamide (AZM-Diamox)   Benzos: clonazepam (CZP Klonopin), lorazepam (LZP-Ativan), diazepam (DZP-Valium), clorazepate (CLZ- Tranxene)  Brivaracetam (BRV-Briviact)  Cannabidiol (CBD- Epidiolex)  Carbamazepine (CBZ-Tegretol)  Cenobamate (CNB-Xcopri)  Clobazam (CLB-Onfi)  Eslicarbazepine (ESL-Aptiom)  Ethosuximide (ESX-Zarontin)  Felbamate (FBM-Felbatol)  Gabapentin (GBP-Neurontin)  Lacosamide (LCM-Vimpat)  Lamotrigine (LTG-Lamitcal)  Levetiracetam (LEV- Keppra)  Oxcarbazepine (OXC-Trileptal)  Perampanel (PML-Fycompa)  Phenobarbital (PB)  Phenytoin (PHT-Dilantin)  Pregabalin (PGB-Lyrica)  Primidone (PRM)   Retigabine (RTG- Potiga) Discontinued in 2017  Rufinamide (RFN-Benzil)  Stiripentol (STP-Diacomit)  Tiagabine (TGB-Gabitril)  Topiramate (TPM-Topamax)  Valproate (VPA-Depakote)  Vigabatrin (VGB-Sabril)  Zonisamide (ZNS-Zonegran)        Plan:         INTRACTABLE-MEDICALLY REFRACTORY NON-LESIONAL RT TLE/ COVID +/ UNDERGOING SURGICAL EVALUATION              The patient was encouraged to maintain full traditional seizure precautions which include but not limited to avoid driving, avoid high altitudes, avoid being close to fire or fire source, avoid being close to a body of water or swimming alone, avoid operating heavy machinery and avoid using sharp objects if possible. The patient was encouraged to shower (without accumulation of water) instead of taking a bath if unsupervised. The patient was made aware that these precautions are especially important during concurrent illness. Adequate sleep and avoidance of alcohol as important measures to assure good seizure control were discussed with the patient. The patient was also advised not to care for children without company. The patient was advised to pad the side rails with pillows and blankets if applicable.I strongly recommended lowering the bed to the floor level to decrease the risk of falls.     Continue  mg BID check level      Continue  mg  BID check level     Continue PHT 30 mg po HS       Continue  CLB 10 mg BID Check level      Continue Nayzilam (NS Midazolam) for prolonged and/or clusters of seizures.     Continue AEDs INDEFINITELY, FOR GOOD AND NEVER SKIP A DOSE. The patient verbalized full understanding. Stressed the extreme medical, personal safety, public safety and legal importance of compliance and seizure control. Will give as many refills as possible with or without face-to-face evaluation to make sure the patient and any patient with epilepsy will never run out of medications. Running out of seizure medications should never happen under our care. I explained to the patient that he should not, under any circumstances, adjust or stop taking his seizure medication without discussing with me. The patient verbalized full understanding.     AVOID any substance that could lower seizure threshold including but not limited to:      ALCOHOL AND WITHDRAWAL      TRAMADOL.     DEMEROL      ALL STIMULANTS-ALL ADHD MEDICATIONS.      CLOZAPINE.      BUPROPION.     CIPROFLOXACIN          MEDICAL/SURGICAL COMORBIDITIES     All relevant medical comorbidities noted and managed by primary care physician and medical care team.          MISCELLANEOUS MEDICAL PROBLEMS       HEALTHY LIFESTYLE AND PREVENTATIVE CARE    Encouraged the patient to adhere to the age-appropriate health maintenance guidelines including screening tests and vaccinations.     Discussed the overall importance of healthy lifestyle, optimal weight, exercise, healthy diet, good sleep hygiene and avoiding drugs including smoking, alcohol and recreational drugs. The patient verbalized full understanding.       Advised the patient to follow COVID-19 prevention measures. S/P vaccination       RTC in 3 months          Stacey Adan, MSN NP      Collaborating Provider: Payton Estevez MD, FAAN Neurologist/Epileptologist        TOTAL E/M 58 M

## 2022-07-01 NOTE — TELEPHONE ENCOUNTER
Left 3 different messages, advised pt to call back if assistance/advice is needed.  Pt phone is going straight to voice mail.    Reason for Disposition   Third attempt to contact caller AND no contact made. Phone number verified.    Protocols used: NO CONTACT OR DUPLICATE CONTACT CALL-A-AH

## 2022-07-13 ENCOUNTER — NURSE TRIAGE (OUTPATIENT)
Dept: ADMINISTRATIVE | Facility: CLINIC | Age: 62
End: 2022-07-13
Payer: MEDICARE

## 2022-07-13 NOTE — TELEPHONE ENCOUNTER
Placed call to pt listed contact number. Left message will make second attempt in 15min.     2nd attempt unsuccessful - LM for pt to return call to [phone] for continued or worsening symptoms and to call 911 for medical emergencies.    Reason for Disposition   Message left on unidentified voice mail.  Phone number verified.    Protocols used: NO CONTACT OR DUPLICATE CONTACT CALL-A-AH

## 2022-07-13 NOTE — TELEPHONE ENCOUNTER
Patient tested positive on 6/27/22 and still experiencing weakness, no energy, and coughing still. I asked if patient can go be seen at urgent care to be re elevated since its been some time that she tested positive. Patient verbalized understanding and will be seen at urgent care.

## 2022-07-26 ENCOUNTER — PES CALL (OUTPATIENT)
Dept: ADMINISTRATIVE | Facility: CLINIC | Age: 62
End: 2022-07-26
Payer: MEDICARE

## 2022-08-03 ENCOUNTER — PATIENT MESSAGE (OUTPATIENT)
Dept: NEUROLOGY | Facility: CLINIC | Age: 62
End: 2022-08-03
Payer: MEDICARE

## 2022-08-03 RX ORDER — CLOBAZAM 2.5 MG/ML
10 SUSPENSION ORAL 2 TIMES DAILY
Qty: 360 ML | Refills: 5 | Status: SHIPPED | OUTPATIENT
Start: 2022-08-03 | End: 2023-03-06

## 2022-08-15 DIAGNOSIS — N95.1 MENOPAUSE SYNDROME: ICD-10-CM

## 2022-08-16 DIAGNOSIS — Z12.31 BREAST CANCER SCREENING BY MAMMOGRAM: Primary | ICD-10-CM

## 2022-08-16 RX ORDER — ESTRADIOL 2 MG/1
2 TABLET ORAL DAILY
Qty: 30 TABLET | Refills: 0 | Status: SHIPPED | OUTPATIENT
Start: 2022-08-16 | End: 2022-10-20 | Stop reason: SDUPTHER

## 2022-08-16 NOTE — TELEPHONE ENCOUNTER
Pt last seen 06-   Pt requesting refill on Estrace   Medication pending.   Pharmacy up to date.   Please advise.  Thank you!

## 2022-08-25 ENCOUNTER — OFFICE VISIT (OUTPATIENT)
Dept: UROLOGY | Facility: CLINIC | Age: 62
End: 2022-08-25
Payer: MEDICARE

## 2022-08-25 VITALS
HEART RATE: 73 BPM | DIASTOLIC BLOOD PRESSURE: 70 MMHG | WEIGHT: 212.06 LBS | SYSTOLIC BLOOD PRESSURE: 110 MMHG | BODY MASS INDEX: 35.29 KG/M2

## 2022-08-25 DIAGNOSIS — N39.41 URGE INCONTINENCE: Primary | ICD-10-CM

## 2022-08-25 DIAGNOSIS — N28.1 RENAL CYST: ICD-10-CM

## 2022-08-25 DIAGNOSIS — N39.0 FREQUENT UTI: ICD-10-CM

## 2022-08-25 DIAGNOSIS — N39.3 STRESS INCONTINENCE: ICD-10-CM

## 2022-08-25 PROCEDURE — 99214 PR OFFICE/OUTPT VISIT, EST, LEVL IV, 30-39 MIN: ICD-10-PCS | Mod: S$PBB,,, | Performed by: UROLOGY

## 2022-08-25 PROCEDURE — 99214 OFFICE O/P EST MOD 30 MIN: CPT | Mod: S$PBB,,, | Performed by: UROLOGY

## 2022-08-25 PROCEDURE — 99999 PR PBB SHADOW E&M-EST. PATIENT-LVL V: ICD-10-PCS | Mod: PBBFAC,,, | Performed by: UROLOGY

## 2022-08-25 PROCEDURE — 99215 OFFICE O/P EST HI 40 MIN: CPT | Mod: PBBFAC | Performed by: UROLOGY

## 2022-08-25 PROCEDURE — 99999 PR PBB SHADOW E&M-EST. PATIENT-LVL V: CPT | Mod: PBBFAC,,, | Performed by: UROLOGY

## 2022-08-25 NOTE — PROGRESS NOTES
Chief Complaint:   Encounter Diagnoses   Name Primary?    Urge incontinence Yes    Stress incontinence     Renal cyst     Frequent UTI        HPI:    22- patient returns today to discuss possible surgical management from May.  States incontinence is actually stable.  No evidence of recurrent UTI.  61-year-old female who comes in with recurrent UTIs.  Patient states that her UTIs began around 2019 when she saw previous urologist, but was unable to get worked up for due to a seizure in the office.  She is having a couple per year.  Symptoms usually include suprapubic pain, lower back pain dysuria.  She recently had 1 just finished 5 days of Augmentin, she still having little bit of discomfort.  She normally gets up twice per evening, with no significant frequency or urgency during the daytime.  She has some mild stress incontinence, nothing that she would like surgical management for.  No gross hematuria, positive microscopic hematuria, she does have a history of smoking.  No other urological gynecological history.  She has had a total abdominal hysterectomy with bilateral salpingo oophorectomy, no slings though.  Patient has had 2  sections, she self treats for significant constipation.  No other family history of urological cancers or stones.    Allergies:  Latex, natural rubber; Ace inhibitors; and Diclo gel [diclofenac sodium]    Medications:  has a current medication list which includes the following prescription(s): clobazam, clopidogrel, estradiol, furosemide, hydrocodone-acetaminophen, ibuprofen, isosorbide mononitrate, levocetirizine, levothyroxine, lidocaine, metoprolol succinate, naproxen, nayzilam, pantoprazole, phenytoin, phenytoin, potassium chloride, promethazine-dextromethorphan, ranolazine, repatha sureclick, solifenacin, tizanidine, and zonisamide.    Review of Systems:  General: No fever, chills, fatigability, or weight loss.  Skin: No rashes, itching, or changes in color or  texture of skin.  Chest: Denies SCALES, cyanosis, wheezing, cough, and sputum production.  Abdomen: Appetite fine. No weight loss. Denies diarrhea, abdominal pain, hematemesis, or blood in stool.  Musculoskeletal: No joint stiffness or swelling. Denies back pain.  : As above.  All other review of systems negative.    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    PSH:   has a past surgical history that includes Abdominal surgery; Back surgery; Fracture surgery; Cardiac surgery; Hysterectomy; Tonsillectomy; Vascular surgery; Esophageal manometry with measurement of impedance (N/A, 2/23/2021); Oophorectomy; Breast biopsy (Left); and Left heart catheterization (Left, 4/26/2022).    FamHx: family history includes Cancer in her brother; Hyperlipidemia in her mother; Hypertension in her mother and sister; Marfan syndrome in her daughter; Seizures in her father; Stroke in her father.    SocHx:  reports that she quit smoking about 8 years ago. She has never used smokeless tobacco. She reports that she does not drink alcohol and does not use drugs.      Physical Exam:  Vitals:    08/25/22 0920   BP: 110/70   Pulse: 73     General: A&Ox3, no apparent distress, no deformities  Neck: No masses, normal ROM  Lungs: normal inspiration, no use of accessory muscles  Heart: normal pulse, no arrhythmias  Abdomen: Soft, NT, ND, no masses, no hernias, no hepatosplenomegaly  Skin: The skin is warm and dry. No jaundice.  Ext: No c/c/e.    Labs/Studies:   CT renogram right cyst, left small stone 8/21  Renal ultrasound right cyst 7/21  Urine culture E coli 6/21      Impression/Plan:       1. Recurrent UTI- no evidence of recurrence, CT scan otherwise stable.  Stone has not moved, not causing her back pain.  Call with a recurrence.    2.  Stress incontinence- she has considered surgical management in the past, she will probably go back to pelvic floor physical therapy though as this is not too severe.    3. Renal cyst- appears to be stable nothing further.    4. Urge incontinence- vesicare 10 mg appears to be controlling her symptoms, no other indication for further therapy.  I will see her back in 6 months with a PVR and if stable yearly from there.  Call with any complaints in the meantime.

## 2022-09-20 ENCOUNTER — LAB VISIT (OUTPATIENT)
Dept: LAB | Facility: HOSPITAL | Age: 62
End: 2022-09-20
Attending: PHYSICIAN ASSISTANT
Payer: MEDICARE

## 2022-09-20 DIAGNOSIS — D72.819 LEUKOPENIA, UNSPECIFIED TYPE: ICD-10-CM

## 2022-09-20 LAB
BASOPHILS # BLD AUTO: 0.03 K/UL (ref 0–0.2)
BASOPHILS NFR BLD: 0.8 % (ref 0–1.9)
DIFFERENTIAL METHOD: ABNORMAL
EOSINOPHIL # BLD AUTO: 0.1 K/UL (ref 0–0.5)
EOSINOPHIL NFR BLD: 3.6 % (ref 0–8)
ERYTHROCYTE [DISTWIDTH] IN BLOOD BY AUTOMATED COUNT: 14.1 % (ref 11.5–14.5)
HCT VFR BLD AUTO: 37.7 % (ref 37–48.5)
HGB BLD-MCNC: 12.4 G/DL (ref 12–16)
IMM GRANULOCYTES # BLD AUTO: 0.01 K/UL (ref 0–0.04)
IMM GRANULOCYTES NFR BLD AUTO: 0.3 % (ref 0–0.5)
LYMPHOCYTES # BLD AUTO: 1.7 K/UL (ref 1–4.8)
LYMPHOCYTES NFR BLD: 47.3 % (ref 18–48)
MCH RBC QN AUTO: 32.5 PG (ref 27–31)
MCHC RBC AUTO-ENTMCNC: 32.9 G/DL (ref 32–36)
MCV RBC AUTO: 99 FL (ref 82–98)
MONOCYTES # BLD AUTO: 0.4 K/UL (ref 0.3–1)
MONOCYTES NFR BLD: 10.1 % (ref 4–15)
NEUTROPHILS # BLD AUTO: 1.4 K/UL (ref 1.8–7.7)
NEUTROPHILS NFR BLD: 37.9 % (ref 38–73)
NRBC BLD-RTO: 0 /100 WBC
PLATELET # BLD AUTO: 200 K/UL (ref 150–450)
PMV BLD AUTO: 9.5 FL (ref 9.2–12.9)
RBC # BLD AUTO: 3.82 M/UL (ref 4–5.4)
WBC # BLD AUTO: 3.57 K/UL (ref 3.9–12.7)

## 2022-09-20 PROCEDURE — 85025 COMPLETE CBC W/AUTO DIFF WBC: CPT | Performed by: PHYSICIAN ASSISTANT

## 2022-09-20 PROCEDURE — 36415 COLL VENOUS BLD VENIPUNCTURE: CPT | Mod: PO | Performed by: PHYSICIAN ASSISTANT

## 2022-10-03 DIAGNOSIS — Z71.89 COMPLEX CARE COORDINATION: ICD-10-CM

## 2022-10-04 ENCOUNTER — HOSPITAL ENCOUNTER (OUTPATIENT)
Dept: RADIOLOGY | Facility: HOSPITAL | Age: 62
Discharge: HOME | End: 2022-10-04
Attending: OBSTETRICS & GYNECOLOGY
Payer: MEDICARE

## 2022-10-04 DIAGNOSIS — Z12.31 BREAST CANCER SCREENING BY MAMMOGRAM: ICD-10-CM

## 2022-10-04 PROCEDURE — 77067 MAMMO DIGITAL SCREENING BILAT WITH TOMO: ICD-10-PCS | Mod: 26,,, | Performed by: RADIOLOGY

## 2022-10-04 PROCEDURE — 77067 SCR MAMMO BI INCL CAD: CPT | Mod: 26,,, | Performed by: RADIOLOGY

## 2022-10-04 PROCEDURE — 77063 BREAST TOMOSYNTHESIS BI: CPT | Mod: TC

## 2022-10-04 PROCEDURE — 77063 BREAST TOMOSYNTHESIS BI: CPT | Mod: 26,,, | Performed by: RADIOLOGY

## 2022-10-04 PROCEDURE — 77063 MAMMO DIGITAL SCREENING BILAT WITH TOMO: ICD-10-PCS | Mod: 26,,, | Performed by: RADIOLOGY

## 2022-10-04 PROCEDURE — 77067 SCR MAMMO BI INCL CAD: CPT | Mod: TC

## 2022-10-05 ENCOUNTER — PATIENT MESSAGE (OUTPATIENT)
Dept: NEUROLOGY | Facility: CLINIC | Age: 62
End: 2022-10-05
Payer: MEDICARE

## 2022-10-05 RX ORDER — ZONISAMIDE 100 MG/1
300 CAPSULE ORAL 2 TIMES DAILY
Qty: 540 CAPSULE | Refills: 3 | Status: SHIPPED | OUTPATIENT
Start: 2022-10-05 | End: 2023-03-09 | Stop reason: SDUPTHER

## 2022-10-12 ENCOUNTER — OFFICE VISIT (OUTPATIENT)
Dept: INTERNAL MEDICINE | Facility: CLINIC | Age: 62
End: 2022-10-12
Payer: MEDICARE

## 2022-10-12 ENCOUNTER — LAB VISIT (OUTPATIENT)
Dept: LAB | Facility: HOSPITAL | Age: 62
End: 2022-10-12
Attending: FAMILY MEDICINE
Payer: MEDICARE

## 2022-10-12 VITALS
TEMPERATURE: 97 F | HEART RATE: 78 BPM | WEIGHT: 213.19 LBS | BODY MASS INDEX: 35.48 KG/M2 | DIASTOLIC BLOOD PRESSURE: 82 MMHG | SYSTOLIC BLOOD PRESSURE: 118 MMHG | OXYGEN SATURATION: 98 % | RESPIRATION RATE: 20 BRPM

## 2022-10-12 DIAGNOSIS — E03.9 HYPOTHYROIDISM, UNSPECIFIED TYPE: Primary | ICD-10-CM

## 2022-10-12 DIAGNOSIS — E78.00 PURE HYPERCHOLESTEROLEMIA: Chronic | ICD-10-CM

## 2022-10-12 DIAGNOSIS — I10 PRIMARY HYPERTENSION: Chronic | ICD-10-CM

## 2022-10-12 DIAGNOSIS — I25.10 CORONARY ARTERY DISEASE INVOLVING NATIVE CORONARY ARTERY OF NATIVE HEART WITHOUT ANGINA PECTORIS: ICD-10-CM

## 2022-10-12 DIAGNOSIS — R53.83 FATIGUE, UNSPECIFIED TYPE: ICD-10-CM

## 2022-10-12 DIAGNOSIS — M46.96 UNSPECIFIED INFLAMMATORY SPONDYLOPATHY, LUMBAR REGION: ICD-10-CM

## 2022-10-12 DIAGNOSIS — E03.9 HYPOTHYROIDISM, UNSPECIFIED TYPE: ICD-10-CM

## 2022-10-12 DIAGNOSIS — M62.830 LUMBAR PARASPINAL MUSCLE SPASM: ICD-10-CM

## 2022-10-12 LAB
T4 FREE SERPL-MCNC: 0.76 NG/DL (ref 0.71–1.51)
TSH SERPL DL<=0.005 MIU/L-ACNC: 0.61 UIU/ML (ref 0.4–4)

## 2022-10-12 PROCEDURE — 99214 PR OFFICE/OUTPT VISIT, EST, LEVL IV, 30-39 MIN: ICD-10-PCS | Mod: S$PBB,,, | Performed by: FAMILY MEDICINE

## 2022-10-12 PROCEDURE — 84439 ASSAY OF FREE THYROXINE: CPT | Performed by: FAMILY MEDICINE

## 2022-10-12 PROCEDURE — 99999 PR PBB SHADOW E&M-EST. PATIENT-LVL V: CPT | Mod: PBBFAC,,, | Performed by: FAMILY MEDICINE

## 2022-10-12 PROCEDURE — 99215 OFFICE O/P EST HI 40 MIN: CPT | Mod: PBBFAC | Performed by: FAMILY MEDICINE

## 2022-10-12 PROCEDURE — 99999 PR PBB SHADOW E&M-EST. PATIENT-LVL V: ICD-10-PCS | Mod: PBBFAC,,, | Performed by: FAMILY MEDICINE

## 2022-10-12 PROCEDURE — 84443 ASSAY THYROID STIM HORMONE: CPT | Performed by: FAMILY MEDICINE

## 2022-10-12 PROCEDURE — 36415 COLL VENOUS BLD VENIPUNCTURE: CPT | Performed by: FAMILY MEDICINE

## 2022-10-12 PROCEDURE — 99214 OFFICE O/P EST MOD 30 MIN: CPT | Mod: S$PBB,,, | Performed by: FAMILY MEDICINE

## 2022-10-12 RX ORDER — LEVOTHYROXINE SODIUM 100 UG/1
100 TABLET ORAL
Qty: 90 TABLET | Refills: 3 | Status: SHIPPED | OUTPATIENT
Start: 2022-10-12 | End: 2023-03-10 | Stop reason: SDUPTHER

## 2022-10-12 NOTE — PROGRESS NOTES
Subjective:       Patient ID: Evelyne Francisco is a 62 y.o. female.    Chief Complaint: Follow-up    Follow-up hypertension coronary artery disease hypothyroidism.  She is following neurology for seizure disorder.  She reports seizures have improved since she discontinued Lasix.  She is followed by cardiology with history of coronary artery disease.  She is followed by Urology for urinary stress incontinence she denies headache chest pain palpitations for shortness of breath or edema.  She reports several months of fatigue that she relates to COVID.  She does have hypothyroidism.  Previous TSH T4 were normal but will recheck.  Her weight last visit was 230and  this visit 02/13.  She reports improvement her diet.    Review of Systems   Constitutional:  Negative for activity change, appetite change, fatigue and unexpected weight change.   Respiratory:  Negative for cough, chest tightness, shortness of breath and wheezing.    Cardiovascular:  Negative for chest pain, palpitations and leg swelling.   Gastrointestinal:  Negative for abdominal distention, abdominal pain, nausea and vomiting.   Neurological:  Positive for seizures. Negative for dizziness, weakness, light-headedness and headaches.     Objective:      Physical Exam  Constitutional:       General: She is not in acute distress.     Appearance: She is not ill-appearing or diaphoretic.   Cardiovascular:      Rate and Rhythm: Normal rate and regular rhythm.      Heart sounds: No murmur heard.    No gallop.   Pulmonary:      Effort: Pulmonary effort is normal. No respiratory distress.      Breath sounds: No wheezing, rhonchi or rales.   Abdominal:      General: There is no distension.      Palpations: Abdomen is soft.   Lymphadenopathy:      Cervical: No cervical adenopathy.   Skin:     General: Skin is warm and dry.      Coloration: Skin is not pale.      Findings: No erythema.   Neurological:      Mental Status: She is alert and oriented to person,  place, and time.   Psychiatric:         Mood and Affect: Mood normal.         Behavior: Behavior normal.         Thought Content: Thought content normal.         Judgment: Judgment normal.       Lab Visit on 09/20/2022   Component Date Value Ref Range Status    WBC 09/20/2022 3.57 (L)  3.90 - 12.70 K/uL Final    RBC 09/20/2022 3.82 (L)  4.00 - 5.40 M/uL Final    Hemoglobin 09/20/2022 12.4  12.0 - 16.0 g/dL Final    Hematocrit 09/20/2022 37.7  37.0 - 48.5 % Final    MCV 09/20/2022 99 (H)  82 - 98 fL Final    MCH 09/20/2022 32.5 (H)  27.0 - 31.0 pg Final    MCHC 09/20/2022 32.9  32.0 - 36.0 g/dL Final    RDW 09/20/2022 14.1  11.5 - 14.5 % Final    Platelets 09/20/2022 200  150 - 450 K/uL Final    MPV 09/20/2022 9.5  9.2 - 12.9 fL Final    Immature Granulocytes 09/20/2022 0.3  0.0 - 0.5 % Final    Gran # (ANC) 09/20/2022 1.4 (L)  1.8 - 7.7 K/uL Final    Immature Grans (Abs) 09/20/2022 0.01  0.00 - 0.04 K/uL Final    Lymph # 09/20/2022 1.7  1.0 - 4.8 K/uL Final    Mono # 09/20/2022 0.4  0.3 - 1.0 K/uL Final    Eos # 09/20/2022 0.1  0.0 - 0.5 K/uL Final    Baso # 09/20/2022 0.03  0.00 - 0.20 K/uL Final    nRBC 09/20/2022 0  0 /100 WBC Final    Gran % 09/20/2022 37.9 (L)  38.0 - 73.0 % Final    Lymph % 09/20/2022 47.3  18.0 - 48.0 % Final    Mono % 09/20/2022 10.1  4.0 - 15.0 % Final    Eosinophil % 09/20/2022 3.6  0.0 - 8.0 % Final    Basophil % 09/20/2022 0.8  0.0 - 1.9 % Final    Differential Method 09/20/2022 Automated   Final     Assessment:       1. Hypothyroidism, unspecified type    2. Fatigue, unspecified type    3. Unspecified inflammatory spondylopathy, lumbar region    4. Coronary artery disease involving native coronary artery of native heart without angina pectoris    5. Primary hypertension    6. Pure hypercholesterolemia    7. Lumbar paraspinal muscle spasm    8. BMI 34.0-34.9,adult          Plan:   Blood pressure controlled up-to-date lab reviewed TSH T4 being repeated refill Synthroid.  She received flu  vaccination at her pharmacy earlier.  Discussed need for COVID booster.  Follow-up in 6 months discussed low-dose CT lung at that time.      Hypothyroidism, unspecified type  -     TSH; Future; Expected date: 10/12/2022  -     T4, Free; Future; Expected date: 10/12/2022    Fatigue, unspecified type    Unspecified inflammatory spondylopathy, lumbar region    Coronary artery disease involving native coronary artery of native heart without angina pectoris    Primary hypertension    Pure hypercholesterolemia    Lumbar paraspinal muscle spasm    BMI 34.0-34.9,adult    Other orders  -     levothyroxine (SYNTHROID) 100 MCG tablet; Take 1 tablet (100 mcg total) by mouth before breakfast.  Dispense: 90 tablet; Refill: 3

## 2022-10-19 ENCOUNTER — TELEPHONE (OUTPATIENT)
Dept: OBSTETRICS AND GYNECOLOGY | Facility: CLINIC | Age: 62
End: 2022-10-19
Payer: MEDICARE

## 2022-10-19 ENCOUNTER — TELEPHONE (OUTPATIENT)
Dept: OBSTETRICS AND GYNECOLOGY | Facility: CLINIC | Age: 62
End: 2022-10-19

## 2022-10-19 NOTE — TELEPHONE ENCOUNTER
Attempted to contact patient to reschedule appointment on 11/10/22 with , patient did not answer, left  for patient to give office a callback at [phone]!

## 2022-10-19 NOTE — TELEPHONE ENCOUNTER
----- Message from Maribell Castillo sent at 10/19/2022  4:05 PM CDT -----  Contact: Patient  Type:  Patient Returning Call    Who Called:Evelyne Francisco   Who Left Message for Patient: MA   Does the patient know what this is regarding?: Appointment   Would the patient rather a call back or a response via [facility]?  Call back   Best Call Back Number: [phone]   Additional Information:

## 2022-10-20 ENCOUNTER — OFFICE VISIT (OUTPATIENT)
Dept: OBSTETRICS AND GYNECOLOGY | Facility: CLINIC | Age: 62
End: 2022-10-20
Payer: MEDICARE

## 2022-10-20 VITALS
WEIGHT: 210.56 LBS | SYSTOLIC BLOOD PRESSURE: 118 MMHG | BODY MASS INDEX: 35.08 KG/M2 | DIASTOLIC BLOOD PRESSURE: 78 MMHG | HEIGHT: 65 IN

## 2022-10-20 DIAGNOSIS — N95.1 MENOPAUSE SYNDROME: ICD-10-CM

## 2022-10-20 DIAGNOSIS — Z01.419 ENCOUNTER FOR ANNUAL ROUTINE GYNECOLOGICAL EXAMINATION: Primary | ICD-10-CM

## 2022-10-20 PROCEDURE — G0101 CA SCREEN;PELVIC/BREAST EXAM: HCPCS | Mod: GZ,S$PBB,,

## 2022-10-20 PROCEDURE — 99213 OFFICE O/P EST LOW 20 MIN: CPT | Mod: PBBFAC,PN

## 2022-10-20 PROCEDURE — 99999 PR PBB SHADOW E&M-EST. PATIENT-LVL III: CPT | Mod: PBBFAC,,,

## 2022-10-20 PROCEDURE — G0101 PR CA SCREEN;PELVIC/BREAST EXAM: ICD-10-PCS | Mod: GZ,S$PBB,,

## 2022-10-20 PROCEDURE — 99999 PR PBB SHADOW E&M-EST. PATIENT-LVL III: ICD-10-PCS | Mod: PBBFAC,,,

## 2022-10-20 RX ORDER — EZETIMIBE 10 MG/1
10 TABLET ORAL DAILY
COMMUNITY
Start: 2022-08-09

## 2022-10-20 RX ORDER — ESTRADIOL 2 MG/1
2 TABLET ORAL DAILY
Qty: 30 TABLET | Refills: 12 | Status: SHIPPED | OUTPATIENT
Start: 2022-10-20 | End: 2023-03-09 | Stop reason: SDUPTHER

## 2022-10-20 RX ORDER — FLUCONAZOLE 200 MG/1
200 TABLET ORAL
COMMUNITY
Start: 2022-09-10

## 2022-10-20 NOTE — PROGRESS NOTES
Subjective:       Patient ID: Evelyne Francisco is a 62 y.o. female.    Chief Complaint:  Well Woman and Medication Refill      History of Present Illness  Medication Refill    Annual Exam-Postmenopausal  Patient presents for annual exam. The patient has no complaints today. The patient is not currently sexually active. GYN screening history: last pap: was normal and last mammogram: approximate date 10/4/22 and was normal. The patient is taking hormone replacement therapy. Patient denies post-menopausal vaginal bleeding. The patient wears seatbelts: yes. The patient participates in regular exercise: yes, recently loss 30 lbs, walking and dieting. Has the patient ever been transfused or tattooed?: no. The patient reports that there is not domestic violence in her life.    Vaginal dryness, requesting Estradiol refill   Mammogram current 10/4/22    GYN & OB History  No LMP recorded (lmp unknown). Patient has had a hysterectomy.   Date of Last Pap: No result found    OB History    Para Term  AB Living   2 2 2         SAB IAB Ectopic Multiple Live Births                  # Outcome Date GA Lbr Gato/2nd Weight Sex Delivery Anes PTL Lv   2 Term            1 Term                Review of Systems  Review of Systems   Constitutional: Negative.    HENT: Negative.     Eyes: Negative.    Respiratory: Negative.     Cardiovascular: Negative.    Gastrointestinal: Negative.    Genitourinary:  Positive for vaginal dryness.   Musculoskeletal: Negative.    Integumentary:  Negative.   Neurological: Negative.    Hematological: Negative.    Psychiatric/Behavioral: Negative.     All other systems reviewed and are negative.  Breast: negative.          Objective:      Physical Exam:   Constitutional: She is oriented to person, place, and time. She appears well-developed and well-nourished.    HENT:   Head: Normocephalic and atraumatic.   Nose: Nose normal.    Eyes: Pupils are equal, round, and reactive to light.  Conjunctivae and EOM are normal.     Cardiovascular:  Normal rate and regular rhythm.             Pulmonary/Chest: Effort normal. She has no rhonchi. Right breast exhibits no inverted nipple, no mass, no nipple discharge, no tenderness and no bleeding. Left breast exhibits no inverted nipple, no mass, no nipple discharge, no tenderness and no bleeding. Breasts are symmetrical.        Abdominal: Soft. There is no abdominal tenderness. There is no rebound and no guarding.     Genitourinary:    Inguinal canal, right adnexa, left adnexa and rectum normal.      Pelvic exam was performed with patient supine.   The external female genitalia was normal.   No external genitalia lesions identified,Genitalia hair distrobution normal .   Labial bartholins normal.No  no vaginal discharge, tenderness or bleeding in the vagina. Vaginal atrophy noted. Cervix is absent.Uterus is absent. Normal urethral meatus.          Musculoskeletal: Normal range of motion and moves all extremeties.       Neurological: She is alert and oriented to person, place, and time.    Skin: Skin is warm and dry.    Psychiatric: She has a normal mood and affect. Her speech is normal and behavior is normal. Mood, judgment and thought content normal.           Assessment:        1. Encounter for annual routine gynecological examination    2. Menopause syndrome               Plan:   Continue annual well woman exam.  Reviewed updated recommendations for pap smears (no pap smear needed) in patients with a hysterectomy for benign indications.  Patient needs a pelvic exam every year. Reviewed recommendations for annual CBE and annual MMG.    Estradiol refill sent  Continue diet, exercise, weight loss      Diagnosis and orders this visit:  Encounter for annual routine gynecological examination    Menopause syndrome  -     estradioL (ESTRACE) 2 MG tablet; Take 1 tablet (2 mg total) by mouth once daily.  Dispense: 30 tablet; Refill: 12         Danita Ryan  NP

## 2022-11-11 NOTE — TELEPHONE ENCOUNTER
No new care gaps identified.  [facility] Embedded Care Gaps. Reference number: [id number]. 11/11/2022   11:10:25 AM CST

## 2022-11-14 RX ORDER — PANTOPRAZOLE SODIUM 40 MG/1
40 TABLET, DELAYED RELEASE ORAL 2 TIMES DAILY
Qty: 180 TABLET | Refills: 0 | Status: SHIPPED | OUTPATIENT
Start: 2022-11-14 | End: 2023-03-10 | Stop reason: SDUPTHER

## 2022-11-18 ENCOUNTER — PATIENT MESSAGE (OUTPATIENT)
Dept: RESEARCH | Facility: HOSPITAL | Age: 62
End: 2022-11-18
Payer: MEDICARE

## 2022-12-01 ENCOUNTER — TELEPHONE (OUTPATIENT)
Dept: INTERNAL MEDICINE | Facility: CLINIC | Age: 62
End: 2022-12-01
Payer: MEDICARE

## 2022-12-01 NOTE — TELEPHONE ENCOUNTER
Called patient back and she wanted to know where to fax her [facility] paperwork to get her meds by mail. Gave her [phone].

## 2022-12-01 NOTE — TELEPHONE ENCOUNTER
----- Message from Rylee Mclaughlin sent at 12/1/2022  2:29 PM CST -----  Contact: YOLI  Patient is calling to speak with the nurse to discuss getting medication by mail and is requesting a call to further discuss at .[phone]

## 2022-12-09 ENCOUNTER — LAB VISIT (OUTPATIENT)
Dept: LAB | Facility: HOSPITAL | Age: 62
End: 2022-12-09
Attending: NURSE PRACTITIONER
Payer: MEDICARE

## 2022-12-09 DIAGNOSIS — G40.109 TEMPORAL LOBE EPILEPSY: ICD-10-CM

## 2022-12-09 PROCEDURE — 80185 ASSAY OF PHENYTOIN TOTAL: CPT | Performed by: NURSE PRACTITIONER

## 2022-12-10 LAB — PHENYTOIN SERPL-MCNC: 17.5 UG/ML (ref 10–20)

## 2022-12-12 LAB
CLOZAPINE SERPL-MCNC: <25 NG/ML (ref 350–600)
CLOZAPINE+NOR SERPL-MCNC: ABNORMAL NG/ML
NORCLOZAPINE SERPL-MCNC: <25 NG/ML

## 2022-12-13 ENCOUNTER — TELEPHONE (OUTPATIENT)
Dept: NEUROLOGY | Facility: CLINIC | Age: 62
End: 2022-12-13
Payer: MEDICARE

## 2022-12-13 LAB — ZONISAMIDE SERPL-MCNC: 29 MCG/ML (ref 10–40)

## 2022-12-13 NOTE — PROGRESS NOTES
Labs Reviewed:    12-    ZNS level  29 NL (10-40)  CZP level   <25 NL   PHT  level 17.5 NL(10.0 -20.0)

## 2022-12-13 NOTE — TELEPHONE ENCOUNTER
----- Message from Shanell Ramsey sent at 12/13/2022 10:05 AM CST -----  Type:  Patient Returning Call    Who Called:patient  Who Left Message for Patient:nurse  Does the patient know what this is regarding?:labs  Would the patient rather a call back or a response via [facility]? Call back  Best Call Back Number:[phone]  Additional Information: na

## 2022-12-13 NOTE — TELEPHONE ENCOUNTER
----- Message from Serenity Adan NP sent at 12/13/2022  8:38 AM CST -----  Labs Reviewed:    12-    ZNS level  29 NL (10-40)  CZP level   <25 NL   PHT  level 17.5 NL(10.0 -20.0)

## 2022-12-15 ENCOUNTER — OFFICE VISIT (OUTPATIENT)
Dept: NEUROLOGY | Facility: CLINIC | Age: 62
End: 2022-12-15
Payer: MEDICARE

## 2022-12-15 VITALS
DIASTOLIC BLOOD PRESSURE: 72 MMHG | WEIGHT: 212.75 LBS | SYSTOLIC BLOOD PRESSURE: 104 MMHG | HEIGHT: 65 IN | RESPIRATION RATE: 16 BRPM | OXYGEN SATURATION: 99 % | BODY MASS INDEX: 35.45 KG/M2 | HEART RATE: 81 BPM

## 2022-12-15 DIAGNOSIS — D64.9 ANEMIA, UNSPECIFIED TYPE: ICD-10-CM

## 2022-12-15 DIAGNOSIS — M46.96 UNSPECIFIED INFLAMMATORY SPONDYLOPATHY, LUMBAR REGION: ICD-10-CM

## 2022-12-15 DIAGNOSIS — G40.019 PARTIAL IDIOPATHIC EPILEPSY WITH SEIZURES OF LOCALIZED ONSET, INTRACTABLE, WITHOUT STATUS EPILEPTICUS: ICD-10-CM

## 2022-12-15 DIAGNOSIS — N39.41 URGE INCONTINENCE: ICD-10-CM

## 2022-12-15 DIAGNOSIS — G40.109 TEMPORAL LOBE EPILEPSY: ICD-10-CM

## 2022-12-15 DIAGNOSIS — M54.81 OCCIPITAL NEURALGIA, UNSPECIFIED LATERALITY: ICD-10-CM

## 2022-12-15 DIAGNOSIS — E78.00 PURE HYPERCHOLESTEROLEMIA: Chronic | ICD-10-CM

## 2022-12-15 DIAGNOSIS — E03.9 HYPOTHYROIDISM, UNSPECIFIED TYPE: ICD-10-CM

## 2022-12-15 DIAGNOSIS — M62.830 LUMBAR PARASPINAL MUSCLE SPASM: ICD-10-CM

## 2022-12-15 DIAGNOSIS — T78.3XXA ANGIOEDEMA, INITIAL ENCOUNTER: ICD-10-CM

## 2022-12-15 DIAGNOSIS — R29.90 MULTIPLE NEUROLOGICAL SYMPTOMS: Primary | ICD-10-CM

## 2022-12-15 DIAGNOSIS — R45.89 ANXIETY ABOUT HEALTH: ICD-10-CM

## 2022-12-15 DIAGNOSIS — N39.0 FREQUENT UTI: ICD-10-CM

## 2022-12-15 DIAGNOSIS — I25.10 CORONARY ARTERY DISEASE INVOLVING NATIVE CORONARY ARTERY OF NATIVE HEART WITHOUT ANGINA PECTORIS: ICD-10-CM

## 2022-12-15 DIAGNOSIS — R53.83 FATIGUE, UNSPECIFIED TYPE: ICD-10-CM

## 2022-12-15 DIAGNOSIS — R35.0 URINARY FREQUENCY: ICD-10-CM

## 2022-12-15 DIAGNOSIS — N28.1 RENAL CYST: ICD-10-CM

## 2022-12-15 DIAGNOSIS — G40.919 REFRACTORY EPILEPSY: ICD-10-CM

## 2022-12-15 DIAGNOSIS — Z95.5 HX OF HEART ARTERY STENT: ICD-10-CM

## 2022-12-15 DIAGNOSIS — N39.3 STRESS INCONTINENCE: ICD-10-CM

## 2022-12-15 DIAGNOSIS — G40.219 PARTIAL SYMPTOMATIC EPILEPSY WITH COMPLEX PARTIAL SEIZURES, INTRACTABLE, WITHOUT STATUS EPILEPTICUS: ICD-10-CM

## 2022-12-15 DIAGNOSIS — I20.0 UNSTABLE ANGINA PECTORIS: ICD-10-CM

## 2022-12-15 DIAGNOSIS — I10 PRIMARY HYPERTENSION: ICD-10-CM

## 2022-12-15 DIAGNOSIS — R35.1 NOCTURIA: ICD-10-CM

## 2022-12-15 PROBLEM — R05.9 COUGH: Status: RESOLVED | Noted: 2021-10-14 | Resolved: 2022-12-15

## 2022-12-15 PROBLEM — K62.5 RECTAL BLEED: Status: RESOLVED | Noted: 2021-01-28 | Resolved: 2022-12-15

## 2022-12-15 PROBLEM — U07.1 COVID: Status: RESOLVED | Noted: 2022-06-28 | Resolved: 2022-12-15

## 2022-12-15 PROBLEM — L02.92 BOIL: Status: RESOLVED | Noted: 2021-06-15 | Resolved: 2022-12-15

## 2022-12-15 PROCEDURE — 99417 PR PROLONGED SVC, OUTPT, W/WO DIRECT PT CONTACT,  EA ADDTL 15 MIN: ICD-10-PCS | Mod: S$GLB,,, | Performed by: PSYCHIATRY & NEUROLOGY

## 2022-12-15 PROCEDURE — 1159F PR MEDICATION LIST DOCUMENTED IN MEDICAL RECORD: ICD-10-PCS | Mod: CPTII,S$GLB,, | Performed by: PSYCHIATRY & NEUROLOGY

## 2022-12-15 PROCEDURE — 99999 PR PBB SHADOW E&M-EST. PATIENT-LVL V: ICD-10-PCS | Mod: PBBFAC,,, | Performed by: PSYCHIATRY & NEUROLOGY

## 2022-12-15 PROCEDURE — 99999 PR PBB SHADOW E&M-EST. PATIENT-LVL V: CPT | Mod: PBBFAC,,, | Performed by: PSYCHIATRY & NEUROLOGY

## 2022-12-15 PROCEDURE — 3078F PR MOST RECENT DIASTOLIC BLOOD PRESSURE < 80 MM HG: ICD-10-PCS | Mod: CPTII,S$GLB,, | Performed by: PSYCHIATRY & NEUROLOGY

## 2022-12-15 PROCEDURE — 99215 PR OFFICE/OUTPT VISIT, EST, LEVL V, 40-54 MIN: ICD-10-PCS | Mod: S$GLB,,, | Performed by: PSYCHIATRY & NEUROLOGY

## 2022-12-15 PROCEDURE — 1159F MED LIST DOCD IN RCRD: CPT | Mod: CPTII,S$GLB,, | Performed by: PSYCHIATRY & NEUROLOGY

## 2022-12-15 PROCEDURE — 3074F SYST BP LT 130 MM HG: CPT | Mod: CPTII,S$GLB,, | Performed by: PSYCHIATRY & NEUROLOGY

## 2022-12-15 PROCEDURE — 3008F PR BODY MASS INDEX (BMI) DOCUMENTED: ICD-10-PCS | Mod: CPTII,S$GLB,, | Performed by: PSYCHIATRY & NEUROLOGY

## 2022-12-15 PROCEDURE — 3074F PR MOST RECENT SYSTOLIC BLOOD PRESSURE < 130 MM HG: ICD-10-PCS | Mod: CPTII,S$GLB,, | Performed by: PSYCHIATRY & NEUROLOGY

## 2022-12-15 PROCEDURE — 99417 PROLNG OP E/M EACH 15 MIN: CPT | Mod: S$GLB,,, | Performed by: PSYCHIATRY & NEUROLOGY

## 2022-12-15 PROCEDURE — 3008F BODY MASS INDEX DOCD: CPT | Mod: CPTII,S$GLB,, | Performed by: PSYCHIATRY & NEUROLOGY

## 2022-12-15 PROCEDURE — 3078F DIAST BP <80 MM HG: CPT | Mod: CPTII,S$GLB,, | Performed by: PSYCHIATRY & NEUROLOGY

## 2022-12-15 PROCEDURE — 99215 OFFICE O/P EST HI 40 MIN: CPT | Mod: S$GLB,,, | Performed by: PSYCHIATRY & NEUROLOGY

## 2022-12-15 RX ORDER — AMITRIPTYLINE HYDROCHLORIDE 10 MG/1
10 TABLET, FILM COATED ORAL NIGHTLY
Qty: 93 TABLET | Refills: 3 | Status: SHIPPED | OUTPATIENT
Start: 2022-12-15 | End: 2023-03-09 | Stop reason: SDUPTHER

## 2022-12-15 NOTE — PROGRESS NOTES
Patient, Evelyne Francisco (MRN #[id number]), presented with a recorded BMI of 35.4 kg/m^2 and a documented comorbidity(s):  - Hypertension  - Hyperlipidemia  to which the severe obesity is a contributing factor. This is consistent with the definition of severe obesity (BMI 35.0-39.9) with comorbidity (ICD-10 E66.01, Z68.35). The patient's severe obesity was monitored, evaluated, addressed and/or treated. This addendum to the medical record is made on 12/15/2022.

## 2022-12-15 NOTE — PROGRESS NOTES
Subjective:       Patient ID: Evelyne Francisco is a 62 y.o. female.    Chief Complaint: Refractory epilepsy           HPI       BACKGROUND HISTORY         The patient is here for seizure evaluation and a 4th opinion.    Saw Dr. Monge, Dr. Hobbs and Dr. Smith in the past.     The patient started having seizures 11 years ago (2009) at the age of 49 .The patient describes aura of strange feeling in her head that she cannot describe. The patient is amnestic to the seizure after that. The seizures are mostly described as Complex Partial (staring, unresponsiveness, sometimes accompanied by spitting) and some of the them Grand Mal (GTC). The patient is currently not driving and has 2-5 seizure a month. No history of meningitis or encephalitis No toxic exposure or lead poisoning. Her father had posttraumatic family history of epilepsy and her nephew might be having seizures.  No history of strokes or aneurysmal bleeding. No history of TBI. Routine EEGs have been normal. EMU in 2016 captured 2 seizures with B/L TL localization. Brain MRI was reported as unremarkable. Failed VPA, LTG, OXC, LEV and VNS. She was on  mg BID and  mg BID. LCM,  ESL and BRV were too expensive. She said that CLB helped control the seizures but the insurance would not pay for it. Added CLB 20 mg BID.Explained to the patient that failing 2 AEDs typically means that chance of achieving seizure-freedom using AEDs alone is <10% and we should explore other treatments like epilepsy surgery.Could not tolerate CLB 20 mg BID.  Was approved to be seen at [facility].  Titrated PHT to 200 mg BID since levels were very low.  Underwent epilepsy surgery evaluation at [facility] by Dr. Nunez. EMU monitoring in  captured 3 stereotypical seizures originating form RT TL. The plan includes CNP, PET, WADA +/- DARCI. Trying to solve some billing issues with [facility].  Helped her with with getting CNP completed locally by Dr. Luu on  04-.Added Nayzilam (NS Midazolam) for prolonged and/or clusters of seizures. Changed CLB from 7.5 mg (3 mL)/10 mg (4ml)  to 10 mg BID (4 mL BID).       INTERVAL HISTORY       Undergoing epilepsy surgery evaluation at [facility] by Dr. Nunez. The plan includes PET, WADA +/- DARCI. Trying to solve some billing issues with [facility].     AED Regimen:  mg BID ,  mg BID and CLB 4 ml (10 mg) BID . On Nayzilam (NS Midazolam) for prolonged and/or clusters of seizures. On  mg BID for pain.       Complains of insomnia and daily occipital headaches that interfere with her ability to rest.     Review of Systems   Constitutional:  Positive for fatigue. Negative for appetite change.   HENT:  Negative for hearing loss and tinnitus.    Eyes:  Negative for photophobia and visual disturbance.   Respiratory:  Negative for apnea and shortness of breath.    Cardiovascular:  Negative for chest pain and palpitations.   Gastrointestinal:  Negative for nausea and vomiting.   Endocrine: Negative for cold intolerance and heat intolerance.   Genitourinary:  Negative for difficulty urinating and urgency.   Musculoskeletal:  Positive for arthralgias. Negative for back pain, gait problem, joint swelling, myalgias, neck pain and neck stiffness.   Skin:  Negative for color change and rash.   Allergic/Immunologic: Negative for environmental allergies and immunocompromised state.   Neurological:  Positive for seizures and headaches. Negative for dizziness, tremors, syncope, facial asymmetry, speech difficulty, weakness, light-headedness and numbness.   Hematological:  Negative for adenopathy. Does not bruise/bleed easily.   Psychiatric/Behavioral:  Negative for agitation, behavioral problems, confusion, decreased concentration, dysphoric mood, hallucinations, self-injury, sleep disturbance and suicidal ideas. The patient is not hyperactive.            Current Outpatient Medications:     cloBAZam (ONFI) 2.5 mg/mL Susp,  Take 4 mLs (10 mg total) by mouth 2 (two) times daily., Disp: 360 mL, Rfl: 5    clopidogreL (PLAVIX) 75 mg tablet, Take 1 tablet (75 mg total) by mouth once daily., Disp: 90 tablet, Rfl: 3    estradioL (ESTRACE) 2 MG tablet, Take 1 tablet (2 mg total) by mouth once daily., Disp: 30 tablet, Rfl: 12    ezetimibe (ZETIA) 10 mg tablet, Take 10 mg by mouth once daily., Disp: , Rfl:     fluconazole (DIFLUCAN) 200 MG Tab, Take 200 mg by mouth every 7 days., Disp: , Rfl:     isosorbide mononitrate (IMDUR) 60 MG 24 hr tablet, TAKE 1 TABLET BY MOUTH EVERY 12 HOURS, Disp: 90 tablet, Rfl: 0    levocetirizine (XYZAL) 5 MG tablet, Take 1 tablet (5 mg total) by mouth every evening., Disp: 90 tablet, Rfl: 3    levothyroxine (SYNTHROID) 100 MCG tablet, Take 1 tablet (100 mcg total) by mouth before breakfast., Disp: 90 tablet, Rfl: 3    metoprolol succinate (TOPROL-XL) 50 MG 24 hr tablet, Take 1 tablet (50 mg total) by mouth once daily., Disp: 90 tablet, Rfl: 1    NAYZILAM 5 mg/spray (0.1 mL) Spry, INSTILL 1 SPRAY IN NASAL ROUTE ONCE AS NEEDED. (1 SPRAY FOR PROLONGED SEIZURE OR CLUSTERS OF SEIZURES. MAY REPEAT ONCE IN OPPOSITE NOSTRIL AFTER 10 MINUTES.), Disp: , Rfl:     pantoprazole (PROTONIX) 40 MG tablet, Take 1 tablet (40 mg total) by mouth 2 (two) times daily., Disp: 180 tablet, Rfl: 0    phenytoin (DILANTIN) 100 MG ER capsule, Take 2 capsules by mouth twice daily, Disp: 360 capsule, Rfl: 3    phenytoin (DILANTIN) 30 MG ER capsule, Take 1 capsule (30 mg total) by mouth every evening. (Patient taking differently: Take 130 mg by mouth every evening.), Disp: 90 capsule, Rfl: 3    potassium chloride (MICRO-K) 10 MEQ CpSR, Take 10 mEq by mouth once., Disp: , Rfl:     ranolazine (RANEXA) 1,000 mg Tb12, Take 1,000 mg by mouth 2 (two) times daily., Disp: , Rfl:     REPATHA SURECLICK 140 mg/mL PnIj, SMARTSI Milligram(s) SUB-Q Every 2 Weeks, Disp: 2 each, Rfl: 11    solifenacin (VESICARE) 10 MG tablet, Take 1 tablet by mouth once  daily, Disp: 30 tablet, Rfl: 0    zonisamide (ZONEGRAN) 100 MG Cap, Take 3 capsules (300 mg total) by mouth 2 (two) times daily., Disp: 540 capsule, Rfl: 3    amitriptyline (ELAVIL) 10 MG tablet, Take 1 tablet (10 mg total) by mouth every evening., Disp: 93 tablet, Rfl: 3  Past Medical History:   Diagnosis Date    Blood clotting tendency     Coronary artery disease     Fever blister     HLD (hyperlipidemia)     Hypertension     Hypothyroidism 2020    Renal cyst 2021    Seizures     Sleep apnea     Temporal lobe epilepsy 2016     Past Surgical History:   Procedure Laterality Date    ABDOMINAL SURGERY      BACK SURGERY      BREAST BIOPSY Left     benign    CARDIAC SURGERY      ESOPHAGEAL MANOMETRY WITH MEASUREMENT OF IMPEDANCE N/A 2021    Procedure: MANOMETRY, ESOPHAGUS, WITH IMPEDANCE MEASUREMENT;  Surgeon: Sumanth Curran RN;  [facility];  Service: Endoscopy;  Laterality: N/A;    FRACTURE SURGERY      HYSTERECTOMY      LEFT HEART CATHETERIZATION Left 2022    Procedure: CATHETERIZATION, HEART, LEFT;  Surgeon: Richie Butt MD;  Location: [facility] CATH LAB;  Service: Cardiology;  Laterality: Left;    OOPHORECTOMY      TONSILLECTOMY      VASCULAR SURGERY       Social History     Socioeconomic History    Marital status:    Tobacco Use    Smoking status: Former     Packs/day: 1.00     Years: 33.00     Pack years: 33.00     Types: Cigarettes     Start date:      Quit date: 2014     Years since quittin.4     Passive exposure: Past    Smokeless tobacco: Never   Substance and Sexual Activity    Alcohol use: No    Drug use: No    Sexual activity: Not Currently     Partners: Male             Past/Current Medical/Surgical History, Past/Current Social History, Past/Current Family History and Past/Current Medications were reviewed in detail.        Objective:           VITAL SIGNS WERE REVIEWED      GENERAL APPEARANCE:     The patient looks fatigued.     BMI 35.40    No signs  of respiratory distress.    Normal breathing pattern.    No dysmorphic features    Normal eye contact.     GENERAL MEDICAL EXAM:    HEENT:  Head is atraumatic normocephalic.     Neck and Axillae: No JVD. No visible lesions    Cardiopulmonary: No cyanosis. No tachypnea. Normal respiratory effort.VNS in place.    Gastrointestinal/Urogenital:  No jaundice. No stomas or lesions. No visible hernias. No catheters.     Skin, Hair and Nails: No pathognonomic skin rash. No neurofibromatosis. No visible lesions.No stigmata of autoimmune disease. No clubbing.    Limbs: No varicose veins. No visible swelling.    Muskoskeletal: No visible deformities.No visible lesions.               Neurologic Exam     Mental Status   Oriented to person, place, and time.   Follows 3 step commands.   Attention: normal. Concentration: normal.   Speech: speech is normal   Level of consciousness: alert  Knowledge: good.   Able to name object. Able to read. Able to repeat. Normal comprehension.     Cranial Nerves   Cranial nerves II through XII intact.     CN II   Visual fields full to confrontation.   Visual acuity: normal with correction  Right visual field deficit: none  Left visual field deficit: none     CN III, IV, VI   Pupils are equal, round, and reactive to light.  Extraocular motions are normal.   Right pupil: Size: 2 mm. Shape: regular. Reactivity: brisk. Consensual response: intact. Accommodation: intact.   Left pupil: Size: 2 mm. Shape: regular. Reactivity: brisk. Consensual response: intact. Accommodation: intact.   CN III: no CN III palsy  CN VI: no CN VI palsy  Nystagmus: none   Diplopia: none  Ophthalmoparesis: none  Upgaze: normal  Downgaze: normal  Conjugate gaze: present  Vestibulo-ocular reflex: present    CN V   Facial sensation intact.   Right facial sensation deficit: none  Left facial sensation deficit: none    CN VII   Facial expression full, symmetric.   Right facial weakness: none  Left facial weakness: none    CN VIII    CN VIII normal.   Hearing: intact  Right Rinne: AC > BC  Left Rinne: AC > BC  Mendes: does not lateralize     CN IX, X   CN IX normal.   CN X normal.   Palate: symmetric    CN XI   CN XI normal.   Right sternocleidomastoid strength: normal  Left sternocleidomastoid strength: normal  Right trapezius strength: normal  Left trapezius strength: normal    CN XII   CN XII normal.   Tongue: not atrophic  Fasciculations: absent  Tongue deviation: none    Motor Exam   Muscle bulk: normal  Overall muscle tone: normal  Right arm tone: normal  Left arm tone: normal  Right arm pronator drift: absent  Left arm pronator drift: absent  Right leg tone: normal  Left leg tone: normal    Strength   Strength 5/5 throughout.   Right neck flexion: 5/5  Left neck flexion: 5/5  Right neck extension: 5/5  Left neck extension: 5/5  Right deltoid: 5/5  Left deltoid: 5/5  Right biceps: 5/5  Left biceps: 5/5  Right triceps: 5/5  Left triceps: 5/5  Right wrist flexion: 5/5  Left wrist flexion: 5/5  Right wrist extension: 5/5  Left wrist extension: 5/5  Right interossei: 5/5  Left interossei: 5/5  Right iliopsoas: 5/5  Left iliopsoas: 5/5  Right quadriceps: 5/5  Left quadriceps: 5/5  Right hamstrin/5  Left hamstrin/5  Right glutei: 5/5  Left glutei: 5/5  Right anterior tibial: 5/5  Left anterior tibial: 5/5  Right posterior tibial: 5/5  Left posterior tibial: 5/5  Right peroneal: 5/5  Left peroneal: 5/5  Right gastroc: 5/5  Left gastroc: 5/5    Sensory Exam   Light touch normal.   Right arm light touch: normal  Left arm light touch: normal  Right leg light touch: normal  Left leg light touch: normal  Vibration normal.   Right arm vibration: normal  Left arm vibration: normal  Right leg vibration: normal  Left leg vibration: normal  Proprioception normal.   Right arm proprioception: normal  Left arm proprioception: normal  Right leg proprioception: normal  Left leg proprioception: normal  Pinprick normal.   Right arm pinprick: normal  Left  arm pinprick: normal  Right leg pinprick: normal  Left leg pinprick: normal  Graphesthesia: normal  Stereognosis: normal    Gait, Coordination, and Reflexes     Gait  Gait: normal    Coordination   Romberg: negative  Finger to nose coordination: normal  Heel to shin coordination: normal  Tandem walking coordination: normal    Tremor   Resting tremor: absent  Intention tremor: absent  Action tremor: absent    Reflexes   Right brachioradialis: 2+  Left brachioradialis: 2+  Right biceps: 2+  Left biceps: 2+  Right triceps: 2+  Left triceps: 2+  Right patellar: 2+  Left patellar: 2+  Right achilles: 2+  Left achilles: 2+  Right plantar: normal  Left plantar: normal  Right Marie: absent  Left Marie: absent  Right ankle clonus: absent  Left ankle clonus: absent  Right pendular knee jerk: absent  Left pendular knee jerk: absent    Lab Results   Component Value Date    WBC 3.57 (L) 09/20/2022    HGB 12.4 09/20/2022    HCT 37.7 09/20/2022    MCV 99 (H) 09/20/2022     09/20/2022     Sodium   Date Value Ref Range Status   06/29/2022 142 136 - 145 mmol/L Final     Potassium   Date Value Ref Range Status   06/29/2022 4.5 3.5 - 5.1 mmol/L Final     Chloride   Date Value Ref Range Status   06/29/2022 112 (H) 95 - 110 mmol/L Final     CO2   Date Value Ref Range Status   06/29/2022 21 (L) 23 - 29 mmol/L Final     Glucose   Date Value Ref Range Status   06/29/2022 117 (H) 70 - 110 mg/dL Final     BUN   Date Value Ref Range Status   06/29/2022 9 8 - 23 mg/dL Final     Creatinine   Date Value Ref Range Status   06/29/2022 0.9 0.5 - 1.4 mg/dL Final     Calcium   Date Value Ref Range Status   06/29/2022 8.9 8.7 - 10.5 mg/dL Final     Total Protein   Date Value Ref Range Status   06/29/2022 7.2 6.0 - 8.4 g/dL Final     Albumin   Date Value Ref Range Status   06/29/2022 3.8 3.5 - 5.2 g/dL Final     Total Bilirubin   Date Value Ref Range Status   06/29/2022 0.3 0.1 - 1.0 mg/dL Final     Comment:     For infants and newborns,  interpretation of results should be based  on gestational age, weight and in agreement with clinical  observations.    Premature Infant recommended reference ranges:  Up to 24 hours.............<8.0 mg/dL  Up to 48 hours............<12.0 mg/dL  3-5 days..................<15.0 mg/dL  6-29 days.................<15.0 mg/dL       Alkaline Phosphatase   Date Value Ref Range Status   06/29/2022 145 (H) 55 - 135 U/L Final     AST   Date Value Ref Range Status   06/29/2022 22 10 - 40 U/L Final     ALT   Date Value Ref Range Status   06/29/2022 27 10 - 44 U/L Final     Anion Gap   Date Value Ref Range Status   06/29/2022 9 8 - 16 mmol/L Final     eGFR if    Date Value Ref Range Status   06/29/2022 >60 >60 mL/min/1.73 m^2 Final     eGFR if non    Date Value Ref Range Status   06/29/2022 >60 >60 mL/min/1.73 m^2 Final     Comment:     Calculation used to obtain the estimated glomerular filtration  rate (eGFR) is the CKD-EPI equation.        No results found for: [id number]  Lab Results   Component Value Date    TSH 0.606 10/12/2022    FREET4 0.76 10/12/2022           [id number]    Brain MRI Unremarkable    EEG TL Seizures  B/L       [facility] EMU     3 RT TL Seizures       04-    CNP Completed       AED LEVELS     AED: PHT NORMAL LEVEL RANGE: 10-20   DATE LEVEL   09- 12.9   10- 15.0   01- 06.3   05- 01.1   05- (200 mg BID) 14.0   07- 11.9   01- 11.0   05- 8.3   12- 17.5       AED: ZNS NORMAL LEVEL RANGE: 10-40   DATE LEVEL   09- 13   07- 37   01- 39   05- 22   12- 29           AED: CLB NORMAL LEVEL RANGE:    DATE LEVEL   10- 176   07- 193   01- 139   05- 55.7                 Reviewed the neuroimaging independently       Assessment:       1. Multiple neurological symptoms    2. Fatigue, unspecified type    3. Unspecified inflammatory spondylopathy, lumbar  region    4. Urge incontinence    5. Anxiety about health    6. BMI 35.0-35.9,adult    7. Urinary frequency    8. Nocturia    9. Lumbar paraspinal muscle spasm    10. Renal cyst    11. Stress incontinence    12. Frequent UTI    13. Unstable angina pectoris    14. Angioedema, initial encounter    15. Anemia, unspecified type    16. Hx of heart artery stent    17. Hypothyroidism, unspecified type    18. Refractory epilepsy    19. Partial idiopathic epilepsy with seizures of localized onset, intractable, without status epilepticus    20. Partial symptomatic epilepsy with complex partial seizures, intractable, without status epilepticus    21. Temporal lobe epilepsy    22. Primary hypertension    23. Coronary artery disease involving native coronary artery of native heart without angina pectoris    24. Pure hypercholesterolemia    25. Occipital neuralgia, unspecified laterality          EPILEPSY CLASSIFICATION      SEMIOLOGY: DIALEPTIC (FOCAL WITH HILARY) WITH ICTAL SPITTING -->GTC     EPILEPTOGENIC ZONE (S):  RT TL     ETIOLOGY: UNKNOWN     PRIOR AEDS: VPA, LTG, OXC, LEV    CURRENT AEDS: ZNS, PHT, CLB, (GBP LBP)    LAST SEIZURE DATE:  MONTHLY       COMPREHENSIVE LIST OF AEDs:     Acetazolamide (AZM-Diamox)   Benzos: clonazepam (CZP Klonopin), lorazepam (LZP-Ativan), diazepam (DZP-Valium), clorazepate (CLZ- Tranxene)  Brivaracetam (BRV-Briviact)  Cannabidiol (CBD- Epidiolex)  Carbamazepine (CBZ-Tegretol)  Cenobamate (CNB-Xcopri)  Clobazam (CLB-Onfi)  Eslicarbazepine (ESL-Aptiom)  Ethosuximide (ESX-Zarontin)  Felbamate (FBM-Felbatol)  Gabapentin (GBP-Neurontin)  Lacosamide (LCM-Vimpat)  Lamotrigine (LTG-Lamitcal)  Levetiracetam (LEV- Keppra)  Oxcarbazepine (OXC-Trileptal)  Perampanel (PML-Fycompa)  Phenobarbital (PB)  Phenytoin (PHT-Dilantin)  Pregabalin (PGB-Lyrica)  Primidone (PRM)   Retigabine (RTG- Potiga) Discontinued in 2017  Rufinamide (RFN-Benzil)  Stiripentol (STP-Diacomit)  Tiagabine (TGB-Gabitril)  Topiramate  (TPM-Topamax)  Valproate (VPA-Depakote)  Vigabatrin (VGB-Sabril)  Zonisamide (ZNS-Zonegran)        Plan:             INTRACTABLE-MEDICALLY REFRACTORY NON-LESIONAL RT TLE UNDERGOING SURGICAL EVALUATION              Reconnect with [facility] to conclude the surgical treatment.      The patient was encouraged to maintain full traditional seizure precautions which include but not limited to avoid driving, avoid high altitudes, avoid being close to fire or fire source, avoid being close to a body of water or swimming alone, avoid operating heavy machinery and avoid using sharp objects if possible. The patient was encouraged to shower (without accumulation of water) instead of taking a bath if unsupervised. The patient was made aware that these precautions are especially important during concurrent illness. Adequate sleep and avoidance of alcohol as important measures to assure good seizure control were discussed with the patient. The patient was also advised not to care for children without company. The patient was advised to pad the side rails with pillows and blankets if applicable.I strongly recommended lowering the bed to the floor level to decrease the risk of falls.     Continue  mg BID.      Continue  mg BID.      Continue CLB 10 mg BID (4 mL BID).     Continue Nayzilam (NS Midazolam) for prolonged and/or clusters of seizures.     Continue AEDs INDEFINITELY, FOR GOOD AND NEVER SKIP A DOSE. The patient verbalized full understanding. Stressed the extreme medical, personal safety, public safety and legal importance of compliance and seizure control. Will give as many refills as possible with or without face-to-face evaluation to make sure the patient and any patient with epilepsy will never run out of medications. Running out of seizure medications should never happen under our care. I explained to the patient that he should not, under any circumstances, adjust or stop taking his seizure medication without  discussing with me. The patient verbalized full understanding.         AVOID any substance that could lower seizure threshold including but not limited to:      ALCOHOL AND WITHDRAWAL      TRAMADOL.     DEMEROL      ALL STIMULANTS-ALL ADHD MEDICATIONS.      CLOZAPINE.      BUPROPION.     CIPROFLOXACIN     CYCLOSPORIN    KRATOM     METCHOLRRAMAIDE    TETRAHYDROCANNABINOL            OCCIPITAL NEURALGIA WITH CERVICAL DDD       Try Amitriptyline/Elavil. Will titrate very slowly to 10 mg QHS which can cause sleepiness, dry eyes, dry mouth, urinary retention and rarely cardiac arrhythmias. The patient verbalized understanding of the most common SEs and was encouraged to read the leaflet for more details.     Heating pads PRN    Improve posture    Avoid heavy lifting or sudden spine movements     Will discuss PT and/or Pain Management            MEDICAL/SURGICAL COMORBIDITIES     All relevant medical comorbidities noted and managed by primary care physician and medical care team.          MISCELLANEOUS MEDICAL PROBLEMS       HEALTHY LIFESTYLE AND PREVENTATIVE CARE    Encouraged the patient to adhere to the age-appropriate health maintenance guidelines including screening tests and vaccinations.     Discussed the overall importance of healthy lifestyle, optimal weight, exercise, healthy diet, good sleep hygiene and avoiding drugs including smoking, alcohol and recreational drugs. The patient verbalized full understanding.           RTC in 3 months           Payton Estevez MD, FAAN    Attending Neurologist/Epileptologist         Diplomate, American Board of Psychiatry and Neurology    Diplomate, American Board of Clinical Neurophysiology     Fellow, American Academy of Neurology             I spent a total of 79 minutes on the day of the visit.  This includes face to face time and non-face to face time preparing to see the patient (eg, review of tests), obtaining and/or reviewing separately obtained history, documenting clinical  information in the electronic or other health record, independently interpreting results and communicating results to the patient/family/caregiver, or care coordinator.

## 2023-01-03 ENCOUNTER — TELEPHONE (OUTPATIENT)
Dept: ADMINISTRATIVE | Facility: HOSPITAL | Age: 63
End: 2023-01-03
Payer: MEDICARE

## 2023-01-11 ENCOUNTER — TELEPHONE (OUTPATIENT)
Dept: INTERNAL MEDICINE | Facility: CLINIC | Age: 63
End: 2023-01-11
Payer: MEDICARE

## 2023-01-11 ENCOUNTER — TELEPHONE (OUTPATIENT)
Dept: NEUROLOGY | Facility: CLINIC | Age: 63
End: 2023-01-11
Payer: MEDICARE

## 2023-01-11 NOTE — TELEPHONE ENCOUNTER
P pt is trying to find out what's going on with her medication .    because it call her to jerk and be out her mind ,. She have gotten calls from ppl tell her that she have called telling her things she is unaware of.    She is now taking 200 bid   Clobazam 3.5  Zonisamide 3 bid      She is taking this as a diet supplement [employer]

## 2023-01-11 NOTE — TELEPHONE ENCOUNTER
----- Message from Delmy Noguera sent at 1/11/2023  3:40 PM CST -----  Contact: chela  Patient is calling to speak with the nurse regarding medication . Reports needing to discuss the medication cloBAZam (ONFI) 2.5 mg/mL Susp. And stats having jerks. Please give the patient a call back at .[phone]   Thanks francisco javier

## 2023-02-01 ENCOUNTER — TELEPHONE (OUTPATIENT)
Dept: NEUROLOGY | Facility: CLINIC | Age: 63
End: 2023-02-01
Payer: MEDICARE

## 2023-02-01 NOTE — TELEPHONE ENCOUNTER
Attempted to contact pt in regards to finding out where she would like her referral to OT.PT sent to. Left a VM to contact office.

## 2023-02-08 ENCOUNTER — OFFICE VISIT (OUTPATIENT)
Dept: INTERNAL MEDICINE | Facility: CLINIC | Age: 63
End: 2023-02-08
Payer: MEDICARE

## 2023-02-08 VITALS
SYSTOLIC BLOOD PRESSURE: 120 MMHG | BODY MASS INDEX: 35.33 KG/M2 | TEMPERATURE: 97 F | RESPIRATION RATE: 18 BRPM | HEART RATE: 79 BPM | OXYGEN SATURATION: 99 % | DIASTOLIC BLOOD PRESSURE: 78 MMHG | HEIGHT: 65 IN | WEIGHT: 212.06 LBS

## 2023-02-08 DIAGNOSIS — G40.109 TEMPORAL LOBE EPILEPSY: ICD-10-CM

## 2023-02-08 DIAGNOSIS — I10 PRIMARY HYPERTENSION: ICD-10-CM

## 2023-02-08 DIAGNOSIS — E66.01 SEVERE OBESITY (BMI 35.0-39.9) WITH COMORBIDITY: Primary | ICD-10-CM

## 2023-02-08 PROCEDURE — 99999 PR PBB SHADOW E&M-EST. PATIENT-LVL V: CPT | Mod: PBBFAC,,,

## 2023-02-08 PROCEDURE — 99213 OFFICE O/P EST LOW 20 MIN: CPT | Mod: S$PBB,,,

## 2023-02-08 PROCEDURE — 99999 PR PBB SHADOW E&M-EST. PATIENT-LVL V: ICD-10-PCS | Mod: PBBFAC,,,

## 2023-02-08 PROCEDURE — 99213 PR OFFICE/OUTPT VISIT, EST, LEVL III, 20-29 MIN: ICD-10-PCS | Mod: S$PBB,,,

## 2023-02-08 PROCEDURE — 99215 OFFICE O/P EST HI 40 MIN: CPT | Mod: PBBFAC

## 2023-02-08 NOTE — Clinical Note
Patient had an appt today for Pre-Op Clearance. We were unable to complete the appt today because clearance has to be within 30 days of surgery. Has history of epilepsy. Does she needs clearance from neuro as well?

## 2023-02-08 NOTE — PROGRESS NOTES
Evelyne Francisco  02/08/2023  [id number]    Sergio Malcolm MD  Patient Care Team:  Sergio Malcolm MD as PCP - General (Family Medicine)  Karthik Smith MD as Consulting Physician (Neurology)          Visit Type:a scheduled routine follow-up visit    Chief Complaint:  Chief Complaint   Patient presents with    Pre-op Exam       History of Present Illness:    62 year old female presents today for Pre-Op Clearance  Patient will undergo a right knee arthroscopy with Dr. 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     Past Surgical History:   Procedure Laterality Date    ABDOMINAL SURGERY      BACK SURGERY      BREAST BIOPSY Left     benign    CARDIAC SURGERY      ESOPHAGEAL MANOMETRY WITH MEASUREMENT OF IMPEDANCE N/A 2/23/2021    Procedure: MANOMETRY, ESOPHAGUS, WITH IMPEDANCE MEASUREMENT;  Surgeon: Sumanth Curran RN;  Location: [facility];  Service: Endoscopy;  Laterality: N/A;    FRACTURE SURGERY      HYSTERECTOMY      LEFT HEART CATHETERIZATION Left 4/26/2022    Procedure: CATHETERIZATION, HEART, LEFT;  Surgeon: Richie Butt MD;  Location: [facility] CATH LAB;  Service: Cardiology;  Laterality: Left;    OOPHORECTOMY      TONSILLECTOMY      VASCULAR SURGERY       Family History   Problem Relation Age of Onset    Hypertension Mother     Hyperlipidemia Mother     Stroke Father     Seizures Father     Hypertension Sister     Cancer Brother     Marfan syndrome Daughter      Social History     Socioeconomic History    Marital status:    Tobacco Use    Smoking status: Former     Packs/day: 1.00     Years: 33.00     Pack years: 33.00     Types: Cigarettes     Start date: 1981     Quit date: 6/30/2014      Years since quittin.6     Passive exposure: Past    Smokeless tobacco: Never   Substance and Sexual Activity    Alcohol use: No    Drug use: No    Sexual activity: Not Currently     Partners: Male     Patient Active Problem List   Diagnosis    Hypertension    HLD (hyperlipidemia)    Coronary artery disease involving native coronary artery without angina pectoris    Primary hypertension    Temporal lobe epilepsy    Intractable epilepsy    Partial idiopathic epilepsy with seizures of localized onset, intractable, without status epilepticus    Refractory epilepsy    Hypothyroidism    Hx of heart artery stent    Anemia    Angio-edema    Unstable angina pectoris    Frequent UTI    Stress incontinence    Renal cyst    Lumbar paraspinal muscle spasm    Nocturia    Urinary frequency    BMI 35.0-35.9,adult    Anxiety about health    Urge incontinence    Unspecified inflammatory spondylopathy, lumbar region    Fatigue    Occipital neuralgia    Multiple neurological symptoms     Review of patient's allergies indicates:   Allergen Reactions    Latex, natural rubber Hives, Shortness Of Breath and Swelling     Throat swelling  Throat swelling  Other reaction(s): Hives  Throat swelling    Ace inhibitors Other (See Comments)     Coughing  Other reaction(s): Unknown    Diclo gel [diclofenac sodium] Other (See Comments)     Chest pain       The following were reviewed at this visit: active problem list, medication list, allergies, family history, social history, and health maintenance.    Medications:  Current Outpatient Medications on File Prior to Visit   Medication Sig Dispense Refill    cloBAZam (ONFI) 2.5 mg/mL Susp Take 4 mLs (10 mg total) by mouth 2 (two) times daily. 360 mL 5    clopidogreL (PLAVIX) 75 mg tablet Take 1 tablet (75 mg total) by mouth once daily. 90 tablet 3    estradioL (ESTRACE) 2 MG tablet Take 1 tablet (2 mg total) by mouth once daily. 30 tablet 12    ezetimibe (ZETIA) 10 mg tablet Take 10 mg by mouth  once daily.      fluconazole (DIFLUCAN) 200 MG Tab Take 200 mg by mouth every 7 days.      isosorbide mononitrate (IMDUR) 60 MG 24 hr tablet TAKE 1 TABLET BY MOUTH EVERY 12 HOURS 90 tablet 0    levocetirizine (XYZAL) 5 MG tablet Take 1 tablet (5 mg total) by mouth every evening. 90 tablet 3    levothyroxine (SYNTHROID) 100 MCG tablet Take 1 tablet (100 mcg total) by mouth before breakfast. 90 tablet 3    metoprolol succinate (TOPROL-XL) 50 MG 24 hr tablet Take 1 tablet (50 mg total) by mouth once daily. 90 tablet 1    NAYZILAM 5 mg/spray (0.1 mL) [place] INSTILL 1 SPRAY IN NASAL ROUTE ONCE AS NEEDED. (1 SPRAY FOR PROLONGED SEIZURE OR CLUSTERS OF SEIZURES. MAY REPEAT ONCE IN OPPOSITE NOSTRIL AFTER 10 MINUTES.)      pantoprazole (PROTONIX) 40 MG tablet Take 1 tablet (40 mg total) by mouth 2 (two) times daily. 180 tablet 0    phenytoin (DILANTIN) 100 MG ER capsule Take 2 capsules by mouth twice daily 360 capsule 3    phenytoin (DILANTIN) 30 MG ER capsule Take 1 capsule (30 mg total) by mouth every evening. (Patient taking differently: Take 130 mg by mouth every evening.) 90 capsule 3    potassium chloride (MICRO-K) 10 MEQ CpSR Take 10 mEq by mouth once.      ranolazine (RANEXA) 1,000 mg Tb12 Take 1,000 mg by mouth 2 (two) times daily.      REPATHA SURECLICK 140 mg/mL PnIj SMARTSI Milligram(s) SUB-Q Every 2 Weeks 2 each 11    solifenacin (VESICARE) 10 MG tablet Take 1 tablet by mouth once daily 30 tablet 0    zonisamide (ZONEGRAN) 100 MG Cap Take 3 capsules (300 mg total) by mouth 2 (two) times daily. 540 capsule 3    amitriptyline (ELAVIL) 10 MG tablet Take 1 tablet (10 mg total) by mouth every evening. (Patient not taking: Reported on 2023) 93 tablet 3     No current facility-administered medications on file prior to visit.       Medications have been reviewed and reconciled with patient at this visit.  Barriers to medications reviewed with patient.    Adverse reactions to current medications reviewed with  patient..    Over the counter medications reviewed and reconciled with patient.    Exam:  Wt Readings from Last 3 Encounters:   02/08/23 96.2 kg (212 lb 1.3 oz)   12/15/22 96.5 kg (212 lb 11.9 oz)   10/20/22 95.5 kg (210 lb 8.6 oz)     Temp Readings from Last 3 Encounters:   02/08/23 96.9 °F (36.1 °C) (Tympanic)   10/12/22 97 °F (36.1 °C)   06/29/22 98.1 °F (36.7 °C) (Oral)     BP Readings from Last 3 Encounters:   02/08/23 120/78   12/15/22 104/72   10/20/22 118/78     Pulse Readings from Last 3 Encounters:   02/08/23 79   12/15/22 81   10/12/22 78     Body mass index is 35.29 kg/m².      Review of Systems   Respiratory:  Negative for cough and shortness of breath.    Cardiovascular:  Negative for chest pain and palpitations.   Musculoskeletal:  Positive for joint pain. Negative for falls.   Physical Exam  Vitals and nursing note reviewed.   Constitutional:       General: She is not in acute distress.     Appearance: She is well-developed. She is obese. She is not diaphoretic.   HENT:      Head: Normocephalic and atraumatic.      Right Ear: External ear normal.      Left Ear: External ear normal.   Eyes:      General:         Right eye: No discharge.         Left eye: No discharge.      Conjunctiva/sclera: Conjunctivae normal.      Pupils: Pupils are equal, round, and reactive to light.   Cardiovascular:      Rate and Rhythm: Normal rate and regular rhythm.      Heart sounds: Normal heart sounds. No murmur heard.  Pulmonary:      Effort: Pulmonary effort is normal. No respiratory distress.      Breath sounds: Normal breath sounds. No wheezing.   Musculoskeletal:         General: Tenderness present.   Neurological:      Mental Status: She is alert and oriented to person, place, and time.   Psychiatric:         Behavior: Behavior normal.         Thought Content: Thought content normal.         Judgment: Judgment normal.       Laboratory Reviewed ({Yes)  Lab Results   Component Value Date    WBC 3.57 (L) 09/20/2022     HGB 12.4 09/20/2022    HCT 37.7 09/20/2022     09/20/2022    CHOL 171 10/14/2021    TRIG 92 10/14/2021    HDL 84 (H) 10/14/2021    ALT 27 06/29/2022    AST 22 06/29/2022     06/29/2022    K 4.5 06/29/2022     (H) 06/29/2022    CREATININE 0.9 06/29/2022    BUN 9 06/29/2022    CO2 21 (L) 06/29/2022    TSH 0.606 10/12/2022    INR 1.0 04/08/2022         Evelyne was seen today for pre-op exam.    Diagnoses and all orders for this visit:    Severe obesity (BMI 35.0-39.9) with comorbidity  Encouraged healthy diet and exercise as tolerated to help bring BMI into normal range.      Primary hypertension  At visit, Blood pressure is at goal. Continue current medications      Temporal lobe epilepsy      After speaking with Dr. Chris' nurse will reschedule Pre-Op Clearance visit to a later date  Her surgery date has not been scheduled  Will call to schedule Pre-Op Clearance at later date     Care Plan/Goals: Reviewed    Goals    None         Follow up: No follow-ups on file.    After visit summary was printed and given to patient upon discharge today.  Patient goals and care plan are included in After Visit Summary.

## 2023-02-24 ENCOUNTER — HOSPITAL ENCOUNTER (EMERGENCY)
Facility: HOSPITAL | Age: 63
Discharge: LEFT BEFORE DISCHARGE | End: 2023-02-24
Payer: MEDICARE

## 2023-02-24 VITALS
RESPIRATION RATE: 16 BRPM | SYSTOLIC BLOOD PRESSURE: 150 MMHG | OXYGEN SATURATION: 100 % | WEIGHT: 212 LBS | HEART RATE: 90 BPM | DIASTOLIC BLOOD PRESSURE: 79 MMHG | TEMPERATURE: 98 F | BODY MASS INDEX: 35.27 KG/M2

## 2023-02-24 PROCEDURE — 99281 EMR DPT VST MAYX REQ PHY/QHP: CPT

## 2023-02-24 NOTE — FIRST PROVIDER EVALUATION
Medical screening examination initiated.  I have conducted a focused provider triage encounter, findings are as follows:    Brief history of present illness:  reports acetone exposure to right eye    Vitals:    02/24/23 1252   BP: (!) 150/79   BP Location: Right arm   Patient Position: Sitting   Pulse: 90   Resp: 16   Temp: 98 °F (36.7 °C)   TempSrc: Oral   SpO2: 100%   Weight: 96.2 kg (211 lb 15.6 oz)       Pertinent physical exam:  nad    Brief workup plan:  eye irrigation. Poison control consult     Preliminary workup initiated; this workup will be continued and followed by the physician or advanced practice provider that is assigned to the patient when roomed.

## 2023-02-27 ENCOUNTER — TELEPHONE (OUTPATIENT)
Dept: DERMATOLOGY | Facility: CLINIC | Age: 63
End: 2023-02-27

## 2023-02-27 ENCOUNTER — OFFICE VISIT (OUTPATIENT)
Dept: DERMATOLOGY | Facility: CLINIC | Age: 63
End: 2023-02-27
Payer: MEDICARE

## 2023-02-27 DIAGNOSIS — R23.8 SCALP IRRITATION: Primary | ICD-10-CM

## 2023-02-27 DIAGNOSIS — L67.9 HAIR CHANGES: ICD-10-CM

## 2023-02-27 PROCEDURE — 99999 PR PBB SHADOW E&M-EST. PATIENT-LVL II: ICD-10-PCS | Mod: PBBFAC,,, | Performed by: DERMATOLOGY

## 2023-02-27 PROCEDURE — 99999 PR PBB SHADOW E&M-EST. PATIENT-LVL II: CPT | Mod: PBBFAC,,, | Performed by: DERMATOLOGY

## 2023-02-27 PROCEDURE — 99203 PR OFFICE/OUTPT VISIT, NEW, LEVL III, 30-44 MIN: ICD-10-PCS | Mod: S$PBB,,, | Performed by: DERMATOLOGY

## 2023-02-27 PROCEDURE — 99203 OFFICE O/P NEW LOW 30 MIN: CPT | Mod: S$PBB,,, | Performed by: DERMATOLOGY

## 2023-02-27 PROCEDURE — 99212 OFFICE O/P EST SF 10 MIN: CPT | Mod: PBBFAC,PO | Performed by: DERMATOLOGY

## 2023-02-27 RX ORDER — FLUOCINOLONE ACETONIDE 0.1 MG/ML
SOLUTION TOPICAL 2 TIMES DAILY
Qty: 60 ML | Refills: 5 | Status: SHIPPED | OUTPATIENT
Start: 2023-02-27 | End: 2023-02-27

## 2023-02-27 RX ORDER — TRIAMCINOLONE ACETONIDE 1 MG/G
OINTMENT TOPICAL
Qty: 80 G | Refills: 1 | Status: SHIPPED | OUTPATIENT
Start: 2023-02-27

## 2023-02-27 NOTE — PROGRESS NOTES
Subjective:       Patient ID:  Evelyne Francisco is a 62 y.o. female who presents for No chief complaint on file.    History of Present Illness: The patient presents with chief complaint of accidental scalp and face exposure with fingernail polish remover (acentone/Sam soak offgel and nail coating remover). Pt c/o texture of permed hair has changed with unpermed appearance.  Location:  scalp  Duration:  3 days  Signs/Symptoms:  tender/dry    Prior treatments:  went to ER Friday no treatment done        Review of Systems   Constitutional:  Negative for fever and chills.   Gastrointestinal:  Negative for nausea and vomiting.   Skin:  Positive for activity-related sunscreen use. Negative for daily sunscreen use and recent sunburn.   Hematologic/Lymphatic: Does not bruise/bleed easily.      Objective:    Physical Exam   Constitutional: She appears well-developed and well-nourished. No distress.   Neurological: She is alert and oriented to person, place, and time. She is not disoriented.   Psychiatric: She has a normal mood and affect.   Skin:   Areas Examined (abnormalities noted in diagram):   Scalp / Hair Palpated and Inspected  Head / Face Inspection Performed  Neck Inspection Performed  RUE Inspected  LUE Inspection Performed  Nails and Digits Inspection Performed              Assessment / Plan:        Scalp irritation  -     triamcinolone acetonide 0.1% (KENALOG) 0.1 % ointment; AAA bid of scalp 1-2 times daily as needed for irritation  Dispense: 80 g; Refill: 1  Hair changes  -     triamcinolone acetonide 0.1% (KENALOG) 0.1 % ointment; AAA bid of scalp 1-2 times daily as needed for irritation  Dispense: 80 g; Refill: 1  -     will start above med.  Discussed pt should f/u with hairdresser to see if breakage can be prevented given acetone is an organic solvent and will most likely strip moisture from her hair and may trigger breakge. The patient acknowledged understanding.              Follow up in  about 3 months (around 5/27/2023).

## 2023-02-27 NOTE — TELEPHONE ENCOUNTER
----- Message from Jenna Rivas sent at 2/27/2023  3:06 PM CST -----  Contact: [phone]  Patient would like to consult with a nurse in regards to her prescriptions. Please call back at [phone]. Thanks r/s

## 2023-02-27 NOTE — TELEPHONE ENCOUNTER
Spoke to patient regarding her new prescription Kenalog ointment- she had few questions that I answered  and she stated she had some at home already and did not want to get new prescription filled since already has one at home. I instructed her to check the expiration date and also the strength bc those meds do come in different strengths.    ----- Message from Marley Julio sent at 2/27/2023  3:25 PM CST -----  Contact: Evelyne Pendleton is calling in regards to her being confused about triamcinolone acetonide 0.1% (KENALOG) 0.1 % ointment she have 2 prescription.Please call back at [phone]          Thanks  NICOLASA

## 2023-03-06 ENCOUNTER — PATIENT MESSAGE (OUTPATIENT)
Dept: INTERNAL MEDICINE | Facility: CLINIC | Age: 63
End: 2023-03-06
Payer: MEDICARE

## 2023-03-06 ENCOUNTER — TELEPHONE (OUTPATIENT)
Dept: INTERNAL MEDICINE | Facility: CLINIC | Age: 63
End: 2023-03-06
Payer: MEDICARE

## 2023-03-06 NOTE — TELEPHONE ENCOUNTER
Returned call to patient informed how to upload documents to portal. Patient voiced understanding.

## 2023-03-09 ENCOUNTER — PATIENT MESSAGE (OUTPATIENT)
Dept: OBSTETRICS AND GYNECOLOGY | Facility: CLINIC | Age: 63
End: 2023-03-09
Payer: MEDICARE

## 2023-03-09 ENCOUNTER — PATIENT MESSAGE (OUTPATIENT)
Dept: INTERNAL MEDICINE | Facility: CLINIC | Age: 63
End: 2023-03-09
Payer: MEDICARE

## 2023-03-09 ENCOUNTER — PATIENT MESSAGE (OUTPATIENT)
Dept: NEUROLOGY | Facility: CLINIC | Age: 63
End: 2023-03-09
Payer: MEDICARE

## 2023-03-09 ENCOUNTER — TELEPHONE (OUTPATIENT)
Dept: NEUROLOGY | Facility: CLINIC | Age: 63
End: 2023-03-09
Payer: MEDICARE

## 2023-03-09 ENCOUNTER — PATIENT MESSAGE (OUTPATIENT)
Dept: UROLOGY | Facility: CLINIC | Age: 63
End: 2023-03-09
Payer: MEDICARE

## 2023-03-09 DIAGNOSIS — N95.1 MENOPAUSE SYNDROME: ICD-10-CM

## 2023-03-09 DIAGNOSIS — M54.81 OCCIPITAL NEURALGIA, UNSPECIFIED LATERALITY: ICD-10-CM

## 2023-03-09 RX ORDER — PHENYTOIN SODIUM 100 MG/1
200 CAPSULE, EXTENDED RELEASE ORAL 2 TIMES DAILY
Qty: 360 CAPSULE | Refills: 3 | Status: SHIPPED | OUTPATIENT
Start: 2023-03-09 | End: 2024-03-26

## 2023-03-09 RX ORDER — ZONISAMIDE 100 MG/1
300 CAPSULE ORAL 2 TIMES DAILY
Qty: 540 CAPSULE | Refills: 3 | Status: SHIPPED | OUTPATIENT
Start: 2023-03-09 | End: 2024-03-27

## 2023-03-09 RX ORDER — SOLIFENACIN SUCCINATE 10 MG/1
10 TABLET, FILM COATED ORAL DAILY
Qty: 90 TABLET | Refills: 0 | Status: SHIPPED | OUTPATIENT
Start: 2023-03-09 | End: 2023-06-19

## 2023-03-09 RX ORDER — AMITRIPTYLINE HYDROCHLORIDE 10 MG/1
10 TABLET, FILM COATED ORAL NIGHTLY
Qty: 90 TABLET | Refills: 3 | Status: SHIPPED | OUTPATIENT
Start: 2023-03-09 | End: 2024-03-08

## 2023-03-09 RX ORDER — ESTRADIOL 2 MG/1
2 TABLET ORAL DAILY
Qty: 90 TABLET | Refills: 3 | Status: SHIPPED | OUTPATIENT
Start: 2023-03-09

## 2023-03-09 RX ORDER — CLOBAZAM 2.5 MG/ML
SUSPENSION ORAL
Qty: 360 ML | Refills: 0 | Status: SHIPPED | OUTPATIENT
Start: 2023-03-09 | End: 2023-04-11 | Stop reason: SDUPTHER

## 2023-03-09 NOTE — TELEPHONE ENCOUNTER
LOV:15/15/2022 Patient has a new pharmacy plan and would like 90 day supply for each mediation.     New Pharmacy is:   [facility] [place] [id number]

## 2023-03-09 NOTE — TELEPHONE ENCOUNTER
----- Message from Migue Trejo sent at 3/9/2023 11:20 AM CST -----  Contact: Evelyne Stubbs is calling to speak with the nurse in regards to a pharmacy change. Please call her at [phone]

## 2023-03-10 ENCOUNTER — TELEPHONE (OUTPATIENT)
Dept: SPORTS MEDICINE | Facility: CLINIC | Age: 63
End: 2023-03-10
Payer: MEDICARE

## 2023-03-10 NOTE — TELEPHONE ENCOUNTER
I spoke with patient new pharmacy ([facility]). I spoke with bridget Ontiveros and she did confirm that all Ms. Evelyne medications have been sent and received by them. I was calling to make sure we don't have any confusions and also confirmed that os there fax number.

## 2023-03-10 NOTE — TELEPHONE ENCOUNTER
Called patient left message to retun call to discuss further -      Patient needs an appt to be re-evaluate she can schedle once provider returns or w/ provider partners

## 2023-03-10 NOTE — TELEPHONE ENCOUNTER
No new care gaps identified.  [facility] Embedded Care Gaps. Reference number: [id number]. 3/10/2023   4:35:34 PM CST

## 2023-03-13 RX ORDER — METOPROLOL SUCCINATE 50 MG/1
50 TABLET, EXTENDED RELEASE ORAL DAILY
Qty: 90 TABLET | Refills: 1 | Status: SHIPPED | OUTPATIENT
Start: 2023-03-13

## 2023-03-13 RX ORDER — LEVOTHYROXINE SODIUM 100 UG/1
100 TABLET ORAL
Qty: 90 TABLET | Refills: 3 | Status: SHIPPED | OUTPATIENT
Start: 2023-03-13 | End: 2023-04-19 | Stop reason: SDUPTHER

## 2023-03-13 RX ORDER — PANTOPRAZOLE SODIUM 40 MG/1
40 TABLET, DELAYED RELEASE ORAL 2 TIMES DAILY
Qty: 180 TABLET | Refills: 0 | Status: SHIPPED | OUTPATIENT
Start: 2023-03-13 | End: 2023-10-30 | Stop reason: SDUPTHER

## 2023-03-13 RX ORDER — LEVOCETIRIZINE DIHYDROCHLORIDE 5 MG/1
5 TABLET, FILM COATED ORAL NIGHTLY
Qty: 90 TABLET | Refills: 3 | Status: SHIPPED | OUTPATIENT
Start: 2023-03-13 | End: 2023-08-24 | Stop reason: SDUPTHER

## 2023-03-13 NOTE — TELEPHONE ENCOUNTER
Called patient she states she has been experiencing Right knee pain     Patient was offered an f/u appt w/ MD Vu's partner to evaluate symptoms      3/17/2023 8:20 AM Nash Pham MD [place] [facility] [place]     Patient aware and verbalized understanding

## 2023-03-16 ENCOUNTER — OFFICE VISIT (OUTPATIENT)
Dept: NEUROLOGY | Facility: CLINIC | Age: 63
End: 2023-03-16
Payer: MEDICARE

## 2023-03-16 VITALS
WEIGHT: 213.88 LBS | HEIGHT: 65 IN | HEART RATE: 81 BPM | BODY MASS INDEX: 35.63 KG/M2 | DIASTOLIC BLOOD PRESSURE: 80 MMHG | SYSTOLIC BLOOD PRESSURE: 125 MMHG

## 2023-03-16 DIAGNOSIS — G40.109 TEMPORAL LOBE EPILEPSY: Primary | ICD-10-CM

## 2023-03-16 DIAGNOSIS — G40.219 PARTIAL SYMPTOMATIC EPILEPSY WITH COMPLEX PARTIAL SEIZURES, INTRACTABLE, WITHOUT STATUS EPILEPTICUS: ICD-10-CM

## 2023-03-16 PROCEDURE — 99999 PR PBB SHADOW E&M-EST. PATIENT-LVL V: CPT | Mod: PBBFAC,,, | Performed by: NURSE PRACTITIONER

## 2023-03-16 PROCEDURE — 99999 PR PBB SHADOW E&M-EST. PATIENT-LVL V: ICD-10-PCS | Mod: PBBFAC,,, | Performed by: NURSE PRACTITIONER

## 2023-03-16 PROCEDURE — 99215 OFFICE O/P EST HI 40 MIN: CPT | Mod: PBBFAC | Performed by: NURSE PRACTITIONER

## 2023-03-16 PROCEDURE — 99214 OFFICE O/P EST MOD 30 MIN: CPT | Mod: S$PBB,,, | Performed by: NURSE PRACTITIONER

## 2023-03-16 PROCEDURE — 99214 PR OFFICE/OUTPT VISIT, EST, LEVL IV, 30-39 MIN: ICD-10-PCS | Mod: S$PBB,,, | Performed by: NURSE PRACTITIONER

## 2023-03-16 RX ORDER — FLUOCINOLONE ACETONIDE 0.1 MG/ML
SOLUTION TOPICAL 2 TIMES DAILY
COMMUNITY
Start: 2023-03-10 | End: 2023-10-18

## 2023-03-16 NOTE — PROGRESS NOTES
Subjective:       Patient ID: Evelyne Francisco is a 62 y.o. female.    Chief Complaint: Temproral lobe epilepsy           HPI       BACKGROUND HISTORY         The patient is here for seizure evaluation and a 4th opinion.    Saw Dr. Moneg, Dr. Hobbs and Dr. Smith in the past.     The patient started having seizures 11 years ago (2009) at the age of 49 .The patient describes aura of strange feeling in her head that she cannot describe. The patient is amnestic to the seizure after that. The seizures are mostly described as Complex Partial (staring, unresponsiveness, sometimes accompanied by spitting) and some of the them Grand Mal (GTC). The patient is currently not driving and has 2-5 seizure a month. No history of meningitis or encephalitis No toxic exposure or lead poisoning. Her father had posttraumatic family history of epilepsy and her nephew might be having seizures.  No history of strokes or aneurysmal bleeding. No history of TBI. Routine EEGs have been normal. EMU in 2016 captured 2 seizures with B/L TL localization. Brain MRI was reported as unremarkable. Failed VPA, LTG, OXC, LEV and VNS. She was on  mg BID and  mg BID. LCM,  ESL and BRV were too expensive. She said that CLB helped control the seizures but the insurance would not pay for it. Added CLB 20 mg BID.Explained to the patient that failing 2 AEDs typically means that chance of achieving seizure-freedom using AEDs alone is <10% and we should explore other treatments like epilepsy surgery.Could not tolerate CLB 20 mg BID.  Was approved to be seen at [facility].  Titrated PHT to 200 mg BID since levels were very low.  Underwent epilepsy surgery evaluation at [facility] by Dr. Nunez. EMU monitoring in  captured 3 stereotypical seizures originating form RT TL. The plan includes CNP, PET, WADA +/- DARCI. Trying to solve some billing issues [facility]  Helped her with with getting CNP completed locally by   Jus on 04-.Added Nayzilam (NS Midazolam) for prolonged and/or clusters of seizures. Changed CLB from 7.5 mg (3 mL)/10 mg (4ml)  to 10 mg BID (4 mL BID).     Undergoing epilepsy surgery evaluation at [facility] by Dr. Nunez. The plan includes PET, WADA +/- DARCI. Trying to solve some billing issues with [facility].     AED Regimen:  mg BID ,  mg BID and CLB 4 ml (10 mg) BID . On Nayzilam (NS Midazolam) for prolonged and/or clusters of seizures. On  mg BID for pain.       Complains of insomnia and daily occipital headaches that interfere with her ability to rest.     INTERVAL HISTORY     Patient is present for Epilepsy management. Patient reports last seizure event 03-. Patient reports that her seizures are monthly.     AED Regimen:  mg BID ,  mg BID and CLB 4 ml (10 mg) BID. Nayzilam (NS Midazolam) for prolonged and/or clusters of seizures. On  mg BID for pain.      Patient has had severe Right Knee pain, Patient has placed Epilepsy surgery evaluation on hold related to  immense pain with right knee. Patient will see an Dr. Chris, Orthopedic Surgeon at [facility] on Monday.      Patient has ongoing complaint of insomnia and daily occipital headaches that interfere with her ability to rest. Patient has not tried Amitriptyline/Elavil 10 mg QHS. She states she is willing to try therapy.           Review of Systems   Constitutional:  Positive for fatigue. Negative for appetite change.   HENT:  Negative for hearing loss and tinnitus.    Eyes:  Negative for photophobia and visual disturbance.   Respiratory:  Negative for apnea and shortness of breath.    Cardiovascular:  Negative for chest pain and palpitations.   Gastrointestinal:  Negative for nausea and vomiting.   Endocrine: Negative for cold intolerance and heat intolerance.   Genitourinary:  Negative for difficulty urinating and urgency.   Musculoskeletal:  Positive for arthralgias and gait  problem. Negative for back pain, joint swelling, myalgias, neck pain and neck stiffness.        Right knee pain    Skin:  Negative for color change and rash.   Allergic/Immunologic: Negative for environmental allergies and immunocompromised state.   Neurological:  Positive for seizures and headaches. Negative for dizziness, tremors, syncope, facial asymmetry, speech difficulty, weakness, light-headedness and numbness.   Hematological:  Negative for adenopathy. Does not bruise/bleed easily.   Psychiatric/Behavioral:  Negative for agitation, behavioral problems, confusion, decreased concentration, dysphoric mood, hallucinations, self-injury, sleep disturbance and suicidal ideas. The patient is not hyperactive.            Current Outpatient Medications:     amitriptyline (ELAVIL) 10 MG tablet, Take 1 tablet (10 mg total) by mouth every evening., Disp: 90 tablet, Rfl: 3    cloBAZam (ONFI) 2.5 mg/mL Susp, TAKE 4 ML BY MOUTH  TWICE DAILY, Disp: 360 mL, Rfl: 0    clopidogreL (PLAVIX) 75 mg tablet, Take 1 tablet (75 mg total) by mouth once daily., Disp: 90 tablet, Rfl: 3    estradioL (ESTRACE) 2 MG tablet, Take 1 tablet (2 mg total) by mouth once daily., Disp: 90 tablet, Rfl: 3    ezetimibe (ZETIA) 10 mg tablet, Take 10 mg by mouth once daily., Disp: , Rfl:     fluconazole (DIFLUCAN) 200 MG Tab, Take 200 mg by mouth every 7 days., Disp: , Rfl:     fluocinolone (SYNALAR) 0.01 % external solution, Apply topically 2 (two) times daily., Disp: , Rfl:     isosorbide mononitrate (IMDUR) 60 MG 24 hr tablet, TAKE 1 TABLET BY MOUTH EVERY 12 HOURS, Disp: 90 tablet, Rfl: 0    levocetirizine (XYZAL) 5 MG tablet, Take 1 tablet (5 mg total) by mouth every evening., Disp: 90 tablet, Rfl: 3    levothyroxine (SYNTHROID) 100 MCG tablet, Take 1 tablet (100 mcg total) by mouth before breakfast., Disp: 90 tablet, Rfl: 3    metoprolol succinate (TOPROL-XL) 50 MG 24 hr tablet, Take 1 tablet (50 mg total) by mouth once daily., Disp: 90 tablet, Rfl:  1    NAYZILAM 5 mg/spray (0.1 mL) Spry, INSTILL 1 SPRAY IN NASAL ROUTE ONCE AS NEEDED. (1 SPRAY FOR PROLONGED SEIZURE OR CLUSTERS OF SEIZURES. MAY REPEAT ONCE IN OPPOSITE NOSTRIL AFTER 10 MINUTES.), Disp: , Rfl:     pantoprazole (PROTONIX) 40 MG tablet, Take 1 tablet (40 mg total) by mouth 2 (two) times daily., Disp: 180 tablet, Rfl: 0    phenytoin (DILANTIN) 100 MG ER capsule, Take 2 capsules (200 mg total) by mouth 2 (two) times daily., Disp: 360 capsule, Rfl: 3    phenytoin (DILANTIN) 30 MG ER capsule, Take 1 capsule (30 mg total) by mouth every evening., Disp: 90 capsule, Rfl: 3    potassium chloride (MICRO-K) 10 MEQ CpSR, Take 10 mEq by mouth once., Disp: , Rfl:     ranolazine (RANEXA) 1,000 mg Tb12, Take 1,000 mg by mouth 2 (two) times daily., Disp: , Rfl:     REPATHA SURECLICK 140 mg/mL PnIj, SMARTSI Milligram(s) SUB-Q Every 2 Weeks, Disp: 2 each, Rfl: 11    solifenacin (VESICARE) 10 MG tablet, Take 1 tablet (10 mg total) by mouth once daily., Disp: 90 tablet, Rfl: 0    triamcinolone acetonide 0.1% (KENALOG) 0.1 % ointment, AAA bid of scalp 1-2 times daily as needed for irritation, Disp: 80 g, Rfl: 1    zonisamide (ZONEGRAN) 100 MG Cap, Take 3 capsules (300 mg total) by mouth 2 (two) times daily., Disp: 540 capsule, Rfl: 3  Past Medical History:   Diagnosis Date    Blood clotting tendency     Coronary artery disease     Fever blister     HLD (hyperlipidemia)     Hypertension     Hypothyroidism 2020    Renal cyst 2021    Seizures     Sleep apnea     Temporal lobe epilepsy 2016     Past Surgical History:   Procedure Laterality Date    ABDOMINAL SURGERY      BACK SURGERY      BREAST BIOPSY Left     benign    CARDIAC SURGERY      ESOPHAGEAL MANOMETRY WITH MEASUREMENT OF IMPEDANCE N/A 2021    Procedure: MANOMETRY, ESOPHAGUS, WITH IMPEDANCE MEASUREMENT;  Surgeon: Sumanth Curran RN;  Location: [facility];  Service: Endoscopy;  Laterality: N/A;    FRACTURE SURGERY      HYSTERECTOMY      LEFT  HEART CATHETERIZATION Left 2022    Procedure: CATHETERIZATION, HEART, LEFT;  Surgeon: Richie Butt MD;  Location: [facility] CATH LAB;  Service: Cardiology;  Laterality: Left;    OOPHORECTOMY      TONSILLECTOMY      VASCULAR SURGERY       Social History     Socioeconomic History    Marital status:    Tobacco Use    Smoking status: Former     Packs/day: 1.00     Years: 33.00     Pack years: 33.00     Types: Cigarettes     Start date:      Quit date: 2014     Years since quittin.7     Passive exposure: Past    Smokeless tobacco: Never   Substance and Sexual Activity    Alcohol use: No    Drug use: No    Sexual activity: Not Currently     Partners: Male             Past/Current Medical/Surgical History, Past/Current Social History, Past/Current Family History and Past/Current Medications were reviewed in detail.        Objective:           VITAL SIGNS WERE REVIEWED      GENERAL APPEARANCE:     The patient looks fatigued.     BMI 35.40    No signs of respiratory distress.    Normal breathing pattern.    No dysmorphic features    Normal eye contact.     GENERAL MEDICAL EXAM:    HEENT:  Head is atraumatic normocephalic.     Neck and Axillae: No JVD. No visible lesions    Cardiopulmonary: No cyanosis. No tachypnea. Normal respiratory effort.VNS in place.    Gastrointestinal/Urogenital:  No jaundice. No stomas or lesions. No visible hernias. No catheters.     Skin, Hair and Nails: No pathognonomic skin rash. No neurofibromatosis. No visible lesions.No stigmata of autoimmune disease. No clubbing.    Limbs: No varicose veins. No visible swelling.    Muskoskeletal: No visible deformities.No visible lesions.               Neurologic Exam     Mental Status   Oriented to person, place, and time.   Follows 3 step commands.   Attention: normal. Concentration: normal.   Speech: speech is normal   Level of consciousness: alert  Knowledge: good.   Able to name object. Able to read. Able to repeat. Normal  comprehension.     Cranial Nerves   Cranial nerves II through XII intact.     CN II   Visual fields full to confrontation.   Visual acuity: normal with correction  Right visual field deficit: none  Left visual field deficit: none     CN III, IV, VI   Pupils are equal, round, and reactive to light.  Extraocular motions are normal.   Right pupil: Size: 2 mm. Shape: regular. Reactivity: brisk. Consensual response: intact. Accommodation: intact.   Left pupil: Size: 2 mm. Shape: regular. Reactivity: brisk. Consensual response: intact. Accommodation: intact.   CN III: no CN III palsy  CN VI: no CN VI palsy  Nystagmus: none   Diplopia: none  Ophthalmoparesis: none  Upgaze: normal  Downgaze: normal  Conjugate gaze: present  Vestibulo-ocular reflex: present    CN V   Facial sensation intact.   Right facial sensation deficit: none  Left facial sensation deficit: none    CN VII   Facial expression full, symmetric.   Right facial weakness: none  Left facial weakness: none    CN VIII   CN VIII normal.   Hearing: intact  Right Rinne: AC > BC  Left Rinne: AC > BC  Mendes: does not lateralize     CN IX, X   CN IX normal.   CN X normal.   Palate: symmetric    CN XI   CN XI normal.   Right sternocleidomastoid strength: normal  Left sternocleidomastoid strength: normal  Right trapezius strength: normal  Left trapezius strength: normal    CN XII   CN XII normal.   Tongue: not atrophic  Fasciculations: absent  Tongue deviation: none    Motor Exam   Muscle bulk: normal  Overall muscle tone: normal  Right arm tone: normal  Left arm tone: normal  Right arm pronator drift: absent  Left arm pronator drift: absent  Right leg tone: normal  Left leg tone: normal    Strength   Strength 5/5 throughout.   Right neck flexion: 5/5  Left neck flexion: 5/5  Right neck extension: 5/5  Left neck extension: 5/5  Right deltoid: 5/5  Left deltoid: 5/5  Right biceps: 5/5  Left biceps: 5/5  Right triceps: 5/5  Left triceps: 5/5  Right wrist flexion: 5/5  Left  wrist flexion: 5/5  Right wrist extension: 5/5  Left wrist extension: 5/5  Right interossei: 5/5  Left interossei: 5/5  Right iliopsoas: 5/5  Left iliopsoas: 5/5  Right quadriceps: 5/5  Left quadriceps: 5/5  Right hamstrin/5  Left hamstrin/5  Right glutei: 5/5  Left glutei: 5/5  Right anterior tibial: 5/5  Left anterior tibial: 5/5  Right posterior tibial: 5/5  Left posterior tibial: 5/5  Right peroneal: 5/5  Left peroneal: 5/5  Right gastroc: 5/5  Left gastroc: 5/5    Sensory Exam   Light touch normal.   Right arm light touch: normal  Left arm light touch: normal  Right leg light touch: normal  Left leg light touch: normal  Vibration normal.   Right arm vibration: normal  Left arm vibration: normal  Right leg vibration: normal  Left leg vibration: normal  Proprioception normal.   Right arm proprioception: normal  Left arm proprioception: normal  Right leg proprioception: normal  Left leg proprioception: normal  Pinprick normal.   Right arm pinprick: normal  Left arm pinprick: normal  Right leg pinprick: normal  Left leg pinprick: normal  Graphesthesia: normal  Stereognosis: normal    Gait, Coordination, and Reflexes     Gait  Gait: (limping gait)    Coordination   Romberg: negative  Finger to nose coordination: normal  Heel to shin coordination: normal  Tandem walking coordination: normal    Tremor   Resting tremor: absent  Intention tremor: absent  Action tremor: absent    Reflexes   Right brachioradialis: 2+  Left brachioradialis: 2+  Right biceps: 2+  Left biceps: 2+  Right triceps: 2+  Left triceps: 2+  Right patellar: 2+  Left patellar: 2+  Right achilles: 2+  Left achilles: 2+  Right plantar: normal  Left plantar: normal  Right Marie: absent  Left Marie: absent  Right ankle clonus: absent  Left ankle clonus: absent  Right pendular knee jerk: absent  Left pendular knee jerk: absent    Lab Results   Component Value Date    WBC 3.57 (L) 2022    HGB 12.4 2022    HCT 37.7 2022    MCV  99 (H) 09/20/2022     09/20/2022     Sodium   Date Value Ref Range Status   06/29/2022 142 136 - 145 mmol/L Final     Potassium   Date Value Ref Range Status   06/29/2022 4.5 3.5 - 5.1 mmol/L Final     Chloride   Date Value Ref Range Status   06/29/2022 112 (H) 95 - 110 mmol/L Final     CO2   Date Value Ref Range Status   06/29/2022 21 (L) 23 - 29 mmol/L Final     Glucose   Date Value Ref Range Status   06/29/2022 117 (H) 70 - 110 mg/dL Final     BUN   Date Value Ref Range Status   06/29/2022 9 8 - 23 mg/dL Final     Creatinine   Date Value Ref Range Status   06/29/2022 0.9 0.5 - 1.4 mg/dL Final     Calcium   Date Value Ref Range Status   06/29/2022 8.9 8.7 - 10.5 mg/dL Final     Total Protein   Date Value Ref Range Status   06/29/2022 7.2 6.0 - 8.4 g/dL Final     Albumin   Date Value Ref Range Status   06/29/2022 3.8 3.5 - 5.2 g/dL Final     Total Bilirubin   Date Value Ref Range Status   06/29/2022 0.3 0.1 - 1.0 mg/dL Final     Comment:     For infants and newborns, interpretation of results should be based  on gestational age, weight and in agreement with clinical  observations.    Premature Infant recommended reference ranges:  Up to 24 hours.............<8.0 mg/dL  Up to 48 hours............<12.0 mg/dL  3-5 days..................<15.0 mg/dL  6-29 days.................<15.0 mg/dL       Alkaline Phosphatase   Date Value Ref Range Status   06/29/2022 145 (H) 55 - 135 U/L Final     AST   Date Value Ref Range Status   06/29/2022 22 10 - 40 U/L Final     ALT   Date Value Ref Range Status   06/29/2022 27 10 - 44 U/L Final     Anion Gap   Date Value Ref Range Status   06/29/2022 9 8 - 16 mmol/L Final     eGFR if    Date Value Ref Range Status   06/29/2022 >60 >60 mL/min/1.73 m^2 Final     eGFR if non    Date Value Ref Range Status   06/29/2022 >60 >60 mL/min/1.73 m^2 Final     Comment:     Calculation used to obtain the estimated glomerular filtration  rate (eGFR) is the CKD-EPI  equation.        No results found for: [id number]  Lab Results   Component Value Date    TSH 0.606 10/12/2022    FREET4 0.76 10/12/2022           [id number]    Brain MRI Unremarkable    EEG TL Seizures  B/L       [facility]     3 RT TL Seizures       04-    CNP Completed       AED LEVELS     AED: PHT NORMAL LEVEL RANGE: 10-20   DATE LEVEL   09- 12.9   10- 15.0   01- 06.3   05- 01.1   05- (200 mg BID) 14.0   07- 11.9   01- 11.0   05- 8.3   12- 17.5   03- 18.5                AED: ZNS NORMAL LEVEL RANGE: 10-40   DATE LEVEL   09- 13   07- 37   01- 39   05- 22   12- 29   03- 34.0        AED: CLB NORMAL LEVEL RANGE:    DATE LEVEL   10- 176   07- 193   01- 139   05- 55.7   03- 91.8              Reviewed the neuroimaging independently       Assessment:       1. Temporal lobe epilepsy    2. Partial symptomatic epilepsy with complex partial seizures, intractable, without status epilepticus            EPILEPSY CLASSIFICATION      SEMIOLOGY: DIALEPTIC (FOCAL WITH HILARY) WITH ICTAL SPITTING -->GTC     EPILEPTOGENIC ZONE (S):  RT TL     ETIOLOGY: UNKNOWN     PRIOR AEDS: VPA, LTG, OXC, LEV    CURRENT AEDS: ZNS, PHT, CLB, (GBP LBP)    LAST SEIZURE DATE:  MONTHLY 03-      COMPREHENSIVE LIST OF AEDs:     Acetazolamide (AZM-Diamox)   Benzos: clonazepam (CZP Klonopin), lorazepam (LZP-Ativan), diazepam (DZP-Valium), clorazepate (CLZ- Tranxene)  Brivaracetam (BRV-Briviact)  Cannabidiol (CBD- Epidiolex)  Carbamazepine (CBZ-Tegretol)  Cenobamate (CNB-Xcopri)  Clobazam (CLB-Onfi)  Eslicarbazepine (ESL-Aptiom)  Ethosuximide (ESX-Zarontin)  Felbamate (FBM-Felbatol)  Gabapentin (GBP-Neurontin)  Lacosamide (LCM-Vimpat)  Lamotrigine (LTG-Lamitcal)  Levetiracetam (LEV- Keppra)  Oxcarbazepine (OXC-Trileptal)  Perampanel (PML-Fycompa)  Phenobarbital (PB)  Phenytoin  (PHT-Dilantin)  Pregabalin (PGB-Lyrica)  Primidone (PRM)   Retigabine (RTG- Potiga) Discontinued in 2017  Rufinamide (RFN-Benzil)  Stiripentol (STP-Diacomit)  Tiagabine (TGB-Gabitril)  Topiramate (TPM-Topamax)  Valproate (VPA-Depakote)  Vigabatrin (VGB-Sabril)  Zonisamide (ZNS-Zonegran)        Plan:         INTRACTABLE-MEDICALLY REFRACTORY NON-LESIONAL RT TLE UNDERGOING SURGICAL EVALUATION            Reconnect with [facility] to conclude the surgical treatment.      The patient was encouraged to maintain full traditional seizure precautions which include but not limited to avoid driving, avoid high altitudes, avoid being close to fire or fire source, avoid being close to a body of water or swimming alone, avoid operating heavy machinery and avoid using sharp objects if possible. The patient was encouraged to shower (without accumulation of water) instead of taking a bath if unsupervised. The patient was made aware that these precautions are especially important during concurrent illness. Adequate sleep and avoidance of alcohol as important measures to assure good seizure control were discussed with the patient. The patient was also advised not to care for children without company. The patient was advised to pad the side rails with pillows and blankets if applicable.I strongly recommended lowering the bed to the floor level to decrease the risk of falls.     Continue  mg BID. Check ZNS level      Continue  mg BID.  Check PHT level      Continue CLB 10 mg BID (4 mL BID). Check CLB  Level     Continue Nayzilam (NS Midazolam) for prolonged and/or clusters of seizures.     Continue AEDs INDEFINITELY, FOR GOOD AND NEVER SKIP A DOSE. The patient verbalized full understanding. Stressed the extreme medical, personal safety, public safety and legal importance of compliance and seizure control. Will give as many refills as possible with or without face-to-face evaluation to make sure the patient and any patient with  epilepsy will never run out of medications. Running out of seizure medications should never happen under our care. I explained to the patient that he should not, under any circumstances, adjust or stop taking his seizure medication without discussing with me. The patient verbalized full understanding.         AVOID any substance that could lower seizure threshold including but not limited to:      ALCOHOL AND WITHDRAWAL      TRAMADOL.     DEMEROL      ALL STIMULANTS-ALL ADHD MEDICATIONS.      CLOZAPINE.      BUPROPION.     CIPROFLOXACIN     CYCLOSPORIN    KRATOM     METCHOLRRAMAIDE    TETRAHYDROCANNABINOL            OCCIPITAL NEURALGIA WITH CERVICAL DDD       Try Amitriptyline/Elavil. Will titrate very slowly to 10 mg QHS which can cause sleepiness, dry eyes, dry mouth, urinary retention and rarely cardiac arrhythmias. The patient verbalized understanding of the most common SEs and was encouraged to read the leaflet for more details.     Heating pads PRN    Improve posture    Avoid heavy lifting or sudden spine movements     Will discuss PT and/or Pain Management            MEDICAL/SURGICAL COMORBIDITIES     All relevant medical comorbidities noted and managed by primary care physician and medical care team.          MISCELLANEOUS MEDICAL PROBLEMS       HEALTHY LIFESTYLE AND PREVENTATIVE CARE    Encouraged the patient to adhere to the age-appropriate health maintenance guidelines including screening tests and vaccinations.     Discussed the overall importance of healthy lifestyle, optimal weight, exercise, healthy diet, good sleep hygiene and avoiding drugs including smoking, alcohol and recreational drugs. The patient verbalized full understanding.           RTC in 3 months          Stacey Adan MSN NP      Collaborating Provider: Payton Estevez MD, FAAN Neurologist/Epileptologist          I spent a total of 30 minutes on the day of the visit.  This includes face to face time and non-face to face time preparing to  see the patient (eg, review of tests), obtaining and/or reviewing separately obtained history, documenting clinical information in the electronic or other health record, independently interpreting results and communicating results to the patient/family/caregiver, or care coordinator.

## 2023-03-17 ENCOUNTER — LAB VISIT (OUTPATIENT)
Dept: LAB | Facility: HOSPITAL | Age: 63
End: 2023-03-17
Attending: NURSE PRACTITIONER
Payer: MEDICARE

## 2023-03-17 DIAGNOSIS — G40.219 PARTIAL SYMPTOMATIC EPILEPSY WITH COMPLEX PARTIAL SEIZURES, INTRACTABLE, WITHOUT STATUS EPILEPTICUS: ICD-10-CM

## 2023-03-17 DIAGNOSIS — G40.109 TEMPORAL LOBE EPILEPSY: ICD-10-CM

## 2023-03-17 PROCEDURE — 36415 COLL VENOUS BLD VENIPUNCTURE: CPT | Mod: PO | Performed by: NURSE PRACTITIONER

## 2023-03-17 PROCEDURE — 80339 ANTIEPILEPTICS NOS 1-3: CPT | Performed by: NURSE PRACTITIONER

## 2023-03-17 PROCEDURE — 80185 ASSAY OF PHENYTOIN TOTAL: CPT | Performed by: NURSE PRACTITIONER

## 2023-03-18 LAB — PHENYTOIN SERPL-MCNC: 18.5 UG/ML (ref 10–20)

## 2023-03-20 ENCOUNTER — TELEPHONE (OUTPATIENT)
Dept: NEUROLOGY | Facility: CLINIC | Age: 63
End: 2023-03-20
Payer: MEDICARE

## 2023-03-20 NOTE — TELEPHONE ENCOUNTER
----- Message from Serenity Adan NP sent at 3/20/2023  9:35 AM CDT -----  Labs Reviewed:      03-    PHT level 18.5 NL ( 10.0 -20.0)

## 2023-03-21 LAB
CLOBAZAM SERPL-MCNC: 91.8 NG/ML (ref 30–300)
NORCLOBAZAM SERPL-MCNC: 2130 NG/ML (ref 300–3000)
ZONISAMIDE SERPL-MCNC: 34 MCG/ML (ref 10–40)

## 2023-03-24 ENCOUNTER — TELEPHONE (OUTPATIENT)
Dept: NEUROLOGY | Facility: CLINIC | Age: 63
End: 2023-03-24
Payer: MEDICARE

## 2023-03-24 NOTE — TELEPHONE ENCOUNTER
----- Message from Serenity Adan NP sent at 3/23/2023  8:08 PM CDT -----  Labs Reviewed:    03-    ZNS 34 NL (10 -40) ,CLB 91.8 (30 -300)    NL AEDs level

## 2023-04-03 ENCOUNTER — PATIENT MESSAGE (OUTPATIENT)
Dept: DERMATOLOGY | Facility: CLINIC | Age: 63
End: 2023-04-03
Payer: MEDICARE

## 2023-04-04 ENCOUNTER — PATIENT MESSAGE (OUTPATIENT)
Dept: NEUROLOGY | Facility: CLINIC | Age: 63
End: 2023-04-04
Payer: MEDICARE

## 2023-04-11 RX ORDER — CLOBAZAM 2.5 MG/ML
SUSPENSION ORAL
Qty: 360 ML | Refills: 0 | Status: SHIPPED | OUTPATIENT
Start: 2023-04-11 | End: 2023-06-19

## 2023-04-11 NOTE — TELEPHONE ENCOUNTER
----- Message from Natalie Dennis sent at 4/11/2023 10:51 AM CDT -----  Contact: [phone]  Pt request a call back at [phone]. In reference to medication Clobazam , can be sent to   [address] [place] - [address] [facility]  [address] [facility] [address]  Phone: [phone] Fax: [phone]      Thanks

## 2023-04-12 ENCOUNTER — TELEPHONE (OUTPATIENT)
Dept: INTERNAL MEDICINE | Facility: CLINIC | Age: 63
End: 2023-04-12

## 2023-04-12 ENCOUNTER — LAB VISIT (OUTPATIENT)
Dept: LAB | Facility: HOSPITAL | Age: 63
End: 2023-04-12
Attending: FAMILY MEDICINE
Payer: MEDICARE

## 2023-04-12 ENCOUNTER — PATIENT MESSAGE (OUTPATIENT)
Dept: INTERNAL MEDICINE | Facility: CLINIC | Age: 63
End: 2023-04-12

## 2023-04-12 ENCOUNTER — OFFICE VISIT (OUTPATIENT)
Dept: INTERNAL MEDICINE | Facility: CLINIC | Age: 63
End: 2023-04-12
Payer: MEDICARE

## 2023-04-12 VITALS
HEIGHT: 65 IN | BODY MASS INDEX: 35.85 KG/M2 | WEIGHT: 215.19 LBS | SYSTOLIC BLOOD PRESSURE: 126 MMHG | DIASTOLIC BLOOD PRESSURE: 82 MMHG | HEART RATE: 78 BPM | OXYGEN SATURATION: 98 % | TEMPERATURE: 98 F

## 2023-04-12 DIAGNOSIS — Z86.39 PERSONAL HISTORY OF OTHER ENDOCRINE, NUTRITIONAL AND METABOLIC DISEASE: ICD-10-CM

## 2023-04-12 DIAGNOSIS — I25.118 ATHEROSCLEROTIC HEART DISEASE OF NATIVE CORONARY ARTERY WITH OTHER FORMS OF ANGINA PECTORIS: ICD-10-CM

## 2023-04-12 DIAGNOSIS — I70.0 AORTIC ATHEROSCLEROSIS: ICD-10-CM

## 2023-04-12 DIAGNOSIS — Z87.891 FORMER SMOKER: ICD-10-CM

## 2023-04-12 DIAGNOSIS — I10 PRIMARY HYPERTENSION: ICD-10-CM

## 2023-04-12 DIAGNOSIS — E03.9 HYPOTHYROIDISM, UNSPECIFIED TYPE: ICD-10-CM

## 2023-04-12 DIAGNOSIS — E03.9 HYPOTHYROIDISM, UNSPECIFIED TYPE: Primary | ICD-10-CM

## 2023-04-12 LAB
ESTIMATED AVG GLUCOSE: 97 MG/DL (ref 68–131)
HBA1C MFR BLD: 5 % (ref 4–5.6)
T4 FREE SERPL-MCNC: 0.85 NG/DL (ref 0.71–1.51)
TSH SERPL DL<=0.005 MIU/L-ACNC: 0.64 UIU/ML (ref 0.4–4)

## 2023-04-12 PROCEDURE — 99215 OFFICE O/P EST HI 40 MIN: CPT | Mod: PBBFAC | Performed by: FAMILY MEDICINE

## 2023-04-12 PROCEDURE — 84443 ASSAY THYROID STIM HORMONE: CPT | Performed by: FAMILY MEDICINE

## 2023-04-12 PROCEDURE — 83036 HEMOGLOBIN GLYCOSYLATED A1C: CPT | Performed by: FAMILY MEDICINE

## 2023-04-12 PROCEDURE — 36415 COLL VENOUS BLD VENIPUNCTURE: CPT | Performed by: FAMILY MEDICINE

## 2023-04-12 PROCEDURE — 84439 ASSAY OF FREE THYROXINE: CPT | Performed by: FAMILY MEDICINE

## 2023-04-12 PROCEDURE — 99214 PR OFFICE/OUTPT VISIT, EST, LEVL IV, 30-39 MIN: ICD-10-PCS | Mod: S$PBB,,, | Performed by: FAMILY MEDICINE

## 2023-04-12 PROCEDURE — 99999 PR PBB SHADOW E&M-EST. PATIENT-LVL V: ICD-10-PCS | Mod: PBBFAC,,, | Performed by: FAMILY MEDICINE

## 2023-04-12 PROCEDURE — 99999 PR PBB SHADOW E&M-EST. PATIENT-LVL V: CPT | Mod: PBBFAC,,, | Performed by: FAMILY MEDICINE

## 2023-04-12 PROCEDURE — 99214 OFFICE O/P EST MOD 30 MIN: CPT | Mod: S$PBB,,, | Performed by: FAMILY MEDICINE

## 2023-04-12 NOTE — TELEPHONE ENCOUNTER
Please see patient message below. LOV 04/12/2023    Patient is calling to report of left ear pain and coughing every night. Please give a call back at .[phone] as requested. Patient is still currently taking Levocetirizine 5 mg.

## 2023-04-12 NOTE — PROGRESS NOTES
Six-month follow-up hypertension coronary disease hypothyroidism BMI 35.81.  She is followed by orthopedics for osteoarthritis of the knee.  She is followed by neurology for upper lobe epilepsy in Urology for stress bladder incontinence.  She quit smoking about 15 years ago.  She previously smoked for about 25-30 years.  She denies chest pain palpitations shortness of breath or edema.  Subjective:       Patient ID: Evelyne Francisco is a 62 y.o. female.    Chief Complaint: Follow-up (6 mth)    Follow-up  Associated symptoms include arthralgias. Pertinent negatives include no abdominal pain, chest pain, chills, coughing, fever, headaches or weakness.   Review of Systems   Constitutional:  Negative for activity change, appetite change, chills, fever and unexpected weight change.   Respiratory:  Negative for cough, chest tightness, shortness of breath and wheezing.    Cardiovascular:  Negative for chest pain, palpitations and leg swelling.   Gastrointestinal:  Negative for abdominal distention and abdominal pain.   Musculoskeletal:  Positive for arthralgias.   Neurological:  Negative for dizziness, weakness, light-headedness and headaches.     Objective:      Physical Exam  Constitutional:       General: She is not in acute distress.     Appearance: She is not ill-appearing or diaphoretic.   Neck:      Comments: 2+ carotids normal thyroid  Cardiovascular:      Rate and Rhythm: Normal rate and regular rhythm.      Heart sounds: No murmur heard.    No gallop.   Pulmonary:      Effort: Pulmonary effort is normal. No respiratory distress.      Breath sounds: No wheezing, rhonchi or rales.   Abdominal:      General: There is no distension.   Lymphadenopathy:      Cervical: No cervical adenopathy.   Skin:     General: Skin is warm and dry.      Coloration: Skin is not pale.      Findings: No erythema.   Neurological:      Mental Status: She is alert and oriented to person, place, and time.   Psychiatric:          Mood and Affect: Mood normal.         Behavior: Behavior normal.       Lab Visit on 03/17/2023   Component Date Value Ref Range Status    Zonisamide 03/17/2023 34  10 - 40 mcg/mL Final    Phenytoin Lvl 03/17/2023 18.5  10.0 - 20.0 ug/mL Final    Clobazam 03/17/2023 91.8  30 - 300 ng/mL Final    Desmethylclobazam 03/17/2023 2130.0  300 - 3000 ng/mL Final     Assessment:       1. Hypothyroidism, unspecified type    2. BMI 35.0-35.9,adult    3. Personal history of other endocrine, nutritional and metabolic disease    4. Former smoker    5. Atherosclerotic heart disease of native coronary artery with other forms of angina pectoris    6. Aortic atherosclerosis    7. Primary hypertension        Plan:     Blood pressure controlled TSH free T4 A1c was ordered medications reviewed elevated BMI discussed low-dose CT lung was ordered follow-up in 6 months  aortic atherosclerosis being treated medically    Hypothyroidism, unspecified type  -     TSH; Future; Expected date: 04/12/2023  -     T4, Free; Future; Expected date: 04/12/2023    BMI 35.0-35.9,adult  -     Hemoglobin A1C; Future; Expected date: 04/12/2023    Personal history of other endocrine, nutritional and metabolic disease  -     Hemoglobin A1C; Future; Expected date: 04/12/2023    Former smoker  -     CT Chest Lung Screening Low Dose; Future; Expected date: 04/12/2023    Atherosclerotic heart disease of native coronary artery with other forms of angina pectoris    Aortic atherosclerosis    Primary hypertension

## 2023-04-12 NOTE — TELEPHONE ENCOUNTER
----- Message from Sari Griggs sent at 4/12/2023  9:26 AM CDT -----  Contact: Evelyne  Patient is calling to report of left ear pain and coughing every night. Please give a call back at .[phone] as requested. Patient is still currently taking Levocetirizine 5 mg.   Thank  MODE

## 2023-04-26 ENCOUNTER — HOSPITAL ENCOUNTER (OUTPATIENT)
Dept: RADIOLOGY | Facility: HOSPITAL | Age: 63
Discharge: HOME | End: 2023-04-26
Attending: FAMILY MEDICINE
Payer: MEDICARE

## 2023-04-26 DIAGNOSIS — Z87.891 FORMER SMOKER: ICD-10-CM

## 2023-04-26 PROCEDURE — 71271 CT THORAX LUNG CANCER SCR C-: CPT | Mod: TC

## 2023-04-26 PROCEDURE — 71271 CT CHEST LUNG SCREENING LOW DOSE: ICD-10-PCS | Mod: 26,,, | Performed by: RADIOLOGY

## 2023-04-26 PROCEDURE — 71271 CT THORAX LUNG CANCER SCR C-: CPT | Mod: 26,,, | Performed by: RADIOLOGY

## 2023-05-01 ENCOUNTER — PATIENT MESSAGE (OUTPATIENT)
Dept: NEUROLOGY | Facility: CLINIC | Age: 63
End: 2023-05-01
Payer: MEDICARE

## 2023-05-03 DIAGNOSIS — Z71.89 COMPLEX CARE COORDINATION: ICD-10-CM

## 2023-06-15 ENCOUNTER — PATIENT OUTREACH (OUTPATIENT)
Dept: ADMINISTRATIVE | Facility: HOSPITAL | Age: 63
End: 2023-06-15
Payer: MEDICARE

## 2023-06-15 NOTE — Clinical Note
Payor is suggesting a high/moderate intensity statin.  . If patient cannot take statin medications due to allergy or intolerance you must drop that code every year in a visit. (See below) A few codes to place on Problem list instead of insensitivity/intolerance/allergy: G72.0 Drug induced myopathy G72.2 Myopathy due to other toxic agent G72.9 Myopathy, unspecified M62.82 Rhabdomyolysis M60.80 Other myositis unspecified M79.1 Myalgia Elevated LFT (enzymes)  T.46.6 adverse reaction to statin (Elevated LFT's) K71.9 Hepatotoxicity due to statin (Elevated LFT's)   For patients refusing statin or non-compliant  Z53.29 Statin medication declined by patient   Z53.8    Statin medication declined by patient's family Z91.14 Patient noncompliant with statin medication  Z53.29 Refusal of statin medication by patient Z53.8    Refusal of statin medication by patient's family

## 2023-06-15 NOTE — PROGRESS NOTES
Working non-compliant Statin Report: Patient is currently not on any statin, chart routed with instructions to PCP to review need for statin or to update Problem List on next visit. Reminder set to f/u.

## 2023-06-19 RX ORDER — SOLIFENACIN SUCCINATE 10 MG/1
TABLET, FILM COATED ORAL
Qty: 90 TABLET | Refills: 0 | Status: SHIPPED | OUTPATIENT
Start: 2023-06-19

## 2023-06-19 RX ORDER — CLOBAZAM 2.5 MG/ML
SUSPENSION ORAL
Qty: 360 ML | Refills: 0 | Status: SHIPPED | OUTPATIENT
Start: 2023-06-19 | End: 2023-08-16

## 2023-06-27 ENCOUNTER — OFFICE VISIT (OUTPATIENT)
Dept: INTERNAL MEDICINE | Facility: CLINIC | Age: 63
End: 2023-06-27
Payer: MEDICARE

## 2023-06-27 VITALS
BODY MASS INDEX: 35.81 KG/M2 | HEART RATE: 84 BPM | WEIGHT: 214.94 LBS | HEIGHT: 65 IN | RESPIRATION RATE: 18 BRPM | OXYGEN SATURATION: 96 % | SYSTOLIC BLOOD PRESSURE: 124 MMHG | DIASTOLIC BLOOD PRESSURE: 70 MMHG | TEMPERATURE: 99 F

## 2023-06-27 DIAGNOSIS — R35.0 URINE FREQUENCY: Primary | ICD-10-CM

## 2023-06-27 DIAGNOSIS — R30.0 DYSURIA: ICD-10-CM

## 2023-06-27 LAB
BACTERIA #/AREA URNS HPF: ABNORMAL /HPF
BILIRUB UR QL STRIP: NEGATIVE
CLARITY UR: ABNORMAL
COLOR UR: YELLOW
GLUCOSE UR QL STRIP: NEGATIVE
HGB UR QL STRIP: ABNORMAL
HYALINE CASTS #/AREA URNS LPF: 0 /LPF
KETONES UR QL STRIP: NEGATIVE
LEUKOCYTE ESTERASE UR QL STRIP: ABNORMAL
MICROSCOPIC COMMENT: ABNORMAL
NITRITE UR QL STRIP: POSITIVE
PH UR STRIP: 6 [PH] (ref 5–8)
PROT UR QL STRIP: ABNORMAL
RBC #/AREA URNS HPF: 30 /HPF (ref 0–4)
SP GR UR STRIP: 1.02 (ref 1–1.03)
SQUAMOUS #/AREA URNS HPF: 17 /HPF
URN SPEC COLLECT METH UR: ABNORMAL
UROBILINOGEN UR STRIP-ACNC: NEGATIVE EU/DL
WBC #/AREA URNS HPF: >100 /HPF (ref 0–5)
WBC CLUMPS URNS QL MICRO: ABNORMAL

## 2023-06-27 PROCEDURE — 99214 PR OFFICE/OUTPT VISIT, EST, LEVL IV, 30-39 MIN: ICD-10-PCS | Mod: S$PBB,,, | Performed by: NURSE PRACTITIONER

## 2023-06-27 PROCEDURE — 99214 OFFICE O/P EST MOD 30 MIN: CPT | Mod: S$PBB,,, | Performed by: NURSE PRACTITIONER

## 2023-06-27 PROCEDURE — 87088 URINE BACTERIA CULTURE: CPT | Performed by: NURSE PRACTITIONER

## 2023-06-27 PROCEDURE — 99999 PR PBB SHADOW E&M-EST. PATIENT-LVL V: ICD-10-PCS | Mod: PBBFAC,,, | Performed by: NURSE PRACTITIONER

## 2023-06-27 PROCEDURE — 99215 OFFICE O/P EST HI 40 MIN: CPT | Mod: PBBFAC | Performed by: NURSE PRACTITIONER

## 2023-06-27 PROCEDURE — 87186 SC STD MICRODIL/AGAR DIL: CPT | Performed by: NURSE PRACTITIONER

## 2023-06-27 PROCEDURE — 87086 URINE CULTURE/COLONY COUNT: CPT | Performed by: NURSE PRACTITIONER

## 2023-06-27 PROCEDURE — 87077 CULTURE AEROBIC IDENTIFY: CPT | Performed by: NURSE PRACTITIONER

## 2023-06-27 PROCEDURE — 81000 URINALYSIS NONAUTO W/SCOPE: CPT | Mod: 59 | Performed by: NURSE PRACTITIONER

## 2023-06-27 PROCEDURE — 81003 URINALYSIS AUTO W/O SCOPE: CPT | Performed by: NURSE PRACTITIONER

## 2023-06-27 PROCEDURE — 99999 PR PBB SHADOW E&M-EST. PATIENT-LVL V: CPT | Mod: PBBFAC,,, | Performed by: NURSE PRACTITIONER

## 2023-06-27 RX ORDER — NITROFURANTOIN 25; 75 MG/1; MG/1
100 CAPSULE ORAL 2 TIMES DAILY
Qty: 7 CAPSULE | Refills: 0 | Status: SHIPPED | OUTPATIENT
Start: 2023-06-27 | End: 2023-11-02

## 2023-06-27 RX ORDER — FLUCONAZOLE 150 MG/1
150 TABLET ORAL DAILY
Qty: 1 TABLET | Refills: 0 | Status: SHIPPED | OUTPATIENT
Start: 2023-06-27 | End: 2023-06-28

## 2023-06-27 NOTE — PROGRESS NOTES
Evelyne Francisco  2023  [id number]    Sergio Malcolm MD  Patient Care Team:  Sergio Malcolm MD as PCP - General (Family Medicine)  Karthik Smith MD as Consulting Physician (Neurology)          Visit Type:an urgent visit for a new problem    Chief Complaint:  Chief Complaint   Patient presents with    Dysuria    Urinary Frequency    Abdominal Pain       History of Present Illness:    64 yo female presents wit co frequent urination and dysuria that started two days ago.   No other complaints experienced at this time      History:  Past Medical History:   Diagnosis Date    Blood clotting tendency     Coronary artery disease     Fever blister     HLD (hyperlipidemia)     Hypertension     Hypothyroidism 2020    Personal history of other endocrine, nutritional and metabolic disease 2023    Renal cyst 2021    Seizures     Sleep apnea     Temporal lobe epilepsy 2016     Past Surgical History:   Procedure Laterality Date    ABDOMINAL SURGERY      BACK SURGERY      BREAST BIOPSY Left     benign    CARDIAC SURGERY       SECTION  10/21/1988; 1994    ESOPHAGEAL MANOMETRY WITH MEASUREMENT OF IMPEDANCE N/A 2021    Procedure: MANOMETRY, ESOPHAGUS, WITH IMPEDANCE MEASUREMENT;  Surgeon: Sumanth Curran RN;  Location: [facility];  Service: Endoscopy;  Laterality: N/A;    FRACTURE SURGERY      HYSTERECTOMY      LEFT HEART CATHETERIZATION Left 2022    Procedure: CATHETERIZATION, HEART, LEFT;  Surgeon: Richie Butt MD;  Location: [facility] CATH LAB;  Service: Cardiology;  Laterality: Left;    OOPHORECTOMY      TONSILLECTOMY      VASCULAR SURGERY       Family History   Problem Relation Age of Onset    Hypertension Mother     Hyperlipidemia Mother     Stroke Father     Seizures Father     Hypertension Sister     Cancer Brother     Marfan syndrome Daughter     Stroke Sister      Social History     Socioeconomic History    Marital status:    Tobacco Use    Smoking  status: Former     Packs/day: 1.00     Years: 15.00     Pack years: 15.00     Types: Cigarettes     Start date:      Quit date: 2014     Years since quittin.2     Passive exposure: Past    Smokeless tobacco: Never   Substance and Sexual Activity    Alcohol use: No    Drug use: No    Sexual activity: Not Currently     Partners: Male     Birth control/protection: Condom     Patient Active Problem List   Diagnosis    Hypertension    HLD (hyperlipidemia)    Coronary artery disease involving native coronary artery without angina pectoris    Primary hypertension    Temporal lobe epilepsy    Intractable epilepsy    Partial idiopathic epilepsy with seizures of localized onset, intractable, without status epilepticus    Refractory epilepsy    Hypothyroidism    Hx of heart artery stent    Anemia    Angio-edema    Unstable angina pectoris    Frequent UTI    Stress incontinence    Renal cyst    Lumbar paraspinal muscle spasm    Nocturia    Urinary frequency    BMI 35.0-35.9,adult    Anxiety about health    Urge incontinence    Unspecified inflammatory spondylopathy, lumbar region    Fatigue    Occipital neuralgia    Multiple neurological symptoms    Atherosclerotic heart disease of native coronary artery with other forms of angina pectoris    Aortic atherosclerosis    Former smoker    Personal history of other endocrine, nutritional and metabolic disease     Review of patient's allergies indicates:   Allergen Reactions    Latex, natural rubber Hives, Shortness Of Breath and Swelling     Throat swelling  Throat swelling  Other reaction(s): Hives  Throat swelling    Ace inhibitors Other (See Comments)     Coughing  Other reaction(s): Unknown    Diclo gel [diclofenac sodium] Other (See Comments)     Chest pain       The following were reviewed at this visit: active problem list, medication list, allergies, family history, social history, and health maintenance.    Medications:  Current Outpatient Medications on File  Prior to Visit   Medication Sig Dispense Refill    amitriptyline (ELAVIL) 10 MG tablet Take 1 tablet (10 mg total) by mouth every evening. 90 tablet 3    cloBAZam (ONFI) 2.5 mg/mL Susp TAKE 4 ML BY MOUTH  TWICE DAILY 360 mL 0    clopidogreL (PLAVIX) 75 mg tablet Take 1 tablet (75 mg total) by mouth once daily. 90 tablet 3    estradioL (ESTRACE) 2 MG tablet Take 1 tablet (2 mg total) by mouth once daily. 90 tablet 3    ezetimibe (ZETIA) 10 mg tablet Take 10 mg by mouth once daily.      fluconazole (DIFLUCAN) 200 MG Tab Take 200 mg by mouth every 7 days.      fluocinolone (SYNALAR) 0.01 % external solution Apply topically 2 (two) times daily.      isosorbide mononitrate (IMDUR) 60 MG 24 hr tablet TAKE 1 TABLET BY MOUTH EVERY 12 HOURS 90 tablet 0    levocetirizine (XYZAL) 5 MG tablet Take 1 tablet (5 mg total) by mouth every evening. 90 tablet 3    levothyroxine (SYNTHROID) 100 MCG tablet Take 1 tablet (100 mcg total) by mouth before breakfast. 90 tablet 3    metoprolol succinate (TOPROL-XL) 50 MG 24 hr tablet Take 1 tablet (50 mg total) by mouth once daily. 90 tablet 1    NAYZILAM 5 mg/spray (0.1 mL) [place] INSTILL 1 SPRAY IN NASAL ROUTE ONCE AS NEEDED. (1 SPRAY FOR PROLONGED SEIZURE OR CLUSTERS OF SEIZURES. MAY REPEAT ONCE IN OPPOSITE NOSTRIL AFTER 10 MINUTES.)      pantoprazole (PROTONIX) 40 MG tablet Take 1 tablet (40 mg total) by mouth 2 (two) times daily. 180 tablet 0    phenytoin (DILANTIN) 100 MG ER capsule Take 2 capsules (200 mg total) by mouth 2 (two) times daily. 360 capsule 3    phenytoin (DILANTIN) 30 MG ER capsule Take 1 capsule (30 mg total) by mouth every evening. 90 capsule 3    potassium chloride (MICRO-K) 10 MEQ CpSR Take 10 mEq by mouth once.      ranolazine (RANEXA) 1,000 mg Tb12 Take 1,000 mg by mouth 2 (two) times daily.      REPATHA SURECLICK 140 mg/mL PnIj SMARTSI Milligram(s) SUB-Q Every 2 Weeks 2 each 11    solifenacin (VESICARE) 10 MG tablet Take 1 tablet by mouth once daily 90 tablet 0     triamcinolone acetonide 0.1% (KENALOG) 0.1 % ointment AAA bid of scalp 1-2 times daily as needed for irritation 80 g 1    zonisamide (ZONEGRAN) 100 MG Cap Take 3 capsules (300 mg total) by mouth 2 (two) times daily. 540 capsule 3     No current facility-administered medications on file prior to visit.       Medications have been reviewed and reconciled with patient at this visit.  Barriers to medications reviewed with patient.    Adverse reactions to current medications reviewed with patient..    Over the counter medications reviewed and reconciled with patient.    Exam:  Wt Readings from Last 3 Encounters:   06/27/23 97.5 kg (214 lb 15.2 oz)   04/12/23 97.6 kg (215 lb 2.7 oz)   03/16/23 97 kg (213 lb 13.5 oz)     Temp Readings from Last 3 Encounters:   06/27/23 98.5 °F (36.9 °C) (Tympanic)   04/12/23 98.3 °F (36.8 °C) (Temporal)   02/24/23 98 °F (36.7 °C) (Oral)     BP Readings from Last 3 Encounters:   06/27/23 124/70   04/12/23 126/82   03/16/23 125/80     Pulse Readings from Last 3 Encounters:   06/27/23 84   04/12/23 78   03/16/23 81     Body mass index is 35.77 kg/m².      Review of Systems   Constitutional:  Negative for fever.   Respiratory:  Negative for cough, shortness of breath and wheezing.    Cardiovascular:  Negative for chest pain and palpitations.   Gastrointestinal:  Negative for nausea.   Genitourinary:  Positive for dysuria, frequency and urgency. Negative for flank pain and hematuria.   Neurological:  Negative for speech change, weakness and headaches.   All other systems reviewed and are negative.  Physical Exam  Vitals and nursing note reviewed.   Constitutional:       Appearance: Normal appearance. She is obese.   HENT:      Head: Normocephalic and atraumatic.      Right Ear: Tympanic membrane, ear canal and external ear normal.      Left Ear: Tympanic membrane, ear canal and external ear normal.      Nose: Nose normal.      Mouth/Throat:      Mouth: Mucous membranes are moist.       Pharynx: Oropharynx is clear.   Eyes:      Extraocular Movements: Extraocular movements intact.      Conjunctiva/sclera: Conjunctivae normal.      Pupils: Pupils are equal, round, and reactive to light.   Cardiovascular:      Rate and Rhythm: Normal rate and regular rhythm.      Pulses: Normal pulses.      Heart sounds: Normal heart sounds.   Pulmonary:      Effort: Pulmonary effort is normal.      Breath sounds: Normal breath sounds.   Abdominal:      General: Bowel sounds are normal.      Palpations: Abdomen is soft.   Musculoskeletal:         General: Normal range of motion.      Cervical back: Normal range of motion and neck supple.   Skin:     General: Skin is warm and dry.      Capillary Refill: Capillary refill takes less than 2 seconds.   Neurological:      General: No focal deficit present.      Mental Status: She is alert and oriented to person, place, and time.   Psychiatric:         Mood and Affect: Mood normal.         Behavior: Behavior normal.         Thought Content: Thought content normal.         Judgment: Judgment normal.       Laboratory Reviewed ({Yes)  Lab Results   Component Value Date    WBC 3.57 (L) 09/20/2022    HGB 12.4 09/20/2022    HCT 37.7 09/20/2022     09/20/2022    CHOL 171 10/14/2021    TRIG 92 10/14/2021    HDL 84 (H) 10/14/2021    ALT 27 06/29/2022    AST 22 06/29/2022     06/29/2022    K 4.5 06/29/2022     (H) 06/29/2022    CREATININE 0.9 06/29/2022    BUN 9 06/29/2022    CO2 21 (L) 06/29/2022    TSH 0.639 04/12/2023    INR 1.0 04/08/2022    HGBA1C 5.0 04/12/2023       Evelyne was seen today for dysuria, urinary frequency and abdominal pain.    Diagnoses and all orders for this visit:    Urine frequency  -     Urinalysis, Reflex to Urine Culture Urine, Clean Catch; Future  -     Urinalysis, Reflex to Urine Culture Urine, Clean Catch    Dysuria  -     Urinalysis, Reflex to Urine Culture Urine, Clean Catch; Future  -     Urinalysis, Reflex to Urine Culture  Urine, Clean Catch  -     nitrofurantoin, macrocrystal-monohydrate, (MACROBID) 100 MG capsule; Take 1 capsule (100 mg total) by mouth 2 (two) times daily.  -     fluconazole (DIFLUCAN) 150 MG Tab; Take 1 tablet (150 mg total) by mouth once daily. for 1 day    Other orders  -     Urinalysis Microscopic  -     Urine culture            Care Plan/Goals: Reviewed    Goals    None     Evelyne was seen today for dysuria, urinary frequency and abdominal pain.    Diagnoses and all orders for this visit:    Urine frequency  -     Urinalysis, Reflex to Urine Culture Urine, Clean Catch; Future  -     Urinalysis, Reflex to Urine Culture Urine, Clean Catch    Dysuria  -     Urinalysis, Reflex to Urine Culture Urine, Clean Catch; Future  -     Urinalysis, Reflex to Urine Culture Urine, Clean Catch  -     nitrofurantoin, macrocrystal-monohydrate, (MACROBID) 100 MG capsule; Take 1 capsule (100 mg total) by mouth 2 (two) times daily.  -     fluconazole (DIFLUCAN) 150 MG Tab; Take 1 tablet (150 mg total) by mouth once daily. for 1 day    Other orders  -     Urinalysis Microscopic  -     Urine culture         Follow up: Follow up if symptoms worsen or fail to improve.    After visit summary was printed and given to patient upon discharge today.  Patient goals and care plan are included in After Visit Summary.

## 2023-06-30 LAB — BACTERIA UR CULT: ABNORMAL

## 2023-07-05 DIAGNOSIS — I10 PRIMARY HYPERTENSION: ICD-10-CM

## 2023-07-08 ENCOUNTER — PES CALL (OUTPATIENT)
Dept: ADMINISTRATIVE | Facility: CLINIC | Age: 63
End: 2023-07-08
Payer: MEDICARE

## 2023-07-28 ENCOUNTER — PATIENT MESSAGE (OUTPATIENT)
Dept: INTERNAL MEDICINE | Facility: CLINIC | Age: 63
End: 2023-07-28
Payer: MEDICARE

## 2023-08-02 ENCOUNTER — PATIENT MESSAGE (OUTPATIENT)
Dept: NEUROLOGY | Facility: CLINIC | Age: 63
End: 2023-08-02
Payer: MEDICARE

## 2023-08-03 ENCOUNTER — TELEPHONE (OUTPATIENT)
Dept: NEUROLOGY | Facility: CLINIC | Age: 63
End: 2023-08-03
Payer: MEDICARE

## 2023-08-03 NOTE — TELEPHONE ENCOUNTER
----- Message from Nara Stevens sent at 8/3/2023  4:51 PM CDT -----  Contact: patricia Tolentino  .Type:  Patient Returning Call    Who Called: Patricia Tolentino  Who Left Message for Patient: Nurse   Does the patient know what this is regarding?: No  Would the patient rather a call back or a response via [facility]?  Call  Best Call Back Number: .[phone]  Additional Information:

## 2023-08-03 NOTE — TELEPHONE ENCOUNTER
Advised daughter of appt and the NP and MD would like her to come to appt with mother . She verbalized understanding .

## 2023-08-09 ENCOUNTER — OFFICE VISIT (OUTPATIENT)
Dept: NEUROLOGY | Facility: CLINIC | Age: 63
End: 2023-08-09
Payer: MEDICARE

## 2023-08-09 VITALS
HEIGHT: 65 IN | DIASTOLIC BLOOD PRESSURE: 82 MMHG | OXYGEN SATURATION: 99 % | SYSTOLIC BLOOD PRESSURE: 126 MMHG | WEIGHT: 214 LBS | HEART RATE: 77 BPM | BODY MASS INDEX: 35.65 KG/M2 | RESPIRATION RATE: 16 BRPM

## 2023-08-09 DIAGNOSIS — Z96.89 S/P PLACEMENT OF VNS (VAGUS NERVE STIMULATION) DEVICE: ICD-10-CM

## 2023-08-09 DIAGNOSIS — G40.919 REFRACTORY EPILEPSY: Primary | ICD-10-CM

## 2023-08-09 DIAGNOSIS — G40.219 PARTIAL SYMPTOMATIC EPILEPSY WITH COMPLEX PARTIAL SEIZURES, INTRACTABLE, WITHOUT STATUS EPILEPTICUS: ICD-10-CM

## 2023-08-09 DIAGNOSIS — R53.83 FATIGUE, UNSPECIFIED TYPE: ICD-10-CM

## 2023-08-09 DIAGNOSIS — M54.81 OCCIPITAL NEURALGIA, UNSPECIFIED LATERALITY: ICD-10-CM

## 2023-08-09 DIAGNOSIS — G40.109 TEMPORAL LOBE EPILEPSY: ICD-10-CM

## 2023-08-09 PROCEDURE — 95970 PR ANALYZE NEUROSTIM,NO REPROG: ICD-10-PCS | Mod: S$PBB,,, | Performed by: NURSE PRACTITIONER

## 2023-08-09 PROCEDURE — 95970 ALYS NPGT W/O PRGRMG: CPT | Mod: PBBFAC | Performed by: NURSE PRACTITIONER

## 2023-08-09 PROCEDURE — 99999 PR PBB SHADOW E&M-EST. PATIENT-LVL V: ICD-10-PCS | Mod: PBBFAC,,, | Performed by: NURSE PRACTITIONER

## 2023-08-09 PROCEDURE — 95971 ALYS SMPL SP/PN NPGT W/PRGRM: CPT | Mod: PBBFAC | Performed by: NURSE PRACTITIONER

## 2023-08-09 PROCEDURE — 99215 PR OFFICE/OUTPT VISIT, EST, LEVL V, 40-54 MIN: ICD-10-PCS | Mod: 25,S$PBB,, | Performed by: NURSE PRACTITIONER

## 2023-08-09 PROCEDURE — 99215 OFFICE O/P EST HI 40 MIN: CPT | Mod: 25,S$PBB,, | Performed by: NURSE PRACTITIONER

## 2023-08-09 PROCEDURE — 99215 OFFICE O/P EST HI 40 MIN: CPT | Mod: PBBFAC,25 | Performed by: NURSE PRACTITIONER

## 2023-08-09 PROCEDURE — 95970 ALYS NPGT W/O PRGRMG: CPT | Mod: S$PBB,,, | Performed by: NURSE PRACTITIONER

## 2023-08-09 PROCEDURE — 99999 PR PBB SHADOW E&M-EST. PATIENT-LVL V: CPT | Mod: PBBFAC,,, | Performed by: NURSE PRACTITIONER

## 2023-08-09 NOTE — PROGRESS NOTES
Subjective:       Patient ID: Evelyne Francisco is a 63 y.o. female.    Chief Complaint: temproal Lobe  epilepsy           HPI       BACKGROUND HISTORY         The patient is here for seizure evaluation and a 4th opinion.    Saw Dr. Monge, Dr. Hobbs and Dr. Smith in the past.     The patient started having seizures 11 years ago (2009) at the age of 49 .The patient describes aura of strange feeling in her head that she cannot describe. The patient is amnestic to the seizure after that. The seizures are mostly described as Complex Partial (staring, unresponsiveness, sometimes accompanied by spitting) and some of the them Grand Mal (GTC). The patient is currently not driving and has 2-5 seizure a month. No history of meningitis or encephalitis No toxic exposure or lead poisoning. Her father had posttraumatic family history of epilepsy and her nephew might be having seizures.  No history of strokes or aneurysmal bleeding. No history of TBI. Routine EEGs have been normal. EMU in 2016 captured 2 seizures with B/L TL localization. Brain MRI was reported as unremarkable. Failed VPA, LTG, OXC, LEV and VNS. She was on  mg BID and  mg BID. LCM,  ESL and BRV were too expensive. She said that CLB helped control the seizures but the insurance would not pay for it. Added CLB 20 mg BID.Explained to the patient that failing 2 AEDs typically means that chance of achieving seizure-freedom using AEDs alone is <10% and we should explore other treatments like epilepsy surgery.Could not tolerate CLB 20 mg BID.  Was approved to be seen at [facility].  Titrated PHT to 200 mg BID since levels were very low.  Underwent epilepsy surgery evaluation at [facility] by Dr. Nunez. EMU monitoring in  captured 3 stereotypical seizures originating form RT TL. The plan includes CNP, PET, WADA +/- DARCI. Trying to solve some billing issues with [facility]  Helped her with with getting CNP completed locally by   Jus on 04-.Added Nayzilam (NS Midazolam) for prolonged and/or clusters of seizures. Changed CLB from 7.5 mg (3 mL)/10 mg (4ml)  to 10 mg BID (4 mL BID).     Undergoing epilepsy surgery evaluation at [facility] by Dr. Nunez. The plan includes PET, WADA +/- DARCI. Trying to solve some billing issues with [facility].     AED Regimen:  mg BID ,  mg BID and CLB 4 ml (10 mg) BID . On Nayzilam (NS Midazolam) for prolonged and/or clusters of seizures. On  mg BID for pain.       Complains of insomnia and daily occipital headaches that interfere with her ability to rest.     INTERVAL HISTORY     Patient is present for Epilepsy management and VNS check and programming. Patient reports last seizure event . Patient reports that her seizures are monthly.     AED Regimen:  mg BID ,  mg BID and CLB 4 ml (10 mg) BID. Nayzilam (NS Midazolam) for prolonged and/or clusters of seizures. On  mg BID for pain.  Patient also has VNS present. She reports previous management of VNS was done per Dr. John, Neurologist. 08- Battery Checked 11-25%. Will send for battery replacement. Patient VNS settings changed, detailed below.     Discussed Epileptic surgery with patient, Patient states at this time she is unable to undergo surgery with Dr. Chase Schuster due to lack of family support.            Review of Systems   Constitutional:  Positive for fatigue. Negative for appetite change.   HENT:  Negative for hearing loss and tinnitus.    Eyes:  Negative for photophobia and visual disturbance.   Respiratory:  Negative for apnea and shortness of breath.    Cardiovascular:  Negative for chest pain and palpitations.   Gastrointestinal:  Negative for nausea and vomiting.   Endocrine: Negative for cold intolerance and heat intolerance.   Genitourinary:  Negative for difficulty urinating and urgency.   Musculoskeletal:  Positive for arthralgias and gait problem. Negative for back pain, joint  swelling, myalgias, neck pain and neck stiffness.        Right knee pain    Skin:  Negative for color change and rash.   Allergic/Immunologic: Negative for environmental allergies and immunocompromised state.   Neurological:  Positive for seizures and headaches. Negative for dizziness, tremors, syncope, facial asymmetry, speech difficulty, weakness, light-headedness and numbness.   Hematological:  Negative for adenopathy. Does not bruise/bleed easily.   Psychiatric/Behavioral:  Negative for agitation, behavioral problems, confusion, decreased concentration, dysphoric mood, hallucinations, self-injury, sleep disturbance and suicidal ideas. The patient is not hyperactive.              Current Outpatient Medications:     amitriptyline (ELAVIL) 10 MG tablet, Take 1 tablet (10 mg total) by mouth every evening., Disp: 90 tablet, Rfl: 3    cloBAZam (ONFI) 2.5 mg/mL Susp, TAKE 4 ML BY MOUTH  TWICE DAILY, Disp: 360 mL, Rfl: 0    clopidogreL (PLAVIX) 75 mg tablet, Take 1 tablet (75 mg total) by mouth once daily., Disp: 90 tablet, Rfl: 3    estradioL (ESTRACE) 2 MG tablet, Take 1 tablet (2 mg total) by mouth once daily., Disp: 90 tablet, Rfl: 3    ezetimibe (ZETIA) 10 mg tablet, Take 10 mg by mouth once daily., Disp: , Rfl:     fluconazole (DIFLUCAN) 200 MG Tab, Take 200 mg by mouth every 7 days., Disp: , Rfl:     fluocinolone (SYNALAR) 0.01 % external solution, Apply topically 2 (two) times daily., Disp: , Rfl:     isosorbide mononitrate (IMDUR) 60 MG 24 hr tablet, TAKE 1 TABLET BY MOUTH EVERY 12 HOURS, Disp: 90 tablet, Rfl: 0    levocetirizine (XYZAL) 5 MG tablet, Take 1 tablet (5 mg total) by mouth every evening., Disp: 90 tablet, Rfl: 3    levothyroxine (SYNTHROID) 100 MCG tablet, Take 1 tablet (100 mcg total) by mouth before breakfast., Disp: 90 tablet, Rfl: 3    metoprolol succinate (TOPROL-XL) 50 MG 24 hr tablet, Take 1 tablet (50 mg total) by mouth once daily., Disp: 90 tablet, Rfl: 1    NAYZILAM 5 mg/spray (0.1 mL)  Kiara, INSTILL 1 SPRAY IN NASAL ROUTE ONCE AS NEEDED. (1 SPRAY FOR PROLONGED SEIZURE OR CLUSTERS OF SEIZURES. MAY REPEAT ONCE IN OPPOSITE NOSTRIL AFTER 10 MINUTES.), Disp: , Rfl:     nitrofurantoin, macrocrystal-monohydrate, (MACROBID) 100 MG capsule, Take 1 capsule (100 mg total) by mouth 2 (two) times daily., Disp: 7 capsule, Rfl: 0    pantoprazole (PROTONIX) 40 MG tablet, Take 1 tablet (40 mg total) by mouth 2 (two) times daily., Disp: 180 tablet, Rfl: 0    phenytoin (DILANTIN) 100 MG ER capsule, Take 2 capsules (200 mg total) by mouth 2 (two) times daily., Disp: 360 capsule, Rfl: 3    phenytoin (DILANTIN) 30 MG ER capsule, Take 1 capsule (30 mg total) by mouth every evening., Disp: 90 capsule, Rfl: 3    potassium chloride (MICRO-K) 10 MEQ CpSR, Take 10 mEq by mouth once., Disp: , Rfl:     ranolazine (RANEXA) 1,000 mg Tb12, Take 1,000 mg by mouth 2 (two) times daily., Disp: , Rfl:     REPATHA SURECLICK 140 mg/mL PnIj, SMARTSI Milligram(s) SUB-Q Every 2 Weeks, Disp: 2 each, Rfl: 11    solifenacin (VESICARE) 10 MG tablet, Take 1 tablet by mouth once daily, Disp: 90 tablet, Rfl: 0    triamcinolone acetonide 0.1% (KENALOG) 0.1 % ointment, AAA bid of scalp 1-2 times daily as needed for irritation, Disp: 80 g, Rfl: 1    zonisamide (ZONEGRAN) 100 MG Cap, Take 3 capsules (300 mg total) by mouth 2 (two) times daily., Disp: 540 capsule, Rfl: 3  Past Medical History:   Diagnosis Date    Blood clotting tendency     Coronary artery disease     Fever blister     HLD (hyperlipidemia)     Hypertension     Hypothyroidism 2020    Personal history of other endocrine, nutritional and metabolic disease 2023    Renal cyst 2021    Seizures     Sleep apnea     Temporal lobe epilepsy 2016     Past Surgical History:   Procedure Laterality Date    ABDOMINAL SURGERY      BACK SURGERY      BREAST BIOPSY Left     benign    CARDIAC SURGERY       SECTION  10/21/1988; 1994    ESOPHAGEAL MANOMETRY WITH  MEASUREMENT OF IMPEDANCE N/A 2021    Procedure: MANOMETRY, ESOPHAGUS, WITH IMPEDANCE MEASUREMENT;  Surgeon: Sumanth Curran RN;  Location: [facility] ENDO;  Service: Endoscopy;  Laterality: N/A;    FRACTURE SURGERY      HYSTERECTOMY      LEFT HEART CATHETERIZATION Left 2022    Procedure: CATHETERIZATION, HEART, LEFT;  Surgeon: Richie Butt MD;  Location: [facility] CATH LAB;  Service: Cardiology;  Laterality: Left;    OOPHORECTOMY      TONSILLECTOMY      VASCULAR SURGERY       Social History     Socioeconomic History    Marital status:    Tobacco Use    Smoking status: Former     Current packs/day: 0.00     Average packs/day: 1 pack/day for 33.2 years (33.2 ttl pk-yrs)     Types: Cigarettes     Start date:      Quit date: 2014     Years since quittin.3     Passive exposure: Past    Smokeless tobacco: Never   Substance and Sexual Activity    Alcohol use: No    Drug use: No    Sexual activity: Not Currently     Partners: Male     Birth control/protection: Condom             Past/Current Medical/Surgical History, Past/Current Social History, Past/Current Family History and Past/Current Medications were reviewed in detail.        Objective:           VITAL SIGNS WERE REVIEWED      GENERAL APPEARANCE:     The patient looks fatigued.     BMI 35.40    No signs of respiratory distress.    Normal breathing pattern.    No dysmorphic features    Normal eye contact.     GENERAL MEDICAL EXAM:    HEENT:  Head is atraumatic normocephalic.     Neck and Axillae: No JVD. No visible lesions    Cardiopulmonary: No cyanosis. No tachypnea. Normal respiratory effort.VNS in place.    Gastrointestinal/Urogenital:  No jaundice. No stomas or lesions. No visible hernias. No catheters.     Skin, Hair and Nails: No pathognonomic skin rash. No neurofibromatosis. No visible lesions.No stigmata of autoimmune disease. No clubbing.    Limbs: No varicose veins. No visible swelling.    Muskoskeletal: No visible deformities.No  visible lesions.               Neurologic Exam     Mental Status   Oriented to person, place, and time.   Follows 3 step commands.   Attention: normal. Concentration: normal.   Speech: speech is normal   Level of consciousness: alert  Knowledge: good.   Able to name object. Able to read. Able to repeat. Normal comprehension.     Cranial Nerves   Cranial nerves II through XII intact.     CN II   Visual fields full to confrontation.   Visual acuity: normal with correction  Right visual field deficit: none  Left visual field deficit: none     CN III, IV, VI   Pupils are equal, round, and reactive to light.  Extraocular motions are normal.   Right pupil: Size: 2 mm. Shape: regular. Reactivity: brisk. Consensual response: intact. Accommodation: intact.   Left pupil: Size: 2 mm. Shape: regular. Reactivity: brisk. Consensual response: intact. Accommodation: intact.   CN III: no CN III palsy  CN VI: no CN VI palsy  Nystagmus: none   Diplopia: none  Ophthalmoparesis: none  Upgaze: normal  Downgaze: normal  Conjugate gaze: present  Vestibulo-ocular reflex: present    CN V   Facial sensation intact.   Right facial sensation deficit: none  Left facial sensation deficit: none    CN VII   Facial expression full, symmetric.   Right facial weakness: none  Left facial weakness: none    CN VIII   CN VIII normal.   Hearing: intact  Right Rinne: AC > BC  Left Rinne: AC > BC  Mendes: does not lateralize     CN IX, X   CN IX normal.   CN X normal.   Palate: symmetric    CN XI   CN XI normal.   Right sternocleidomastoid strength: normal  Left sternocleidomastoid strength: normal  Right trapezius strength: normal  Left trapezius strength: normal    CN XII   CN XII normal.   Tongue: not atrophic  Fasciculations: absent  Tongue deviation: none    Motor Exam   Muscle bulk: normal  Overall muscle tone: normal  Right arm tone: normal  Left arm tone: normal  Right arm pronator drift: absent  Left arm pronator drift: absent  Right leg tone:  normal  Left leg tone: normal    Strength   Strength 5/5 throughout.   Right neck flexion: 5/5  Left neck flexion: 5/5  Right neck extension: 5/5  Left neck extension: 5/5  Right deltoid: 5/5  Left deltoid: 5/5  Right biceps: 5/5  Left biceps: 5/5  Right triceps: 5/5  Left triceps: 5/5  Right wrist flexion: 5/5  Left wrist flexion: 5/5  Right wrist extension: 5/5  Left wrist extension: 5/5  Right interossei: 5/5  Left interossei: 5/5  Right iliopsoas: 5/5  Left iliopsoas: 5/5  Right quadriceps: 5/5  Left quadriceps: 5/5  Right hamstrin/5  Left hamstrin/5  Right glutei: 5/5  Left glutei: 5/5  Right anterior tibial: 5/5  Left anterior tibial: 5/5  Right posterior tibial: 5/5  Left posterior tibial: 5/5  Right peroneal: 5/5  Left peroneal: 5/5  Right gastroc: 5/5  Left gastroc: 5/5    Sensory Exam   Light touch normal.   Right arm light touch: normal  Left arm light touch: normal  Right leg light touch: normal  Left leg light touch: normal  Vibration normal.   Right arm vibration: normal  Left arm vibration: normal  Right leg vibration: normal  Left leg vibration: normal  Proprioception normal.   Right arm proprioception: normal  Left arm proprioception: normal  Right leg proprioception: normal  Left leg proprioception: normal  Pinprick normal.   Right arm pinprick: normal  Left arm pinprick: normal  Right leg pinprick: normal  Left leg pinprick: normal  Graphesthesia: normal  Stereognosis: normal    Gait, Coordination, and Reflexes     Gait  Gait: (limping gait)    Coordination   Romberg: negative  Finger to nose coordination: normal  Heel to shin coordination: normal  Tandem walking coordination: normal    Tremor   Resting tremor: absent  Intention tremor: absent  Action tremor: absent    Reflexes   Right brachioradialis: 2+  Left brachioradialis: 2+  Right biceps: 2+  Left biceps: 2+  Right triceps: 2+  Left triceps: 2+  Right patellar: 2+  Left patellar: 2+  Right achilles: 2+  Left achilles: 2+  Right  plantar: normal  Left plantar: normal  Right Marie: absent  Left Marie: absent  Right ankle clonus: absent  Left ankle clonus: absent  Right pendular knee jerk: absent  Left pendular knee jerk: absent      Lab Results   Component Value Date    WBC 3.57 (L) 09/20/2022    HGB 12.4 09/20/2022    HCT 37.7 09/20/2022    MCV 99 (H) 09/20/2022     09/20/2022     Sodium   Date Value Ref Range Status   06/29/2022 142 136 - 145 mmol/L Final     Potassium   Date Value Ref Range Status   06/29/2022 4.5 3.5 - 5.1 mmol/L Final     Chloride   Date Value Ref Range Status   06/29/2022 112 (H) 95 - 110 mmol/L Final     CO2   Date Value Ref Range Status   06/29/2022 21 (L) 23 - 29 mmol/L Final     Glucose   Date Value Ref Range Status   06/29/2022 117 (H) 70 - 110 mg/dL Final     BUN   Date Value Ref Range Status   06/29/2022 9 8 - 23 mg/dL Final     Creatinine   Date Value Ref Range Status   06/29/2022 0.9 0.5 - 1.4 mg/dL Final     Calcium   Date Value Ref Range Status   06/29/2022 8.9 8.7 - 10.5 mg/dL Final     Total Protein   Date Value Ref Range Status   06/29/2022 7.2 6.0 - 8.4 g/dL Final     Albumin   Date Value Ref Range Status   06/29/2022 3.8 3.5 - 5.2 g/dL Final     Total Bilirubin   Date Value Ref Range Status   06/29/2022 0.3 0.1 - 1.0 mg/dL Final     Comment:     For infants and newborns, interpretation of results should be based  on gestational age, weight and in agreement with clinical  observations.    Premature Infant recommended reference ranges:  Up to 24 hours.............<8.0 mg/dL  Up to 48 hours............<12.0 mg/dL  3-5 days..................<15.0 mg/dL  6-29 days.................<15.0 mg/dL       Alkaline Phosphatase   Date Value Ref Range Status   06/29/2022 145 (H) 55 - 135 U/L Final     AST   Date Value Ref Range Status   06/29/2022 22 10 - 40 U/L Final     ALT   Date Value Ref Range Status   06/29/2022 27 10 - 44 U/L Final     Anion Gap   Date Value Ref Range Status   06/29/2022 9 8 - 16  "mmol/L Final     eGFR if    Date Value Ref Range Status   06/29/2022 >60 >60 mL/min/1.73 m^2 Final     eGFR if non    Date Value Ref Range Status   06/29/2022 >60 >60 mL/min/1.73 m^2 Final     Comment:     Calculation used to obtain the estimated glomerular filtration  rate (eGFR) is the CKD-EPI equation.        No results found for: "[id number]"  Lab Results   Component Value Date    TSH 0.639 04/12/2023    FREET4 0.85 04/12/2023 2016-2020    Brain MRI Unremarkable    EEG TL Seizures  B/L       [facility] EMU     3 RT TL Seizures       04-    CNP Completed       AED LEVELS     AED: PHT NORMAL LEVEL RANGE: 10-20   DATE LEVEL   09- 12.9   10- 15.0   01- 06.3   05- 01.1   05- (200 mg BID) 14.0   07- 11.9   01- 11.0   05- 8.3   12- 17.5   03- 18.5                AED: ZNS NORMAL LEVEL RANGE: 10-40   DATE LEVEL   09- 13   07- 37   01- 39   05- 22   12- 29   03- 34.0        AED: CLB NORMAL LEVEL RANGE:    DATE LEVEL   10- 176   07- 193   01- 139   05- 55.7   03- 91.8              08-     Prior setting        Normal  Output Current mA 1.125    Signal Frequency Hz 30    Pulse Width microsec 500    Signal ON Time seconds 30    Signal OFF Time minute 1.8   AutoStim Output Current mA 1.25    Pulse Width microsec 500    ON Time seconds 60   Magnet  Output Current mA 1.25    Pulse Width microsec 500    ON Time seconds 60   Battery Life 11% -25 %           08-     New setting   Normal  Output Current mA 1.25    Signal Frequency Hz 30    Pulse Width microsec 250    Signal ON Time seconds 30    Signal OFF Time minute 5   AutoStim Output Current mA 1.375    Pulse Width microsec 250    ON Time seconds 30   Magnet  Output Current mA 1.5    Pulse Width microsec 500    ON Time seconds 60   Battery Life  11-25%      Visit " 6   Normal  Output Current mA 1.5    Signal Frequency Hz 30    Pulse Width microsec 250    Signal ON Time seconds 30    Signal OFF Time minute 5   AutoStim Output Current mA 1.625    Pulse Width microsec 250    ON Time seconds 30   Magnet  Output Current mA 1.75    Pulse Width microsec 500    ON Time seconds 60     Visit 7   Normal  Output Current mA 1.75    Signal Frequency Hz 30    Pulse Width microsec 250    Signal ON Time seconds 30    Signal OFF Time minute 5   AutoStim Output Current mA 1.875    Pulse Width microsec 250    ON Time seconds 30   Magnet  Output Current mA 2.0    Pulse Width microsec 500    ON Time seconds 60         Leave Visit 7 Settings for 3 months.    If no improvement, titrate Output Current by increments of 0.25 to maximum of 3.5 m-A (depending on side effects like voice alteration, cough, throat         Reviewed the neuroimaging independently       Assessment:       1. Refractory epilepsy    2. Temporal lobe epilepsy    3. S/P placement of VNS (vagus nerve stimulation) device    4. Partial symptomatic epilepsy with complex partial seizures, intractable, without status epilepticus    5. Occipital neuralgia, unspecified laterality    6. Fatigue, unspecified type              EPILEPSY CLASSIFICATION      SEMIOLOGY: DIALEPTIC (FOCAL WITH HILARY) WITH ICTAL SPITTING -->GTC     EPILEPTOGENIC ZONE (S):  RT TL     ETIOLOGY: UNKNOWN     PRIOR AEDS: VPA, LTG, OXC, LEV VNS     CURRENT AEDS: ZNS, PHT, CLB, (GBP LBP)    LAST SEIZURE DATE:  MONTHLY 03-      COMPREHENSIVE LIST OF AEDs:     Acetazolamide (AZM-Diamox)   Benzos: clonazepam (CZP Klonopin), lorazepam (LZP-Ativan), diazepam (DZP-Valium), clorazepate (CLZ- Tranxene)  Brivaracetam (BRV-Briviact)  Cannabidiol (CBD- Epidiolex)  Carbamazepine (CBZ-Tegretol)  Cenobamate (CNB-Xcopri)  Clobazam (CLB-Onfi)  Eslicarbazepine (ESL-Aptiom)  Ethosuximide (ESX-Zarontin)  Felbamate (FBM-Felbatol)  Gabapentin (GBP-Neurontin)  Lacosamide  (LCM-Vimpat)  Lamotrigine (LTG-Lamitcal)  Levetiracetam (LEV- Keppra)  Oxcarbazepine (OXC-Trileptal)  Perampanel (PML-Fycompa)  Phenobarbital (PB)  Phenytoin (PHT-Dilantin)  Pregabalin (PGB-Lyrica)  Primidone (PRM)   Retigabine (RTG- Potiga) Discontinued in 2017  Rufinamide (RFN-Benzil)  Stiripentol (STP-Diacomit)  Tiagabine (TGB-Gabitril)  Topiramate (TPM-Topamax)  Valproate (VPA-Depakote)  Vigabatrin (VGB-Sabril)  Zonisamide (ZNS-Zonegran)        Plan:         INTRACTABLE-MEDICALLY REFRACTORY NON-LESIONAL RT TLE UNDERGOING SURGICAL EVALUATION            Declined [facility] surgery recommendation at this time due to lack of family support      The patient was encouraged to maintain full traditional seizure precautions which include but not limited to avoid driving, avoid high altitudes, avoid being close to fire or fire source, avoid being close to a body of water or swimming alone, avoid operating heavy machinery and avoid using sharp objects if possible. The patient was encouraged to shower (without accumulation of water) instead of taking a bath if unsupervised. The patient was made aware that these precautions are especially important during concurrent illness. Adequate sleep and avoidance of alcohol as important measures to assure good seizure control were discussed with the patient. The patient was also advised not to care for children without company. The patient was advised to pad the side rails with pillows and blankets if applicable.I strongly recommended lowering the bed to the floor level to decrease the risk of falls.     Continue  mg BID.      Continue  mg BID.      Continue PHT 30 mg ER capsule HS      Continue CLB 10 mg BID (4 mL BID).     Continue Nayzilam (NS Midazolam) for prolonged and/or clusters of seizures.       Refer to Dr. Alisha Hobbs for potential battery replacement current battery 11 -25 %     08- New setting   Normal  Output Current mA 1.25    Signal Frequency Hz 30     Pulse Width microsec 250    Signal ON Time seconds 30    Signal OFF Time minute 5   AutoStim Output Current mA 1.375    Pulse Width microsec 250    ON Time seconds 30   Magnet  Output Current mA 1.5    Pulse Width microsec 500    ON Time seconds 60   Battery life  11-25%          Continue AEDs INDEFINITELY, FOR GOOD AND NEVER SKIP A DOSE. The patient verbalized full understanding. Stressed the extreme medical, personal safety, public safety and legal importance of compliance and seizure control. Will give as many refills as possible with or without face-to-face evaluation to make sure the patient and any patient with epilepsy will never run out of medications. Running out of seizure medications should never happen under our care. I explained to the patient that he should not, under any circumstances, adjust or stop taking his seizure medication without discussing with me. The patient verbalized full understanding.         AVOID any substance that could lower seizure threshold including but not limited to:      ALCOHOL AND WITHDRAWAL      TRAMADOL.     DEMEROL      ALL STIMULANTS-ALL ADHD MEDICATIONS.      CLOZAPINE.      BUPROPION.     CIPROFLOXACIN     CYCLOSPORIN    KRATOM     METCHOLRRAMAIDE    TETRAHYDROCANNABINOL            OCCIPITAL NEURALGIA WITH CERVICAL DDD       Try Amitriptyline/Elavil. Will titrate very slowly to 10 mg QHS which can cause sleepiness, dry eyes, dry mouth, urinary retention and rarely cardiac arrhythmias. The patient verbalized understanding of the most common SEs and was encouraged to read the leaflet for more details.     Heating pads PRN    Improve posture    Avoid heavy lifting or sudden spine movements     Will discuss PT and/or Pain Management            MEDICAL/SURGICAL COMORBIDITIES     All relevant medical comorbidities noted and managed by primary care physician and medical care team.          MISCELLANEOUS MEDICAL PROBLEMS       HEALTHY LIFESTYLE AND PREVENTATIVE  CARE    Encouraged the patient to adhere to the age-appropriate health maintenance guidelines including screening tests and vaccinations.     Discussed the overall importance of healthy lifestyle, optimal weight, exercise, healthy diet, good sleep hygiene and avoiding drugs including smoking, alcohol and recreational drugs. The patient verbalized full understanding.           RTC in 3 months          Stacey Adan, MSN NP      Collaborating Provider: Payton Estevez MD, FAAN Neurologist/Epileptologist          I spent a total of 61 minutes on the day of the visit.  This includes face to face time and non-face to face time preparing to see the patient (eg, review of tests), obtaining and/or reviewing separately obtained history, documenting clinical information in the electronic or other health record, independently interpreting results and communicating results to the patient/family/caregiver, or care coordinator.

## 2023-08-16 RX ORDER — CLOBAZAM 2.5 MG/ML
SUSPENSION ORAL
Qty: 360 ML | Refills: 0 | Status: SHIPPED | OUTPATIENT
Start: 2023-08-16 | End: 2023-10-09

## 2023-08-18 ENCOUNTER — PATIENT MESSAGE (OUTPATIENT)
Dept: ADMINISTRATIVE | Facility: HOSPITAL | Age: 63
End: 2023-08-18
Payer: MEDICARE

## 2023-08-18 ENCOUNTER — TELEPHONE (OUTPATIENT)
Dept: ADMINISTRATIVE | Facility: HOSPITAL | Age: 63
End: 2023-08-18
Payer: MEDICARE

## 2023-08-24 RX ORDER — LEVOCETIRIZINE DIHYDROCHLORIDE 5 MG/1
5 TABLET, FILM COATED ORAL NIGHTLY
Qty: 90 TABLET | Refills: 2 | Status: SHIPPED | OUTPATIENT
Start: 2023-08-24

## 2023-08-24 NOTE — TELEPHONE ENCOUNTER
No care due was identified.  [facility] Embedded Care Due Messages. Reference number: [id number].   8/24/2023 12:03:56 PM CDT

## 2023-08-24 NOTE — TELEPHONE ENCOUNTER
Refill Decision Note   Evelyne Francisco  is requesting a refill authorization.  Brief Assessment and Rationale for Refill:  Approve     Medication Therapy Plan:         Comments:     Note composed:1:27 PM 08/24/2023             Appointments     Last Visit   4/12/2023 Sergio Malcolm MD   Next Visit   10/17/2023 Sergio Malcolm MD

## 2023-09-08 ENCOUNTER — TELEPHONE (OUTPATIENT)
Dept: NEUROSURGERY | Facility: CLINIC | Age: 63
End: 2023-09-08
Payer: MEDICARE

## 2023-09-08 NOTE — TELEPHONE ENCOUNTER
Patient with old NRSX referral in my WQ again. Was transferred to [facility], but bounced back to my WQ. Patient never contacted. Message sent to [facility] NRSX clinical support for possible scheduling.  ASHLEY

## 2023-09-15 ENCOUNTER — TELEPHONE (OUTPATIENT)
Dept: NEUROSURGERY | Facility: CLINIC | Age: 63
End: 2023-09-15
Payer: MEDICARE

## 2023-09-15 NOTE — TELEPHONE ENCOUNTER
Called to schedule appt per referral for vns battery change. Pt would not like to schedule appt. They are beginning care under a provider outside of [facility].

## 2023-09-25 ENCOUNTER — PATIENT MESSAGE (OUTPATIENT)
Dept: SPORTS MEDICINE | Facility: CLINIC | Age: 63
End: 2023-09-25
Payer: MEDICARE

## 2023-10-09 RX ORDER — CLOBAZAM 2.5 MG/ML
SUSPENSION ORAL
Qty: 360 ML | Refills: 2 | Status: SHIPPED | OUTPATIENT
Start: 2023-10-09 | End: 2024-03-26 | Stop reason: SDUPTHER

## 2023-10-11 ENCOUNTER — TELEPHONE (OUTPATIENT)
Dept: SPORTS MEDICINE | Facility: CLINIC | Age: 63
End: 2023-10-11
Payer: MEDICARE

## 2023-10-11 DIAGNOSIS — M25.569 KNEE PAIN, UNSPECIFIED CHRONICITY, UNSPECIFIED LATERALITY: Primary | ICD-10-CM

## 2023-10-17 ENCOUNTER — OFFICE VISIT (OUTPATIENT)
Dept: INTERNAL MEDICINE | Facility: CLINIC | Age: 63
End: 2023-10-17
Payer: MEDICARE

## 2023-10-17 VITALS
BODY MASS INDEX: 36.03 KG/M2 | DIASTOLIC BLOOD PRESSURE: 84 MMHG | TEMPERATURE: 98 F | HEIGHT: 65 IN | HEART RATE: 83 BPM | SYSTOLIC BLOOD PRESSURE: 132 MMHG | WEIGHT: 216.25 LBS | OXYGEN SATURATION: 99 %

## 2023-10-17 DIAGNOSIS — R06.02 SHORTNESS OF BREATH: ICD-10-CM

## 2023-10-17 DIAGNOSIS — Z12.39 BREAST SCREENING: ICD-10-CM

## 2023-10-17 DIAGNOSIS — Z12.31 ENCOUNTER FOR SCREENING MAMMOGRAM FOR MALIGNANT NEOPLASM OF BREAST: ICD-10-CM

## 2023-10-17 DIAGNOSIS — Z28.39 IMMUNIZATION DEFICIENCY: ICD-10-CM

## 2023-10-17 DIAGNOSIS — E03.9 HYPOTHYROIDISM, UNSPECIFIED TYPE: ICD-10-CM

## 2023-10-17 DIAGNOSIS — I10 PRIMARY HYPERTENSION: Primary | ICD-10-CM

## 2023-10-17 DIAGNOSIS — N39.0 URINARY TRACT INFECTION WITHOUT HEMATURIA, SITE UNSPECIFIED: ICD-10-CM

## 2023-10-17 DIAGNOSIS — M46.96 UNSPECIFIED INFLAMMATORY SPONDYLOPATHY, LUMBAR REGION: ICD-10-CM

## 2023-10-17 PROCEDURE — 99214 PR OFFICE/OUTPT VISIT, EST, LEVL IV, 30-39 MIN: ICD-10-PCS | Mod: S$PBB,,, | Performed by: FAMILY MEDICINE

## 2023-10-17 PROCEDURE — G0008 ADMIN INFLUENZA VIRUS VAC: HCPCS | Mod: PBBFAC

## 2023-10-17 PROCEDURE — 99999 PR PBB SHADOW E&M-EST. PATIENT-LVL V: CPT | Mod: PBBFAC,,, | Performed by: FAMILY MEDICINE

## 2023-10-17 PROCEDURE — 99215 OFFICE O/P EST HI 40 MIN: CPT | Mod: PBBFAC,25 | Performed by: FAMILY MEDICINE

## 2023-10-17 PROCEDURE — 99999PBSHW FLU VACCINE (QUAD) GREATER THAN OR EQUAL TO 3YO PRESERVATIVE FREE IM: ICD-10-PCS | Mod: PBBFAC,,,

## 2023-10-17 PROCEDURE — 99999PBSHW FLU VACCINE (QUAD) GREATER THAN OR EQUAL TO 3YO PRESERVATIVE FREE IM: Mod: PBBFAC,,,

## 2023-10-17 PROCEDURE — 99214 OFFICE O/P EST MOD 30 MIN: CPT | Mod: S$PBB,,, | Performed by: FAMILY MEDICINE

## 2023-10-17 PROCEDURE — 99999 PR PBB SHADOW E&M-EST. PATIENT-LVL V: ICD-10-PCS | Mod: PBBFAC,,, | Performed by: FAMILY MEDICINE

## 2023-10-17 NOTE — PROGRESS NOTES
Subjective:       Patient ID: Evelyne Francisco is a 63 y.o. female.    Chief Complaint: Follow-up (6 mth)    Six-month follow-up.  She has a vagus nerve stimulator device and battery is scheduled be replaced.  She history of partial epilepsy followed by Neurology.  She is followed by Cardiology with coronary disease coronary artery atherosclerosis cirrhosis.  She also has hypertension frequent urinary infections and hypothyroidism.  She denies any current fever chills.  She denies any current urinary infections.  Urine culture follow-up is being ordered.  She reports 3 weeks ago she had fever cough.  Those symptoms resolved.  She reports some shortness breath since.  She is scheduled for EKG chest x-ray prior to the vagus nerve battery change.    Follow-up  Pertinent negatives include no chest pain, chills, coughing, fever, headaches or weakness.     Review of Systems   Constitutional:  Negative for chills and fever.   Respiratory:  Positive for shortness of breath. Negative for cough, chest tightness and wheezing.    Cardiovascular:  Negative for chest pain, palpitations and leg swelling.   Genitourinary:  Negative for difficulty urinating, dysuria, frequency, hematuria and urgency.   Neurological:  Negative for dizziness, weakness, light-headedness and headaches.       Objective:      Physical Exam  Constitutional:       General: She is not in acute distress.     Appearance: She is not ill-appearing or diaphoretic.   Cardiovascular:      Rate and Rhythm: Normal rate and regular rhythm.      Heart sounds: No murmur heard.     No gallop.   Pulmonary:      Effort: Pulmonary effort is normal. No respiratory distress.      Breath sounds: No wheezing, rhonchi or rales.   Abdominal:      General: There is no distension.   Lymphadenopathy:      Cervical: No cervical adenopathy.   Skin:     General: Skin is warm and dry.      Coloration: Skin is not pale.      Findings: No erythema.   Neurological:      Mental  Status: She is alert.         Office Visit on 06/27/2023   Component Date Value Ref Range Status    Specimen UA 06/27/2023 Urine, Clean Catch   Final    Color, UA 06/27/2023 Yellow  Yellow, Straw, Angelica Final    Appearance, UA 06/27/2023 Cloudy (A)  Clear Final    pH, UA 06/27/2023 6.0  5.0 - 8.0 Final    Specific Gravity, UA 06/27/2023 1.020  1.005 - 1.030 Final    Protein, UA 06/27/2023 1+ (A)  Negative Final    Glucose, UA 06/27/2023 Negative  Negative Final    Ketones, UA 06/27/2023 Negative  Negative Final    Bilirubin (UA) 06/27/2023 Negative  Negative Final    Occult Blood UA 06/27/2023 2+ (A)  Negative Final    Nitrite, UA 06/27/2023 Positive (A)  Negative Final    Urobilinogen, UA 06/27/2023 Negative  <2.0 EU/dL Final    Leukocytes, UA 06/27/2023 3+ (A)  Negative Final    RBC, UA 06/27/2023 30 (H)  0 - 4 /hpf Final    WBC, UA 06/27/2023 >100 (H)  0 - 5 /hpf Final    WBC Clumps, UA 06/27/2023 Many (A)  None-Rare Final    Bacteria 06/27/2023 Many (A)  None-Occ /hpf Final    Squam Epithel, UA 06/27/2023 17  /hpf Final    Hyaline Casts, UA 06/27/2023 0  0-1/lpf /lpf Final    Microscopic Comment 06/27/2023 SEE COMMENT   Final    Urine Culture, Routine 06/27/2023  (A)   Final                    Value:ESCHERICHIA COLI  >100,000 cfu/ml       Assessment:       1. Primary hypertension    2. Immunization deficiency    3. Breast screening    4. Encounter for screening mammogram for malignant neoplasm of breast    5. Urinary tract infection without hematuria, site unspecified    6. Unspecified inflammatory spondylopathy, lumbar region    7. Hypothyroidism, unspecified type    8. Shortness of breath        Plan:   Blood pressure controlled flu immunization ordered mammogram ordered urine culture ordered.  Follow-up in 3 months.  She has history of inflammatory spondylopathy of the lumbar region which is stable.  Up-to-date TSH T4 was normal with a history of hypothyroidism      Primary hypertension    Immunization  deficiency  -     Influenza - Quadrivalent *Preferred* (6 months+) (PF)    Breast screening  -     Mammo Digital Screening Bilat w/ Eliazar; Future; Expected date: 10/17/2023    Encounter for screening mammogram for malignant neoplasm of breast  -     Mammo Digital Screening Bilat w/ Eliazar; Future; Expected date: 10/17/2023    Urinary tract infection without hematuria, site unspecified  -     Urine culture; Future; Expected date: 10/18/2023    Unspecified inflammatory spondylopathy, lumbar region    Hypothyroidism, unspecified type    Shortness of breath

## 2023-10-18 ENCOUNTER — OFFICE VISIT (OUTPATIENT)
Dept: SPORTS MEDICINE | Facility: CLINIC | Age: 63
End: 2023-10-18
Payer: MEDICARE

## 2023-10-18 ENCOUNTER — HOSPITAL ENCOUNTER (OUTPATIENT)
Dept: RADIOLOGY | Facility: HOSPITAL | Age: 63
Discharge: HOME | End: 2023-10-18
Attending: STUDENT IN AN ORGANIZED HEALTH CARE EDUCATION/TRAINING PROGRAM
Payer: MEDICARE

## 2023-10-18 DIAGNOSIS — M25.562 CHRONIC PAIN OF BOTH KNEES: Primary | ICD-10-CM

## 2023-10-18 DIAGNOSIS — G89.29 CHRONIC PAIN OF BOTH KNEES: Primary | ICD-10-CM

## 2023-10-18 DIAGNOSIS — M79.2 NERVE PAIN: ICD-10-CM

## 2023-10-18 DIAGNOSIS — M25.561 CHRONIC PAIN OF BOTH KNEES: Primary | ICD-10-CM

## 2023-10-18 DIAGNOSIS — M23.200 OLD TEAR OF LATERAL MENISCUS OF RIGHT KNEE, UNSPECIFIED TEAR TYPE: ICD-10-CM

## 2023-10-18 DIAGNOSIS — M25.569 KNEE PAIN, UNSPECIFIED CHRONICITY, UNSPECIFIED LATERALITY: ICD-10-CM

## 2023-10-18 DIAGNOSIS — M17.0 BILATERAL PRIMARY OSTEOARTHRITIS OF KNEE: ICD-10-CM

## 2023-10-18 PROCEDURE — 99214 OFFICE O/P EST MOD 30 MIN: CPT | Mod: S$PBB,,, | Performed by: STUDENT IN AN ORGANIZED HEALTH CARE EDUCATION/TRAINING PROGRAM

## 2023-10-18 PROCEDURE — 99214 PR OFFICE/OUTPT VISIT, EST, LEVL IV, 30-39 MIN: ICD-10-PCS | Mod: S$PBB,,, | Performed by: STUDENT IN AN ORGANIZED HEALTH CARE EDUCATION/TRAINING PROGRAM

## 2023-10-18 PROCEDURE — 99999 PR PBB SHADOW E&M-EST. PATIENT-LVL IV: ICD-10-PCS | Mod: PBBFAC,,, | Performed by: STUDENT IN AN ORGANIZED HEALTH CARE EDUCATION/TRAINING PROGRAM

## 2023-10-18 PROCEDURE — 73564 X-RAY EXAM KNEE 4 OR MORE: CPT | Mod: TC,50,FY,PO

## 2023-10-18 PROCEDURE — 73564 XR KNEE ORTHO BILAT WITH FLEXION: ICD-10-PCS | Mod: 26,50,, | Performed by: RADIOLOGY

## 2023-10-18 PROCEDURE — 99999 PR PBB SHADOW E&M-EST. PATIENT-LVL IV: CPT | Mod: PBBFAC,,, | Performed by: STUDENT IN AN ORGANIZED HEALTH CARE EDUCATION/TRAINING PROGRAM

## 2023-10-18 PROCEDURE — 99214 OFFICE O/P EST MOD 30 MIN: CPT | Mod: PBBFAC,PO | Performed by: STUDENT IN AN ORGANIZED HEALTH CARE EDUCATION/TRAINING PROGRAM

## 2023-10-18 PROCEDURE — 73564 X-RAY EXAM KNEE 4 OR MORE: CPT | Mod: 26,50,, | Performed by: RADIOLOGY

## 2023-10-18 RX ORDER — PREGABALIN 75 MG/1
75 CAPSULE ORAL 2 TIMES DAILY
Qty: 60 CAPSULE | Refills: 2 | Status: SHIPPED | OUTPATIENT
Start: 2023-10-18 | End: 2024-01-16

## 2023-10-18 RX ORDER — PREGABALIN 75 MG/1
75 CAPSULE ORAL 2 TIMES DAILY
Qty: 60 CAPSULE | Refills: 2 | Status: SHIPPED | OUTPATIENT
Start: 2023-10-18 | End: 2023-10-18

## 2023-10-18 NOTE — PATIENT INSTRUCTIONS
Assessment:  Evelyne Francisco is a 63 y.o. female with a chief complaint of Pain, Injury, and Swelling of the Right Knee and Pain, Injury, and Swelling of the Left Knee    Encounter Diagnoses   Name Primary?    Chronic pain of both knees Yes    Nerve pain     Bilateral primary osteoarthritis of knee     Old tear of lateral meniscus of right knee, unspecified tear type       Plan:  XR reviewed - mild medial and patellofemoral compartment degenerative changes.    MRI from 1 year ago with report stating small posterior horn lateral meniscus tear of the right knee  Labs reviewed - A1c 5.0%, Cr 0.9, GFR > 60, LFTs WNL, TSH WNL.    Patient with chronic bilateral knee pain, right more significant than the left, treated in the past as osteoarthritis related pain, but has had multiple corticosteroid injections and round of viscosupplementation injections, all without any significant relief.    Pain today consistent more with hyperesthesia, hyperalgesia, and I suspect underlying nerve component.    We will trial Lyrica, 75 mg, twice daily.    No indication for repeat injections at this time, given lack of efficacy.  No surgical intervention at this time.  May benefit from formal PT, has not had good experience in the past, so we will monitor response to Lyrica, and may consider repeat physical therapy in the future.      Follow-up:  2 months or sooner if there are any problems between now and then.    Thank you for choosing [facility] and Dr. Nash Pham for your orthopedic & sports medicine care. It is our goal to provide you with exceptional care that will help keep you healthy, active, and get you back in the game.    Please do not hesitate to reach out to us via email, phone, or MyChart with any questions, concerns, or feedback.    If you are experiencing pain/discomfort ,or have questions after 5pm and would like to be connected to the [facility]  on-call team, please call this number and specify which Sports Medicine provider is treating you: [phone]

## 2023-10-18 NOTE — PROGRESS NOTES
Patient ID: Evelyne Francisco  YOB: 1960  MRN: [id number]    Chief Complaint: Pain, Injury, and Swelling of the Right Knee and Pain, Injury, and Swelling of the Left Knee    Referred By: Self    Occupation: Retired      History of Present Illness: Evelyne Francisco is a 63 y.o. female who presents today with Pain, Injury, and Swelling of the Right Knee and Pain, Injury, and Swelling of the Left Knee    She complains of chronic bilateral knee pain that has worsened since summer 2023.  Right is worse than Left.  The pain is primarily anterior and medial knee but also affects the lateral, posterior knee, up her anterior thigh and down her anterior shin.  The pain is constant, worse with prolonged sitting and at night where she experiences hypersensitivity that is irritated even by the pressure of her blanket.  She has been previously evaluate by Dr. Vu who recommended PT but she did not complete this.  She has done PT in the past a long time ago but doesn't remember how helpful it was.  She has also been treated by Dr. Wilkins and Dr. Chris at [facility] with multiple CSI and Orthovisc injections.  None of them provided relief for more than 2 days.    Of note, she reports history of lumbar spine pathology that has been recommended for injections and possible surgery but she has been lost to follow up and doesn't remember who her doctor was.    Past Medical History:   Past Medical History:   Diagnosis Date    Blood clotting tendency     Coronary artery disease     Fever blister     HLD (hyperlipidemia)     Hypertension     Hypothyroidism 12/7/2020    Personal history of other endocrine, nutritional and metabolic disease 4/12/2023    Renal cyst 7/12/2021    Seizures     Sleep apnea     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     Past Surgical History:   Procedure Laterality Date    ABDOMINAL SURGERY      BACK SURGERY      BREAST BIOPSY Left     benign    CARDIAC SURGERY        SECTION  10/21/1988; 1994    ESOPHAGEAL MANOMETRY WITH MEASUREMENT OF IMPEDANCE N/A 2021    Procedure: MANOMETRY, ESOPHAGUS, WITH IMPEDANCE MEASUREMENT;  Surgeon: Sumanth Curran RN;  Location: [facility] ENDO;  Service: Endoscopy;  Laterality: N/A;    FRACTURE SURGERY      HYSTERECTOMY      LEFT HEART CATHETERIZATION Left 2022    Procedure: CATHETERIZATION, HEART, LEFT;  Surgeon: Richie Butt MD;  Location: [facility] CATH LAB;  Service: Cardiology;  Laterality: Left;    OOPHORECTOMY      TONSILLECTOMY      VASCULAR SURGERY       Family History   Problem Relation Age of Onset    Hypertension Mother     Hyperlipidemia Mother     Stroke Father     Seizures Father     Hypertension Sister     Cancer Brother     Marfan syndrome Daughter     Stroke Sister      Social History     Socioeconomic History    Marital status:    Tobacco Use    Smoking status: Former     Current packs/day: 0.00     Average packs/day: 1 pack/day for 33.2 years (33.2 ttl pk-yrs)     Types: Cigarettes     Start date:      Quit date: 2014     Years since quittin.5     Passive exposure: Past    Smokeless tobacco: Never   Substance and Sexual Activity    Alcohol use: No    Drug use: No    Sexual activity: Not Currently     Partners: Male     Birth control/protection: Condom     Medication List with Changes/Refills   New Medications    PREGABALIN (LYRICA) 75 MG CAPSULE    Take 1 capsule (75 mg total) by mouth 2 (two) times daily.   Current Medications    AMITRIPTYLINE (ELAVIL) 10 MG TABLET    Take 1 tablet (10 mg total) by mouth every evening.    CLOBAZAM (ONFI) 2.5 MG/ML SUSP    TAKE 4 ML BY MOUTH  TWICE DAILY    CLOPIDOGREL (PLAVIX) 75 MG TABLET    Take 1 tablet (75 mg total) by mouth once daily.    ESTRADIOL (ESTRACE) 2 MG TABLET    Take 1 tablet (2 mg total) by mouth once daily.    EZETIMIBE (ZETIA) 10 MG TABLET    Take 10 mg by mouth once daily.    FLUCONAZOLE (DIFLUCAN) 200 MG TAB    Take 200 mg by mouth  every 7 days.    FLUOCINOLONE (SYNALAR) 0.01 % EXTERNAL SOLUTION    Apply topically 2 (two) times daily.    ISOSORBIDE MONONITRATE (IMDUR) 60 MG 24 HR TABLET    TAKE 1 TABLET BY MOUTH EVERY 12 HOURS    LEVOCETIRIZINE (XYZAL) 5 MG TABLET    Take 1 tablet (5 mg total) by mouth every evening.    LEVOTHYROXINE (SYNTHROID) 100 MCG TABLET    Take 1 tablet (100 mcg total) by mouth before breakfast.    METOPROLOL SUCCINATE (TOPROL-XL) 50 MG 24 HR TABLET    Take 1 tablet (50 mg total) by mouth once daily.    NAYZILAM 5 MG/SPRAY (0.1 ML) SPRY    INSTILL 1 SPRAY IN NASAL ROUTE ONCE AS NEEDED. (1 SPRAY FOR PROLONGED SEIZURE OR CLUSTERS OF SEIZURES. MAY REPEAT ONCE IN OPPOSITE NOSTRIL AFTER 10 MINUTES.)    NITROFURANTOIN, MACROCRYSTAL-MONOHYDRATE, (MACROBID) 100 MG CAPSULE    Take 1 capsule (100 mg total) by mouth 2 (two) times daily.    PANTOPRAZOLE (PROTONIX) 40 MG TABLET    Take 1 tablet (40 mg total) by mouth 2 (two) times daily.    PHENYTOIN (DILANTIN) 100 MG ER CAPSULE    Take 2 capsules (200 mg total) by mouth 2 (two) times daily.    PHENYTOIN (DILANTIN) 30 MG ER CAPSULE    Take 1 capsule (30 mg total) by mouth every evening.    POTASSIUM CHLORIDE (MICRO-K) 10 MEQ CPSR    Take 10 mEq by mouth once.    RANOLAZINE (RANEXA) 1,000 MG TB12    Take 1,000 mg by mouth 2 (two) times daily.    REPATHA SURECLICK 140 MG/ML PNIJ    SMARTSI Milligram(s) SUB-Q Every 2 Weeks    SOLIFENACIN (VESICARE) 10 MG TABLET    Take 1 tablet by mouth once daily    TRIAMCINOLONE ACETONIDE 0.1% (KENALOG) 0.1 % OINTMENT    AAA bid of scalp 1-2 times daily as needed for irritation    ZONISAMIDE (ZONEGRAN) 100 MG CAP    Take 3 capsules (300 mg total) by mouth 2 (two) times daily.     Review of patient's allergies indicates:   Allergen Reactions    Latex, natural rubber Hives, Shortness Of Breath and Swelling     Throat swelling  Throat swelling  Other reaction(s): Hives  Throat swelling    Ace inhibitors Other (See Comments)     Coughing  Other  reaction(s): Unknown    Diclo gel [diclofenac sodium] Other (See Comments)     Chest pain       Physical Exam:   There is no height or weight on file to calculate BMI.    Physical Exam  Detailed MSK exam:     Right Knee:  Inspection: Genu valgus    No effusion, erythema, or ecchymosis   Palpation tenderness: Medial and lateral joint line  Range of motion: 0 deg extension - 120 deg flexion  Strength:  5/5 Extension    5/5 Flexion  Stability: Stable ACL/Lachman      Stable Posterior Drawer   Stable Valgus Stress      Stable Varus Stress  Patella Exam: Negative J-sign   Negative Patellar apprehension   Negative Patellar crepitus   N/V Exam:  Tibial:    Normal sensory (plantar foot)  Normal motor (FHL)    Sup Peroneal:  Normal sensory (dorsal foot)  Normal motor (Peroneals)            Deep Peroneal:  Normal sensory (1st web space) Normal motor (EHL)    Sural:   Normal sensory (lateral foot)   Saphenous:   Normal sensory (medial lower leg)   Normal pedal pulses, warm and well perfused with capillary refill < 2 sec    Positive SLR      Left Knee:  Inspection: Genu valgus    No effusion, erythema, or ecchymosis   Palpation tenderness: Medial joint line  Range of motion: 0 deg extension - 120 deg flexion  Strength:  5/5 Extension    5/5 Flexion  Stability: Stable ACL/Lachman      Stable Posterior Drawer   Stable Valgus Stress      Stable Varus Stress  Patella Exam: Negative J-sign   Negative Patellar apprehension   Negative Patellar crepitus   N/V Exam:  Tibial:    Normal sensory (plantar foot)  Normal motor (FHL)    Sup Peroneal:  Normal sensory (dorsal foot)  Normal motor (Peroneals)            Deep Peroneal:  Normal sensory (1st web space) Normal motor (EHL)    Sural:   Normal sensory (lateral foot)   Saphenous:   Normal sensory (medial lower leg)   Normal pedal pulses, warm and well perfused with capillary refill < 2 sec    Positive SLR      Imaging:  X-ray Knee Ortho Bilateral with Flexion  Narrative: EXAMINATION:  XR  KNEE ORTHO BILAT WITH FLEXION    CLINICAL HISTORY:  Pain in unspecified knee    TECHNIQUE:  AP standing of both knees, PA flexion standing views of both knees, and Merchant views of both knees were performed.  Lateral views of both knees were also performed.    COMPARISON  12/15/2020    FINDINGS:  The medial and lateral compartment joint spaces are relatively well maintained.  Minimal degenerative change seen at either patellofemoral compartment.  No joint effusions are suggested.  No acute fracture or dislocation.  Impression: 1.  As above    Electronically signed by: Abdirizak Portillo DO  Date:    10/18/2023  Time:    09:48    Relevant imaging results were reviewed and interpreted by me and per my read:  Mild medial compartment narrowing of both knees, with small osteophyte of the superior pole patella, bilaterally.  Normal alignment overall.  No fractures or other acute abnormalities.    This was discussed with the patient and / or family today.     Patient Instructions   Assessment:  Evelyne Francisco is a 63 y.o. female with a chief complaint of Pain, Injury, and Swelling of the Right Knee and Pain, Injury, and Swelling of the Left Knee    Encounter Diagnoses   Name Primary?    Chronic pain of both knees Yes    Nerve pain     Bilateral primary osteoarthritis of knee     Old tear of lateral meniscus of right knee, unspecified tear type       Plan:  XR reviewed - mild medial and patellofemoral compartment degenerative changes.    MRI from 1 year ago with report stating small posterior horn lateral meniscus tear of the right knee  Labs reviewed - A1c 5.0%, Cr 0.9, GFR > 60, LFTs WNL, TSH WNL.    Patient with chronic bilateral knee pain, right more significant than the left, treated in the past as osteoarthritis related pain, but has had multiple corticosteroid injections and round of viscosupplementation injections, all without any significant relief.    Pain today consistent more with hyperesthesia,  hyperalgesia, and I suspect underlying nerve component.    We will trial Lyrica, 75 mg, twice daily.    No indication for repeat injections at this time, given lack of efficacy.  No surgical intervention at this time.  May benefit from formal PT, has not had good experience in the past, so we will monitor response to Lyrica, and may consider repeat physical therapy in the future.      Follow-up:  2 months or sooner if there are any problems between now and then.    Thank you for choosing [facility] and Dr. Nash Pham for your orthopedic & sports medicine care. It is our goal to provide you with exceptional care that will help keep you healthy, active, and get you back in the game.    Please do not hesitate to reach out to us via email, phone, or MyChart with any questions, concerns, or feedback.    If you are experiencing pain/discomfort ,or have questions after 5pm and would like to be connected to the [facility] on-call team, please call this number and specify which Sports Medicine provider is treating you: [phone]      A copy of today's visit note has been sent to the referring provider.           Nash Pham MD  Primary Care Sports Medicine    Disclaimer: This note was prepared using a voice recognition system and is likely to have sound alike errors within the text.

## 2023-10-30 ENCOUNTER — TELEPHONE (OUTPATIENT)
Dept: INTERNAL MEDICINE | Facility: CLINIC | Age: 63
End: 2023-10-30
Payer: MEDICARE

## 2023-10-30 NOTE — TELEPHONE ENCOUNTER
No care due was identified.  [facility] Embedded Care Due Messages. Reference number: [id number].   10/30/2023 5:01:35 PM CDT

## 2023-10-31 RX ORDER — PANTOPRAZOLE SODIUM 40 MG/1
40 TABLET, DELAYED RELEASE ORAL 2 TIMES DAILY
Qty: 180 TABLET | Refills: 0 | Status: SHIPPED | OUTPATIENT
Start: 2023-10-31

## 2023-11-01 ENCOUNTER — OFFICE VISIT (OUTPATIENT)
Dept: INTERNAL MEDICINE | Facility: CLINIC | Age: 63
End: 2023-11-01
Payer: MEDICARE

## 2023-11-01 VITALS
OXYGEN SATURATION: 97 % | WEIGHT: 217.63 LBS | RESPIRATION RATE: 17 BRPM | DIASTOLIC BLOOD PRESSURE: 80 MMHG | HEART RATE: 84 BPM | SYSTOLIC BLOOD PRESSURE: 134 MMHG | TEMPERATURE: 98 F | BODY MASS INDEX: 36.26 KG/M2 | HEIGHT: 65 IN

## 2023-11-01 DIAGNOSIS — R35.0 URINE FREQUENCY: ICD-10-CM

## 2023-11-01 DIAGNOSIS — R30.0 DYSURIA: Primary | ICD-10-CM

## 2023-11-01 PROCEDURE — 99999 PR PBB SHADOW E&M-EST. PATIENT-LVL V: ICD-10-PCS | Mod: PBBFAC,,, | Performed by: FAMILY MEDICINE

## 2023-11-01 PROCEDURE — 99214 PR OFFICE/OUTPT VISIT, EST, LEVL IV, 30-39 MIN: ICD-10-PCS | Mod: S$PBB,,, | Performed by: FAMILY MEDICINE

## 2023-11-01 PROCEDURE — 99215 OFFICE O/P EST HI 40 MIN: CPT | Mod: PBBFAC | Performed by: FAMILY MEDICINE

## 2023-11-01 PROCEDURE — 99999 PR PBB SHADOW E&M-EST. PATIENT-LVL V: CPT | Mod: PBBFAC,,, | Performed by: FAMILY MEDICINE

## 2023-11-01 PROCEDURE — 99214 OFFICE O/P EST MOD 30 MIN: CPT | Mod: S$PBB,,, | Performed by: FAMILY MEDICINE

## 2023-11-01 NOTE — PROGRESS NOTES
"Subjective:   Patient ID: Evelyne Francisco is a 63 y.o. female.  Chief Complaint:  Urinary Tract Infection    PCP Dr. Tate   Presents for evaluation of possible recurrent UTI   Complains of dysuria, abnormal urine odor, urinary frequency all similar to previous bladder infections   Chart shows treatment for last bladder infection from E coli in June 2023   Urine culture done October 2023- and showed resolution of infection  Urine culture June 2022  Urine culture June 2021 positive for E coli  Last saw Urology March 2022 for evaluation of incontinence issues and recurrent UTIs with stable kidney stone   Has not seen urology in past 6 months   If has infection this time, it will be her 2nd in three-month.    Urinary Tract Infection   This is a recurrent problem. The current episode started in the past 7 days. The problem occurs every urination. The problem has been unchanged. The quality of the pain is described as burning. The pain is at a severity of 3/10. The pain is mild. There has been no fever. There is No history of pyelonephritis. Associated symptoms include frequency, hesitancy and urgency. Pertinent negatives include no chills, discharge, flank pain, hematuria, nausea, sweats, vomiting, weight loss, constipation, rash or withholding. She has tried nothing for the symptoms. Her past medical history is significant for kidney stones, recurrent UTIs and urinary stasis.       Review of Systems   Constitutional:  Negative for chills and weight loss.   Gastrointestinal:  Negative for constipation, nausea and vomiting.   Genitourinary:  Positive for frequency, hesitancy and urgency. Negative for flank pain and hematuria.   Skin:  Negative for rash.     Objective:   /80 (BP Location: Left arm, Patient Position: Sitting, BP Method: Large (Manual))   Pulse 84   Temp 97.6 °F (36.4 °C)   Resp 17   Ht 5' 5" (1.651 m)   Wt 98.7 kg (217 lb 9.5 oz)   LMP  (LMP Unknown)   SpO2 97%   BMI 36.21 kg/m² "     Physical Exam  Vitals and nursing note reviewed.   Constitutional:       Appearance: Normal appearance. She is well-developed. She is obese. She is not ill-appearing.   Cardiovascular:      Rate and Rhythm: Normal rate and regular rhythm.   Pulmonary:      Effort: Pulmonary effort is normal.   Abdominal:      General: Bowel sounds are normal. There is no distension.      Palpations: Abdomen is soft.      Tenderness: There is abdominal tenderness in the suprapubic area. There is no right CVA tenderness, left CVA tenderness, guarding or rebound.   Lymphadenopathy:      Lower Body: No right inguinal adenopathy. No left inguinal adenopathy.   Skin:     Findings: No rash.   Psychiatric:         Mood and Affect: Mood and affect normal.       Assessment:       ICD-10-CM ICD-9-CM   1. Dysuria  R30.0 788.1   2. Urine frequency  R35.0 788.41     Plan:   Dysuria  Urine frequency  -     Urinalysis; Future; Expected date: 11/01/2023  -     Urine culture; Future; Expected date: 11/01/2023    Check urinalysis and urine culture   Treat with appropriate antibiotic if indicated   If culture positive, needs to follow-up with Urology for 2nd UTI in three-month.  Patient expresses agreement and understanding to the above plan  Follow-up with PCP as scheduled/as needed    30+ minutes of total time spent on the encounter, which includes face to face time and non-face to face time preparing to see the patient (eg, review of tests), Obtaining and/or reviewing separately obtained history, documenting clinical information in the electronic or other health record, independently interpreting results (not separately reported) and communicating results to the patient/family/caregiver, or Care coordination (not separately reported).

## 2023-11-02 ENCOUNTER — TELEPHONE (OUTPATIENT)
Dept: UROLOGY | Facility: CLINIC | Age: 63
End: 2023-11-02

## 2023-11-02 ENCOUNTER — OFFICE VISIT (OUTPATIENT)
Dept: UROLOGY | Facility: CLINIC | Age: 63
End: 2023-11-02
Payer: MEDICARE

## 2023-11-02 VITALS
WEIGHT: 217.63 LBS | BODY MASS INDEX: 36.21 KG/M2 | DIASTOLIC BLOOD PRESSURE: 97 MMHG | HEART RATE: 85 BPM | SYSTOLIC BLOOD PRESSURE: 143 MMHG

## 2023-11-02 DIAGNOSIS — N30.00 ACUTE CYSTITIS WITHOUT HEMATURIA: Primary | ICD-10-CM

## 2023-11-02 PROBLEM — T78.3XXA ANGIO-EDEMA: Status: RESOLVED | Noted: 2021-02-23 | Resolved: 2023-11-02

## 2023-11-02 PROBLEM — Z12.39 BREAST SCREENING: Status: RESOLVED | Noted: 2020-12-07 | Resolved: 2023-11-02

## 2023-11-02 PROBLEM — R53.83 FATIGUE: Status: RESOLVED | Noted: 2022-10-12 | Resolved: 2023-11-02

## 2023-11-02 PROBLEM — R06.02 SHORTNESS OF BREATH: Status: RESOLVED | Noted: 2022-02-04 | Resolved: 2023-11-02

## 2023-11-02 PROCEDURE — 99999 PR PBB SHADOW E&M-EST. PATIENT-LVL III: ICD-10-PCS | Mod: PBBFAC,,, | Performed by: NURSE PRACTITIONER

## 2023-11-02 PROCEDURE — 99213 OFFICE O/P EST LOW 20 MIN: CPT | Mod: PBBFAC | Performed by: NURSE PRACTITIONER

## 2023-11-02 PROCEDURE — 99214 PR OFFICE/OUTPT VISIT, EST, LEVL IV, 30-39 MIN: ICD-10-PCS | Mod: S$PBB,,, | Performed by: NURSE PRACTITIONER

## 2023-11-02 PROCEDURE — 99999 PR PBB SHADOW E&M-EST. PATIENT-LVL III: CPT | Mod: PBBFAC,,, | Performed by: NURSE PRACTITIONER

## 2023-11-02 PROCEDURE — 99214 OFFICE O/P EST MOD 30 MIN: CPT | Mod: S$PBB,,, | Performed by: NURSE PRACTITIONER

## 2023-11-02 RX ORDER — NITROFURANTOIN 25; 75 MG/1; MG/1
100 CAPSULE ORAL 2 TIMES DAILY
Qty: 20 CAPSULE | Refills: 0 | Status: SHIPPED | OUTPATIENT
Start: 2023-11-02 | End: 2023-11-12

## 2023-11-02 RX ORDER — PHENAZOPYRIDINE HYDROCHLORIDE 200 MG/1
200 TABLET, FILM COATED ORAL
Qty: 9 TABLET | Refills: 0 | Status: SHIPPED | OUTPATIENT
Start: 2023-11-02 | End: 2023-11-05

## 2023-11-02 RX ORDER — PHENAZOPYRIDINE HYDROCHLORIDE 200 MG/1
200 TABLET, FILM COATED ORAL
Qty: 9 TABLET | Refills: 0 | Status: SHIPPED | OUTPATIENT
Start: 2023-11-02 | End: 2023-11-02 | Stop reason: SDUPTHER

## 2023-11-02 RX ORDER — NITROFURANTOIN 25; 75 MG/1; MG/1
100 CAPSULE ORAL 2 TIMES DAILY
Qty: 20 CAPSULE | Refills: 0 | Status: SHIPPED | OUTPATIENT
Start: 2023-11-02 | End: 2023-11-02 | Stop reason: SDUPTHER

## 2023-11-02 NOTE — TELEPHONE ENCOUNTER
Patient was seen in office today.             ----- Message from Gera Lam MD sent at 11/2/2023  7:54 AM CDT -----  Urinalysis suspicion for recurrent infection   Urine culture pending   Sent empiric antibiotic to her pharmacy   Also sent Pyridium for symptoms   Patient needs to schedule a follow-up visit with her urologist

## 2023-11-02 NOTE — PROGRESS NOTES
Chief Complaint:   E coli cystitis    HPI:   Patient is a 63-year-old female that is presenting with dysuria for 3 days.  Patient was seen by primary care physician, urine culture is pending and she was prescribed Macrobid and Pyridium.  Unfortunately, patient has not picked up medication.  Denies gross hematuria or flank pain.  Slight pelvic pain.  In reviewing EMR, patient had a previous E coli  urine culture.  No history of renal stones.    Allergies:  Latex, natural rubber; Ace inhibitors; and Diclo gel [diclofenac sodium]    Medications:  has a current medication list which includes the following prescription(s): amitriptyline, clobazam, clopidogrel, estradiol, ezetimibe, fluconazole, isosorbide mononitrate, levocetirizine, levothyroxine, metoprolol succinate, nayzilam, nitrofurantoin (macrocrystal-monohydrate), pantoprazole, phenazopyridine, phenytoin, phenytoin, potassium chloride, pregabalin, ranolazine, repatha sureclick, solifenacin, triamcinolone acetonide 0.1%, and zonisamide.    Review of Systems:  General: No fever, chills, fatigability, or weight loss.  Skin: No rashes, itching, or changes in color or texture of skin.  Chest: Denies SCALES, cyanosis, wheezing, cough, and sputum production.  Abdomen: Appetite fine. No weight loss. Denies diarrhea, abdominal pain, hematemesis, or blood in stool. + pelvic pain  Musculoskeletal: No joint stiffness or swelling. Denies back pain.  : As above.  All other review of systems negative.    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    PSH:   has a past surgical history that includes Abdominal surgery; Back surgery; Fracture surgery; Cardiac surgery; Hysterectomy;  Tonsillectomy; Vascular surgery; Esophageal manometry with measurement of impedance (N/A, 2021); Oophorectomy; Breast biopsy (Left); Left heart catheterization (Left, 2022); and  section (10/21/1988; 1994).    FamHx: family history includes Cancer in her brother; Hyperlipidemia in her mother; Hypertension in her mother and sister; Marfan syndrome in her daughter; Seizures in her father; Stroke in her father and sister.    SocHx:  reports that she quit smoking about 9 years ago. Her smoking use included cigarettes. She started smoking about 42 years ago. She has a 33.2 pack-year smoking history. She has been exposed to tobacco smoke. She has never used smokeless tobacco. She reports that she does not drink alcohol and does not use drugs.      Physical Exam:  Vitals:    23 1331   BP: (!) 143/97   Pulse: 85     General: A&Ox3, no apparent distress, no deformities  Neck: No masses, normal thyroid  Lungs: normal inspiration, no use of accessory muscles  Heart: normal pulse, no arrhythmias  Abdomen: Soft, NT, ND, no masses, no hernias, no hepatosplenomegaly  Lymphatic: Neck and groin nodes negative  Skin: The skin is warm and dry. No jaundice.    Impression/Plan:   Urinary tract infection  Patient was educated on behavior modifications needed, in females, to decrease risk of recurrent E coli cystitis.  Patient to start Macrobid and Pyridium and I will contact her with final urine culture results.    2023  I personally reviewed urine culture results, E coli, sensitive to Macrobid.  Patient is aware of the need to complete Macrobid and to follow-up with our clinic in 2-3 weeks for repeat urine culture to assess for eradication.

## 2023-11-08 ENCOUNTER — HOSPITAL ENCOUNTER (OUTPATIENT)
Dept: RADIOLOGY | Facility: HOSPITAL | Age: 63
Discharge: HOME | End: 2023-11-08
Attending: FAMILY MEDICINE
Payer: MEDICARE

## 2023-11-08 DIAGNOSIS — Z12.31 ENCOUNTER FOR SCREENING MAMMOGRAM FOR MALIGNANT NEOPLASM OF BREAST: ICD-10-CM

## 2023-11-08 DIAGNOSIS — Z12.39 BREAST SCREENING: ICD-10-CM

## 2023-11-08 PROCEDURE — 77063 BREAST TOMOSYNTHESIS BI: CPT | Mod: 26,,, | Performed by: RADIOLOGY

## 2023-11-08 PROCEDURE — 77067 SCR MAMMO BI INCL CAD: CPT | Mod: TC

## 2023-11-08 PROCEDURE — 77067 SCR MAMMO BI INCL CAD: CPT | Mod: 26,,, | Performed by: RADIOLOGY

## 2023-11-08 PROCEDURE — 77067 MAMMO DIGITAL SCREENING BILAT WITH TOMO: ICD-10-PCS | Mod: 26,,, | Performed by: RADIOLOGY

## 2023-11-08 PROCEDURE — 77063 MAMMO DIGITAL SCREENING BILAT WITH TOMO: ICD-10-PCS | Mod: 26,,, | Performed by: RADIOLOGY

## 2023-11-09 ENCOUNTER — TELEPHONE (OUTPATIENT)
Dept: INTERNAL MEDICINE | Facility: CLINIC | Age: 63
End: 2023-11-09
Payer: MEDICARE

## 2023-12-03 DIAGNOSIS — Z71.89 COMPLEX CARE COORDINATION: ICD-10-CM

## 2023-12-05 ENCOUNTER — PATIENT MESSAGE (OUTPATIENT)
Dept: INTERNAL MEDICINE | Facility: CLINIC | Age: 63
End: 2023-12-05
Payer: MEDICARE

## 2023-12-11 NOTE — TELEPHONE ENCOUNTER
No care due was identified.  [facility] Embedded Care Due Messages. Reference number: [id number].   12/11/2023 4:16:08 PM CST

## 2023-12-12 RX ORDER — LEVOTHYROXINE SODIUM 100 UG/1
100 TABLET ORAL
Qty: 90 TABLET | Refills: 3 | Status: SHIPPED | OUTPATIENT
Start: 2023-12-12

## 2024-01-11 DIAGNOSIS — Z00.00 ENCOUNTER FOR MEDICARE ANNUAL WELLNESS EXAM: ICD-10-CM

## 2024-01-22 PROBLEM — N39.0 URINARY TRACT INFECTION WITHOUT HEMATURIA: Status: RESOLVED | Noted: 2021-06-29 | Resolved: 2024-01-22

## 2024-02-01 ENCOUNTER — PATIENT MESSAGE (OUTPATIENT)
Dept: INTERNAL MEDICINE | Facility: CLINIC | Age: 64
End: 2024-02-01
Payer: MEDICARE

## 2024-02-19 ENCOUNTER — PATIENT MESSAGE (OUTPATIENT)
Dept: INTERNAL MEDICINE | Facility: CLINIC | Age: 64
End: 2024-02-19

## 2024-02-19 ENCOUNTER — OFFICE VISIT (OUTPATIENT)
Dept: INTERNAL MEDICINE | Facility: CLINIC | Age: 64
End: 2024-02-19
Payer: MEDICARE

## 2024-02-19 VITALS
OXYGEN SATURATION: 99 % | HEIGHT: 65 IN | BODY MASS INDEX: 36.21 KG/M2 | SYSTOLIC BLOOD PRESSURE: 130 MMHG | TEMPERATURE: 98 F | DIASTOLIC BLOOD PRESSURE: 84 MMHG | HEART RATE: 90 BPM

## 2024-02-19 DIAGNOSIS — I25.118 ATHEROSCLEROTIC HEART DISEASE OF NATIVE CORONARY ARTERY WITH OTHER FORMS OF ANGINA PECTORIS: ICD-10-CM

## 2024-02-19 DIAGNOSIS — N39.0 URINARY TRACT INFECTION WITHOUT HEMATURIA, SITE UNSPECIFIED: Primary | ICD-10-CM

## 2024-02-19 DIAGNOSIS — M46.96 UNSPECIFIED INFLAMMATORY SPONDYLOPATHY, LUMBAR REGION: ICD-10-CM

## 2024-02-19 DIAGNOSIS — I70.0 AORTIC ATHEROSCLEROSIS: ICD-10-CM

## 2024-02-19 DIAGNOSIS — G40.919 REFRACTORY EPILEPSY: ICD-10-CM

## 2024-02-19 DIAGNOSIS — E66.01 SEVERE OBESITY (BMI 35.0-39.9) WITH COMORBIDITY: ICD-10-CM

## 2024-02-19 PROCEDURE — 99214 OFFICE O/P EST MOD 30 MIN: CPT | Mod: S$PBB,,, | Performed by: FAMILY MEDICINE

## 2024-02-19 PROCEDURE — 99215 OFFICE O/P EST HI 40 MIN: CPT | Mod: PBBFAC | Performed by: FAMILY MEDICINE

## 2024-02-19 PROCEDURE — 99999 PR PBB SHADOW E&M-EST. PATIENT-LVL V: CPT | Mod: PBBFAC,,, | Performed by: FAMILY MEDICINE

## 2024-02-19 NOTE — PROGRESS NOTES
Subjective:       Patient ID: Evelyne Francisco is a 63 y.o. female.    Chief Complaint: Follow-up    Three-month follow-up.  She is followed by orthopedics for bilateral ankle foot issues.  She has in a boot on her right side and a orthopedic postop shoe on left side.  She was treated with a UTI with urology follow-up in the past.  She is due for repeat urine culture which is being ordered.  Medical history also includes hypertension atherosclerosis.  She has temporal lobe epilepsy sleep followed by Neurology.  She has had left shoulder recently.  She reports she is doing pulling exercises with both arms on a daily basis.  She denies any recent shoulder trauma.  She denies chest pain palpitations shortness of breath or edema    Follow-up  Associated symptoms include myalgias. Pertinent negatives include no chest pain, chills, coughing, fever or neck pain.     Review of Systems   Constitutional:  Negative for chills and fever.   Respiratory:  Negative for cough, chest tightness, shortness of breath and wheezing.    Cardiovascular:  Negative for chest pain, palpitations and leg swelling.   Gastrointestinal:  Negative for abdominal distention.   Musculoskeletal:  Positive for myalgias. Negative for neck pain and neck stiffness.        Left trapezius pain       Objective:      Physical Exam  Constitutional:       General: She is not in acute distress.     Appearance: She is not ill-appearing or diaphoretic.   Neck:      Comments: She has a mild tenderness spasm of the left trapezius area.  No cervical tenderness  Cardiovascular:      Rate and Rhythm: Normal rate and regular rhythm.      Heart sounds: No murmur heard.     No gallop.   Pulmonary:      Effort: Pulmonary effort is normal. No respiratory distress.      Breath sounds: No wheezing, rhonchi or rales.   Musculoskeletal:      Cervical back: No rigidity.   Lymphadenopathy:      Cervical: No cervical adenopathy.   Neurological:      Mental Status: She is  alert.   Psychiatric:         Mood and Affect: Mood normal.         Behavior: Behavior normal.         Lab Visit on 11/01/2023   Component Date Value Ref Range Status    Specimen UA 11/01/2023 Urine, Clean Catch   Final    Color, UA 11/01/2023 Yellow  Yellow, Straw, Angelica Final    Appearance, UA 11/01/2023 Hazy (A)  Clear Final    pH, UA 11/01/2023 6.0  5.0 - 8.0 Final    Specific [place], UA 11/01/2023 1.015  1.005 - 1.030 Final    Protein, UA 11/01/2023 Trace (A)  Negative Final    Glucose, UA 11/01/2023 Negative  Negative Final    Ketones, UA 11/01/2023 Negative  Negative Final    Bilirubin (UA) 11/01/2023 Negative  Negative Final    Occult Blood UA 11/01/2023 Trace (A)  Negative Final    Nitrite, UA 11/01/2023 Positive (A)  Negative Final    Leukocytes, UA 11/01/2023 3+ (A)  Negative Final    Urine Culture, Routine 11/01/2023  (A)   Final                    Value:ESCHERICHIA COLI ESBL  >100,000 cfu/ml      RBC, UA 11/01/2023 8 (H)  0 - 4 /hpf Final    WBC, UA 11/01/2023 >100 (H)  0 - 5 /hpf Final    WBC Clumps, UA 11/01/2023 Occasional (A)  None-Rare Final    Bacteria 11/01/2023 Few (A)  None-Occ /hpf Final    Squam Epithel, UA 11/01/2023 9  /hpf Final    Microscopic Comment 11/01/2023 SEE COMMENT   Final     Assessment:       1. Urinary tract infection without hematuria, site unspecified    2. Severe obesity (BMI 35.0-39.9) with comorbidity    3. Refractory epilepsy    4. Unspecified inflammatory spondylopathy, lumbar region    5. Atherosclerotic heart disease of native coronary artery with other forms of angina pectoris    6. Aortic atherosclerosis        Plan:   Blood pressure controlled.  Heat and Myoflex left trapezius area twice a day.  Follow-up in 2 weeks if not improved or resolved.  Discontinue shoulder exercises 2-4 weeks.  Urine culture was ordered.  BMI is elevated.  Diet discussed.  Follow-up with Neurology regards epilepsy.  She has spondylopathy which is stable.  She has a aortic atherosclerosis  roses coronary artery disease medically managed.      Urinary tract infection without hematuria, site unspecified  -     Urine culture; Future; Expected date: 02/19/2024    Severe obesity (BMI 35.0-39.9) with comorbidity    Refractory epilepsy    Unspecified inflammatory spondylopathy, lumbar region    Atherosclerotic heart disease of native coronary artery with other forms of angina pectoris    Aortic atherosclerosis

## 2024-03-13 ENCOUNTER — TELEPHONE (OUTPATIENT)
Dept: INTERNAL MEDICINE | Facility: CLINIC | Age: 64
End: 2024-03-13
Payer: MEDICARE

## 2024-03-13 NOTE — TELEPHONE ENCOUNTER
Returned jono to patient regarding heartburn. Patient advised to seek care at the nearest Urgent Care facility. She voiced understanding.

## 2024-03-26 RX ORDER — PHENYTOIN SODIUM 100 MG/1
200 CAPSULE, EXTENDED RELEASE ORAL 2 TIMES DAILY
Qty: 360 CAPSULE | Refills: 3 | Status: SHIPPED | OUTPATIENT
Start: 2024-03-26

## 2024-03-27 RX ORDER — EXTENDED PHENYTOIN SODIUM 30 MG/1
30 CAPSULE ORAL NIGHTLY
Qty: 90 CAPSULE | Refills: 3 | Status: SHIPPED | OUTPATIENT
Start: 2024-03-27

## 2024-03-27 RX ORDER — PHENYTOIN SODIUM 100 MG/1
200 CAPSULE, EXTENDED RELEASE ORAL 2 TIMES DAILY
Qty: 360 CAPSULE | Refills: 3 | Status: SHIPPED | OUTPATIENT
Start: 2024-03-27 | End: 2024-04-22 | Stop reason: SDUPTHER

## 2024-03-27 RX ORDER — ZONISAMIDE 100 MG/1
CAPSULE ORAL
Qty: 540 CAPSULE | Refills: 3 | Status: SHIPPED | OUTPATIENT
Start: 2024-03-27

## 2024-03-27 RX ORDER — CLOBAZAM 2.5 MG/ML
SUSPENSION ORAL
Qty: 360 ML | Refills: 2 | Status: SHIPPED | OUTPATIENT
Start: 2024-03-27

## 2024-04-17 DIAGNOSIS — N95.1 MENOPAUSE SYNDROME: ICD-10-CM

## 2024-04-17 NOTE — TELEPHONE ENCOUNTER
Refill Routing Note   Medication(s) are not appropriate for processing by [facility] for the following reason(s):        Patient not seen by provider within 15 months    [facility] action(s):  Defer               Appointments  past 12m or future 3m with PCP    Date Provider   Last Visit   6/29/2021 Gabriela Spicer MD   Next Visit   Visit date not found Gabriela Spicer MD   ED visits in past 90 days: 0        Note composed:4:19 PM 04/17/2024

## 2024-04-18 RX ORDER — ESTRADIOL 2 MG/1
2 TABLET ORAL DAILY
Qty: 30 TABLET | Refills: 11 | Status: SHIPPED | OUTPATIENT
Start: 2024-04-18

## 2024-04-22 ENCOUNTER — LAB VISIT (OUTPATIENT)
Dept: LAB | Facility: HOSPITAL | Age: 64
End: 2024-04-22
Payer: MEDICARE

## 2024-04-22 ENCOUNTER — OFFICE VISIT (OUTPATIENT)
Dept: INTERNAL MEDICINE | Facility: CLINIC | Age: 64
End: 2024-04-22
Payer: MEDICARE

## 2024-04-22 VITALS
TEMPERATURE: 98 F | HEIGHT: 65 IN | SYSTOLIC BLOOD PRESSURE: 122 MMHG | HEART RATE: 78 BPM | WEIGHT: 217.63 LBS | BODY MASS INDEX: 36.26 KG/M2 | OXYGEN SATURATION: 98 % | DIASTOLIC BLOOD PRESSURE: 68 MMHG

## 2024-04-22 DIAGNOSIS — I25.10 CORONARY ARTERY DISEASE INVOLVING NATIVE CORONARY ARTERY OF NATIVE HEART WITHOUT ANGINA PECTORIS: ICD-10-CM

## 2024-04-22 DIAGNOSIS — E78.5 HYPERLIPIDEMIA, UNSPECIFIED HYPERLIPIDEMIA TYPE: Chronic | ICD-10-CM

## 2024-04-22 DIAGNOSIS — Z01.818 PREOP EXAMINATION: ICD-10-CM

## 2024-04-22 DIAGNOSIS — H26.9 CATARACT OF BOTH EYES, UNSPECIFIED CATARACT TYPE: ICD-10-CM

## 2024-04-22 DIAGNOSIS — E66.01 SEVERE OBESITY (BMI 35.0-39.9) WITH COMORBIDITY: ICD-10-CM

## 2024-04-22 DIAGNOSIS — Z01.818 PREOP EXAMINATION: Primary | ICD-10-CM

## 2024-04-22 DIAGNOSIS — I10 PRIMARY HYPERTENSION: Chronic | ICD-10-CM

## 2024-04-22 DIAGNOSIS — R26.9 GAIT ABNORMALITY: ICD-10-CM

## 2024-04-22 LAB
ALBUMIN SERPL BCP-MCNC: 3.9 G/DL (ref 3.5–5.2)
ALP SERPL-CCNC: 196 U/L (ref 55–135)
ALT SERPL W/O P-5'-P-CCNC: 25 U/L (ref 10–44)
ANION GAP SERPL CALC-SCNC: 6 MMOL/L (ref 8–16)
AST SERPL-CCNC: 24 U/L (ref 10–40)
BASOPHILS # BLD AUTO: 0.01 K/UL (ref 0–0.2)
BASOPHILS NFR BLD: 0.2 % (ref 0–1.9)
BILIRUB SERPL-MCNC: 0.2 MG/DL (ref 0.1–1)
BUN SERPL-MCNC: 9 MG/DL (ref 8–23)
CALCIUM SERPL-MCNC: 9 MG/DL (ref 8.7–10.5)
CHLORIDE SERPL-SCNC: 113 MMOL/L (ref 95–110)
CHOLEST SERPL-MCNC: 142 MG/DL (ref 120–199)
CHOLEST/HDLC SERPL: 1.6 {RATIO} (ref 2–5)
CO2 SERPL-SCNC: 25 MMOL/L (ref 23–29)
CREAT SERPL-MCNC: 0.9 MG/DL (ref 0.5–1.4)
DIFFERENTIAL METHOD BLD: ABNORMAL
EOSINOPHIL # BLD AUTO: 0.1 K/UL (ref 0–0.5)
EOSINOPHIL NFR BLD: 2.3 % (ref 0–8)
ERYTHROCYTE [DISTWIDTH] IN BLOOD BY AUTOMATED COUNT: 14.6 % (ref 11.5–14.5)
EST. GFR  (NO RACE VARIABLE): >60 ML/MIN/1.73 M^2
GLUCOSE SERPL-MCNC: 87 MG/DL (ref 70–110)
HCT VFR BLD AUTO: 39.4 % (ref 37–48.5)
HDLC SERPL-MCNC: 88 MG/DL (ref 40–75)
HDLC SERPL: 62 % (ref 20–50)
HGB BLD-MCNC: 12.4 G/DL (ref 12–16)
IMM GRANULOCYTES # BLD AUTO: 0.01 K/UL (ref 0–0.04)
IMM GRANULOCYTES NFR BLD AUTO: 0.2 % (ref 0–0.5)
LDLC SERPL CALC-MCNC: 42.2 MG/DL (ref 63–159)
LYMPHOCYTES # BLD AUTO: 2.5 K/UL (ref 1–4.8)
LYMPHOCYTES NFR BLD: 47.6 % (ref 18–48)
MCH RBC QN AUTO: 31.5 PG (ref 27–31)
MCHC RBC AUTO-ENTMCNC: 31.5 G/DL (ref 32–36)
MCV RBC AUTO: 100 FL (ref 82–98)
MONOCYTES # BLD AUTO: 0.3 K/UL (ref 0.3–1)
MONOCYTES NFR BLD: 5.6 % (ref 4–15)
NEUTROPHILS # BLD AUTO: 2.3 K/UL (ref 1.8–7.7)
NEUTROPHILS NFR BLD: 44.1 % (ref 38–73)
NONHDLC SERPL-MCNC: 54 MG/DL
NRBC BLD-RTO: 0 /100 WBC
PLATELET # BLD AUTO: 173 K/UL (ref 150–450)
PMV BLD AUTO: 10 FL (ref 9.2–12.9)
POTASSIUM SERPL-SCNC: 4.2 MMOL/L (ref 3.5–5.1)
PROT SERPL-MCNC: 7.1 G/DL (ref 6–8.4)
RBC # BLD AUTO: 3.94 M/UL (ref 4–5.4)
SODIUM SERPL-SCNC: 144 MMOL/L (ref 136–145)
TRIGL SERPL-MCNC: 59 MG/DL (ref 30–150)
WBC # BLD AUTO: 5.15 K/UL (ref 3.9–12.7)

## 2024-04-22 PROCEDURE — 80053 COMPREHEN METABOLIC PANEL: CPT

## 2024-04-22 PROCEDURE — 80061 LIPID PANEL: CPT

## 2024-04-22 PROCEDURE — 99215 OFFICE O/P EST HI 40 MIN: CPT | Mod: PBBFAC

## 2024-04-22 PROCEDURE — 85025 COMPLETE CBC W/AUTO DIFF WBC: CPT

## 2024-04-22 PROCEDURE — 99999 PR PBB SHADOW E&M-EST. PATIENT-LVL V: CPT | Mod: PBBFAC,,,

## 2024-04-22 PROCEDURE — G2211 COMPLEX E/M VISIT ADD ON: HCPCS | Mod: S$PBB,,,

## 2024-04-22 PROCEDURE — 36415 COLL VENOUS BLD VENIPUNCTURE: CPT

## 2024-04-22 PROCEDURE — 99214 OFFICE O/P EST MOD 30 MIN: CPT | Mod: S$PBB,,,

## 2024-04-22 NOTE — PROGRESS NOTES
Evelyne Francisco  2024  [id number]    Sergio Malcolm MD  Patient Care Team:  Sergio Malcolm MD as PCP - General (Family Medicine)  Karthik Smith MD as Consulting Physician (Neurology)          Visit Type:a scheduled routine follow-up visit    Chief Complaint:  Chief Complaint   Patient presents with    Pre-op Exam        History of Present Illness:    Patient presents today for Pre-Op Clearance. She will undergo bilateral cataract removal surgery . The surgery will performed at [facility]. the procedure. The surgery is scheduled for May 9th and May 13 th 2024 and it will be under IV anesthesia. The patient has had surgery before. He denies personal or family history of complications with anesthesia. The patient takes Plavix daily.     History:  Past Medical History:   Diagnosis Date    Blood clotting tendency     Coronary artery disease     Fever blister     HLD (hyperlipidemia)     Hypertension     Hypothyroidism 2020    Personal history of other endocrine, nutritional and metabolic disease 2023    Renal cyst 2021    Seizures     Sleep apnea     Temporal lobe epilepsy 2016     Past Surgical History:   Procedure Laterality Date    ABDOMINAL SURGERY      BACK SURGERY      BREAST BIOPSY Left     benign    CARDIAC SURGERY       SECTION  10/21/1988; 1994    ESOPHAGEAL MANOMETRY WITH MEASUREMENT OF IMPEDANCE N/A 2021    Procedure: MANOMETRY, ESOPHAGUS, WITH IMPEDANCE MEASUREMENT;  Surgeon: Sumanth Curran RN;  Location: [facility];  Service: Endoscopy;  Laterality: N/A;    FRACTURE SURGERY      HYSTERECTOMY      LEFT HEART CATHETERIZATION Left 2022    Procedure: CATHETERIZATION, HEART, LEFT;  Surgeon: Richie Butt MD;  Location: [facility] CATH LAB;  Service: Cardiology;  Laterality: Left;    OOPHORECTOMY      TONSILLECTOMY      VASCULAR SURGERY       Family History   Problem Relation Name Age of Onset    Hypertension  Mother Chelle     Hyperlipidemia Mother Chelle     Stroke Father Maninder Barkley     Seizures Father Maninder Barkley     Hypertension Sister Oldest     Cancer Brother      Marfan syndrome Daughter      Stroke Sister [employer]      Social History     Socioeconomic History    Marital status:    Tobacco Use    Smoking status: Former     Current packs/day: 0.00     Average packs/day: 1 pack/day for 33.2 years (33.2 ttl pk-yrs)     Types: Cigarettes     Start date: 1981     Quit date: 4/1/2014     Years since quitting: 10.0     Passive exposure: Past    Smokeless tobacco: Never   Substance and Sexual Activity    Alcohol use: No    Drug use: No    Sexual activity: Not Currently     Partners: Male     Birth control/protection: Condom     Social Determinants of Health     Financial Resource Strain: Patient Declined (2/16/2024)    Overall Financial Resource Strain (CARDIA)     Difficulty of Paying Living Expenses: Patient declined   Food Insecurity: Patient Declined (2/16/2024)    Hunger Vital Sign     Worried About Running Out of Food in the Last Year: Patient declined     Ran Out of Food in the Last Year: Patient declined   Transportation Needs: Patient Declined (2/16/2024)    PRAPARE - Transportation     Lack of Transportation (Medical): Patient declined     Lack of Transportation (Non-Medical): Patient declined   Physical Activity: Inactive (2/16/2024)    Exercise Vital Sign     Days of Exercise per Week: 0 days     Minutes of Exercise per Session: 0 min   Stress: No Stress Concern Present (2/16/2024)    [employer] [place] [employer] - Occupational Stress Questionnaire     Feeling of Stress : Not at all   Social Connections: Unknown (2/16/2024)    Social Connection and Isolation Panel [NHANES]     Frequency of Communication with Friends and Family: More than three times a week     Frequency of Social Gatherings with Friends and Family: Never     Active Member of Clubs or Organizations:  Yes     Attends Club or Organization Meetings: Patient declined     Marital Status:    Housing Stability: Unknown (2/16/2024)    Housing Stability Vital Sign     Unable to Pay for Housing in the Last Year: Patient declined     Unstable Housing in the Last Year: No     Patient Active Problem List   Diagnosis    Hypertension    HLD (hyperlipidemia)    Coronary artery disease involving native coronary artery without angina pectoris    Primary hypertension    Temporal lobe epilepsy    Intractable epilepsy    Partial idiopathic epilepsy with seizures of localized onset, intractable, without status epilepticus    Refractory epilepsy    Hypothyroidism    Hx of heart artery stent    Anemia    Unstable angina pectoris    Stress incontinence    Renal cyst    Lumbar paraspinal muscle spasm    Nocturia    Urinary frequency    BMI 35.0-35.9,adult    Anxiety about health    Urge incontinence    Unspecified inflammatory spondylopathy, lumbar region    Occipital neuralgia    Multiple neurological symptoms    Atherosclerotic heart disease of native coronary artery with other forms of angina pectoris    Aortic atherosclerosis    Former smoker    Personal history of other endocrine, nutritional and metabolic disease    S/P placement of VNS (vagus nerve stimulation) device    Immunization deficiency    Severe obesity (BMI 35.0-39.9) with comorbidity     Review of patient's allergies indicates:   Allergen Reactions    Latex, natural rubber Hives, Shortness Of Breath and Swelling     Throat swelling  Throat swelling  Other reaction(s): Hives  Throat swelling    Ace inhibitors Other (See Comments)     Coughing  Other reaction(s): Unknown    Diclo gel [diclofenac sodium] Other (See Comments)     Chest pain       The following were reviewed at this visit: active problem list, medication list, allergies, family history, social history, and health maintenance.    Medications:  Current Outpatient Medications   Medication Sig Dispense  Refill    cloBAZam (ONFI) 2.5 mg/mL Susp Take 4 ml by mouth twice per day 360 mL 2    clopidogreL (PLAVIX) 75 mg tablet Take 1 tablet (75 mg total) by mouth once daily. 90 tablet 3    DILANTIN 30 mg ER capsule TAKE ONE CAPSULE BY MOUTH EVERY DAY IN THE EVENING 90 capsule 3    estradioL (ESTRACE) 2 MG tablet Take 1 tablet (2 mg total) by mouth once daily. 30 tablet 11    ezetimibe (ZETIA) 10 mg tablet Take 10 mg by mouth once daily.      isosorbide mononitrate (IMDUR) 60 MG 24 hr tablet TAKE 1 TABLET BY MOUTH EVERY 12 HOURS 90 tablet 0    levocetirizine (XYZAL) 5 MG tablet Take 1 tablet (5 mg total) by mouth every evening. 90 tablet 2    levothyroxine (SYNTHROID) 100 MCG tablet Take 1 tablet (100 mcg total) by mouth before breakfast. 90 tablet 3    NAYZILAM 5 mg/spray (0.1 mL) [place] INSTILL 1 SPRAY IN NASAL ROUTE ONCE AS NEEDED. (1 SPRAY FOR PROLONGED SEIZURE OR CLUSTERS OF SEIZURES. MAY REPEAT ONCE IN OPPOSITE NOSTRIL AFTER 10 MINUTES.)      phenytoin (DILANTIN) 100 MG ER capsule TAKE TWO CAPSULES BY MOUTH TWICE A  capsule 3    potassium chloride (MICRO-K) 10 MEQ CpSR Take 10 mEq by mouth once.      pregabalin (LYRICA) 75 MG capsule Take 1 capsule (75 mg total) by mouth 2 (two) times daily. 60 capsule 2    ranolazine (RANEXA) 1,000 mg Tb12 Take 1,000 mg by mouth 2 (two) times daily.      REPATHA SURECLICK 140 mg/mL PnIj SMARTSI Milligram(s) SUB-Q Every 2 Weeks 2 each 11    solifenacin (VESICARE) 10 MG tablet Take 1 tablet by mouth once daily 90 tablet 0    zonisamide (ZONEGRAN) 100 MG Cap TAKE THREE CAPSULES BY MOUTH TWICE A  capsule 3    metoprolol succinate (TOPROL-XL) 50 MG 24 hr tablet Take 1 tablet (50 mg total) by mouth once daily. 90 tablet 1     No current facility-administered medications for this visit.          Medications have been reviewed and reconciled with patient at this visit.  Barriers to medications reviewed with patient.    Adverse reactions to current medications reviewed with  patient..    Over the counter medications reviewed and reconciled with patient.    Exam:  Wt Readings from Last 3 Encounters:   11/02/23 98.7 kg (217 lb 9.5 oz)   11/01/23 98.7 kg (217 lb 9.5 oz)   10/17/23 98.1 kg (216 lb 4.3 oz)     Temp Readings from Last 3 Encounters:   02/19/24 98.2 °F (36.8 °C) (Temporal)   11/01/23 97.6 °F (36.4 °C)   10/17/23 98 °F (36.7 °C) (Temporal)     BP Readings from Last 3 Encounters:   02/19/24 130/84   11/02/23 (!) 143/97   11/01/23 134/80     Pulse Readings from Last 3 Encounters:   02/19/24 90   11/02/23 85   11/01/23 84     There is no height or weight on file to calculate BMI.      Review of Systems   Respiratory:  Negative for shortness of breath.    Cardiovascular:  Negative for chest pain and palpitations.   Musculoskeletal:  Positive for joint pain.     Physical Exam  Vitals and nursing note reviewed.   Constitutional:       General: She is not in acute distress.     Appearance: She is well-developed. She is not diaphoretic.   HENT:      Head: Normocephalic and atraumatic.      Right Ear: External ear normal.      Left Ear: External ear normal.      Mouth/Throat:      Mouth: Mucous membranes are moist.   Neck:      Thyroid: No thyromegaly.   Cardiovascular:      Rate and Rhythm: Normal rate and regular rhythm.      Heart sounds: Normal heart sounds. No murmur heard.  Pulmonary:      Effort: Pulmonary effort is normal. No respiratory distress.      Breath sounds: Normal breath sounds. No wheezing.   Abdominal:      General: Bowel sounds are normal.   Musculoskeletal:         General: Tenderness and signs of injury present.      Left ankle: Tenderness present. Decreased range of motion.      Comments: Left ankle in walking boot. The patient is using an assistive device during the visit     Neurological:      Mental Status: She is alert and oriented to person, place, and time.      Motor: Weakness present.      Gait: Gait abnormal.   Psychiatric:         Behavior: Behavior  normal.         Thought Content: Thought content normal.         Judgment: Judgment normal.         Laboratory Reviewed ({Yes)  CMP  Sodium   Date Value Ref Range Status   04/22/2024 144 136 - 145 mmol/L Final     Potassium   Date Value Ref Range Status   04/22/2024 4.2 3.5 - 5.1 mmol/L Final     Chloride   Date Value Ref Range Status   04/22/2024 113 (H) 95 - 110 mmol/L Final     CO2   Date Value Ref Range Status   04/22/2024 25 23 - 29 mmol/L Final     Glucose   Date Value Ref Range Status   04/22/2024 87 70 - 110 mg/dL Final     BUN   Date Value Ref Range Status   04/22/2024 9 8 - 23 mg/dL Final     Creatinine   Date Value Ref Range Status   04/22/2024 0.9 0.5 - 1.4 mg/dL Final     Calcium   Date Value Ref Range Status   04/22/2024 9.0 8.7 - 10.5 mg/dL Final     Total Protein   Date Value Ref Range Status   04/22/2024 7.1 6.0 - 8.4 g/dL Final     Albumin   Date Value Ref Range Status   04/22/2024 3.9 3.5 - 5.2 g/dL Final     Total Bilirubin   Date Value Ref Range Status   04/22/2024 0.2 0.1 - 1.0 mg/dL Final     Comment:     For infants and newborns, interpretation of results should be based  on gestational age, weight and in agreement with clinical  observations.    Premature Infant recommended reference ranges:  Up to 24 hours.............<8.0 mg/dL  Up to 48 hours............<12.0 mg/dL  3-5 days..................<15.0 mg/dL  6-29 days.................<15.0 mg/dL       Alkaline Phosphatase   Date Value Ref Range Status   04/22/2024 196 (H) 55 - 135 U/L Final     AST   Date Value Ref Range Status   04/22/2024 24 10 - 40 U/L Final     ALT   Date Value Ref Range Status   04/22/2024 25 10 - 44 U/L Final     Anion Gap   Date Value Ref Range Status   04/22/2024 6 (L) 8 - 16 mmol/L Final     eGFR   Date Value Ref Range Status   04/22/2024 >60.0 >60 mL/min/1.73 m^2 Final      Lab Results   Component Value Date    WBC 5.15 04/22/2024    HGB 12.4 04/22/2024    HCT 39.4 04/22/2024     (H) 04/22/2024      04/22/2024          Evelyne was seen today for pre-op exam.    Diagnoses and all orders for this visit:    Preop examination  -     COMPREHENSIVE METABOLIC PANEL; Future  -     CBC Auto Differential; Future    Cataract of both eyes, unspecified cataract type  -     COMPREHENSIVE METABOLIC PANEL; Future  -     CBC Auto Differential; Future    Primary hypertension  -     COMPREHENSIVE METABOLIC PANEL; Future  -     CBC Auto Differential; Future  -     Lipid Panel; Future    Severe obesity (BMI 35.0-39.9) with comorbidity  -     COMPREHENSIVE METABOLIC PANEL; Future  -     CBC Auto Differential; Future    Gait abnormality  Patient is using an assistive device at visit. Disused fall prevention      Coronary artery disease involving native coronary artery of native heart without angina pectoris  -     Lipid Panel; Future    Hyperlipidemia, unspecified hyperlipidemia type  -     Lipid Panel; Future    The patient is cleared for upcoming surgery.  If you have any questions or concerns, or if I can be of further assistance, please do not hesitate to contact me.          Lia Thompson, MARCP-C      Health Maintenance Reviewed and updated. At this time will continue the patient on current dose of medication for chronic medical conditions      Informed the patient that she can go to her local pharmacy for vaccines     The patient will be cleared for upcoming surgical procedure pending labs and or imaging reports    Visit today included increased complexity associated with the care of the episodic problem HTN , which was addressed while instituting co-management of the longitudinal care of the patient due to the serious and/or complex managed problem(s) .    I have evaluated and discussed management associated with medical care services that serve as the continuing focal point for all needed health care services and/or with medical care services that are part of ongoing care related to my patient's single, serious condition or a  complex condition(s).    I am providing ongoing care and I am the primary care provider for this patient, and they are being managed, monitored, and/or observed for their chronic conditions over time.     I have addressed their ongoing health maintenance requirements and needs for all health care services and reviewed co-management plans provided by specialty providers when available.    Health Maintenance Due   Topic Date Due    TETANUS VACCINE  Never done    RSV Vaccine (Age 60+ and Pregnant patients) (1 - 1-dose 60+ series) Never done    COVID-19 Vaccine (4 - 2023-24 season) 09/01/2023        Care Plan/Goals: Reviewed    Goals    None         Follow up: No follow-ups on file.    After visit summary was printed and given to patient upon discharge today.  Patient goals and care plan are included in After Visit Summary.

## 2024-04-22 NOTE — TELEPHONE ENCOUNTER
Care Due:                  Date            Visit Type   Department     Provider  --------------------------------------------------------------------------------                                EP -                              PRIMARY      [facility]  Last Visit: 02-      CARE ([facility])   JENNIFER Malcolm                              EP -                              PRIMARY      [facility]  Next Visit: 05-      CARE ([facility])   JENNIFER Malcolm                                                            Last  Test          Frequency    Reason                     Performed    Due Date  --------------------------------------------------------------------------------    TSH.........  12 months..  levothyroxine............  04- 04-    Health [facility] Embedded Care Due Messages. Reference number: [id number].   4/22/2024 10:38:19 AM CDT

## 2024-04-23 ENCOUNTER — TELEPHONE (OUTPATIENT)
Dept: INTERNAL MEDICINE | Facility: CLINIC | Age: 64
End: 2024-04-23
Payer: MEDICARE

## 2024-04-23 RX ORDER — METOPROLOL SUCCINATE 50 MG/1
50 TABLET, EXTENDED RELEASE ORAL DAILY
Qty: 90 TABLET | Refills: 1 | Status: SHIPPED | OUTPATIENT
Start: 2024-04-23

## 2024-04-23 NOTE — TELEPHONE ENCOUNTER
"Pre op paperwork and office note faxed to Dr. Baron Tuttle at [phone]    Outcome:    Your fax has been successfully sent to [phone] at [phone].    4/23/2024 11:07:15 AM Transmission Record   Sent to [phone] with remote ID "[email or web address] Fax [email or web address]"   Result: (0/339;0/0) Success   Page record: 1 - 10   Elapsed time: 03:32 on channel 34    "

## 2024-07-03 DIAGNOSIS — Z71.89 COMPLEX CARE COORDINATION: ICD-10-CM

## 2024-07-29 ENCOUNTER — OFFICE VISIT (OUTPATIENT)
Dept: INTERNAL MEDICINE | Facility: CLINIC | Age: 64
End: 2024-07-29
Payer: MEDICARE

## 2024-07-29 VITALS
BODY MASS INDEX: 34.78 KG/M2 | SYSTOLIC BLOOD PRESSURE: 124 MMHG | TEMPERATURE: 99 F | HEIGHT: 65 IN | OXYGEN SATURATION: 100 % | WEIGHT: 208.75 LBS | DIASTOLIC BLOOD PRESSURE: 72 MMHG | HEART RATE: 84 BPM

## 2024-07-29 DIAGNOSIS — I25.10 CORONARY ARTERY DISEASE INVOLVING NATIVE CORONARY ARTERY OF NATIVE HEART WITHOUT ANGINA PECTORIS: ICD-10-CM

## 2024-07-29 DIAGNOSIS — G40.109 TEMPORAL LOBE EPILEPSY: ICD-10-CM

## 2024-07-29 DIAGNOSIS — E78.5 HYPERLIPIDEMIA, UNSPECIFIED HYPERLIPIDEMIA TYPE: ICD-10-CM

## 2024-07-29 DIAGNOSIS — G89.29 CHRONIC LEFT SHOULDER PAIN: ICD-10-CM

## 2024-07-29 DIAGNOSIS — N39.0 URINARY TRACT INFECTION WITHOUT HEMATURIA, SITE UNSPECIFIED: ICD-10-CM

## 2024-07-29 DIAGNOSIS — M25.512 CHRONIC LEFT SHOULDER PAIN: ICD-10-CM

## 2024-07-29 DIAGNOSIS — M54.2 NECK PAIN: ICD-10-CM

## 2024-07-29 DIAGNOSIS — E03.8 OTHER SPECIFIED HYPOTHYROIDISM: ICD-10-CM

## 2024-07-29 DIAGNOSIS — I10 PRIMARY HYPERTENSION: Primary | Chronic | ICD-10-CM

## 2024-07-29 PROCEDURE — 99215 OFFICE O/P EST HI 40 MIN: CPT | Mod: PBBFAC | Performed by: FAMILY MEDICINE

## 2024-07-29 PROCEDURE — 99999 PR PBB SHADOW E&M-EST. PATIENT-LVL V: CPT | Mod: PBBFAC,,, | Performed by: FAMILY MEDICINE

## 2024-07-29 PROCEDURE — G2211 COMPLEX E/M VISIT ADD ON: HCPCS | Mod: S$PBB,,, | Performed by: FAMILY MEDICINE

## 2024-07-29 PROCEDURE — 99214 OFFICE O/P EST MOD 30 MIN: CPT | Mod: S$PBB,,, | Performed by: FAMILY MEDICINE

## 2024-07-29 NOTE — PROGRESS NOTES
Subjective:       Patient ID: Evelyne Francisco is a 64 y.o. female.    Chief Complaint: Annual Exam    Follow-up hypertension hyperlipidemia hypothyroidism history urinary tract infection with urge incontinence.  She is followed by Cardiology coronary artery disease aortic atherosclerosis coronary stent.  She is followed by gynecology.  She is followed by neurology with intractable epilepsy having had a vagus nerve stimulator placed April of this year.  She has a history temporal lobe epilepsy.  She is current about several months duration of left trapezius is left shoulder pain.  She denies any trauma.  Her last seizure was about one-month ago.  She describes her seizures has loss of consciousness urinary incontinence but no clonic tonic activity.  She denies chest pain.  She reports she has ongoing shortness of breath.    Shortness of Breath  This is a new problem. The current episode started more than 1 month ago. The problem occurs intermittently. The problem has been gradually improving. The average episode lasts 30 seconds. Associated symptoms include claudication, coryza, ear pain, headaches, neck pain, a rash, rhinorrhea and sputum production. Pertinent negatives include no abdominal pain, chest pain, fever, hemoptysis, leg pain, leg swelling, orthopnea, PND, sore throat, swollen glands, syncope, vomiting or wheezing. Risk factors include prolonged immobilization. The treatment provided mild relief. Her past medical history is significant for allergies, CAD, DVT and pneumonia. There is no history of asthma.     Review of Systems   Constitutional:  Negative for chills and fever.   HENT:  Positive for ear pain and rhinorrhea. Negative for sore throat.         She is followed by for ear pain and congestion   Respiratory:  Positive for sputum production and shortness of breath. Negative for cough, hemoptysis, chest tightness and wheezing.    Cardiovascular:  Positive for claudication. Negative for  chest pain, orthopnea, leg swelling, syncope and PND.   Gastrointestinal:  Negative for abdominal pain and vomiting.   Musculoskeletal:  Positive for arthralgias and neck pain.   Skin:  Positive for rash.   Neurological:  Positive for seizures and headaches. Negative for dizziness, weakness and light-headedness.       Objective:      Physical Exam  Constitutional:       General: She is not in acute distress.     Appearance: She is not ill-appearing or diaphoretic.   Cardiovascular:      Rate and Rhythm: Normal rate and regular rhythm.      Heart sounds: No murmur heard.     No gallop.   Pulmonary:      Effort: Pulmonary effort is normal. No respiratory distress.      Breath sounds: No wheezing, rhonchi or rales.   Abdominal:      General: There is no distension.   Musculoskeletal:      Comments: She has full range of motion of the cervical spine with no tenderness.  She has tenderness in the left anterior shoulder.  She has full range of motion of the shoulder with no pain she has no trapezius tenderness.   Lymphadenopathy:      Cervical: No cervical adenopathy.   Neurological:      Mental Status: She is alert.         Lab Visit on 04/22/2024   Component Date Value Ref Range Status    Sodium 04/22/2024 144  136 - 145 mmol/L Final    Potassium 04/22/2024 4.2  3.5 - 5.1 mmol/L Final    Chloride 04/22/2024 113 (H)  95 - 110 mmol/L Final    CO2 04/22/2024 25  23 - 29 mmol/L Final    Glucose 04/22/2024 87  70 - 110 mg/dL Final    BUN 04/22/2024 9  8 - 23 mg/dL Final    Creatinine 04/22/2024 0.9  0.5 - 1.4 mg/dL Final    Calcium 04/22/2024 9.0  8.7 - 10.5 mg/dL Final    Total Protein 04/22/2024 7.1  6.0 - 8.4 g/dL Final    Albumin 04/22/2024 3.9  3.5 - 5.2 g/dL Final    Total Bilirubin 04/22/2024 0.2  0.1 - 1.0 mg/dL Final    Alkaline Phosphatase 04/22/2024 196 (H)  55 - 135 U/L Final    AST 04/22/2024 24  10 - 40 U/L Final    ALT 04/22/2024 25  10 - 44 U/L Final    eGFR 04/22/2024 >60.0  >60 mL/min/1.73 m^2 Final     Anion Gap 04/22/2024 6 (L)  8 - 16 mmol/L Final    WBC 04/22/2024 5.15  3.90 - 12.70 K/uL Final    RBC 04/22/2024 3.94 (L)  4.00 - 5.40 M/uL Final    Hemoglobin 04/22/2024 12.4  12.0 - 16.0 g/dL Final    Hematocrit 04/22/2024 39.4  37.0 - 48.5 % Final    MCV 04/22/2024 100 (H)  82 - 98 fL Final    MCH 04/22/2024 31.5 (H)  27.0 - 31.0 pg Final    MCHC 04/22/2024 31.5 (L)  32.0 - 36.0 g/dL Final    RDW 04/22/2024 14.6 (H)  11.5 - 14.5 % Final    Platelets 04/22/2024 173  150 - 450 K/uL Final    MPV 04/22/2024 10.0  9.2 - 12.9 fL Final    Immature Granulocytes 04/22/2024 0.2  0.0 - 0.5 % Final    Gran # (ANC) 04/22/2024 2.3  1.8 - 7.7 K/uL Final    Immature Grans (Abs) 04/22/2024 0.01  0.00 - 0.04 K/uL Final    Lymph # 04/22/2024 2.5  1.0 - 4.8 K/uL Final    Mono # 04/22/2024 0.3  0.3 - 1.0 K/uL Final    Eos # 04/22/2024 0.1  0.0 - 0.5 K/uL Final    Baso # 04/22/2024 0.01  0.00 - 0.20 K/uL Final    nRBC 04/22/2024 0  0 /100 WBC Final    Gran % 04/22/2024 44.1  38.0 - 73.0 % Final    Lymph % 04/22/2024 47.6  18.0 - 48.0 % Final    Mono % 04/22/2024 5.6  4.0 - 15.0 % Final    Eosinophil % 04/22/2024 2.3  0.0 - 8.0 % Final    Basophil % 04/22/2024 0.2  0.0 - 1.9 % Final    Differential Method 04/22/2024 Automated   Final    Cholesterol 04/22/2024 142  120 - 199 mg/dL Final    Triglycerides 04/22/2024 59  30 - 150 mg/dL Final    HDL 04/22/2024 88 (H)  40 - 75 mg/dL Final    LDL Cholesterol 04/22/2024 42.2 (L)  63.0 - 159.0 mg/dL Final    HDL/Cholesterol Ratio 04/22/2024 62.0 (H)  20.0 - 50.0 % Final    Total Cholesterol/HDL Ratio 04/22/2024 1.6 (L)  2.0 - 5.0 Final    Non-HDL Cholesterol 04/22/2024 54  mg/dL Final     Assessment:       1. Primary hypertension    2. Other specified hypothyroidism    3. Urinary tract infection without hematuria, site unspecified    4. Neck pain    5. Chronic left shoulder pain    6. Coronary artery disease involving native coronary artery of native heart without angina pectoris    7.  Temporal lobe epilepsy    8. Hyperlipidemia, unspecified hyperlipidemia type        Plan:   X-ray neck shoulder and chest.  Recheck urinalysis with a past history of UTI.  TSH free T4 ordered.  Up-to-date CBC CMP reviewed.  Blood pressure controlled.  Follow-up in 6 weeks.  She has a history of chronic back pain.      Primary hypertension  -     X-Ray Chest PA And Lateral; Future; Expected date: 07/29/2024    Other specified hypothyroidism  -     TSH; Future; Expected date: 07/29/2024  -     T4, Free; Future; Expected date: 07/29/2024    Urinary tract infection without hematuria, site unspecified  -     Urinalysis; Future; Expected date: 07/29/2024    Neck pain  -     X-Ray Cervical Spine Complete 5 view; Future; Expected date: 07/29/2024    Chronic left shoulder pain  -     X-Ray Shoulder 2 or More Views Left; Future; Expected date: 07/29/2024    Coronary artery disease involving native coronary artery of native heart without angina pectoris    Temporal lobe epilepsy    Hyperlipidemia, unspecified hyperlipidemia type

## 2024-07-30 NOTE — TELEPHONE ENCOUNTER
Care Due:                  Date            Visit Type   Department     Provider  --------------------------------------------------------------------------------                                MYCHART                              ANNUAL                              CHECKUP/PHY  [facility]  Last Visit: 07-      S            JENNIFER Malcolm                              EP -                              PRIMARY      [facility]  Next Visit: 09-      CARE ([facility])   [facility]       Sergio Malcolm                                                            Last  Test          Frequency    Reason                     Performed    Due Date  --------------------------------------------------------------------------------    TSH.........  12 months..  levothyroxine............  04- 04-    Health [facility] Embedded Care Due Messages. Reference number: [id number].   7/30/2024 11:32:18 AM CDT

## 2024-07-31 ENCOUNTER — HOSPITAL ENCOUNTER (OUTPATIENT)
Dept: RADIOLOGY | Facility: HOSPITAL | Age: 64
Discharge: HOME | End: 2024-07-31
Attending: FAMILY MEDICINE
Payer: MEDICARE

## 2024-07-31 DIAGNOSIS — M54.2 NECK PAIN: ICD-10-CM

## 2024-07-31 DIAGNOSIS — M25.512 CHRONIC LEFT SHOULDER PAIN: ICD-10-CM

## 2024-07-31 DIAGNOSIS — I10 PRIMARY HYPERTENSION: Chronic | ICD-10-CM

## 2024-07-31 DIAGNOSIS — G89.29 CHRONIC LEFT SHOULDER PAIN: ICD-10-CM

## 2024-07-31 PROCEDURE — 73030 X-RAY EXAM OF SHOULDER: CPT | Mod: TC,LT

## 2024-07-31 PROCEDURE — 71046 X-RAY EXAM CHEST 2 VIEWS: CPT | Mod: 26,,, | Performed by: RADIOLOGY

## 2024-07-31 PROCEDURE — 73030 X-RAY EXAM OF SHOULDER: CPT | Mod: 26,LT,, | Performed by: RADIOLOGY

## 2024-07-31 PROCEDURE — 71046 X-RAY EXAM CHEST 2 VIEWS: CPT | Mod: TC

## 2024-07-31 PROCEDURE — 72050 X-RAY EXAM NECK SPINE 4/5VWS: CPT | Mod: 26,,, | Performed by: RADIOLOGY

## 2024-07-31 PROCEDURE — 72050 X-RAY EXAM NECK SPINE 4/5VWS: CPT | Mod: TC

## 2024-07-31 RX ORDER — LEVOCETIRIZINE DIHYDROCHLORIDE 5 MG/1
5 TABLET, FILM COATED ORAL NIGHTLY
Qty: 90 TABLET | Refills: 2 | Status: SHIPPED | OUTPATIENT
Start: 2024-07-31

## 2024-08-01 DIAGNOSIS — N39.0 URINARY TRACT INFECTION WITHOUT HEMATURIA, SITE UNSPECIFIED: Primary | ICD-10-CM

## 2024-08-01 RX ORDER — NITROFURANTOIN (MACROCRYSTALS) 100 MG/1
100 CAPSULE ORAL 4 TIMES DAILY
Qty: 28 CAPSULE | Refills: 0 | Status: SHIPPED | OUTPATIENT
Start: 2024-08-01

## 2024-09-16 ENCOUNTER — TELEPHONE (OUTPATIENT)
Dept: INTERNAL MEDICINE | Facility: CLINIC | Age: 64
End: 2024-09-16
Payer: MEDICARE

## 2024-09-16 NOTE — TELEPHONE ENCOUNTER
Returned call to patient regarding medication. No answer left voice message. Awaiting return call.

## 2024-10-09 ENCOUNTER — TELEPHONE (OUTPATIENT)
Dept: INTERNAL MEDICINE | Facility: CLINIC | Age: 64
End: 2024-10-09
Payer: MEDICARE

## 2024-10-09 NOTE — TELEPHONE ENCOUNTER
Assist patient with rescheduling visit. Pcp will be out on 10/14/24 and she agreed to see NP Lia Thompson.

## 2024-10-09 NOTE — TELEPHONE ENCOUNTER
----- Message from Marley sent at 10/9/2024  8:52 AM CDT -----  Contact: Sumaya  .Type:  Patient Returning Call    Who Called:Sumaya  Who Left Message for Patient:nurse  Does the patient know what this is regarding?:yes   Would the patient rather a call back or a response via [facility]? Call back  Best Call Back Number:.[phone]   Additional Information: na      Thanks  NICOLASA

## 2024-10-21 ENCOUNTER — OFFICE VISIT (OUTPATIENT)
Dept: INTERNAL MEDICINE | Facility: CLINIC | Age: 64
End: 2024-10-21
Payer: MEDICARE

## 2024-10-21 ENCOUNTER — HOSPITAL ENCOUNTER (OUTPATIENT)
Dept: RADIOLOGY | Facility: HOSPITAL | Age: 64
Discharge: HOME | End: 2024-10-21
Payer: MEDICARE

## 2024-10-21 VITALS
RESPIRATION RATE: 20 BRPM | OXYGEN SATURATION: 99 % | BODY MASS INDEX: 35.04 KG/M2 | DIASTOLIC BLOOD PRESSURE: 68 MMHG | WEIGHT: 210.56 LBS | HEART RATE: 70 BPM | TEMPERATURE: 96 F | SYSTOLIC BLOOD PRESSURE: 126 MMHG

## 2024-10-21 DIAGNOSIS — E66.01 SEVERE OBESITY (BMI 35.0-39.9) WITH COMORBIDITY: ICD-10-CM

## 2024-10-21 DIAGNOSIS — J30.89 PERENNIAL ALLERGIC RHINITIS: ICD-10-CM

## 2024-10-21 DIAGNOSIS — R79.1 ABNORMAL COAGULATION PROFILE: ICD-10-CM

## 2024-10-21 DIAGNOSIS — Z01.818 PREOP EXAMINATION: ICD-10-CM

## 2024-10-21 DIAGNOSIS — Z01.818 PREOP EXAMINATION: Primary | ICD-10-CM

## 2024-10-21 DIAGNOSIS — L20.89 OTHER ATOPIC DERMATITIS: ICD-10-CM

## 2024-10-21 DIAGNOSIS — R29.90 MULTIPLE NEUROLOGICAL SYMPTOMS: ICD-10-CM

## 2024-10-21 DIAGNOSIS — I10 PRIMARY HYPERTENSION: ICD-10-CM

## 2024-10-21 PROCEDURE — 71046 X-RAY EXAM CHEST 2 VIEWS: CPT | Mod: 26,,, | Performed by: RADIOLOGY

## 2024-10-21 PROCEDURE — 99214 OFFICE O/P EST MOD 30 MIN: CPT | Mod: S$PBB,,,

## 2024-10-21 PROCEDURE — 71046 X-RAY EXAM CHEST 2 VIEWS: CPT | Mod: TC

## 2024-10-21 PROCEDURE — 99999 PR PBB SHADOW E&M-EST. PATIENT-LVL V: CPT | Mod: PBBFAC,,,

## 2024-10-21 PROCEDURE — 99215 OFFICE O/P EST HI 40 MIN: CPT | Mod: PBBFAC,25

## 2024-10-21 PROCEDURE — G2211 COMPLEX E/M VISIT ADD ON: HCPCS | Mod: S$PBB,,,

## 2024-10-21 RX ORDER — TRIAMCINOLONE ACETONIDE 1 MG/G
CREAM TOPICAL
COMMUNITY
Start: 2024-07-31

## 2024-10-21 RX ORDER — FLUTICASONE PROPIONATE 50 MCG
2 SPRAY, SUSPENSION (ML) NASAL DAILY
COMMUNITY
Start: 2024-09-12

## 2024-10-21 RX ORDER — ATORVASTATIN CALCIUM 40 MG/1
1 TABLET, FILM COATED ORAL DAILY
COMMUNITY

## 2024-10-21 RX ORDER — MONTELUKAST SODIUM 10 MG/1
10 TABLET ORAL DAILY
COMMUNITY
Start: 2024-09-30

## 2024-10-21 RX ORDER — AZELASTINE 1 MG/ML
1 SPRAY, METERED NASAL 2 TIMES DAILY
COMMUNITY
Start: 2024-08-08

## 2024-10-21 NOTE — PROGRESS NOTES
Evelyne Francisco  10/22/2024  [id number]    Sergio Malcolm MD  Patient Care Team:  Sergio Malcolm MD as PCP - General (Family Medicine)  Karthik Smith MD as Consulting Physician (Neurology)          Visit Type:a scheduled routine follow-up visit    Chief Complaint:  Chief Complaint   Patient presents with    Follow-up     She is here for a follow up visit. States she needs surgery clearance for a procedure scheduled on 11/1/24.         History of Present Illness:    History of Present Illness    CHIEF COMPLAINT:  Ms. Francisco presents today for a six-week follow-up.    MUSCULOSKELETAL PAIN:  She reports worsening pain, specifically in the neck and shoulder areas. She expresses concern about feeling like she is deteriorating. [facility]    PRE-OPERATIVE EVALUATION:  She has completed pre-operative tests including a chest XR, EKG, and allergy testing for her upcoming surgery scheduled for November 1st mentioning previous lab work done with Dr. Osullivan that may not meet current pre-operative requirements. She expresses uncertainty about which specific labs are needed. She denies any previous problems with anesthesia during past surgeries.    ENT CONCERNS:  She reports nasal congestion, with one side of her nose being obstructed. A previous doctor, Dr. Mckinley, identified nasal polyps. She indicates there may be additional nasal issues beyond the congestion.    CARDIAC HISTORY:  Her cardiologist is Dr. Em with [facility]. She recently had lab work done with her cardiologist.    SURGICAL HISTORY:  She reports previous surgeries with no known problems with anesthesia.      ROS:  General: -fever, -chills, -fatigue, -weight gain, -weight loss  Eyes: -vision changes, -redness, -discharge  ENT: -ear pain, +nasal congestion, -sore throat  Cardiovascular: -chest pain, -palpitations, -lower extremity edema  Respiratory: -cough, -shortness of  breath  Gastrointestinal: -abdominal pain, -nausea, -vomiting, -diarrhea, -constipation, -blood in stool  Genitourinary: -dysuria, -hematuria, -frequency  Musculoskeletal: -joint pain, +muscle pain  Skin: -rash, -lesion  Neurological: -headache, -dizziness, -numbness, -tingling  Psychiatric: -anxiety, -depression, -sleep difficulty  Allergic: -allergic reactions            History:  Past Medical History:   Diagnosis Date    Blood clotting tendency     Coronary artery disease     Fever blister     HLD (hyperlipidemia)     Hypertension     Hypothyroidism 2020    Personal history of other endocrine, nutritional and metabolic disease 2023    Renal cyst 2021    Seizures     Sleep apnea     Temporal lobe epilepsy 2016     Past Surgical History:   Procedure Laterality Date    ABDOMINAL SURGERY      BACK SURGERY      BREAST BIOPSY Left     benign    CARDIAC SURGERY       SECTION  10/21/1988; 1994    ESOPHAGEAL MANOMETRY WITH MEASUREMENT OF IMPEDANCE N/A 2021    Procedure: MANOMETRY, ESOPHAGUS, WITH IMPEDANCE MEASUREMENT;  Surgeon: Sumanth Curran RN;  Location: [facility];  Service: Endoscopy;  Laterality: N/A;    EYE SURGERY  -    I had Cataract surgery on both eyes of this year,a week apart.    FRACTURE SURGERY      HYSTERECTOMY      LEFT HEART CATHETERIZATION Left 2022    Procedure: CATHETERIZATION, HEART, LEFT;  Surgeon: Richie Butt MD;  Location: [facility] CATH LAB;  Service: Cardiology;  Laterality: Left;    OOPHORECTOMY      TONSILLECTOMY      VASCULAR SURGERY       Family History   Problem Relation Name Age of Onset    Hypertension Mother Chelle     Hyperlipidemia Mother Chelle     Stroke Father Bozrahsoto Barkley     Seizures Father Maninder Barkley     Hypertension Sister Oldest     Cancer Brother      Marfan syndrome Daughter      Stroke Sister Grupo Cheema      Social History     Socioeconomic History    Marital status:    Tobacco Use     Smoking status: Former     Current packs/day: 0.00     Average packs/day: 1 pack/day for 33.2 years (33.2 ttl pk-yrs)     Types: Cigarettes     Start date: 1981     Quit date: 4/1/2014     Years since quitting: 10.5     Passive exposure: Past    Smokeless tobacco: Never   Substance and Sexual Activity    Alcohol use: No    Drug use: No    Sexual activity: Not Currently     Partners: Male     Birth control/protection: Condom     Social Drivers of Health     Financial Resource Strain: Patient Declined (2/16/2024)    Overall Financial Resource Strain (CARDIA)     Difficulty of Paying Living Expenses: Patient declined   Food Insecurity: Patient Declined (2/16/2024)    Hunger Vital Sign     Worried About Running Out of Food in the Last Year: Patient declined     Ran Out of Food in the Last Year: Patient declined   Transportation Needs: Patient Declined (2/16/2024)    PRAPARE - Transportation     Lack of Transportation (Medical): Patient declined     Lack of Transportation (Non-Medical): Patient declined   Physical Activity: Inactive (2/16/2024)    Exercise Vital Sign     Days of Exercise per Week: 0 days     Minutes of Exercise per Session: 0 min   Stress: No Stress Concern Present (2/16/2024)    [employer] - Occupational Stress Questionnaire     Feeling of Stress : Not at all   Housing Stability: Unknown (2/16/2024)    Housing Stability Vital Sign     Unable to Pay for Housing in the Last Year: Patient declined     Unstable Housing in the Last Year: No     Patient Active Problem List   Diagnosis    Hypertension    HLD (hyperlipidemia)    Coronary artery disease involving native coronary artery without angina pectoris    Primary hypertension    Temporal lobe epilepsy    Intractable epilepsy    Partial idiopathic epilepsy with seizures of localized onset, intractable, without status epilepticus    Refractory epilepsy    Hypothyroidism    Hx of heart artery stent    Anemia    Unstable angina  pectoris    Stress incontinence    Renal cyst    Lumbar paraspinal muscle spasm    Nocturia    Urinary frequency    BMI 35.0-35.9,adult    Anxiety about health    Urge incontinence    Unspecified inflammatory spondylopathy, lumbar region    Occipital neuralgia    Multiple neurological symptoms    Atherosclerotic heart disease of native coronary artery with other forms of angina pectoris    Aortic atherosclerosis    Former smoker    Personal history of other endocrine, nutritional and metabolic disease    S/P placement of VNS (vagus nerve stimulation) device    Immunization deficiency    Severe obesity (BMI 35.0-39.9) with comorbidity    Chronic left shoulder pain    Neck pain    Urinary tract infection without hematuria     Review of patient's allergies indicates:   Allergen Reactions    Latex, natural rubber Hives, Shortness Of Breath and Swelling     Throat swelling  Throat swelling  Other reaction(s): Hives  Throat swelling    Ace inhibitors Other (See Comments)     Coughing  Other reaction(s): Unknown    Diclo gel [diclofenac sodium] Other (See Comments)     Chest pain       The following were reviewed at this visit: active problem list, medication list, allergies, family history, social history, and health maintenance.    Medications:  Current Outpatient Medications on File Prior to Visit   Medication Sig Dispense Refill    atorvastatin (LIPITOR) 40 MG tablet Take 1 tablet by mouth once daily.      azelastine (ASTELIN) 137 mcg (0.1 %) nasal spray 1 spray by Nasal route 2 (two) times daily.      cloBAZam (ONFI) 2.5 mg/mL Susp Take 4 ml by mouth twice per day 360 mL 2    clopidogreL (PLAVIX) 75 mg tablet Take 1 tablet (75 mg total) by mouth once daily. 90 tablet 3    DILANTIN 30 mg ER capsule TAKE ONE CAPSULE BY MOUTH EVERY DAY IN THE EVENING 90 capsule 3    estradioL (ESTRACE) 2 MG tablet Take 1 tablet (2 mg total) by mouth once daily. 30 tablet 11    ezetimibe (ZETIA) 10 mg tablet Take 10 mg by mouth once  daily.      fluticasone propionate (FLONASE) 50 mcg/actuation nasal spray 2 sprays by Each Nostril route once daily.      isosorbide mononitrate (IMDUR) 60 MG 24 hr tablet TAKE 1 TABLET BY MOUTH EVERY 12 HOURS 90 tablet 0    levocetirizine (XYZAL) 5 MG tablet Take 1 tablet (5 mg total) by mouth every evening. 90 tablet 2    levothyroxine (SYNTHROID) 100 MCG tablet Take 1 tablet (100 mcg total) by mouth before breakfast. 90 tablet 3    metoprolol succinate (TOPROL-XL) 50 MG 24 hr tablet Take 1 tablet (50 mg total) by mouth once daily. 90 tablet 1    montelukast (SINGULAIR) 10 mg tablet Take 10 mg by mouth once daily.      NAYZILAM 5 mg/spray (0.1 mL) [place] INSTILL 1 SPRAY IN NASAL ROUTE ONCE AS NEEDED. (1 SPRAY FOR PROLONGED SEIZURE OR CLUSTERS OF SEIZURES. MAY REPEAT ONCE IN OPPOSITE NOSTRIL AFTER 10 MINUTES.)      nitrofurantoin (MACRODANTIN) 100 MG capsule Take 1 capsule (100 mg total) by mouth 4 (four) times daily. 28 capsule 0    phenytoin (DILANTIN) 100 MG ER capsule TAKE TWO CAPSULES BY MOUTH TWICE A  capsule 3    potassium chloride (MICRO-K) 10 MEQ CpSR Take 10 mEq by mouth once.      ranolazine (RANEXA) 1,000 mg Tb12 Take 1,000 mg by mouth 2 (two) times daily.      REPATHA SURECLICK 140 mg/mL PnIj SMARTSI Milligram(s) SUB-Q Every 2 Weeks 2 each 11    solifenacin (VESICARE) 10 MG tablet Take 1 tablet by mouth once daily 90 tablet 0    triamcinolone acetonide 0.1% (KENALOG) 0.1 % cream Apply topically.      zonisamide (ZONEGRAN) 100 MG Cap TAKE THREE CAPSULES BY MOUTH TWICE A  capsule 3    pregabalin (LYRICA) 75 MG capsule Take 1 capsule (75 mg total) by mouth 2 (two) times daily. 60 capsule 2     No current facility-administered medications on file prior to visit.       Medications have been reviewed and reconciled with patient at this visit.  Barriers to medications reviewed with patient.    Adverse reactions to current medications reviewed with patient..    Over the counter medications  reviewed and reconciled with patient.    Exam:  Wt Readings from Last 3 Encounters:   10/21/24 95.5 kg (210 lb 8.6 oz)   07/29/24 94.7 kg (208 lb 12.4 oz)   04/22/24 98.7 kg (217 lb 9.5 oz)     Temp Readings from Last 3 Encounters:   10/21/24 96 °F (35.6 °C) (Tympanic)   07/29/24 98.8 °F (37.1 °C) (Temporal)   04/22/24 97.8 °F (36.6 °C) (Tympanic)     BP Readings from Last 3 Encounters:   10/21/24 126/68   07/29/24 124/72   04/22/24 122/68     Pulse Readings from Last 3 Encounters:   10/21/24 70   07/29/24 84   04/22/24 78     Body mass index is 35.04 kg/m².    Physical Exam  Nursing note reviewed.   Constitutional:       Appearance: She is obese.   HENT:      Head: Normocephalic and atraumatic.      Mouth/Throat:      Mouth: Mucous membranes are moist.   Cardiovascular:      Rate and Rhythm: Normal rate and regular rhythm.      Heart sounds: Normal heart sounds.   Pulmonary:      Effort: Pulmonary effort is normal. No respiratory distress.      Breath sounds: Normal breath sounds.   Abdominal:      General: Bowel sounds are normal.   Neurological:      Mental Status: She is alert.      Motor: Weakness present.      Gait: Gait abnormal.   Psychiatric:         Mood and Affect: Mood normal.         Behavior: Behavior normal.         Thought Content: Thought content normal.         Cognition and Memory: Memory is impaired.           Laboratory Reviewed ({Yes)  Lab Results   Component Value Date    WBC 4.12 10/21/2024    HGB 12.1 10/21/2024    HCT 36.3 (L) 10/21/2024     10/21/2024    CHOL 142 04/22/2024    TRIG 59 04/22/2024    HDL 88 (H) 04/22/2024    ALT 21 10/21/2024    AST 23 10/21/2024     10/21/2024    K 4.1 10/21/2024     (H) 10/21/2024    CREATININE 0.9 10/21/2024    BUN 9 10/21/2024    CO2 24 10/21/2024    TSH 0.257 (L) 07/31/2024    INR 1.0 04/08/2022    HGBA1C 5.0 04/12/2023       Evelyne was seen today for follow-up.    Diagnoses and all orders for this visit:    Preop examination  -      X-Ray Chest PA And Lateral; Future  -     COMPREHENSIVE METABOLIC PANEL; Future  -     CBC Auto Differential; Future  -     IGE; Future  -     APTT; Future  -     PROTIME-INR; Future    Perennial allergic rhinitis  -     X-Ray Chest PA And Lateral; Future  -     COMPREHENSIVE METABOLIC PANEL; Future  -     CBC Auto Differential; Future  -     IGE; Future  -     APTT; Future  -     PROTIME-INR; Future    Primary hypertension  -     X-Ray Chest PA And Lateral; Future  -     COMPREHENSIVE METABOLIC PANEL; Future  -     CBC Auto Differential; Future  -     IGE; Future  -     APTT; Future  -     PROTIME-INR; Future    Severe obesity (BMI 35.0-39.9) with comorbidity  -     X-Ray Chest PA And Lateral; Future  -     COMPREHENSIVE METABOLIC PANEL; Future  -     CBC Auto Differential; Future  -     IGE; Future  -     APTT; Future  -     PROTIME-INR; Future    Other atopic dermatitis  -     IGE; Future    Abnormal coagulation profile  -     APTT; Future  -     PROTIME-INR; Future    Multiple neurological symptoms          Assessment & Plan    NECK AND SHOULDER PAIN:  - Reviewed patient's worsening pain, particularly in neck and shoulder area.  - Noted previous recommendation by Dr. Malcolm for orthopedic consultation if pain persisted.    PRE-OPERATIVE CLEARANCE:  - Assessed patient's upcoming surgery scheduled for November 1st, requiring pre-operative clearance.  - Evaluated current test results, including chest XR and EKG.  - Considered need for additional pre-operative testing, including comprehensive lab work and allergy testing.  - Performed physical exam, including respiratory and oral assessment.  - Planned to coordinate with Dr. Em's office for cardiac clearance and to obtain recent lab results.  - Will complete pre-operative clearance form once all test results are received.  - Contact Dr. Em's office to request faxing of cardiac clearance and recent lab results.    LABS:  - Ordered lab work.    OTHER  INSTRUCTIONS:  - Ordered chest XR.       The patient is cleared for upcoming surgery by her primary care team.  The patient was recently seen by an extremal cardiologist, Dr. Em and his office will send over cardiac clearance.  If you have any questions or concerns, or if I can be of further assistance, please do not hesitate to contact me.          Lia Thompson, FNP-C           Visit today included increased complexity associated with the care of the episodic problem Pre- Op Clearance  , which was addressed while instituting co-management of the longitudinal care of the patient due to the serious and/or complex managed problem(s) .    I have evaluated and discussed management associated with medical care services that serve as the continuing focal point for all needed health care services and/or with medical care services that are part of ongoing care related to my patient's single, serious condition or a complex condition(s).    I am providing ongoing care and I am the primary care provider for this patient, and they are being managed, monitored, and/or observed for their chronic conditions over time.     I have addressed their ongoing health maintenance requirements and needs for all health care services and reviewed co-management plans provided by specialty providers when available.    [facility]   [phone] (office number)    Health Maintenance Due   Topic Date Due    TETANUS VACCINE  Never done    RSV Vaccine (Age 60+ and Pregnant patients) (1 - Risk 60-74 years 1-dose series) Never done    LDCT Lung Screen  04/26/2024    Influenza Vaccine (1) 09/01/2024    COVID-19 Vaccine (4 - 2024-25 season) 09/01/2024    Mammogram  11/08/2024          Care Plan/Goals: Reviewed    Goals    None         Follow up: No follow-ups on file.    After visit summary was printed and given to patient upon discharge today.  Patient goals and care plan are included in After Visit Summary.

## 2024-10-24 RX ORDER — LEVOCETIRIZINE DIHYDROCHLORIDE 5 MG/1
5 TABLET, FILM COATED ORAL NIGHTLY
Qty: 90 TABLET | Refills: 2 | Status: SHIPPED | OUTPATIENT
Start: 2024-10-24

## 2024-10-24 RX ORDER — LEVOTHYROXINE SODIUM 100 UG/1
100 TABLET ORAL
Qty: 90 TABLET | Refills: 3 | Status: SHIPPED | OUTPATIENT
Start: 2024-10-24

## 2024-10-24 RX ORDER — METOPROLOL SUCCINATE 50 MG/1
50 TABLET, EXTENDED RELEASE ORAL DAILY
Qty: 90 TABLET | Refills: 1 | Status: SHIPPED | OUTPATIENT
Start: 2024-10-24

## 2024-10-24 NOTE — TELEPHONE ENCOUNTER
No care due was identified.  [facility] Embedded Care Due Messages. Reference number: [id number].   10/24/2024 12:04:11 PM CDT

## 2024-10-24 NOTE — TELEPHONE ENCOUNTER
Refill Routing Note   Medication(s) are not appropriate for processing by [facility] for the following reason(s):        Due for refill >6 months ago    [facility] action(s):  Defer             Appointments  past 12m or future 3m with PCP    Date Provider   Last Visit   7/29/2024 Sergio Malcolm MD   Next Visit   Visit date not found Sergio Malcolm MD   ED visits in past 90 days: 0        Note composed:12:27 PM 10/24/2024

## 2024-10-24 NOTE — TELEPHONE ENCOUNTER
----- Message from Felicia sent at 10/24/2024  2:01 PM CDT -----  Contact: chela  Type:  RX Refill Request    Who Called: chela  Refill or New Rx:refill    RX Name and Strength:metoprolol succinate (TOPROL-XL) 50 MG 24 hr tablet levothyroxine (SYNTHROID) 100 MCG tablet levocetirizine (XYZAL) 5 MG tablet     How is the patient currently taking it? (ex. 1XDay):  Is this a 30 day or 90 day RX:  Preferred Pharmacy with phone number:   [facility] - [address]  [address]  [place] [address]  Phone: [phone] Fax: [phone]    Local or Mail Order:local  Ordering Provider:  Would the patient rather a call back or a response via [facility]? call  Best Call Back Number: [phone]   Additional Information:

## 2024-10-25 ENCOUNTER — TELEPHONE (OUTPATIENT)
Dept: INTERNAL MEDICINE | Facility: CLINIC | Age: 64
End: 2024-10-25
Payer: MEDICARE

## 2024-10-25 NOTE — TELEPHONE ENCOUNTER
----- Message from Med Assistant Kannan sent at 10/25/2024  8:19 AM CDT -----  Contact: Dr. Edouard Contreras office  Patient seen Lia for pre-op.  ----- Message -----  From: Monique Avery LPN  Sent: 10/24/2024   5:11 PM CDT  To: Kannan Morrell MA      ----- Message -----  From: Anaya Art  Sent: 10/24/2024   3:47 PM CDT  To: Gold Haynes    ..Type:  Needs Medical Advice    Who Called:  Lizz   Would the patient rather a call back or a response via [facility]? Call back   Best Call Back Number: [phone] Fax number: [phone]  Additional Information: Lizz is calling to get singed clearance, EKG tracing and not the report , labs, office notes for pt scheduled procedure on 11/01

## 2024-10-28 PROBLEM — N39.0 URINARY TRACT INFECTION WITHOUT HEMATURIA: Status: RESOLVED | Noted: 2024-07-29 | Resolved: 2024-10-28

## 2024-12-05 ENCOUNTER — TELEPHONE (OUTPATIENT)
Dept: INTERNAL MEDICINE | Facility: CLINIC | Age: 64
End: 2024-12-05
Payer: MEDICARE

## 2024-12-05 DIAGNOSIS — Z12.31 ENCOUNTER FOR SCREENING MAMMOGRAM FOR MALIGNANT NEOPLASM OF BREAST: Primary | ICD-10-CM

## 2024-12-05 NOTE — TELEPHONE ENCOUNTER
----- Message from Mayra sent at 12/5/2024  2:47 PM CST -----  Contact: Evelyne  Type:  Mammogram    Caller is requesting to schedule their annual mammogram appointment.  Order is not listed in EPIC.  Please enter order and contact patient to schedule.  Name of Caller:Evelyne  Where would they like the mammogram performed?[facility]  Would the patient rather a call back or a response via [facility]? Call Back  Best Call Back Number:[phone]  Thanks!

## 2024-12-09 ENCOUNTER — OFFICE VISIT (OUTPATIENT)
Dept: INTERNAL MEDICINE | Facility: CLINIC | Age: 64
End: 2024-12-09
Payer: MEDICARE

## 2024-12-09 VITALS
SYSTOLIC BLOOD PRESSURE: 136 MMHG | WEIGHT: 207.88 LBS | OXYGEN SATURATION: 97 % | HEIGHT: 65 IN | BODY MASS INDEX: 34.63 KG/M2 | RESPIRATION RATE: 18 BRPM | TEMPERATURE: 97 F | HEART RATE: 100 BPM | DIASTOLIC BLOOD PRESSURE: 72 MMHG

## 2024-12-09 DIAGNOSIS — G89.29 CHRONIC BACK PAIN, UNSPECIFIED BACK LOCATION, UNSPECIFIED BACK PAIN LATERALITY: ICD-10-CM

## 2024-12-09 DIAGNOSIS — I10 PRIMARY HYPERTENSION: ICD-10-CM

## 2024-12-09 DIAGNOSIS — M54.9 CHRONIC BACK PAIN, UNSPECIFIED BACK LOCATION, UNSPECIFIED BACK PAIN LATERALITY: ICD-10-CM

## 2024-12-09 DIAGNOSIS — Z28.39 IMMUNIZATION DEFICIENCY: ICD-10-CM

## 2024-12-09 DIAGNOSIS — M54.2 CHRONIC NECK PAIN: ICD-10-CM

## 2024-12-09 DIAGNOSIS — Z23 NEED FOR VACCINATION: ICD-10-CM

## 2024-12-09 DIAGNOSIS — G89.29 CHRONIC NECK PAIN: ICD-10-CM

## 2024-12-09 DIAGNOSIS — R29.90 MULTIPLE NEUROLOGICAL SYMPTOMS: Primary | ICD-10-CM

## 2024-12-09 PROCEDURE — G0008 ADMIN INFLUENZA VIRUS VAC: HCPCS | Mod: PBBFAC

## 2024-12-09 PROCEDURE — 99214 OFFICE O/P EST MOD 30 MIN: CPT | Mod: S$PBB,,, | Performed by: FAMILY MEDICINE

## 2024-12-09 PROCEDURE — 99215 OFFICE O/P EST HI 40 MIN: CPT | Mod: PBBFAC | Performed by: FAMILY MEDICINE

## 2024-12-09 PROCEDURE — 99999PBSHW FLU VACCINE - TRIVALENT (MDCK) PRESERVATIVE FREE IM: Mod: PBBFAC,,,

## 2024-12-09 PROCEDURE — 99999 PR PBB SHADOW E&M-EST. PATIENT-LVL V: CPT | Mod: PBBFAC,,, | Performed by: FAMILY MEDICINE

## 2024-12-09 NOTE — PROGRESS NOTES
Patient ID: Evelyne Francisco is a 64 y.o. female.    Chief Complaint: Fatigue and Pain    History of Present Illness    89-year-old female presents today for left-sided pain.    She reports left-sided pain extending to the neck and ear, affecting her breathing. The pain is triggered by putting on her purse and causes discomfort throughout the shoulder and neck area. Currently, the pain appears to be localized in the neck muscles.    She has seen a neurologist . She mentions receiving a recommendation for back surgery, though she has not yet seen the back doctor.    X-ray of the neck showed degenerative arthritic changes and narrowing of the nerves. X-ray of the shoulder was also performed.      ROS:  General: -fever, -chills, -fatigue, -weight gain, -weight loss  Eyes: -vision changes, -redness, -discharge  ENT: -ear pain, -nasal congestion, -sore throat  Cardiovascular: -chest pain, -palpitations, -lower extremity edema  Respiratory: -cough, -shortness of breath, +difficulty breathing  Gastrointestinal: -abdominal pain, -nausea, -vomiting, -diarrhea, -constipation, -blood in stool  Genitourinary: -dysuria, -hematuria, -frequency  Musculoskeletal: -joint pain, +muscle pain  Skin: -rash, -lesion  Neurological: -headache, -dizziness, -numbness, -tingling         Physical Exam    General: In no acute distress. Not ill-appearing or diaphoretic.  Neck: Normal thyroid. No cervical lymphadenopathy. 2+ carotid pulses.  Cardiovascular: Normal rate and regular  rhythm. No murmur heard. No gallop.  Pulmonary: Pulmonary effort is normal. No respiratory distress. No wheezing. No rhonchi. No rales.  Abdominal: Soft. Nontender. Nondistended. No mass.  Musculoskeletal: No swelling. Pain in neck muscles. No deformity.  Neurological: Alert.  Psychiatric: Mood normal. Behavior normal.         Assessment & Plan    CERVICAL SPONDYLOSIS AND DISC DISORDER:  - Reviewed x-ray results showing degenerative arthritic changes and  nerve narrowing in neck.    - Reviewed previous x-rays of neck and shoulder.    SHOULDER AND NECK PAIN:  - Assessed shoulder and neck pain.  - Performed physical exam of shoulder and neck, noting pain in shoulder and neck muscl         She is [facility].  Thinks she has seen neurologist neurosurgeon as well.  Blood pressure controlled.  Flu immunization was ordered.  Follow-up in 3 months    Follow up in about 3 months (around 3/9/2025).    This note was generated with the assistance of ambient listening technology. Verbal consent was obtained by the patient and accompanying visitor(s) for the recording of patient appointment to facilitate this note. I attest to having reviewed and edited the generated note for accuracy, though some syntax or spelling errors may persist. Please contact the author of this note for any clarification.

## 2024-12-20 ENCOUNTER — HOSPITAL ENCOUNTER (OUTPATIENT)
Dept: RADIOLOGY | Facility: HOSPITAL | Age: 64
Discharge: HOME | End: 2024-12-20
Attending: FAMILY MEDICINE
Payer: MEDICARE

## 2024-12-20 DIAGNOSIS — Z12.31 ENCOUNTER FOR SCREENING MAMMOGRAM FOR MALIGNANT NEOPLASM OF BREAST: ICD-10-CM

## 2024-12-20 PROCEDURE — 77063 BREAST TOMOSYNTHESIS BI: CPT | Mod: TC

## 2024-12-20 PROCEDURE — 77063 BREAST TOMOSYNTHESIS BI: CPT | Mod: 26,,, | Performed by: RADIOLOGY

## 2024-12-20 PROCEDURE — 77067 SCR MAMMO BI INCL CAD: CPT | Mod: 26,,, | Performed by: RADIOLOGY

## 2025-01-02 ENCOUNTER — HOSPITAL ENCOUNTER (OUTPATIENT)
Dept: CARDIOLOGY | Facility: HOSPITAL | Age: 65
Discharge: HOME | End: 2025-01-02
Attending: FAMILY MEDICINE
Payer: MEDICARE

## 2025-01-02 ENCOUNTER — HOSPITAL ENCOUNTER (OUTPATIENT)
Dept: RADIOLOGY | Facility: HOSPITAL | Age: 65
Discharge: HOME | End: 2025-01-02
Attending: FAMILY MEDICINE
Payer: MEDICARE

## 2025-01-02 ENCOUNTER — OFFICE VISIT (OUTPATIENT)
Dept: INTERNAL MEDICINE | Facility: CLINIC | Age: 65
End: 2025-01-02
Payer: MEDICARE

## 2025-01-02 ENCOUNTER — PATIENT MESSAGE (OUTPATIENT)
Dept: INTERNAL MEDICINE | Facility: CLINIC | Age: 65
End: 2025-01-02

## 2025-01-02 VITALS
HEIGHT: 65 IN | TEMPERATURE: 98 F | OXYGEN SATURATION: 96 % | WEIGHT: 207.44 LBS | HEART RATE: 87 BPM | BODY MASS INDEX: 34.56 KG/M2 | SYSTOLIC BLOOD PRESSURE: 120 MMHG | DIASTOLIC BLOOD PRESSURE: 70 MMHG

## 2025-01-02 DIAGNOSIS — G40.019 PARTIAL IDIOPATHIC EPILEPSY WITH SEIZURES OF LOCALIZED ONSET, INTRACTABLE, WITHOUT STATUS EPILEPTICUS: ICD-10-CM

## 2025-01-02 DIAGNOSIS — I10 PRIMARY HYPERTENSION: ICD-10-CM

## 2025-01-02 DIAGNOSIS — I25.10 CORONARY ARTERY DISEASE INVOLVING NATIVE CORONARY ARTERY OF NATIVE HEART WITHOUT ANGINA PECTORIS: ICD-10-CM

## 2025-01-02 DIAGNOSIS — Z96.89 S/P PLACEMENT OF VNS (VAGUS NERVE STIMULATION) DEVICE: ICD-10-CM

## 2025-01-02 DIAGNOSIS — I49.9 CARDIAC ARRHYTHMIA, UNSPECIFIED CARDIAC ARRHYTHMIA TYPE: ICD-10-CM

## 2025-01-02 DIAGNOSIS — I20.9 ANGINA PECTORIS, UNSPECIFIED: ICD-10-CM

## 2025-01-02 DIAGNOSIS — Z95.5 HX OF HEART ARTERY STENT: ICD-10-CM

## 2025-01-02 DIAGNOSIS — Z01.818 PREOPERATIVE CLEARANCE: Primary | ICD-10-CM

## 2025-01-02 DIAGNOSIS — E03.9 HYPOTHYROIDISM, UNSPECIFIED TYPE: ICD-10-CM

## 2025-01-02 LAB
OHS QRS DURATION: 76 MS
OHS QTC CALCULATION: 404 MS

## 2025-01-02 PROCEDURE — 99999 PR PBB SHADOW E&M-EST. PATIENT-LVL V: CPT | Mod: PBBFAC,,, | Performed by: FAMILY MEDICINE

## 2025-01-02 PROCEDURE — 93010 ELECTROCARDIOGRAM REPORT: CPT | Mod: ,,, | Performed by: INTERNAL MEDICINE

## 2025-01-02 PROCEDURE — 99215 OFFICE O/P EST HI 40 MIN: CPT | Mod: PBBFAC | Performed by: FAMILY MEDICINE

## 2025-01-02 PROCEDURE — 93005 ELECTROCARDIOGRAM TRACING: CPT

## 2025-01-02 PROCEDURE — 71046 X-RAY EXAM CHEST 2 VIEWS: CPT | Mod: 26,,, | Performed by: RADIOLOGY

## 2025-01-02 PROCEDURE — 71046 X-RAY EXAM CHEST 2 VIEWS: CPT | Mod: TC

## 2025-01-02 NOTE — PROGRESS NOTES
Patient ID: Evelyne Francisco is a 64 y.o. female.    Chief Complaint: Pre-op Exam    History of Present Illness    Ms. Francisco presents today for pre-operative evaluation for upcoming back surgery.    She underwent sinus surgery on November 1st. She had a cardiac catheterization in 2022.  She reports she had cardiac clearance by her cardiologist prior to her recent sinus surgery.  Cardiology told her that she did any additional clearance.    She has had two recent seizures and is currently under neurologist care.    She denies chest pain or palpitations.      ROS:  General: -fever, -chills, -fatigue, -weight gain, -weight loss  Eyes: -vision changes, -redness, -discharge  ENT: -ear pain, -nasal congestion, -sore throat  Cardiovascular: -chest pain, -palpitations, -lower extremity edema  Respiratory: -cough, -shortness of breath  Gastrointestinal: -abdominal pain, -nausea, -vomiting, -diarrhea, -constipation, -blood in stool  Genitourinary: -dysuria, -hematuria, -frequency  Musculoskeletal: -joint pain, -muscle pain chronic back pain with neurological symptoms  Skin: -rash, -lesion  Neurological: -headache, -dizziness, -numbness, -tingling         Physical Exam    General: In no acute distress. Not ill-appearing or diaphoretic.  Neck: Normal thyroid. No cervical lymphadenopathy. 2+ carotid pulses.  Cardiovascular: Normal rate and regular  rhythm. No murmur heard. No gallop.  Pulmonary: Pulmonary effort is normal. No respiratory distress. No wheezing. No rhonchi. No rales.  Abdominal: Soft. Nontender. Nondistended. No mass  Neurological: Alert.  Psychiatric: Mood normal. Behavior normal.         Assessment & Plan    OBESITY (BMI 35.0-39.9) WITH COMORBIDITY:  - Monitor the patient's weight, eating habits, and physical condition, noting obesity-related concerns.  - Educate the patient about the importance of maintaining a healthy diet and regular exercise.    EPILEPSY:  - Assess the patient's seizure  history, noting the report of 2 recent seizures.  - Verify that the patient contacted their neurologist after these episodes and advise them to maintain regular follow-ups with their neurologist.  - Confirm ongoing neurologist care for proper management of the condition.    SURGERY:  - Ms. Francisco is scheduled for lower back surgery   - Request additional information from the surgical form to determine pre-operative lab requirements.  - Advise patient to provide relevant surgical documentation for review.  - Review surgical plan and expected outcomes with the patient, providing pre-operative instructions and discussing post-operative care.  - Review patient's history of recent sinus surgery on 11/1.    HISTORY:  - Ms. Francisco had a cardiac catheterization in 2022.  - Attempt to review cardiology records, but recent notes are unavailable.  - Ms. Francisco reports being cleared by cardiology for the upcoming surgery.    SYMPTOMS:  - Evaluate patient for chest pain, tachycardia, and dyspnea.  - Perform physical exam, including lung and ankle assessment.  - Record normal blood pressure reading.  - Advise patient to report any changes in cardiovascular symptoms before the surgery.    INSTRUCTIONS:  - Ensure all relevant medical history is documented in the patient's chart.  - Request the surgery form to determine necessary pre-operative labs and orders.    UP:  - Follow up as needed to obtain and review the required pre-operative information, including surgical form and updated cardiology records.          CBC CMP PT PTT and urine was ordered.  EKG chest x-ray ordered.  We will complete form as results are resulted    No follow-ups on file.    This note was generated with the assistance of ambient listening technology. Verbal consent was obtained by the patient and accompanying visitor(s) for the recording of patient appointment to facilitate this note. I attest to having reviewed and edited the generated note for  accuracy, though some syntax or spelling errors may persist. Please contact the author of this note for any clarification.

## 2025-01-08 ENCOUNTER — TELEPHONE (OUTPATIENT)
Dept: INTERNAL MEDICINE | Facility: CLINIC | Age: 65
End: 2025-01-08
Payer: MEDICARE

## 2025-01-08 NOTE — TELEPHONE ENCOUNTER
----- Message from Summer sent at 1/8/2025  2:22 PM CST -----  Contact: Josue/Dr. Vargas  Patient called asking for advice about needing the pt last office note fax for the pt clearance. Please fax to [phone] phone [phone]. Thanks!

## 2025-01-08 NOTE — TELEPHONE ENCOUNTER
Please advise patient that Dr. Malcolm is out of the office this week and he can address on his return.

## 2025-01-08 NOTE — TELEPHONE ENCOUNTER
Spoke with Burton Vargas's staff asking for pre-op notes, labs and imaging results. Please advise if patient is good to go for procedure on 01/17/25.    Fax:[phone]   phone [phone]

## 2025-01-13 ENCOUNTER — TELEPHONE (OUTPATIENT)
Dept: INTERNAL MEDICINE | Facility: CLINIC | Age: 65
End: 2025-01-13
Payer: MEDICARE

## 2025-01-13 NOTE — TELEPHONE ENCOUNTER
----- Message from Coleen sent at 1/13/2025  2:51 PM CST -----  Contact: Martha Thomas with Dr. Mittal [facility] is calling to speak with the nurse regarding clearance. Reports needing to get clearance refaxed due to some of the pages are missing. Please give Martha a call back at [phone]

## 2025-01-13 NOTE — TELEPHONE ENCOUNTER
----- Message from Katlyn sent at 1/13/2025  8:53 AM CST -----  Type:  Patient Returning Call    Who Called:Mahendra/ [facility]/ Dr Jaimes office  Who Left Message for Patient:Nurse  Does the patient know what this is regarding?:Clearance  Would the patient rather a call back or a response via [facility]? Callback  Best Call Back Number:[phone]  Additional Information: Fax number to fax clearance  [phone]

## 2025-01-14 ENCOUNTER — TELEPHONE (OUTPATIENT)
Dept: INTERNAL MEDICINE | Facility: CLINIC | Age: 65
End: 2025-01-14
Payer: MEDICARE

## 2025-01-14 NOTE — TELEPHONE ENCOUNTER
----- Message from Chemayi sent at 1/14/2025 11:00 AM CST -----  Contact: [facility]  .Type:  Needs Medical Advice    Who Called: Ria ([facility])  Would the patient rather a call back or a response via [facility]? Call   Best Call Back Number: [phone]  Additional Information: That are missing the urine report for the pt. Fax;[phone]

## 2025-02-21 DIAGNOSIS — Z00.00 ENCOUNTER FOR MEDICARE ANNUAL WELLNESS EXAM: ICD-10-CM

## 2025-03-12 ENCOUNTER — TELEPHONE (OUTPATIENT)
Dept: INTERNAL MEDICINE | Facility: CLINIC | Age: 65
End: 2025-03-12
Payer: MEDICARE

## 2025-03-12 NOTE — TELEPHONE ENCOUNTER
Talked to pt      ----- Message from Krista sent at 3/12/2025  2:51 PM CDT -----  Regarding: Appt Assistance  Contact: Patient  Type:  Needs Medical AdviceWho Called: Patient Symptoms (please be specific): n/a  How long has patient had these symptoms:  n/a Pharmacy name and phone #:  n/a Would the patient rather a call back or a response via [facility]? Call back Best Call Back Number:  [phone] Information: Patient stated she came in to office on Monday unaware she was scheduled for Tuesday Patient stated she can only attend appts on Mondays and Fridays Patient is requesting a call back attempting to verify an appt she believes she has scheduled for Scheduling was unable to confirm Please Assist

## 2025-03-12 NOTE — TELEPHONE ENCOUNTER
Talked to pt appt made    ----- Message from Mayra sent at 3/12/2025  3:30 PM CDT -----  Contact: Evelyne  Type:  Patient Returning Ryan Called:Anahi Left Message for Patient:UnsureDoes the patient know what this is regarding?:she ask to be seen tomorrow at 9:30amWould the patient rather a call back or a response via [facility]? Call [facility] Call Back Number:[phone]!

## 2025-03-13 ENCOUNTER — OFFICE VISIT (OUTPATIENT)
Dept: INTERNAL MEDICINE | Facility: CLINIC | Age: 65
End: 2025-03-13
Payer: MEDICARE

## 2025-03-13 VITALS
HEART RATE: 80 BPM | OXYGEN SATURATION: 96 % | SYSTOLIC BLOOD PRESSURE: 120 MMHG | DIASTOLIC BLOOD PRESSURE: 70 MMHG | BODY MASS INDEX: 34.49 KG/M2 | WEIGHT: 207 LBS | HEIGHT: 65 IN

## 2025-03-13 DIAGNOSIS — Z28.39 IMMUNIZATION DEFICIENCY: ICD-10-CM

## 2025-03-13 DIAGNOSIS — I10 PRIMARY HYPERTENSION: Primary | ICD-10-CM

## 2025-03-13 DIAGNOSIS — I25.10 CORONARY ARTERY DISEASE INVOLVING NATIVE CORONARY ARTERY OF NATIVE HEART WITHOUT ANGINA PECTORIS: ICD-10-CM

## 2025-03-13 DIAGNOSIS — E66.01 SEVERE OBESITY (BMI 35.0-39.9) WITH COMORBIDITY: ICD-10-CM

## 2025-03-13 DIAGNOSIS — M46.96 UNSPECIFIED INFLAMMATORY SPONDYLOPATHY, LUMBAR REGION: ICD-10-CM

## 2025-03-13 DIAGNOSIS — I70.0 AORTIC ATHEROSCLEROSIS: ICD-10-CM

## 2025-03-13 PROBLEM — D80.6 ANTIBODY DEFICIENCY WITH NEAR-NORMAL IMMUNOGLOBULINS OR WITH HYPERIMMUNOGLOBULINEMIA: Status: ACTIVE | Noted: 2025-03-13

## 2025-03-13 PROCEDURE — 99215 OFFICE O/P EST HI 40 MIN: CPT | Mod: PBBFAC | Performed by: FAMILY MEDICINE

## 2025-03-13 PROCEDURE — 90677 PCV20 VACCINE IM: CPT | Mod: PBBFAC

## 2025-03-13 PROCEDURE — 99999PBSHW PR PBB SHADOW TECHNICAL ONLY FILED TO HB: Mod: PBBFAC,,,

## 2025-03-13 PROCEDURE — G0009 ADMIN PNEUMOCOCCAL VACCINE: HCPCS | Mod: PBBFAC

## 2025-03-13 PROCEDURE — 99999 PR PBB SHADOW E&M-EST. PATIENT-LVL V: CPT | Mod: PBBFAC,,, | Performed by: FAMILY MEDICINE

## 2025-03-13 PROCEDURE — 99214 OFFICE O/P EST MOD 30 MIN: CPT | Mod: S$PBB,,, | Performed by: FAMILY MEDICINE

## 2025-03-13 RX ADMIN — PNEUMOCOCCAL 20-VALENT CONJUGATE VACCINE 0.5 ML
2.2; 2.2; 2.2; 2.2; 2.2; 2.2; 2.2; 2.2; 2.2; 2.2; 2.2; 2.2; 2.2; 2.2; 2.2; 2.2; 4.4; 2.2; 2.2; 2.2 INJECTION, SUSPENSION INTRAMUSCULAR at 10:03

## 2025-03-13 NOTE — PROGRESS NOTES
Patient ID: Evelyne Francisco is a 64 y.o. female.    Chief Complaint: Follow-up    History of Present Illness    Ms. Francisco presents today with back and knee pain.    She underwent back surgery.  She been in physical therapy.  Back specialist evaluation confirmed pain originates from the back rather than the hip.    She reports bilateral knee pain.      ROS:  General: -fever, -chills, -fatigue, -weight gain, -weight loss  Eyes: -vision changes, -redness, -discharge  ENT: -ear pain, -nasal congestion, -sore throat  Cardiovascular: -chest pain, -palpitations, -lower extremity edema  Respiratory: -cough, -shortness of breath  Gastrointestinal: -abdominal pain, -nausea, -vomiting, -diarrhea, -constipation, -blood in stool  Genitourinary: -dysuria, -hematuria, -frequency  Musculoskeletal: +joint pain, -muscle pain, +back pain, +difficulty walking  Skin: -rash, -lesion  Neurological: -headache, -dizziness, -numbness, -tingling         Physical Exam    General: In no acute distress. Not ill-appearing or diaphoretic.  Neck: Normal thyroid. No cervical lymphadenopathy. 2+ carotid pulses.  Cardiovascular: Normal rate and regular  rhythm. No murmur heard. No gallop.  Pulmonary: Pulmonary effort is normal. No respiratory distress. No wheezing. No rhonchi. No rales.  Abdominal: Soft. Nontender. Nondistended. No mass.    Neurological: Alert.  Psychiatric: Mood normal. Behavior normal.  Vitals: Normal blood pressure.         Assessment & Plan    SEVERE OBESITY (BMI 35.0-39.9) WITH COMORBIDITY:  - Evaluated patient's reported pain in back and knees, potentially related to obesity.  - Reviewed x-rays of back and hip, assessing that the pain is originating from the back, not the hip.  - Ordered and scheduled an MRI for further evaluation of back pain.  - Noted that the patient had 2 back surgeries in January and referred them to a spine specialist for further evaluation.  - Offered pneumonia  vaccination.    POSTLAMINECTOMY SYNDROME:  - Reviewed patient's recent back surgeries performed on January 17, 2025.  - Ordered MRI to further evaluate back condition, scheduled for Monday (pending insurance approval).    RIGHT KNEE PAIN:  - Noted patient's report of bilateral knee pain, with the right knee being more painful.    FOLLOW-UP:  - Instructed patient to follow up after MRI results are available.        Blood pressure controlled medical history also includes coronary artery disease no current chest pain or palpitations history epilepsy with neurological symptoms.  Follow-up in 4 months.  Pneumococcal ordered        Follow up in about 4 months (around 7/13/2025).    This note was generated with the assistance of ambient listening technology. Verbal consent was obtained by the patient and accompanying visitor(s) for the recording of patient appointment to facilitate this note. I attest to having reviewed and edited the generated note for accuracy, though some syntax or spelling errors may persist. Please contact the author of this note for any clarification.

## 2025-05-12 RX ORDER — METOPROLOL SUCCINATE 50 MG/1
50 TABLET, EXTENDED RELEASE ORAL
Qty: 90 TABLET | Refills: 3 | Status: SHIPPED | OUTPATIENT
Start: 2025-05-12

## 2025-05-13 NOTE — TELEPHONE ENCOUNTER
Refill Decision Note   Evelyne Francisco  is requesting a refill authorization.  Brief Assessment and Rationale for Refill:  Approve     Medication Therapy Plan:        Comments:     Note composed:9:23 PM 05/12/2025

## 2025-06-06 ENCOUNTER — TELEPHONE (OUTPATIENT)
Dept: UROLOGY | Facility: CLINIC | Age: 65
End: 2025-06-06
Payer: MEDICARE

## 2025-06-06 ENCOUNTER — OFFICE VISIT (OUTPATIENT)
Dept: OBSTETRICS AND GYNECOLOGY | Facility: CLINIC | Age: 65
End: 2025-06-06
Payer: MEDICARE

## 2025-06-06 VITALS
WEIGHT: 212.5 LBS | HEIGHT: 65 IN | DIASTOLIC BLOOD PRESSURE: 83 MMHG | BODY MASS INDEX: 35.4 KG/M2 | SYSTOLIC BLOOD PRESSURE: 137 MMHG

## 2025-06-06 DIAGNOSIS — N94.9 VAGINAL DISCOMFORT: ICD-10-CM

## 2025-06-06 DIAGNOSIS — N30.00 ACUTE CYSTITIS WITHOUT HEMATURIA: Primary | ICD-10-CM

## 2025-06-06 LAB
BILIRUBIN, UA POC OHS: NEGATIVE
BLOOD, UA POC OHS: ABNORMAL
CLARITY, UA POC OHS: ABNORMAL
COLOR, UA POC OHS: ABNORMAL
GLUCOSE, UA POC OHS: 100
KETONES, UA POC OHS: ABNORMAL
LEUKOCYTES, UA POC OHS: ABNORMAL
NITRITE, UA POC OHS: POSITIVE
PH, UA POC OHS: 6.5
PROTEIN, UA POC OHS: 100
SPECIFIC GRAVITY, UA POC OHS: 1.02
UROBILINOGEN, UA POC OHS: 2

## 2025-06-06 PROCEDURE — 99999 PR PBB SHADOW E&M-EST. PATIENT-LVL III: CPT | Mod: PBBFAC,,, | Performed by: OBSTETRICS & GYNECOLOGY

## 2025-06-06 PROCEDURE — 87086 URINE CULTURE/COLONY COUNT: CPT | Performed by: OBSTETRICS & GYNECOLOGY

## 2025-06-06 PROCEDURE — 99213 OFFICE O/P EST LOW 20 MIN: CPT | Mod: PBBFAC | Performed by: OBSTETRICS & GYNECOLOGY

## 2025-06-06 RX ORDER — NITROFURANTOIN 25; 75 MG/1; MG/1
100 CAPSULE ORAL 2 TIMES DAILY
Qty: 10 CAPSULE | Refills: 0 | Status: SHIPPED | OUTPATIENT
Start: 2025-06-06 | End: 2025-06-11

## 2025-06-06 RX ORDER — PHENAZOPYRIDINE HYDROCHLORIDE 200 MG/1
200 TABLET, FILM COATED ORAL 3 TIMES DAILY PRN
Qty: 20 TABLET | Refills: 0 | Status: SHIPPED | OUTPATIENT
Start: 2025-06-06 | End: 2026-06-06

## 2025-06-06 RX ORDER — GABAPENTIN 300 MG/1
300 CAPSULE ORAL
COMMUNITY

## 2025-06-09 ENCOUNTER — RESULTS FOLLOW-UP (OUTPATIENT)
Dept: OBSTETRICS AND GYNECOLOGY | Facility: CLINIC | Age: 65
End: 2025-06-09

## 2025-06-10 ENCOUNTER — TELEPHONE (OUTPATIENT)
Dept: OBSTETRICS AND GYNECOLOGY | Facility: CLINIC | Age: 65
End: 2025-06-10
Payer: MEDICARE

## 2025-06-23 DIAGNOSIS — N95.1 MENOPAUSE SYNDROME: ICD-10-CM

## 2025-06-23 RX ORDER — ESTRADIOL 2 MG/1
TABLET ORAL
Qty: 30 TABLET | Refills: 11 | Status: SHIPPED | OUTPATIENT
Start: 2025-06-23

## 2025-07-14 ENCOUNTER — OFFICE VISIT (OUTPATIENT)
Dept: INTERNAL MEDICINE | Facility: CLINIC | Age: 65
End: 2025-07-14
Payer: MEDICARE

## 2025-07-14 ENCOUNTER — LAB VISIT (OUTPATIENT)
Dept: LAB | Facility: HOSPITAL | Age: 65
End: 2025-07-14
Attending: FAMILY MEDICINE
Payer: MEDICARE

## 2025-07-14 VITALS
BODY MASS INDEX: 35.4 KG/M2 | WEIGHT: 212.5 LBS | TEMPERATURE: 98 F | HEART RATE: 93 BPM | SYSTOLIC BLOOD PRESSURE: 124 MMHG | OXYGEN SATURATION: 96 % | DIASTOLIC BLOOD PRESSURE: 76 MMHG | HEIGHT: 65 IN

## 2025-07-14 DIAGNOSIS — I25.10 CORONARY ARTERY DISEASE INVOLVING NATIVE CORONARY ARTERY OF NATIVE HEART WITHOUT ANGINA PECTORIS: ICD-10-CM

## 2025-07-14 DIAGNOSIS — E03.9 HYPOTHYROIDISM, UNSPECIFIED TYPE: ICD-10-CM

## 2025-07-14 DIAGNOSIS — I10 PRIMARY HYPERTENSION: Primary | ICD-10-CM

## 2025-07-14 DIAGNOSIS — E78.5 HYPERLIPIDEMIA, UNSPECIFIED HYPERLIPIDEMIA TYPE: Chronic | ICD-10-CM

## 2025-07-14 LAB
ALBUMIN SERPL BCP-MCNC: 3.6 G/DL (ref 3.5–5.2)
ALP SERPL-CCNC: 219 UNIT/L (ref 40–150)
ALT SERPL W/O P-5'-P-CCNC: 18 UNIT/L (ref 10–44)
ANION GAP (OHS): 8 MMOL/L (ref 8–16)
AST SERPL-CCNC: 20 UNIT/L (ref 11–45)
BILIRUB SERPL-MCNC: 0.2 MG/DL (ref 0.1–1)
BUN SERPL-MCNC: 10 MG/DL (ref 8–23)
CALCIUM SERPL-MCNC: 8.6 MG/DL (ref 8.7–10.5)
CHLORIDE SERPL-SCNC: 108 MMOL/L (ref 95–110)
CHOLEST SERPL-MCNC: 157 MG/DL (ref 120–199)
CHOLEST/HDLC SERPL: 1.9 {RATIO} (ref 2–5)
CO2 SERPL-SCNC: 26 MMOL/L (ref 23–29)
CREAT SERPL-MCNC: 0.7 MG/DL (ref 0.5–1.4)
GFR SERPLBLD CREATININE-BSD FMLA CKD-EPI: >60 ML/MIN/1.73/M2
GLUCOSE SERPL-MCNC: 83 MG/DL (ref 70–110)
HDLC SERPL-MCNC: 83 MG/DL (ref 40–75)
HDLC SERPL: 52.9 % (ref 20–50)
LDLC SERPL CALC-MCNC: 62.2 MG/DL (ref 63–159)
NONHDLC SERPL-MCNC: 74 MG/DL
POTASSIUM SERPL-SCNC: 3.9 MMOL/L (ref 3.5–5.1)
PROT SERPL-MCNC: 6.7 GM/DL (ref 6–8.4)
SODIUM SERPL-SCNC: 142 MMOL/L (ref 136–145)
T4 FREE SERPL-MCNC: 0.92 NG/DL (ref 0.71–1.51)
TRIGL SERPL-MCNC: 59 MG/DL (ref 30–150)
TSH SERPL-ACNC: 0.46 UIU/ML (ref 0.4–4)

## 2025-07-14 PROCEDURE — 99999 PR PBB SHADOW E&M-EST. PATIENT-LVL IV: CPT | Mod: PBBFAC,,, | Performed by: FAMILY MEDICINE

## 2025-07-14 PROCEDURE — 83718 ASSAY OF LIPOPROTEIN: CPT

## 2025-07-14 PROCEDURE — 84443 ASSAY THYROID STIM HORMONE: CPT

## 2025-07-14 PROCEDURE — 84439 ASSAY OF FREE THYROXINE: CPT

## 2025-07-14 PROCEDURE — 36415 COLL VENOUS BLD VENIPUNCTURE: CPT

## 2025-07-14 PROCEDURE — 84075 ASSAY ALKALINE PHOSPHATASE: CPT

## 2025-07-14 PROCEDURE — 99214 OFFICE O/P EST MOD 30 MIN: CPT | Mod: PBBFAC | Performed by: FAMILY MEDICINE

## 2025-07-14 PROCEDURE — 99214 OFFICE O/P EST MOD 30 MIN: CPT | Mod: S$PBB,,, | Performed by: FAMILY MEDICINE

## 2025-07-14 RX ORDER — POTASSIUM CHLORIDE 750 MG/1
TABLET, EXTENDED RELEASE ORAL
COMMUNITY

## 2025-07-14 RX ORDER — BUDESONIDE 0.5 MG/2ML
INHALANT ORAL
COMMUNITY
Start: 2024-11-06

## 2025-07-14 RX ORDER — EVOLOCUMAB 140 MG/ML
140 INJECTION, SOLUTION SUBCUTANEOUS
COMMUNITY

## 2025-07-14 NOTE — PROGRESS NOTES
Patient ID: Evelyne Francisco is a 65 y.o. female.    Chief Complaint: Follow-up    History of Present Illness    Ms. Francisco presents today for follow up.    She reports significant severe pain and frequent falls, which have become a safety concern. Her daughter purchased a walker due to recurrent falls and was hesitant to leave her unattended, particularly during a recent family trip. She is discussing ongoing pain management with potential consideration of a spinal stimulator, though no definitive intervention has been implemented.    She reports a growing spot on her feet that initially started small and is progressively increasing in size. She currently wears slippers at home instead of shoes and is managing the condition with eczema cream . She acknowledges scraping her feet and is seeking guidance on managing the developing callus.      ROS:  General: -fever, -chills, -fatigue, -weight gain, -weight loss  Eyes: -vision changes, -redness, -discharge  ENT: -ear pain, -nasal congestion, -sore throat  Cardiovascular: -chest pain, -palpitations, -lower extremity edema  Respiratory: -cough, -shortness of breath  Gastrointestinal: -abdominal pain, -nausea, -vomiting, -diarrhea, -constipation, -blood in stool  Genitourinary: -dysuria, -hematuria, -frequency  Musculoskeletal:  Chronic back pain followed by pain management  Neurological: -headache, -dizziness, -numbness, -tingling, +falling         Physical Exam    General: In no acute distress. Not ill-appearing or diaphoretic.  Neck: Normal thyroid. No cervical lymphadenopathy. 2+ carotid pulses.  Cardiovascular: Normal rate and regular  rhythm. No murmur heard. No gallop.  Pulmonary: Pulmonary effort is normal. No respiratory distress. No wheezing. No rhonchi. No rales.  Abdominal: Soft. Nontender. Nondistended. No mass.  Musculoskeletal:  Using a Rollator to ambulate  Skin: Callus on foot.  Neurological: Alert.  Psychiatric: Mood normal. Behavior normal.          Assessment & Plan    PAIN MANAGEMENT:  - Ms. Francisco reports significant pain requiring intervention.  - Discussed and considering spinal stimulator as a potential option for long-term pain management.    FALL PREVENTION:  - Ms. Francisco reports history of frequent falls.  - Evaluated fall risk and confirmed continued need for assistive devic    CALLUS MANAGEMENT:  - Identified callus on the bottom of patient's foot and explained the nature of this condition.  - Recommend conservative management including: soaking foot in warm water for approximately 15 minutes, patting dry afterward, applying moisturizing cream or ointment (suggested Aquaphor or similar petroleum-based product), and wearing socks at night to maintain moisture and prevent rubbing.          Lab was ordered    Follow up in about 3 months (around 10/14/2025).    This note was generated with the assistance of ambient listening technology. Verbal consent was obtained by the patient and accompanying visitor(s) for the recording of patient appointment to facilitate this note. I attest to having reviewed and edited the generated note for accuracy, though some syntax or spelling errors may persist. Please contact the author of this note for any clarification.

## 2025-07-15 ENCOUNTER — TELEPHONE (OUTPATIENT)
Dept: INTERNAL MEDICINE | Facility: CLINIC | Age: 65
End: 2025-07-15
Payer: MEDICARE

## 2025-07-15 DIAGNOSIS — I10 PRIMARY HYPERTENSION: Primary | ICD-10-CM

## 2025-07-15 NOTE — TELEPHONE ENCOUNTER
----- Message from Erin Gannon sent at 7/15/2025 12:23 AM CDT -----  Regarding: Lab Client Services  Contact: [phone]  Good Morning,      My name is Teressa Kate, I work in the Lab Client Services. We had a problem with some lab work on this patient. If someone from your office could call us at [phone] or ext. [phone] that would be great. Anyone in my department can help.      Thank you

## 2025-07-15 NOTE — TELEPHONE ENCOUNTER
Please put orders in for cbc the lab said the tube they robert was clotted need to redraw        ----- Message from Sergio Malcolm MD sent at 7/15/2025  7:26 AM CDT -----  Regarding: FW: Lab Client Services  Contact: [phone]  Call the lab to see what they need  ----- Message -----  From: HumbleTeressa  Sent: 7/15/2025  12:24 AM CDT  To: Sergio Malcolm MD; [facility] Staff  Subject: Lab Client Services                              Good Morning,      My name is Teressa Kate, I work in the Lab Client Services. We had a problem with some lab work on this patient. If someone from your office could call us at [phone] or ext. [phone] that would be great. Anyone in my department can help.      Thank you

## 2025-07-18 ENCOUNTER — LAB VISIT (OUTPATIENT)
Dept: LAB | Facility: HOSPITAL | Age: 65
End: 2025-07-18
Attending: FAMILY MEDICINE
Payer: MEDICARE

## 2025-07-18 DIAGNOSIS — I10 PRIMARY HYPERTENSION: ICD-10-CM

## 2025-07-18 LAB
ABSOLUTE EOSINOPHIL (OHS): 0.14 K/UL
ABSOLUTE MONOCYTE (OHS): 0.27 K/UL (ref 0.3–1)
ABSOLUTE NEUTROPHIL COUNT (OHS): 1.26 K/UL (ref 1.8–7.7)
BASOPHILS # BLD AUTO: 0.02 K/UL
BASOPHILS NFR BLD AUTO: 0.6 %
ERYTHROCYTE [DISTWIDTH] IN BLOOD BY AUTOMATED COUNT: 14.5 % (ref 11.5–14.5)
HCT VFR BLD AUTO: 36.9 % (ref 37–48.5)
HGB BLD-MCNC: 11.6 GM/DL (ref 12–16)
IMM GRANULOCYTES # BLD AUTO: 0 K/UL (ref 0–0.04)
IMM GRANULOCYTES NFR BLD AUTO: 0 % (ref 0–0.5)
LYMPHOCYTES # BLD AUTO: 1.54 K/UL (ref 1–4.8)
MCH RBC QN AUTO: 29.6 PG (ref 27–31)
MCHC RBC AUTO-ENTMCNC: 31.4 G/DL (ref 32–36)
MCV RBC AUTO: 94 FL (ref 82–98)
NUCLEATED RBC (/100WBC) (OHS): 0 /100 WBC
PLATELET # BLD AUTO: 216 K/UL (ref 150–450)
PMV BLD AUTO: 9.8 FL (ref 9.2–12.9)
RBC # BLD AUTO: 3.92 M/UL (ref 4–5.4)
RELATIVE EOSINOPHIL (OHS): 4.3 %
RELATIVE LYMPHOCYTE (OHS): 47.7 % (ref 18–48)
RELATIVE MONOCYTE (OHS): 8.4 % (ref 4–15)
RELATIVE NEUTROPHIL (OHS): 39 % (ref 38–73)
WBC # BLD AUTO: 3.23 K/UL (ref 3.9–12.7)

## 2025-07-18 PROCEDURE — 36415 COLL VENOUS BLD VENIPUNCTURE: CPT | Mod: PN

## 2025-07-18 PROCEDURE — 85025 COMPLETE CBC W/AUTO DIFF WBC: CPT

## (undated) DEVICE — CATH INFINITI 4F 3DRC 100CM

## (undated) DEVICE — CATH JL4 5FR

## (undated) DEVICE — KIT MANIFOLD LOW PRESS TUBING

## (undated) DEVICE — SHEET THYROID W/ISO-BAC

## (undated) DEVICE — CATH PIG145 INFINITI 5X110CM

## (undated) DEVICE — CATH JL3.5 5FR

## (undated) DEVICE — KIT GLIDESHEATH SLEND 6FR 10CM

## (undated) DEVICE — GUIDEWIRE EMERALD .035IN 260CM

## (undated) DEVICE — CATH INFINITI MULTIPAK JR4 5FR

## (undated) DEVICE — CATH JR4 5FR

## (undated) DEVICE — PACK CATH LAB CUSTOM BR

## (undated) DEVICE — OMNIPAQUE 300MG 150ML VIAL

## (undated) DEVICE — COVER TABLE 44X90 STERILE

## (undated) DEVICE — KIT SYR REUSABLE

## (undated) DEVICE — BAND TR COMP DEVICE REG 24CM

## (undated) DEVICE — GUIDEWIRE WHOLEY HI TORQ 175CM

## (undated) DEVICE — CATH INFINITI MP JL3.5 JR4 5FR